# Patient Record
Sex: MALE | Race: WHITE | NOT HISPANIC OR LATINO | Employment: OTHER | URBAN - METROPOLITAN AREA
[De-identification: names, ages, dates, MRNs, and addresses within clinical notes are randomized per-mention and may not be internally consistent; named-entity substitution may affect disease eponyms.]

---

## 2017-01-05 RX ORDER — AMLODIPINE BESYLATE 10 MG/1
TABLET ORAL
Qty: 90 TABLET | Refills: 2 | Status: SHIPPED | OUTPATIENT
Start: 2017-01-05 | End: 2017-08-23 | Stop reason: SDUPTHER

## 2017-01-05 RX ORDER — FLUTICASONE PROPIONATE 50 MCG
SPRAY, SUSPENSION (ML) NASAL
Qty: 48 G | Refills: 3 | Status: SHIPPED | OUTPATIENT
Start: 2017-01-05 | End: 2017-10-23 | Stop reason: SDUPTHER

## 2017-01-24 RX ORDER — BLOOD SUGAR DIAGNOSTIC
STRIP MISCELLANEOUS
Qty: 300 EACH | Refills: 2 | Status: SHIPPED | OUTPATIENT
Start: 2017-01-24 | End: 2017-08-23 | Stop reason: SDUPTHER

## 2017-02-13 RX ORDER — LANCETS
EACH MISCELLANEOUS
Qty: 300 EACH | Refills: 2 | Status: SHIPPED | OUTPATIENT
Start: 2017-02-13 | End: 2020-03-02

## 2017-02-14 RX ORDER — BISOPROLOL FUMARATE AND HYDROCHLOROTHIAZIDE 2.5; 6.25 MG/1; MG/1
TABLET ORAL
Qty: 90 TABLET | Refills: 2 | Status: SHIPPED | OUTPATIENT
Start: 2017-02-14 | End: 2017-10-23 | Stop reason: SDUPTHER

## 2017-03-01 ENCOUNTER — OFFICE VISIT (OUTPATIENT)
Dept: INTERNAL MEDICINE | Facility: CLINIC | Age: 77
End: 2017-03-01

## 2017-03-01 VITALS
SYSTOLIC BLOOD PRESSURE: 120 MMHG | HEIGHT: 66 IN | BODY MASS INDEX: 25.88 KG/M2 | WEIGHT: 161 LBS | DIASTOLIC BLOOD PRESSURE: 65 MMHG | HEART RATE: 68 BPM

## 2017-03-01 DIAGNOSIS — I10 ESSENTIAL HYPERTENSION: ICD-10-CM

## 2017-03-01 DIAGNOSIS — I35.0 AORTIC VALVE STENOSIS, UNSPECIFIED ETIOLOGY: Primary | ICD-10-CM

## 2017-03-01 DIAGNOSIS — K57.30 DIVERTICULOSIS OF LARGE INTESTINE WITHOUT HEMORRHAGE: ICD-10-CM

## 2017-03-01 DIAGNOSIS — J30.9 ATOPIC RHINITIS: ICD-10-CM

## 2017-03-01 DIAGNOSIS — E78.49 OTHER HYPERLIPIDEMIA: ICD-10-CM

## 2017-03-01 DIAGNOSIS — F32.89 OTHER DEPRESSION: ICD-10-CM

## 2017-03-01 DIAGNOSIS — IMO0002 UNCONTROLLED TYPE 2 DIABETES MELLITUS WITH COMPLICATION, WITHOUT LONG-TERM CURRENT USE OF INSULIN: ICD-10-CM

## 2017-03-01 LAB — HBA1C MFR BLD: 6.5 %

## 2017-03-01 PROCEDURE — 99214 OFFICE O/P EST MOD 30 MIN: CPT | Performed by: INTERNAL MEDICINE

## 2017-03-01 PROCEDURE — 83036 HEMOGLOBIN GLYCOSYLATED A1C: CPT | Performed by: INTERNAL MEDICINE

## 2017-03-01 NOTE — PROGRESS NOTES
Patient is a 77 y.o. male who is here for a follow up of chronic conditions.  Chief Complaint   Patient presents with   • Hypertension   • Hyperlipidemia   • Diabetes         HPI:  Here for f/u.  Had to stop glyburide bc he was having hypoglycemia.  Energy level is fair.  Working out daily.  Watching his carb intake.  No dizziness or lightheadedness.  No abdominal pains.  No HA's.     History:    Patient Active Problem List   Diagnosis   • Atopic rhinitis   • Depression   • Uncontrolled type 2 diabetes mellitus   • Diverticulosis of intestine   • Hyperlipidemia   • Hypertension   • Aortic stenosis       Past Medical History   Diagnosis Date   • Community acquired pneumonia        Past Surgical History   Procedure Laterality Date   • Lipoma excision       s/p excision on chest   • Leg surgery Left 07/2016     Popliteal Artery stenting by Dr Hdz   • Leg surgery Left 07/2016     skin graft by Dr Nance       Current Outpatient Prescriptions on File Prior to Visit   Medication Sig   • ACCU-CHEK FASTCLIX LANCETS misc TEST THREE TIMES DAILY   • amLODIPine (NORVASC) 10 MG tablet TAKE 1 TABLET EVERY DAY   • benazepril (LOTENSIN) 40 MG tablet TAKE 1 TABLET DAILY.   • bisoprolol-hydrochlorothiazide (ZIAC) 2.5-6.25 MG per tablet TAKE 1 TABLET ONE TIME DAILY   • clopidogrel (PLAVIX) 75 MG tablet Take 1 tablet by mouth Daily. Stop aspirin   • fluticasone (FLONASE) 50 MCG/ACT nasal spray USE 1 SPRAY NASALLY TWICE DAILY   • metFORMIN (GLUCOPHAGE) 850 MG tablet TAKE 1 TABLET TWICE DAILY WITH MEALS   • PARoxetine (PAXIL) 20 MG tablet TAKE 1 TABLET DAILY   • simvastatin (ZOCOR) 40 MG tablet TAKE 1 TABLET DAILY AT BEDTIME.   • SITagliptin (JANUVIA) 100 MG tablet Take 0.5 tablets by mouth Daily.   • [DISCONTINUED] glipiZIDE (GLUCOTROL) 10 MG tablet Daily.   • ACCU-CHEK SMARTVIEW test strip TEST THREE TIMES DAILY     No current facility-administered medications on file prior to visit.        Family History   Problem Relation Age of  "Onset   • Diabetes Other    • Hypertension Other    • Stroke Other    • Coronary artery disease Other        Social History     Social History   • Marital status:      Spouse name: N/A   • Number of children: N/A   • Years of education: N/A     Occupational History   • Not on file.     Social History Main Topics   • Smoking status: Former Smoker   • Smokeless tobacco: Never Used   • Alcohol use Not on file   • Drug use: No   • Sexual activity: Not on file     Other Topics Concern   • Not on file     Social History Narrative         ROS:    Review of Systems   Constitutional: Negative for chills, diaphoresis, fatigue, fever and unexpected weight change.   HENT: Negative for congestion, ear pain, hearing loss, nosebleeds, postnasal drip, sinus pressure and sore throat.    Eyes: Negative for pain, discharge and itching.   Respiratory: Negative for cough, chest tightness, shortness of breath and wheezing.    Cardiovascular: Negative for chest pain, palpitations and leg swelling.   Gastrointestinal: Negative for abdominal distention, abdominal pain, blood in stool, constipation, diarrhea, nausea and vomiting.        2016 colonoscopy    Endocrine: Negative for polydipsia and polyuria.   Genitourinary: Negative for difficulty urinating, dysuria, frequency and hematuria.   Musculoskeletal: Negative for arthralgias, back pain, gait problem, joint swelling and myalgias.   Skin: Negative for rash and wound.   Neurological: Negative for dizziness, syncope, weakness and headaches.   Psychiatric/Behavioral: Negative for dysphoric mood and sleep disturbance. The patient is not nervous/anxious.        Visit Vitals   • /65   • Pulse 68   • Ht 66\" (167.6 cm)   • Wt 161 lb (73 kg)   • BMI 25.99 kg/m2       Physical Exam:    Physical Exam   Constitutional: He is oriented to person, place, and time. He appears well-developed and well-nourished.   HENT:   Head: Normocephalic and atraumatic.   Right Ear: External ear normal. "   Left Ear: External ear normal.   Mouth/Throat: Oropharynx is clear and moist.   Eyes: Conjunctivae and EOM are normal.   Neck: Normal range of motion. Neck supple.   Cardiovascular: Normal rate, regular rhythm and normal heart sounds.    Pulmonary/Chest: Effort normal and breath sounds normal.   Abdominal: Soft. Bowel sounds are normal.   Musculoskeletal: Normal range of motion.   Lymphadenopathy:     He has no cervical adenopathy.   Neurological: He is alert and oriented to person, place, and time.   Skin: Skin is warm and dry.   Psychiatric: He has a normal mood and affect. His behavior is normal. Thought content normal.       Procedure:      Discussion/Summary:  htn-stable  [facility]lipidemia-[facility]  dm-counseled on diet and exercise, labs today  depression-stable  [facility]  anemia-[facility]  rhinitis-flonase rx  pvd-stop asa and cont plavix  other-patient to get colonoscopy       Current Outpatient Prescriptions:   •  ACCU-CHEK FASTCLIX LANCETS misc, TEST THREE TIMES DAILY, Disp: 300 each, Rfl: 2  •  amLODIPine (NORVASC) 10 MG tablet, TAKE 1 TABLET EVERY DAY, Disp: 90 tablet, Rfl: 2  •  benazepril (LOTENSIN) 40 MG tablet, TAKE 1 TABLET DAILY., Disp: 90 tablet, Rfl: 2  •  bisoprolol-hydrochlorothiazide (ZIAC) 2.5-6.25 MG per tablet, TAKE 1 TABLET ONE TIME DAILY, Disp: 90 tablet, Rfl: 2  •  clopidogrel (PLAVIX) 75 MG tablet, Take 1 tablet by mouth Daily. Stop aspirin, Disp: 90 tablet, Rfl: 3  •  fluticasone (FLONASE) 50 MCG/ACT nasal spray, USE 1 SPRAY NASALLY TWICE DAILY, Disp: 48 g, Rfl: 3  •  metFORMIN (GLUCOPHAGE) 850 MG tablet, TAKE 1 TABLET TWICE DAILY WITH MEALS, Disp: 180 tablet, Rfl: 3  •  PARoxetine (PAXIL) 20 MG tablet, TAKE 1 TABLET DAILY, Disp: 90 tablet, Rfl: 2  •  simvastatin (ZOCOR) 40 MG tablet, TAKE 1 TABLET DAILY AT BEDTIME., Disp: 90 tablet, Rfl: 3  •  SITagliptin (JANUVIA) 100 MG tablet, Take 0.5 tablets by mouth Daily., Disp: 42 tablet, Rfl: 0  •  ACCU-CHEK SMARTVIEW test strip,  TEST THREE TIMES DAILY, Disp: 300 each, Rfl: 2        Derian was seen today for hypertension, hyperlipidemia and diabetes.    Diagnoses and all orders for this visit:    Aortic valve stenosis, unspecified etiology    Other hyperlipidemia    Secondary hypertension    Atopic rhinitis    Diverticulosis of large intestine without hemorrhage    Uncontrolled type 2 diabetes mellitus with complication, without long-term current use of insulin  -     POC Glycosylated Hemoglobin (Hb A1C)    Other depression

## 2017-03-20 RX ORDER — PAROXETINE HYDROCHLORIDE 20 MG/1
TABLET, FILM COATED ORAL
Qty: 90 TABLET | Refills: 2 | Status: SHIPPED | OUTPATIENT
Start: 2017-03-20 | End: 2017-11-15 | Stop reason: SDUPTHER

## 2017-04-18 ENCOUNTER — OFFICE VISIT (OUTPATIENT)
Dept: INTERNAL MEDICINE | Facility: CLINIC | Age: 77
End: 2017-04-18

## 2017-04-18 VITALS
HEIGHT: 66 IN | TEMPERATURE: 97.2 F | DIASTOLIC BLOOD PRESSURE: 80 MMHG | SYSTOLIC BLOOD PRESSURE: 130 MMHG | HEART RATE: 74 BPM | BODY MASS INDEX: 26.03 KG/M2 | OXYGEN SATURATION: 100 % | WEIGHT: 162 LBS | RESPIRATION RATE: 20 BRPM

## 2017-04-18 DIAGNOSIS — I35.0 AORTIC VALVE STENOSIS, UNSPECIFIED ETIOLOGY: Primary | ICD-10-CM

## 2017-04-18 DIAGNOSIS — F32.89 OTHER DEPRESSION: ICD-10-CM

## 2017-04-18 DIAGNOSIS — I10 ESSENTIAL HYPERTENSION: ICD-10-CM

## 2017-04-18 DIAGNOSIS — K57.30 DIVERTICULOSIS OF LARGE INTESTINE WITHOUT HEMORRHAGE: ICD-10-CM

## 2017-04-18 DIAGNOSIS — J30.9 ATOPIC RHINITIS: ICD-10-CM

## 2017-04-18 DIAGNOSIS — E78.49 OTHER HYPERLIPIDEMIA: ICD-10-CM

## 2017-04-18 DIAGNOSIS — IMO0002 UNCONTROLLED TYPE 2 DIABETES MELLITUS WITH COMPLICATION, WITHOUT LONG-TERM CURRENT USE OF INSULIN: ICD-10-CM

## 2017-04-18 LAB
BUN SERPL-MCNC: 20 MG/DL (ref 9–23)
BUN/CREAT SERPL: 28.6 (ref 7–25)
CALCIUM SERPL-MCNC: 10.4 MG/DL (ref 8.7–10.4)
CHLORIDE SERPL-SCNC: 108 MMOL/L (ref 99–109)
CO2 SERPL-SCNC: 26 MMOL/L (ref 20–31)
CREAT SERPL-MCNC: 0.7 MG/DL (ref 0.6–1.3)
ERYTHROCYTE [DISTWIDTH] IN BLOOD BY AUTOMATED COUNT: 12.8 % (ref 11.3–14.5)
GLUCOSE SERPL-MCNC: 164 MG/DL (ref 70–100)
HCT VFR BLD AUTO: 41.6 % (ref 38.9–50.9)
HGB BLD-MCNC: 13.5 G/DL (ref 13.1–17.5)
MCH RBC QN AUTO: 31.8 PG (ref 27–31)
MCHC RBC AUTO-ENTMCNC: 32.5 G/DL (ref 32–36)
MCV RBC AUTO: 98.1 FL (ref 80–99)
PLATELET # BLD AUTO: 226 10*3/MM3 (ref 150–450)
POTASSIUM SERPL-SCNC: 4.4 MMOL/L (ref 3.5–5.5)
RBC # BLD AUTO: 4.24 10*6/MM3 (ref 4.2–5.76)
SODIUM SERPL-SCNC: 141 MMOL/L (ref 132–146)
WBC # BLD AUTO: 6.56 10*3/MM3 (ref 3.5–10.8)

## 2017-04-18 PROCEDURE — 99214 OFFICE O/P EST MOD 30 MIN: CPT | Performed by: INTERNAL MEDICINE

## 2017-04-18 NOTE — PROGRESS NOTES
Patient is a 77 y.o. male who is here for a follow up of   Chief Complaint   Patient presents with   • Dizziness         HPI:  Here for f/u.  Has had increased episodes of dizziness.  Improved with increased fluid intake. Worst with activity.  BP in the 120's/70's.  No palpitations or CP.  No abdominal pains.  No edema.      History:    Patient Active Problem List   Diagnosis   • Atopic rhinitis   • Depression   • Uncontrolled type 2 diabetes mellitus   • Diverticulosis of intestine   • Hyperlipidemia   • Essential hypertension   • Aortic stenosis       Past Medical History:   Diagnosis Date   • Community acquired pneumonia        Past Surgical History:   Procedure Laterality Date   • LEG SURGERY Left 07/2016    Popliteal Artery stenting by Dr Hdz   • LEG SURGERY Left 07/2016    skin graft by Dr Nance   • LIPOMA EXCISION      s/p excision on chest       Current Outpatient Prescriptions on File Prior to Visit   Medication Sig   • ACCU-CHEK FASTCLIX LANCETS misc TEST THREE TIMES DAILY   • ACCU-CHEK SMARTVIEW test strip TEST THREE TIMES DAILY   • amLODIPine (NORVASC) 10 MG tablet TAKE 1 TABLET EVERY DAY   • benazepril (LOTENSIN) 40 MG tablet TAKE 1 TABLET DAILY.   • bisoprolol-hydrochlorothiazide (ZIAC) 2.5-6.25 MG per tablet TAKE 1 TABLET ONE TIME DAILY   • clopidogrel (PLAVIX) 75 MG tablet Take 1 tablet by mouth Daily. Stop aspirin   • fluticasone (FLONASE) 50 MCG/ACT nasal spray USE 1 SPRAY NASALLY TWICE DAILY   • metFORMIN (GLUCOPHAGE) 850 MG tablet TAKE 1 TABLET TWICE DAILY WITH MEALS   • PARoxetine (PAXIL) 20 MG tablet TAKE 1 TABLET DAILY   • simvastatin (ZOCOR) 40 MG tablet TAKE 1 TABLET DAILY AT BEDTIME.   • SITagliptin (JANUVIA) 100 MG tablet Take 0.5 tablets by mouth Daily.     No current facility-administered medications on file prior to visit.        Family History   Problem Relation Age of Onset   • Diabetes Other    • Hypertension Other    • Stroke Other    • Coronary artery disease Other        Social  "History     Social History   • Marital status:      Spouse name: N/A   • Number of children: N/A   • Years of education: N/A     Occupational History   • Not on file.     Social History Main Topics   • Smoking status: Former Smoker   • Smokeless tobacco: Never Used   • Alcohol use Not on file   • Drug use: No   • Sexual activity: Not on file     Other Topics Concern   • Not on file     Social History Narrative         ROS:    Review of Systems   Constitutional: Negative for chills, diaphoresis, fatigue, fever and unexpected weight change.   HENT: Negative for congestion, ear pain, hearing loss, nosebleeds, postnasal drip, sinus pressure and sore throat.    Eyes: Negative for pain, discharge and itching.   Respiratory: Negative for cough, chest tightness, shortness of breath and wheezing.    Cardiovascular: Negative for chest pain, palpitations and leg swelling.   Gastrointestinal: Negative for abdominal distention, abdominal pain, blood in stool, constipation, diarrhea, nausea and vomiting.        2016 colonoscopy    Endocrine: Negative for polydipsia and polyuria.   Genitourinary: Negative for difficulty urinating, dysuria, frequency and hematuria.   Musculoskeletal: Negative for arthralgias, back pain, gait problem, joint swelling and myalgias.   Skin: Negative for rash and wound.   Neurological: Positive for light-headedness. Negative for dizziness, syncope, weakness and headaches.   Psychiatric/Behavioral: Negative for dysphoric mood and sleep disturbance. The patient is not nervous/anxious.        /80  Pulse 74  Temp 97.2 °F (36.2 °C)  Resp 20  Ht 66\" (167.6 cm)  Wt 162 lb (73.5 kg)  SpO2 100%  BMI 26.15 kg/m2    Physical Exam:    Physical Exam   Constitutional: He is oriented to person, place, and time. He appears well-developed and well-nourished.   HENT:   Head: Normocephalic and atraumatic.   Right Ear: External ear normal.   Left Ear: External ear normal.   Mouth/Throat: Oropharynx is " clear and moist.   Eyes: Conjunctivae and EOM are normal.   Neck: Normal range of motion. Neck supple.   Cardiovascular: Normal rate, regular rhythm and normal heart sounds.    Pulmonary/Chest: Effort normal and breath sounds normal.   Abdominal: Soft. Bowel sounds are normal.   Musculoskeletal: Normal range of motion.   Lymphadenopathy:     He has no cervical adenopathy.   Neurological: He is alert and oriented to person, place, and time.   Skin: Skin is warm and dry.   Psychiatric: He has a normal mood and affect. His behavior is normal. Thought content normal.       Procedure:      Discussion/Summary:  htn-stable  hyperlipidemia-[facility]  dm-counseled on diet and exercise, labs at goal  depression-stable  [facility] done [facility]  anemia-check today  rhinitis-flonase rx prn and otc claritin/allegra  pvd-cont plavix  other-patient to get colonoscopy     Labs noted and dw patient      Current Outpatient Prescriptions:   •  ACCU-CHEK FASTCLIX LANCETS misc, TEST THREE TIMES DAILY, Disp: 300 each, Rfl: 2  •  ACCU-CHEK SMARTVIEW test strip, TEST THREE TIMES DAILY, Disp: 300 each, Rfl: 2  •  amLODIPine (NORVASC) 10 MG tablet, TAKE 1 TABLET EVERY DAY, Disp: 90 tablet, Rfl: 2  •  benazepril (LOTENSIN) 40 MG tablet, TAKE 1 TABLET DAILY., Disp: 90 tablet, Rfl: 2  •  bisoprolol-hydrochlorothiazide (ZIAC) 2.5-6.25 MG per tablet, TAKE 1 TABLET ONE TIME DAILY, Disp: 90 tablet, Rfl: 2  •  clopidogrel (PLAVIX) 75 MG tablet, Take 1 tablet by mouth Daily. Stop aspirin, Disp: 90 tablet, Rfl: 3  •  fluticasone (FLONASE) 50 MCG/ACT nasal spray, USE 1 SPRAY NASALLY TWICE DAILY, Disp: 48 g, Rfl: 3  •  metFORMIN (GLUCOPHAGE) 850 MG tablet, TAKE 1 TABLET TWICE DAILY WITH MEALS, Disp: 180 tablet, Rfl: 3  •  PARoxetine (PAXIL) 20 MG tablet, TAKE 1 TABLET DAILY, Disp: 90 tablet, Rfl: 2  •  simvastatin (ZOCOR) 40 MG tablet, TAKE 1 TABLET DAILY AT BEDTIME., Disp: 90 tablet, Rfl: 3  •  SITagliptin (JANUVIA) 100 MG tablet, Take 0.5 tablets by mouth  Daily., Disp: 42 tablet, Rfl: 0        Derian was seen today for dizziness.    Diagnoses and all orders for this visit:    Aortic valve stenosis, unspecified etiology    Essential hypertension  -     Basic Metabolic Panel  -     CBC (No Diff)    Other hyperlipidemia    Atopic rhinitis    Diverticulosis of large intestine without hemorrhage    Uncontrolled type 2 diabetes mellitus with complication, without long-term current use of insulin    Other depression

## 2017-04-28 RX ORDER — BENAZEPRIL HYDROCHLORIDE 40 MG/1
TABLET, FILM COATED ORAL
Qty: 90 TABLET | Refills: 2 | Status: SHIPPED | OUTPATIENT
Start: 2017-04-28 | End: 2017-11-15 | Stop reason: SDUPTHER

## 2017-04-28 RX ORDER — ISOPROPYL ALCOHOL 0.75 G/1
SWAB TOPICAL
Qty: 100 EACH | Refills: 3 | Status: SHIPPED | OUTPATIENT
Start: 2017-04-28 | End: 2018-05-08 | Stop reason: SDUPTHER

## 2017-05-04 ENCOUNTER — OFFICE VISIT (OUTPATIENT)
Dept: INTERNAL MEDICINE | Facility: CLINIC | Age: 77
End: 2017-05-04

## 2017-05-04 VITALS
BODY MASS INDEX: 26.36 KG/M2 | HEART RATE: 60 BPM | SYSTOLIC BLOOD PRESSURE: 140 MMHG | DIASTOLIC BLOOD PRESSURE: 80 MMHG | HEIGHT: 66 IN | WEIGHT: 164 LBS

## 2017-05-04 DIAGNOSIS — J30.9 ATOPIC RHINITIS: ICD-10-CM

## 2017-05-04 DIAGNOSIS — E78.49 OTHER HYPERLIPIDEMIA: ICD-10-CM

## 2017-05-04 DIAGNOSIS — I10 ESSENTIAL HYPERTENSION: ICD-10-CM

## 2017-05-04 DIAGNOSIS — IMO0002 UNCONTROLLED TYPE 2 DIABETES MELLITUS WITH COMPLICATION, WITHOUT LONG-TERM CURRENT USE OF INSULIN: ICD-10-CM

## 2017-05-04 DIAGNOSIS — F32.89 OTHER DEPRESSION: ICD-10-CM

## 2017-05-04 DIAGNOSIS — K57.30 DIVERTICULOSIS OF LARGE INTESTINE WITHOUT HEMORRHAGE: ICD-10-CM

## 2017-05-04 DIAGNOSIS — I35.0 AORTIC VALVE STENOSIS, UNSPECIFIED ETIOLOGY: Primary | ICD-10-CM

## 2017-05-04 PROCEDURE — 96160 PT-FOCUSED HLTH RISK ASSMT: CPT | Performed by: INTERNAL MEDICINE

## 2017-05-04 PROCEDURE — G0438 PPPS, INITIAL VISIT: HCPCS | Performed by: INTERNAL MEDICINE

## 2017-08-24 RX ORDER — BLOOD SUGAR DIAGNOSTIC
STRIP MISCELLANEOUS
Qty: 300 EACH | Refills: 2 | Status: SHIPPED | OUTPATIENT
Start: 2017-08-24 | End: 2018-12-24 | Stop reason: SDUPTHER

## 2017-08-24 RX ORDER — AMLODIPINE BESYLATE 10 MG/1
TABLET ORAL
Qty: 90 TABLET | Refills: 2 | Status: SHIPPED | OUTPATIENT
Start: 2017-08-24 | End: 2018-05-08 | Stop reason: SDUPTHER

## 2017-09-15 ENCOUNTER — OFFICE VISIT (OUTPATIENT)
Dept: INTERNAL MEDICINE | Facility: CLINIC | Age: 77
End: 2017-09-15

## 2017-09-15 VITALS
WEIGHT: 155 LBS | DIASTOLIC BLOOD PRESSURE: 64 MMHG | BODY MASS INDEX: 24.91 KG/M2 | HEART RATE: 60 BPM | SYSTOLIC BLOOD PRESSURE: 118 MMHG | HEIGHT: 66 IN

## 2017-09-15 DIAGNOSIS — I35.0 AORTIC VALVE STENOSIS, UNSPECIFIED ETIOLOGY: Primary | ICD-10-CM

## 2017-09-15 DIAGNOSIS — H61.23 BILATERAL IMPACTED CERUMEN: ICD-10-CM

## 2017-09-15 DIAGNOSIS — L97.309 DIABETIC ANKLE ULCER (HCC): ICD-10-CM

## 2017-09-15 DIAGNOSIS — N40.1 BENIGN NON-NODULAR PROSTATIC HYPERPLASIA WITH LOWER URINARY TRACT SYMPTOMS: ICD-10-CM

## 2017-09-15 DIAGNOSIS — K57.30 DIVERTICULOSIS OF LARGE INTESTINE WITHOUT HEMORRHAGE: ICD-10-CM

## 2017-09-15 DIAGNOSIS — IMO0002 UNCONTROLLED TYPE 2 DIABETES MELLITUS WITH COMPLICATION, WITHOUT LONG-TERM CURRENT USE OF INSULIN: ICD-10-CM

## 2017-09-15 DIAGNOSIS — E78.49 OTHER HYPERLIPIDEMIA: ICD-10-CM

## 2017-09-15 DIAGNOSIS — J30.9 ATOPIC RHINITIS: ICD-10-CM

## 2017-09-15 DIAGNOSIS — E11.622 DIABETIC ANKLE ULCER (HCC): ICD-10-CM

## 2017-09-15 DIAGNOSIS — I10 ESSENTIAL HYPERTENSION: ICD-10-CM

## 2017-09-15 DIAGNOSIS — F32.89 OTHER DEPRESSION: ICD-10-CM

## 2017-09-15 PROCEDURE — 99214 OFFICE O/P EST MOD 30 MIN: CPT | Performed by: INTERNAL MEDICINE

## 2017-09-15 PROCEDURE — 69209 REMOVE IMPACTED EAR WAX UNI: CPT | Performed by: INTERNAL MEDICINE

## 2017-09-15 RX ORDER — DOXAZOSIN 2 MG/1
2 TABLET ORAL NIGHTLY
Qty: 90 TABLET | Refills: 3 | Status: SHIPPED | OUTPATIENT
Start: 2017-09-15 | End: 2018-12-24 | Stop reason: SDUPTHER

## 2017-09-15 NOTE — PROGRESS NOTES
Patient is a 77 y.o. male who is here for a follow up of chronic conditions.  Chief Complaint   Patient presents with   • Hypertension   • Hyperlipidemia         HPI:  Here for f/u.  FSBS in the 120-160's in the am.  Continues to exercise on regular basis.  No dizziness or lightheadedness.  Urinates a lot at night.  Breathing is good.  No abdominal pains.  Feels his ear are full of wax.     History:    Patient Active Problem List   Diagnosis   • Atopic rhinitis   • Depression   • Uncontrolled type 2 diabetes mellitus   • Diverticulosis of intestine   • Hyperlipidemia   • Essential hypertension   • Aortic stenosis   • Bilateral impacted cerumen   • Benign non-nodular prostatic hyperplasia with lower urinary tract symptoms       Past Medical History:   Diagnosis Date   • Community acquired pneumonia        Past Surgical History:   Procedure Laterality Date   • LEG SURGERY Left 07/2016    Popliteal Artery stenting by Dr Hdz   • LEG SURGERY Left 07/2016    skin graft by Dr Nance   • LIPOMA EXCISION      s/p excision on chest       Current Outpatient Prescriptions on File Prior to Visit   Medication Sig   • amLODIPine (NORVASC) 10 MG tablet TAKE 1 TABLET EVERY DAY   • benazepril (LOTENSIN) 40 MG tablet TAKE 1 TABLET EVERY DAY   • bisoprolol-hydrochlorothiazide (ZIAC) 2.5-6.25 MG per tablet TAKE 1 TABLET ONE TIME DAILY   • clopidogrel (PLAVIX) 75 MG tablet Take 1 tablet by mouth Daily. Stop aspirin   • fluticasone (FLONASE) 50 MCG/ACT nasal spray USE 1 SPRAY NASALLY TWICE DAILY   • metFORMIN (GLUCOPHAGE) 850 MG tablet TAKE 1 TABLET TWICE DAILY WITH MEALS   • PARoxetine (PAXIL) 20 MG tablet TAKE 1 TABLET DAILY   • simvastatin (ZOCOR) 40 MG tablet TAKE 1 TABLET DAILY AT BEDTIME.   • SITagliptin (JANUVIA) 100 MG tablet Take 0.5 tablets by mouth Daily.   • ACCU-CHEK FASTCLIX LANCETS misc TEST THREE TIMES DAILY   • ACCU-CHEK SMARTVIEW test strip TEST THREE TIMES DAILY   • Alcohol Swabs (B-D SINGLE USE SWABS REGULAR) pads USE  FOR TESTING DAILY AS DIRECTED     No current facility-administered medications on file prior to visit.        Family History   Problem Relation Age of Onset   • Diabetes Other    • Hypertension Other    • Stroke Other    • Coronary artery disease Other        Social History     Social History   • Marital status:      Spouse name: N/A   • Number of children: N/A   • Years of education: N/A     Occupational History   • Not on file.     Social History Main Topics   • Smoking status: Former Smoker   • Smokeless tobacco: Never Used   • Alcohol use Not on file   • Drug use: No   • Sexual activity: Not on file     Other Topics Concern   • Not on file     Social History Narrative         ROS:    Review of Systems   Constitutional: Negative for chills, diaphoresis, fatigue, fever and unexpected weight change.   HENT: Negative for congestion, ear pain, hearing loss, nosebleeds, postnasal drip, sinus pressure and sore throat.    Eyes: Negative for pain, discharge and itching.   Respiratory: Negative for cough, chest tightness, shortness of breath and wheezing.    Cardiovascular: Negative for chest pain, palpitations and leg swelling.   Gastrointestinal: Negative for abdominal distention, abdominal pain, blood in stool, constipation, diarrhea, nausea and vomiting.        3/2016 colonoscopy by Dr García, with polyps times 2   Endocrine: Negative for polydipsia and polyuria.   Genitourinary: Positive for difficulty urinating, frequency and urgency. Negative for dysuria and hematuria.        [facility], 0.4 on [facility]/17   Musculoskeletal: Negative for arthralgias, back pain, gait problem, joint swelling and myalgias.   Skin: Negative for rash and wound.   Neurological: Positive for light-headedness. Negative for dizziness, syncope, weakness and headaches.   Psychiatric/Behavioral: Negative for dysphoric mood and sleep disturbance. The patient is not nervous/anxious.        /64 (BP Location: Left arm, Patient Position:  "Sitting)  Pulse 60  Ht 66\" (167.6 cm)  Wt 155 lb (70.3 kg)  BMI 25.02 kg/m2    Physical Exam:    Physical Exam   Constitutional: He is oriented to person, place, and time. He appears well-developed and well-nourished.   HENT:   Head: Normocephalic and atraumatic.   Right Ear: External ear normal.   Left Ear: External ear normal.   Mouth/Throat: Oropharynx is clear and moist.   Mild cerumen R>L   Eyes: Conjunctivae and EOM are normal.   Neck: Normal range of motion. Neck supple.   Cardiovascular: Normal rate and regular rhythm.    Murmur heard.  2/6 ANDREA   Pulmonary/Chest: Effort normal and breath sounds normal.   Abdominal: Soft. Bowel sounds are normal.   Musculoskeletal: Normal range of motion.   Lymphadenopathy:     He has no cervical adenopathy.   Neurological: He is alert and oriented to person, place, and time.   Skin: Skin is warm and dry.   Psychiatric: He has a normal mood and affect. His behavior is normal. Thought content normal.         Procedure:    Ear Cerumen Removal Instrumentation  Date/Time: 9/15/2017 4:32 PM  Performed by: AZUL ADHIKARI  Authorized by: AZUL ADHIKARI   Consent: Verbal consent obtained. Written consent not obtained.  Risks and benefits: risks, benefits and alternatives were discussed  Consent given by: patient  Patient understanding: patient states understanding of the procedure being performed  Patient consent: the patient's understanding of the procedure matches consent given  Procedure consent: procedure consent matches procedure scheduled  Relevant documents: relevant documents not present or verified  Test results: test results not available  Site marked: the operative site was not marked  Imaging studies: imaging studies not available  Patient identity confirmed: verbally with patient    Anesthesia:  Local Anesthetic: none  Location details: right ear and left ear  Procedure type: irrigation    Sedation:  Patient sedated: no  Patient tolerance: Patient tolerated the " procedure well with no immediate complications        Discussion/Summary:    htn-stable  hyperlipidemia-[facility]  dm-counseled on diet and exercise, labs at goal  depression-stable  [facility] done at [facility]  Anemia-labs noted  rhinitis-flonase rx prn and otc claritin/allegra  pvd-cont plavix  Cerumen-irrigated bilaterally  BPH-add cardura      Labs noted and dw patient      Current Outpatient Prescriptions:   •  amLODIPine (NORVASC) 10 MG tablet, TAKE 1 TABLET EVERY DAY, Disp: 90 tablet, Rfl: 2  •  benazepril (LOTENSIN) 40 MG tablet, TAKE 1 TABLET EVERY DAY, Disp: 90 tablet, Rfl: 2  •  bisoprolol-hydrochlorothiazide (ZIAC) 2.5-6.25 MG per tablet, TAKE 1 TABLET ONE TIME DAILY, Disp: 90 tablet, Rfl: 2  •  clopidogrel (PLAVIX) 75 MG tablet, Take 1 tablet by mouth Daily. Stop aspirin, Disp: 90 tablet, Rfl: 3  •  fluticasone (FLONASE) 50 MCG/ACT nasal spray, USE 1 SPRAY NASALLY TWICE DAILY, Disp: 48 g, Rfl: 3  •  metFORMIN (GLUCOPHAGE) 850 MG tablet, TAKE 1 TABLET TWICE DAILY WITH MEALS, Disp: 180 tablet, Rfl: 3  •  PARoxetine (PAXIL) 20 MG tablet, TAKE 1 TABLET DAILY, Disp: 90 tablet, Rfl: 2  •  simvastatin (ZOCOR) 40 MG tablet, TAKE 1 TABLET DAILY AT BEDTIME., Disp: 90 tablet, Rfl: 3  •  SITagliptin (JANUVIA) 100 MG tablet, Take 0.5 tablets by mouth Daily., Disp: 42 tablet, Rfl: 0  •  ACCU-CHEK FASTCLIX LANCETS misc, TEST THREE TIMES DAILY, Disp: 300 each, Rfl: 2  •  ACCU-CHEK SMARTVIEW test strip, TEST THREE TIMES DAILY, Disp: 300 each, Rfl: 2  •  Alcohol Swabs (B-D SINGLE USE SWABS REGULAR) pads, USE FOR TESTING DAILY AS DIRECTED, Disp: 100 each, Rfl: 3  •  doxazosin (CARDURA) 2 MG tablet, Take 1 tablet by mouth Every Night., Disp: 90 tablet, Rfl: 3        Derian was seen today for hypertension and hyperlipidemia.    Diagnoses and all orders for this visit:    Aortic valve stenosis, unspecified etiology    Essential hypertension    Other hyperlipidemia    Atopic rhinitis    Diverticulosis of large intestine without  hemorrhage    Diabetic ankle ulcer    Uncontrolled type 2 diabetes mellitus with complication, without long-term current use of insulin    Bilateral impacted cerumen  -     Ear Cerumen Removal    Other depression    Benign non-nodular prostatic hyperplasia with lower urinary tract symptoms  -     doxazosin (CARDURA) 2 MG tablet; Take 1 tablet by mouth Every Night.

## 2017-10-23 DIAGNOSIS — I73.9 PVD (PERIPHERAL VASCULAR DISEASE) (HCC): ICD-10-CM

## 2017-10-23 RX ORDER — FLUTICASONE PROPIONATE 50 MCG
SPRAY, SUSPENSION (ML) NASAL
Qty: 48 G | Refills: 3 | Status: SHIPPED | OUTPATIENT
Start: 2017-10-23 | End: 2018-09-27 | Stop reason: SDUPTHER

## 2017-10-23 RX ORDER — SIMVASTATIN 40 MG
TABLET ORAL
Qty: 90 TABLET | Refills: 3 | Status: SHIPPED | OUTPATIENT
Start: 2017-10-23 | End: 2018-09-27 | Stop reason: SDUPTHER

## 2017-10-23 RX ORDER — BISOPROLOL FUMARATE AND HYDROCHLOROTHIAZIDE 2.5; 6.25 MG/1; MG/1
TABLET ORAL
Qty: 90 TABLET | Refills: 2 | Status: SHIPPED | OUTPATIENT
Start: 2017-10-23 | End: 2018-05-08 | Stop reason: SDUPTHER

## 2017-10-23 RX ORDER — CLOPIDOGREL BISULFATE 75 MG/1
TABLET ORAL
Qty: 90 TABLET | Refills: 3 | Status: SHIPPED | OUTPATIENT
Start: 2017-10-23 | End: 2018-09-27 | Stop reason: SDUPTHER

## 2017-11-16 RX ORDER — PAROXETINE HYDROCHLORIDE 20 MG/1
TABLET, FILM COATED ORAL
Qty: 90 TABLET | Refills: 2 | Status: SHIPPED | OUTPATIENT
Start: 2017-11-16 | End: 2018-05-09 | Stop reason: SDUPTHER

## 2017-11-16 RX ORDER — BENAZEPRIL HYDROCHLORIDE 40 MG/1
TABLET, FILM COATED ORAL
Qty: 90 TABLET | Refills: 2 | Status: SHIPPED | OUTPATIENT
Start: 2017-11-16 | End: 2018-07-05 | Stop reason: SDUPTHER

## 2018-02-14 ENCOUNTER — TELEPHONE (OUTPATIENT)
Dept: INTERNAL MEDICINE | Facility: CLINIC | Age: 78
End: 2018-02-14

## 2018-03-27 ENCOUNTER — OFFICE VISIT (OUTPATIENT)
Dept: INTERNAL MEDICINE | Facility: CLINIC | Age: 78
End: 2018-03-27

## 2018-03-27 VITALS
HEIGHT: 66 IN | BODY MASS INDEX: 25.88 KG/M2 | DIASTOLIC BLOOD PRESSURE: 70 MMHG | SYSTOLIC BLOOD PRESSURE: 124 MMHG | WEIGHT: 161 LBS | HEART RATE: 68 BPM

## 2018-03-27 DIAGNOSIS — H61.23 BILATERAL IMPACTED CERUMEN: ICD-10-CM

## 2018-03-27 DIAGNOSIS — J30.89 CHRONIC NON-SEASONAL ALLERGIC RHINITIS, UNSPECIFIED TRIGGER: ICD-10-CM

## 2018-03-27 DIAGNOSIS — F32.89 OTHER DEPRESSION: ICD-10-CM

## 2018-03-27 DIAGNOSIS — N40.1 BENIGN NON-NODULAR PROSTATIC HYPERPLASIA WITH LOWER URINARY TRACT SYMPTOMS: ICD-10-CM

## 2018-03-27 DIAGNOSIS — I10 ESSENTIAL HYPERTENSION: ICD-10-CM

## 2018-03-27 DIAGNOSIS — IMO0002 UNCONTROLLED TYPE 2 DIABETES MELLITUS WITH COMPLICATION, WITHOUT LONG-TERM CURRENT USE OF INSULIN: ICD-10-CM

## 2018-03-27 DIAGNOSIS — E78.49 OTHER HYPERLIPIDEMIA: ICD-10-CM

## 2018-03-27 DIAGNOSIS — I35.0 AORTIC VALVE STENOSIS, ETIOLOGY OF CARDIAC VALVE DISEASE UNSPECIFIED: Primary | ICD-10-CM

## 2018-03-27 DIAGNOSIS — K57.30 DIVERTICULOSIS OF LARGE INTESTINE WITHOUT HEMORRHAGE: ICD-10-CM

## 2018-03-27 PROCEDURE — 99214 OFFICE O/P EST MOD 30 MIN: CPT | Performed by: INTERNAL MEDICINE

## 2018-03-27 NOTE — PROGRESS NOTES
Patient is a 78 y.o. male who is here for a follow up of chronic conditions.  Chief Complaint   Patient presents with   • Hypertension   • Hyperlipidemia         HPI:  Here for f/u.  FSBS are good.  Continues on Januvia and metformin.  Some nocturia.  No HA's.  No abdominal pains.  No dizziness or lightheadedness.  No fever or chills.      History:    Patient Active Problem List   Diagnosis   • Atopic rhinitis   • Depression   • Uncontrolled type 2 diabetes mellitus   • Diverticulosis of intestine   • Hyperlipidemia   • Essential hypertension   • Aortic stenosis   • Bilateral impacted cerumen   • Benign non-nodular prostatic hyperplasia with lower urinary tract symptoms       Past Medical History:   Diagnosis Date   • Community acquired pneumonia        Past Surgical History:   Procedure Laterality Date   • LEG SURGERY Left 07/2016    Popliteal Artery stenting by Dr Hdz   • LEG SURGERY Left 07/2016    skin graft by Dr Nance   • LIPOMA EXCISION      s/p excision on chest       Current Outpatient Prescriptions on File Prior to Visit   Medication Sig   • amLODIPine (NORVASC) 10 MG tablet TAKE 1 TABLET EVERY DAY   • benazepril (LOTENSIN) 40 MG tablet TAKE 1 TABLET EVERY DAY   • bisoprolol-hydrochlorothiazide (ZIAC) 2.5-6.25 MG per tablet TAKE 1 TABLET EVERY DAY   • clopidogrel (PLAVIX) 75 MG tablet TAKE 1 TABLET EVERY DAY (STOP ASPIRIN)   • doxazosin (CARDURA) 2 MG tablet Take 1 tablet by mouth Every Night.   • fluticasone (FLONASE) 50 MCG/ACT nasal spray USE 1 SPRAY NASALLY TWICE DAILY   • metFORMIN (GLUCOPHAGE) 850 MG tablet TAKE 1 TABLET TWICE DAILY WITH MEALS   • PARoxetine (PAXIL) 20 MG tablet TAKE 1 TABLET EVERY DAY   • simvastatin (ZOCOR) 40 MG tablet TAKE 1 TABLET AT BEDTIME   • SITagliptin (JANUVIA) 100 MG tablet Take 0.5 tablets by mouth Daily.   • ACCU-CHEK FASTCLIX LANCETS misc TEST THREE TIMES DAILY   • ACCU-CHEK SMARTVIEW test strip TEST THREE TIMES DAILY   • Alcohol Swabs (B-D SINGLE USE SWABS REGULAR)  pads USE FOR TESTING DAILY AS DIRECTED     No current facility-administered medications on file prior to visit.        Family History   Problem Relation Age of Onset   • Diabetes Other    • Hypertension Other    • Stroke Other    • Coronary artery disease Other        Social History     Social History   • Marital status:      Spouse name: N/A   • Number of children: N/A   • Years of education: N/A     Occupational History   • Not on file.     Social History Main Topics   • Smoking status: Former Smoker   • Smokeless tobacco: Never Used   • Alcohol use Not on file   • Drug use: No   • Sexual activity: Not on file     Other Topics Concern   • Not on file     Social History Narrative   • No narrative on file         ROS:    Review of Systems   Constitutional: Negative for chills, diaphoresis, fatigue, fever and unexpected weight change.   HENT: Negative for congestion, ear pain, hearing loss, nosebleeds, postnasal drip, sinus pressure and sore throat.    Eyes: Negative for pain, discharge and itching.   Respiratory: Negative for cough, chest tightness, shortness of breath and wheezing.    Cardiovascular: Negative for chest pain, palpitations and leg swelling.   Gastrointestinal: Negative for abdominal distention, abdominal pain, blood in stool, constipation, diarrhea, nausea and vomiting.        3/2016 colonoscopy by Dr García, with polyps times 2   Endocrine: Negative for polydipsia and polyuria.   Genitourinary: Positive for difficulty urinating, frequency and urgency. Negative for dysuria and hematuria.        [facility]   Musculoskeletal: Negative for arthralgias, back pain, gait problem, joint swelling and myalgias.   Skin: Negative for rash and wound.   Neurological: Positive for light-headedness. Negative for dizziness, syncope, weakness and headaches.   Psychiatric/Behavioral: Negative for dysphoric mood and sleep disturbance. The patient is not nervous/anxious.        /70 (BP Location:  "Left arm, Patient Position: Sitting)   Pulse 68   Ht 167.6 cm (65.98\")   Wt 73 kg (161 lb)   BMI 26.00 kg/m²     Physical Exam:    Physical Exam   Constitutional: He is oriented to person, place, and time. He appears well-developed and well-nourished.   HENT:   Head: Normocephalic and atraumatic.   Right Ear: External ear normal.   Left Ear: External ear normal.   Mouth/Throat: Oropharynx is clear and moist.   Mild cerumen R>L   Eyes: Conjunctivae and EOM are normal.   Neck: Normal range of motion. Neck supple.   Cardiovascular: Normal rate and regular rhythm.    Murmur heard.  2/6 ANDREA   Pulmonary/Chest: Effort normal and breath sounds normal.   Abdominal: Soft. Bowel sounds are normal.   Musculoskeletal: Normal range of motion.   Lymphadenopathy:     He has no cervical adenopathy.   Neurological: He is alert and oriented to person, place, and time.   Skin: Skin is warm and dry.   Psychiatric: He has a normal mood and affect. His behavior is normal. Thought content normal.       Procedure:      Discussion/Summary:    htn-stable  hyperlipidemia-[facility]  dm-counseled on diet and exercise, labs at goal  depression-stable  [facility]  Anemia-labs noted  rhinitis-flonase rx prn and otc claritin/allegra  pvd-cont plavix  Cerumen-debrox prn  BPH-cont cardura      Labs noted and dw patient    Current Outpatient Prescriptions:   •  amLODIPine (NORVASC) 10 MG tablet, TAKE 1 TABLET EVERY DAY, Disp: 90 tablet, Rfl: 2  •  benazepril (LOTENSIN) 40 MG tablet, TAKE 1 TABLET EVERY DAY, Disp: 90 tablet, Rfl: 2  •  bisoprolol-hydrochlorothiazide (ZIAC) 2.5-6.25 MG per tablet, TAKE 1 TABLET EVERY DAY, Disp: 90 tablet, Rfl: 2  •  clopidogrel (PLAVIX) 75 MG tablet, TAKE 1 TABLET EVERY DAY (STOP ASPIRIN), Disp: 90 tablet, Rfl: 3  •  doxazosin (CARDURA) 2 MG tablet, Take 1 tablet by mouth Every Night., Disp: 90 tablet, Rfl: 3  •  fluticasone (FLONASE) 50 MCG/ACT nasal spray, USE 1 SPRAY NASALLY TWICE DAILY, Disp: 48 g, Rfl: " 3  •  metFORMIN (GLUCOPHAGE) 850 MG tablet, TAKE 1 TABLET TWICE DAILY WITH MEALS, Disp: 180 tablet, Rfl: 3  •  PARoxetine (PAXIL) 20 MG tablet, TAKE 1 TABLET EVERY DAY, Disp: 90 tablet, Rfl: 2  •  simvastatin (ZOCOR) 40 MG tablet, TAKE 1 TABLET AT BEDTIME, Disp: 90 tablet, Rfl: 3  •  SITagliptin (JANUVIA) 100 MG tablet, Take 0.5 tablets by mouth Daily., Disp: 42 tablet, Rfl: 0  •  ACCU-CHEK FASTCLIX LANCETS misc, TEST THREE TIMES DAILY, Disp: 300 each, Rfl: 2  •  ACCU-CHEK SMARTVIEW test strip, TEST THREE TIMES DAILY, Disp: 300 each, Rfl: 2  •  Alcohol Swabs (B-D SINGLE USE SWABS REGULAR) pads, USE FOR TESTING DAILY AS DIRECTED, Disp: 100 each, Rfl: 3        Derian was seen today for hypertension and hyperlipidemia.    Diagnoses and all orders for this visit:    Aortic valve stenosis, etiology of cardiac valve disease unspecified    Essential hypertension    Other hyperlipidemia    Chronic non-seasonal allergic rhinitis, unspecified trigger    Diverticulosis of large intestine without hemorrhage    Uncontrolled type 2 diabetes mellitus with complication, without long-term current use of insulin    Bilateral impacted cerumen    Benign non-nodular prostatic hyperplasia with lower urinary tract symptoms    Other depression

## 2018-05-08 RX ORDER — BISOPROLOL FUMARATE AND HYDROCHLOROTHIAZIDE 2.5; 6.25 MG/1; MG/1
TABLET ORAL
Qty: 90 TABLET | Refills: 2 | Status: SHIPPED | OUTPATIENT
Start: 2018-05-08 | End: 2019-10-04

## 2018-05-08 RX ORDER — AMLODIPINE BESYLATE 10 MG/1
TABLET ORAL
Qty: 90 TABLET | Refills: 2 | Status: SHIPPED | OUTPATIENT
Start: 2018-05-08 | End: 2019-10-04

## 2018-05-08 RX ORDER — ISOPROPYL ALCOHOL 0.75 G/1
SWAB TOPICAL
Qty: 100 EACH | Refills: 3 | Status: SHIPPED | OUTPATIENT
Start: 2018-05-08 | End: 2020-06-05 | Stop reason: SDUPTHER

## 2018-05-10 RX ORDER — PAROXETINE HYDROCHLORIDE 20 MG/1
TABLET, FILM COATED ORAL
Qty: 90 TABLET | Refills: 2 | Status: SHIPPED | OUTPATIENT
Start: 2018-05-10 | End: 2019-03-03 | Stop reason: SDUPTHER

## 2018-07-05 RX ORDER — BENAZEPRIL HYDROCHLORIDE 40 MG/1
40 TABLET, FILM COATED ORAL DAILY
Qty: 10 TABLET | Refills: 0 | Status: SHIPPED | OUTPATIENT
Start: 2018-07-05 | End: 2018-09-27 | Stop reason: SDUPTHER

## 2018-08-17 ENCOUNTER — OFFICE VISIT (OUTPATIENT)
Dept: INTERNAL MEDICINE | Facility: CLINIC | Age: 78
End: 2018-08-17

## 2018-08-17 VITALS
SYSTOLIC BLOOD PRESSURE: 120 MMHG | HEART RATE: 64 BPM | WEIGHT: 161 LBS | DIASTOLIC BLOOD PRESSURE: 60 MMHG | BODY MASS INDEX: 25.88 KG/M2 | HEIGHT: 66 IN

## 2018-08-17 DIAGNOSIS — K57.30 DIVERTICULOSIS OF LARGE INTESTINE WITHOUT HEMORRHAGE: ICD-10-CM

## 2018-08-17 DIAGNOSIS — J30.89 CHRONIC NON-SEASONAL ALLERGIC RHINITIS, UNSPECIFIED TRIGGER: ICD-10-CM

## 2018-08-17 DIAGNOSIS — E78.49 OTHER HYPERLIPIDEMIA: ICD-10-CM

## 2018-08-17 DIAGNOSIS — N40.1 BENIGN NON-NODULAR PROSTATIC HYPERPLASIA WITH LOWER URINARY TRACT SYMPTOMS: ICD-10-CM

## 2018-08-17 DIAGNOSIS — I10 ESSENTIAL HYPERTENSION: ICD-10-CM

## 2018-08-17 DIAGNOSIS — I35.0 AORTIC VALVE STENOSIS, ETIOLOGY OF CARDIAC VALVE DISEASE UNSPECIFIED: Primary | ICD-10-CM

## 2018-08-17 DIAGNOSIS — F32.89 OTHER DEPRESSION: ICD-10-CM

## 2018-08-17 DIAGNOSIS — IMO0002 UNCONTROLLED TYPE 2 DIABETES MELLITUS WITH COMPLICATION, WITHOUT LONG-TERM CURRENT USE OF INSULIN: ICD-10-CM

## 2018-08-17 PROCEDURE — 99397 PER PM REEVAL EST PAT 65+ YR: CPT | Performed by: INTERNAL MEDICINE

## 2018-08-17 PROCEDURE — G0439 PPPS, SUBSEQ VISIT: HCPCS | Performed by: INTERNAL MEDICINE

## 2018-08-17 PROCEDURE — 96160 PT-FOCUSED HLTH RISK ASSMT: CPT | Performed by: INTERNAL MEDICINE

## 2018-08-17 NOTE — PROGRESS NOTES
QUICK REFERENCE INFORMATION:  The ABCs of the Annual Wellness Visit    Subsequent Medicare Wellness Visit    HEALTH RISK ASSESSMENT    1940    Recent Hospitalizations:  No hospitalization(s) within the last year..        Current Medical Providers:  Patient Care Team:  Diaz Lee MD as PCP - General        Smoking Status:  History   Smoking Status   • Former Smoker   Smokeless Tobacco   • Never Used       Alcohol Consumption:  History   Alcohol use Not on file       Depression Screen:   PHQ-2/PHQ-9 Depression Screening 8/17/2018   Little interest or pleasure in doing things 0   Feeling down, depressed, or hopeless 0   Trouble falling or staying asleep, or sleeping too much 0   Feeling tired or having little energy 0   Poor appetite or overeating 0   Feeling bad about yourself - or that you are a failure or have let yourself or your family down 0   Trouble concentrating on things, such as reading the newspaper or watching television 0   Moving or speaking so slowly that other people could have noticed. Or the opposite - being so fidgety or restless that you have been moving around a lot more than usual 0   Thoughts that you would be better off dead, or of hurting yourself in some way 0   Total Score 0   If you checked off any problems, how difficult have these problems made it for you to do your work, take care of things at home, or get along with other people? -       Health Habits and Functional and Cognitive Screening:  Functional & Cognitive Status 8/17/2018   Do you have difficulty preparing food and eating? No   Do you have difficulty bathing yourself, getting dressed or grooming yourself? No   Do you have difficulty using the toilet? No   Do you have difficulty moving around from place to place? No   Do you have trouble with steps or getting out of a bed or a chair? No   In the past year have you fallen or experienced a near fall? No   Current Diet Well Balanced Diet   Dental Exam Up to date   Eye  Exam Up to date   Exercise (times per week) 5 times per week   Current Exercise Activities Include Aerobics   Do you need help using the phone?  No   Are you deaf or do you have serious difficulty hearing?  No   Do you need help with transportation? No   Do you need help shopping? No   Do you need help preparing meals?  No   Do you need help with housework?  No   Do you need help with laundry? No   Do you need help taking your medications? No   Do you need help managing money? No   Do you ever drive or ride in a car without wearing a seat belt? No   Have you felt unusual stress, anger or loneliness in the last month? No   Who do you live with? Spouse   If you need help, do you have trouble finding someone available to you? No   Have you been bothered in the last four weeks by sexual problems? No   Do you have difficulty concentrating, remembering or making decisions? No           Does the patient have evidence of cognitive impairment? No    Aspirin use counseling: On clopidrogel as an alternative (due to ASA contraindication)      Recent Lab Results:  CMP:  Lab Results   Component Value Date     (H) 04/18/2017    BUN 20 04/18/2017    CREATININE 0.70 04/18/2017    EGFRIFNONA 109 04/18/2017    EGFRIFAFRI 132 04/18/2017    BCR 28.6 (H) 04/18/2017     04/18/2017    K 4.4 04/18/2017    CO2 26.0 04/18/2017    CALCIUM 10.4 04/18/2017     Lipid Panel:     HbA1c:  Lab Results   Component Value Date    HGBA1C 6.5 03/01/2017       Visual Acuity:  No exam data present    Age-appropriate Screening Schedule:  Refer to the list below for future screening recommendations based on patient's age, sex and/or medical conditions. Orders for these recommended tests are listed in the plan section. The patient has been provided with a written plan.    Health Maintenance   Topic Date Due   • URINE MICROALBUMIN  1940   • TDAP/TD VACCINES (1 - Tdap) 01/07/1958   • ZOSTER VACCINE (1 of 2) 01/07/1989   • PNEUMOCOCCAL VACCINES  (65+ LOW/MEDIUM RISK) (1 of 2 - PCV13) 01/07/2004   • LIPID PANEL  12/01/2016   • HEMOGLOBIN A1C  09/01/2017   • INFLUENZA VACCINE  08/01/2018        Subjective   History of Present Illness    Derian Joya is a 78 y.o. male who presents for an Subsequent Wellness Visit.    The following portions of the patient's history were reviewed and updated as appropriate: current medications, past family history, past medical history, past social history, past surgical history and problem list.    Outpatient Medications Prior to Visit   Medication Sig Dispense Refill   • amLODIPine (NORVASC) 10 MG tablet TAKE 1 TABLET EVERY DAY 90 tablet 2   • benazepril (LOTENSIN) 40 MG tablet Take 1 tablet by mouth Daily. 10 tablet 0   • bisoprolol-hydrochlorothiazide (ZIAC) 2.5-6.25 MG per tablet TAKE 1 TABLET EVERY DAY 90 tablet 2   • clopidogrel (PLAVIX) 75 MG tablet TAKE 1 TABLET EVERY DAY (STOP ASPIRIN) 90 tablet 3   • doxazosin (CARDURA) 2 MG tablet Take 1 tablet by mouth Every Night. 90 tablet 3   • fluticasone (FLONASE) 50 MCG/ACT nasal spray USE 1 SPRAY NASALLY TWICE DAILY 48 g 3   • metFORMIN (GLUCOPHAGE) 850 MG tablet TAKE 1 TABLET TWICE DAILY WITH MEALS 180 tablet 3   • PARoxetine (PAXIL) 20 MG tablet TAKE 1 TABLET EVERY DAY 90 tablet 2   • simvastatin (ZOCOR) 40 MG tablet TAKE 1 TABLET AT BEDTIME 90 tablet 3   • SITagliptin (JANUVIA) 100 MG tablet Take 0.5 tablets by mouth Daily. 42 tablet 0   • ACCU-CHEK FASTCLIX LANCETS misc TEST THREE TIMES DAILY 300 each 2   • ACCU-CHEK SMARTVIEW test strip TEST THREE TIMES DAILY 300 each 2   • Alcohol Swabs (B-D SINGLE USE SWABS REGULAR) pads USE FOR TESTING DAILY AS DIRECTED 100 each 3     No facility-administered medications prior to visit.        Patient Active Problem List   Diagnosis   • Atopic rhinitis   • Depression   • Uncontrolled type 2 diabetes mellitus (CMS/HCC)   • Diverticulosis of intestine   • Hyperlipidemia   • Essential hypertension   • Aortic stenosis   • Bilateral  impacted cerumen   • Benign non-nodular prostatic hyperplasia with lower urinary tract symptoms       Advance Care Planning:  has NO advance directive - not interested in additional information    Identification of Risk Factors:  Risk factors include: weight , cardiovascular risk and hearing limitations.    Review of Systems   Constitutional: Negative for chills, diaphoresis, fatigue, fever and unexpected weight change.   HENT: Negative for congestion, ear pain, hearing loss, nosebleeds, postnasal drip, sinus pressure and sore throat.    Eyes: Negative for pain, discharge and itching.   Respiratory: Negative for cough, chest tightness, shortness of breath and wheezing.    Cardiovascular: Negative for chest pain, palpitations and leg swelling.   Gastrointestinal: Negative for abdominal distention, abdominal pain, blood in stool, constipation, diarrhea, nausea and vomiting.        3/2016 colonoscopy by Dr García, with polyps times 2   Endocrine: Negative for polydipsia and polyuria.   Genitourinary: Positive for difficulty urinating, frequency and urgency. Negative for dysuria and hematuria.        [facility]   Musculoskeletal: Negative for arthralgias, back pain, gait problem, joint swelling and myalgias.   Skin: Negative for rash and wound.   Neurological: Positive for light-headedness. Negative for dizziness, syncope, weakness and headaches.   Psychiatric/Behavioral: Negative for dysphoric mood and sleep disturbance. The patient is not nervous/anxious.        Compared to one year ago, the patient feels his physical health is better.  Compared to one year ago, the patient feels his mental health is better.    Objective     Physical Exam   Constitutional: He is oriented to person, place, and time. He appears well-developed and well-nourished.   HENT:   Head: Normocephalic and atraumatic.   Right Ear: External ear normal.   Left Ear: External ear normal.   Mouth/Throat: Oropharynx is clear and moist.   Mild  "cerumen R>L   Eyes: Conjunctivae and EOM are normal.   Neck: Normal range of motion. Neck supple.   Cardiovascular: Normal rate and regular rhythm.    Murmur heard.  2/6 ANDREA   Pulmonary/Chest: Effort normal and breath sounds normal.   Abdominal: Soft. Bowel sounds are normal.   Musculoskeletal: Normal range of motion.   Lymphadenopathy:     He has no cervical adenopathy.   Neurological: He is alert and oriented to person, place, and time.   Skin: Skin is warm and dry.   Psychiatric: He has a normal mood and affect. His behavior is normal. Thought content normal.       Vitals:    08/17/18 1501 08/17/18 1523   BP: 120/64 120/60   BP Location: Left arm    Patient Position: Sitting    Pulse: 64    Weight: 73 kg (161 lb)    Height: 167.6 cm (65.98\")    PainSc: 0-No pain        Patient's Body mass index is 26 kg/m². BMI is above normal parameters. Recommendations include: exercise counseling and nutrition counseling.      Assessment/Plan   Patient Self-Management and Personalized Health Advice  The patient has been provided with information about: diet, exercise, weight management and alcohol use and preventive services including:   · Influenza vaccine, Pneumococcal vaccine , Prostate cancer screening discussed, Zostavax vaccine (Herpes Zoster).    Visit Diagnoses:    ICD-10-CM ICD-9-CM   1. Aortic valve stenosis, etiology of cardiac valve disease unspecified I35.0 424.1   2. Essential hypertension I10 401.9   3. Other hyperlipidemia E78.4 272.4   4. Chronic non-seasonal allergic rhinitis, unspecified trigger J30.89 477.9   5. Diverticulosis of large intestine without hemorrhage K57.30 562.10   6. Uncontrolled type 2 diabetes mellitus with complication, without long-term current use of insulin (CMS/formerly [facility]) E11.8 250.82    E11.65    7. Benign non-nodular prostatic hyperplasia with lower urinary tract symptoms N40.1 600.91   8. Other depression F32.89 311       No orders of the defined types were placed in this " encounter.      Outpatient Encounter Prescriptions as of 8/17/2018   Medication Sig Dispense Refill   • amLODIPine (NORVASC) 10 MG tablet TAKE 1 TABLET EVERY DAY 90 tablet 2   • benazepril (LOTENSIN) 40 MG tablet Take 1 tablet by mouth Daily. 10 tablet 0   • bisoprolol-hydrochlorothiazide (ZIAC) 2.5-6.25 MG per tablet TAKE 1 TABLET EVERY DAY 90 tablet 2   • clopidogrel (PLAVIX) 75 MG tablet TAKE 1 TABLET EVERY DAY (STOP ASPIRIN) 90 tablet 3   • doxazosin (CARDURA) 2 MG tablet Take 1 tablet by mouth Every Night. 90 tablet 3   • fluticasone (FLONASE) 50 MCG/ACT nasal spray USE 1 SPRAY NASALLY TWICE DAILY 48 g 3   • metFORMIN (GLUCOPHAGE) 850 MG tablet TAKE 1 TABLET TWICE DAILY WITH MEALS 180 tablet 3   • PARoxetine (PAXIL) 20 MG tablet TAKE 1 TABLET EVERY DAY 90 tablet 2   • simvastatin (ZOCOR) 40 MG tablet TAKE 1 TABLET AT BEDTIME 90 tablet 3   • SITagliptin (JANUVIA) 100 MG tablet Take 0.5 tablets by mouth Daily. 42 tablet 0   • ACCU-CHEK FASTCLIX LANCETS misc TEST THREE TIMES DAILY 300 each 2   • ACCU-CHEK SMARTVIEW test strip TEST THREE TIMES DAILY 300 each 2   • Alcohol Swabs (B-D SINGLE USE SWABS REGULAR) pads USE FOR TESTING DAILY AS DIRECTED 100 each 3     No facility-administered encounter medications on file as of 8/17/2018.        Reviewed use of high risk medication in the elderly: yes  Reviewed for potential of harmful drug interactions in the elderly: yes    Follow Up:  No Follow-up on file.     An After Visit Summary and PPPS with all of these plans were given to the patient.          htn-stable  [facility]  dm-counseled on diet and exercise, labs at goal, HBAIC of 7.4  depression-stable  AS-ECHO done at [facility]  Anemia-labs noted  rhinitis-flonase rx prn and otc claritin/allegra  pvd-cont plavix  BPH-cont cardura      Labs noted and dw patient  [facility] immun[facility] at [facility]

## 2018-09-27 DIAGNOSIS — I73.9 PVD (PERIPHERAL VASCULAR DISEASE) (HCC): ICD-10-CM

## 2018-09-28 RX ORDER — SIMVASTATIN 40 MG
TABLET ORAL
Qty: 90 TABLET | Refills: 3 | Status: SHIPPED | OUTPATIENT
Start: 2018-09-28 | End: 2019-10-27 | Stop reason: SDUPTHER

## 2018-09-28 RX ORDER — BENAZEPRIL HYDROCHLORIDE 40 MG/1
TABLET, FILM COATED ORAL
Qty: 90 TABLET | Refills: 2 | Status: SHIPPED | OUTPATIENT
Start: 2018-09-28 | End: 2019-10-04

## 2018-09-28 RX ORDER — CLOPIDOGREL BISULFATE 75 MG/1
TABLET ORAL
Qty: 90 TABLET | Refills: 3 | Status: SHIPPED | OUTPATIENT
Start: 2018-09-28 | End: 2020-01-10

## 2018-09-28 RX ORDER — FLUTICASONE PROPIONATE 50 MCG
SPRAY, SUSPENSION (ML) NASAL
Qty: 48 G | Refills: 3 | Status: SHIPPED | OUTPATIENT
Start: 2018-09-28 | End: 2022-02-05 | Stop reason: HOSPADM

## 2018-12-24 DIAGNOSIS — N40.1 BENIGN NON-NODULAR PROSTATIC HYPERPLASIA WITH LOWER URINARY TRACT SYMPTOMS: ICD-10-CM

## 2018-12-26 RX ORDER — DOXAZOSIN 2 MG/1
2 TABLET ORAL NIGHTLY
Qty: 90 TABLET | Refills: 3 | Status: SHIPPED | OUTPATIENT
Start: 2018-12-26 | End: 2020-03-02

## 2018-12-26 RX ORDER — BLOOD SUGAR DIAGNOSTIC
STRIP MISCELLANEOUS
Qty: 300 EACH | Refills: 2 | Status: SHIPPED | OUTPATIENT
Start: 2018-12-26 | End: 2020-03-31 | Stop reason: SDUPTHER

## 2019-03-04 RX ORDER — PAROXETINE HYDROCHLORIDE 20 MG/1
TABLET, FILM COATED ORAL
Qty: 90 TABLET | Refills: 2 | Status: SHIPPED | OUTPATIENT
Start: 2019-03-04 | End: 2020-03-02

## 2019-10-04 ENCOUNTER — OFFICE VISIT (OUTPATIENT)
Dept: INTERNAL MEDICINE | Facility: CLINIC | Age: 79
End: 2019-10-04

## 2019-10-04 VITALS
BODY MASS INDEX: 23.95 KG/M2 | HEIGHT: 66 IN | SYSTOLIC BLOOD PRESSURE: 124 MMHG | WEIGHT: 149 LBS | HEART RATE: 68 BPM | DIASTOLIC BLOOD PRESSURE: 72 MMHG

## 2019-10-04 DIAGNOSIS — E78.2 MIXED HYPERLIPIDEMIA: Primary | ICD-10-CM

## 2019-10-04 DIAGNOSIS — I42.8 CARDIOMYOPATHY, NONISCHEMIC (HCC): ICD-10-CM

## 2019-10-04 DIAGNOSIS — E11.65 UNCONTROLLED TYPE 2 DIABETES MELLITUS WITH HYPERGLYCEMIA (HCC): ICD-10-CM

## 2019-10-04 DIAGNOSIS — F32.89 OTHER DEPRESSION: ICD-10-CM

## 2019-10-04 DIAGNOSIS — I10 ESSENTIAL HYPERTENSION: ICD-10-CM

## 2019-10-04 DIAGNOSIS — I35.0 NONRHEUMATIC AORTIC VALVE STENOSIS: ICD-10-CM

## 2019-10-04 DIAGNOSIS — N40.1 BENIGN NON-NODULAR PROSTATIC HYPERPLASIA WITH LOWER URINARY TRACT SYMPTOMS: ICD-10-CM

## 2019-10-04 DIAGNOSIS — K57.30 DIVERTICULOSIS OF LARGE INTESTINE WITHOUT HEMORRHAGE: ICD-10-CM

## 2019-10-04 PROCEDURE — 96160 PT-FOCUSED HLTH RISK ASSMT: CPT | Performed by: INTERNAL MEDICINE

## 2019-10-04 PROCEDURE — G0439 PPPS, SUBSEQ VISIT: HCPCS | Performed by: INTERNAL MEDICINE

## 2019-10-04 RX ORDER — ASPIRIN 81 MG/1
81 TABLET ORAL DAILY
COMMUNITY
End: 2020-10-16

## 2019-10-04 RX ORDER — BISOPROLOL FUMARATE 10 MG/1
10 TABLET, FILM COATED ORAL DAILY
COMMUNITY
End: 2022-06-15

## 2019-10-04 NOTE — PROGRESS NOTES
Patient is a 79 y.o. male who is here for a follow up of hyperlipidemia and hypertension.  Chief Complaint   Patient presents with   • Hyperlipidemia   • Hypertension         HPI:      History:     Patient Active Problem List   Diagnosis   • Atopic rhinitis   • Depression   • Uncontrolled type 2 diabetes mellitus (CMS/HCC)   • Diverticulosis of intestine   • Hyperlipidemia   • Essential hypertension   • Aortic stenosis   • Bilateral impacted cerumen   • Benign non-nodular prostatic hyperplasia with lower urinary tract symptoms       Past Medical History:   Diagnosis Date   • Community acquired pneumonia        Past Surgical History:   Procedure Laterality Date   • LEG SURGERY Left 07/2016    Popliteal Artery stenting by Dr Hdz   • LEG SURGERY Left 07/2016    skin graft by Dr Nance   • LIPOMA EXCISION      s/p excision on chest       Current Outpatient Medications on File Prior to Visit   Medication Sig   • ACCU-CHEK FASTCLIX LANCETS misc TEST THREE TIMES DAILY   • ACCU-CHEK SMARTVIEW test strip TEST THREE TIMES DAILY   • Alcohol Swabs (B-D SINGLE USE SWABS REGULAR) pads USE FOR TESTING DAILY AS DIRECTED   • amLODIPine (NORVASC) 10 MG tablet TAKE 1 TABLET EVERY DAY   • benazepril (LOTENSIN) 40 MG tablet TAKE 1 TABLET EVERY DAY   • bisoprolol-hydrochlorothiazide (ZIAC) 2.5-6.25 MG per tablet TAKE 1 TABLET EVERY DAY   • clopidogrel (PLAVIX) 75 MG tablet TAKE 1 TABLET EVERY DAY (STOP ASPIRIN)   • doxazosin (CARDURA) 2 MG tablet TAKE 1 TABLET BY MOUTH EVERY NIGHT.   • fluticasone (FLONASE) 50 MCG/ACT nasal spray USE 1 SPRAY NASALLY TWICE DAILY   • metFORMIN (GLUCOPHAGE) 850 MG tablet TAKE 1 TABLET TWICE DAILY WITH MEALS   • PARoxetine (PAXIL) 20 MG tablet TAKE 1 TABLET EVERY DAY   • simvastatin (ZOCOR) 40 MG tablet TAKE 1 TABLET AT BEDTIME   • SITagliptin (JANUVIA) 100 MG tablet Take 0.5 tablets by mouth Daily.     No current facility-administered medications on file prior to visit.        Family History   Problem  Relation Age of Onset   • Diabetes Other    • Hypertension Other    • Stroke Other    • Coronary artery disease Other        Social History     Socioeconomic History   • Marital status:      Spouse name: Not on file   • Number of children: Not on file   • Years of education: Not on file   • Highest education level: Not on file   Tobacco Use   • Smoking status: Former Smoker   • Smokeless tobacco: Never Used   Substance and Sexual Activity   • Drug use: No         Review of Systems    There were no vitals taken for this visit.      Physical Exam    Procedure:      Discussion/Summary:        Current Outpatient Medications:   •  ACCU-CHEK FASTCLIX LANCETS misc, TEST THREE TIMES DAILY, Disp: 300 each, Rfl: 2  •  ACCU-CHEK SMARTVIEW test strip, TEST THREE TIMES DAILY, Disp: 300 each, Rfl: 2  •  Alcohol Swabs (B-D SINGLE USE SWABS REGULAR) pads, USE FOR TESTING DAILY AS DIRECTED, Disp: 100 each, Rfl: 3  •  amLODIPine (NORVASC) 10 MG tablet, TAKE 1 TABLET EVERY DAY, Disp: 90 tablet, Rfl: 2  •  benazepril (LOTENSIN) 40 MG tablet, TAKE 1 TABLET EVERY DAY, Disp: 90 tablet, Rfl: 2  •  bisoprolol-hydrochlorothiazide (ZIAC) 2.5-6.25 MG per tablet, TAKE 1 TABLET EVERY DAY, Disp: 90 tablet, Rfl: 2  •  clopidogrel (PLAVIX) 75 MG tablet, TAKE 1 TABLET EVERY DAY (STOP ASPIRIN), Disp: 90 tablet, Rfl: 3  •  doxazosin (CARDURA) 2 MG tablet, TAKE 1 TABLET BY MOUTH EVERY NIGHT., Disp: 90 tablet, Rfl: 3  •  fluticasone (FLONASE) 50 MCG/ACT nasal spray, USE 1 SPRAY NASALLY TWICE DAILY, Disp: 48 g, Rfl: 3  •  metFORMIN (GLUCOPHAGE) 850 MG tablet, TAKE 1 TABLET TWICE DAILY WITH MEALS, Disp: 180 tablet, Rfl: 3  •  PARoxetine (PAXIL) 20 MG tablet, TAKE 1 TABLET EVERY DAY, Disp: 90 tablet, Rfl: 2  •  simvastatin (ZOCOR) 40 MG tablet, TAKE 1 TABLET AT BEDTIME, Disp: 90 tablet, Rfl: 3  •  SITagliptin (JANUVIA) 100 MG tablet, Take 0.5 tablets by mouth Daily., Disp: 42 tablet, Rfl: 0        There are no diagnoses linked to this encounter.

## 2019-10-04 NOTE — PROGRESS NOTES
The ABCs of the Annual Wellness Visit  Subsequent Medicare Wellness Visit    Chief Complaint   Patient presents with   • Annual Exam       Subjective   History of Present Illness:  Derian Joya is a 79 y.o. male who presents for a Subsequent Medicare Wellness Visit.    HEALTH RISK ASSESSMENT    Recent Hospitalizations:  Recently treated at the following:  Other: [facility]    Current Medical Providers:  Patient Care Team:  Diaz Lee MD as PCP - General    Smoking Status:  Social History     Tobacco Use   Smoking Status Former Smoker   Smokeless Tobacco Never Used       Alcohol Consumption:  Social History     Substance and Sexual Activity   Alcohol Use Not on file       Depression Screen:   PHQ-2/PHQ-9 Depression Screening 10/4/2019   Little interest or pleasure in doing things 0   Feeling down, depressed, or hopeless 0   Trouble falling or staying asleep, or sleeping too much 0   Feeling tired or having little energy 0   Poor appetite or overeating 0   Feeling bad about yourself - or that you are a failure or have let yourself or your family down 0   Trouble concentrating on things, such as reading the newspaper or watching television 0   Moving or speaking so slowly that other people could have noticed. Or the opposite - being so fidgety or restless that you have been moving around a lot more than usual 0   Thoughts that you would be better off dead, or of hurting yourself in some way 0   Total Score 0   If you checked off any problems, how difficult have these problems made it for you to do your work, take care of things at home, or get along with other people? -       Fall Risk Screen:  DASHA Fall Risk Assessment was completed, and patient is at LOW risk for falls.Assessment completed on:10/4/2019    Health Habits and Functional and Cognitive Screening:  Functional & Cognitive Status 10/4/2019   Do you have difficulty preparing food and eating? No   Do you have difficulty bathing yourself,  getting dressed or grooming yourself? No   Do you have difficulty using the toilet? No   Do you have difficulty moving around from place to place? No   Do you have trouble with steps or getting out of a bed or a chair? No   Current Diet Well Balanced Diet   Dental Exam Up to date   Eye Exam Up to date   Exercise (times per week) 5 times per week   Current Exercise Activities Include Walking   Do you need help using the phone?  No   Are you deaf or do you have serious difficulty hearing?  No   Do you need help with transportation? No   Do you need help shopping? No   Do you need help preparing meals?  No   Do you need help with housework?  No   Do you need help with laundry? No   Do you need help taking your medications? No   Do you need help managing money? No   Do you ever drive or ride in a car without wearing a seat belt? No   Have you felt unusual stress, anger or loneliness in the last month? No   Who do you live with? Spouse   If you need help, do you have trouble finding someone available to you? No   Have you been bothered in the last four weeks by sexual problems? No   Do you have difficulty concentrating, remembering or making decisions? No         Does the patient have evidence of cognitive impairment? No    Asprin use counseling:Taking ASA appropriately as indicated    Age-appropriate Screening Schedule:  Refer to the list below for future screening recommendations based on patient's age, sex and/or medical conditions. Orders for these recommended tests are listed in the plan section. The patient has been provided with a written plan.    Health Maintenance   Topic Date Due   • URINE MICROALBUMIN  1940   • TDAP/TD VACCINES (1 - Tdap) 01/07/1958   • ZOSTER VACCINE (1 of 2) 01/07/1989   • PNEUMOCOCCAL VACCINES (65+ LOW/MEDIUM RISK) (1 of 2 - PCV13) 01/07/2004   • DIABETIC EYE EXAM  05/05/2016   • LIPID PANEL  12/01/2016   • HEMOGLOBIN A1C  09/01/2017   • INFLUENZA VACCINE  08/01/2019            Fall  Risk Assessment was completed, and patient is at low risk for falls.      The following portions of the patient's history were reviewed and updated as appropriate: current medications, past family history, past medical history, past social history, past surgical history and problem list.    Outpatient Medications Prior to Visit   Medication Sig Dispense Refill   • aspirin 81 MG EC tablet Take 81 mg by mouth Daily.     • bisoprolol (ZEBeta) 10 MG tablet Take 10 mg by mouth Daily.     • clopidogrel (PLAVIX) 75 MG tablet TAKE 1 TABLET EVERY DAY (STOP ASPIRIN) 90 tablet 3   • doxazosin (CARDURA) 2 MG tablet TAKE 1 TABLET BY MOUTH EVERY NIGHT. 90 tablet 3   • Empagliflozin (JARDIANCE) 10 MG tablet Take  by mouth.     • fluticasone (FLONASE) 50 MCG/ACT nasal spray USE 1 SPRAY NASALLY TWICE DAILY 48 g 3   • metFORMIN (GLUCOPHAGE) 850 MG tablet TAKE 1 TABLET TWICE DAILY WITH MEALS 180 tablet 3   • PARoxetine (PAXIL) 20 MG tablet TAKE 1 TABLET EVERY DAY 90 tablet 2   • sacubitril-valsartan (ENTRESTO) 49-51 MG tablet Take 1 tablet by mouth 2 (Two) Times a Day.     • simvastatin (ZOCOR) 40 MG tablet TAKE 1 TABLET AT BEDTIME 90 tablet 3   • ACCU-CHEK FASTCLIX LANCETS misc TEST THREE TIMES DAILY 300 each 2   • ACCU-CHEK SMARTVIEW test strip TEST THREE TIMES DAILY 300 each 2   • Alcohol Swabs (B-D SINGLE USE SWABS REGULAR) pads USE FOR TESTING DAILY AS DIRECTED 100 each 3   • amLODIPine (NORVASC) 10 MG tablet TAKE 1 TABLET EVERY DAY 90 tablet 2   • benazepril (LOTENSIN) 40 MG tablet TAKE 1 TABLET EVERY DAY 90 tablet 2   • bisoprolol-hydrochlorothiazide (ZIAC) 2.5-6.25 MG per tablet TAKE 1 TABLET EVERY DAY 90 tablet 2   • SITagliptin (JANUVIA) 100 MG tablet Take 0.5 tablets by mouth Daily. 42 tablet 0     No facility-administered medications prior to visit.        Patient Active Problem List   Diagnosis   • Atopic rhinitis   • Depression   • Uncontrolled type 2 diabetes mellitus (CMS/HCC)   • Diverticulosis of intestine   •  Hyperlipidemia   • Essential hypertension   • Aortic stenosis   • Bilateral impacted cerumen   • Benign non-nodular prostatic hyperplasia with lower urinary tract symptoms   • Cardiomyopathy, nonischemic (CMS/HCC)       Advanced Care Planning:  Patient has an advance directive - a copy has not been provided. Have asked the patient to send this to us to add to record    Review of Systems   Constitutional: Negative for chills, diaphoresis, fatigue, fever and unexpected weight change.   HENT: Negative for congestion, ear pain, hearing loss, nosebleeds, postnasal drip, sinus pressure and sore throat.    Eyes: Negative for pain, discharge and itching.   Respiratory: Negative for cough, chest tightness, shortness of breath and wheezing.    Cardiovascular: Negative for chest pain, palpitations and leg swelling.   Gastrointestinal: Negative for abdominal distention, abdominal pain, blood in stool, constipation, diarrhea, nausea and vomiting.        3/2016 colonoscopy by Dr García, with polyps times 2   Endocrine: Negative for polydipsia and polyuria.   Genitourinary: Positive for difficulty urinating, frequency and urgency. Negative for dysuria and hematuria.        [facility], 0.5 on [facility]/19   Musculoskeletal: Negative for arthralgias, back pain, gait problem, joint swelling and myalgias.   Skin: Negative for rash and wound.   Neurological: Negative for dizziness, syncope, weakness and headaches.   Psychiatric/Behavioral: Negative for dysphoric mood and sleep disturbance. The patient is not nervous/anxious.        Compared to one year ago, the patient feels his physical health is the same.  Compared to one year ago, the patient feels his mental health is the same.    Reviewed chart for potential of high risk medication in the elderly: yes  Reviewed chart for potential of harmful drug interactions in the elderly:yes    Objective         Vitals:    10/04/19 0950   BP: 124/72   BP Location: Left arm   Patient Position: Sitting  "  Pulse: 68   Weight: 67.6 kg (149 lb)   Height: 167.6 cm (65.98\")   PainSc: 0-No pain       Body mass index is 24.06 kg/m².  Discussed the patient's BMI with him. The BMI is in the acceptable range.    Physical Exam   Constitutional: He is oriented to person, place, and time. He appears well-developed and well-nourished.   HENT:   Head: Normocephalic and atraumatic.   Right Ear: External ear normal.   Left Ear: External ear normal.   Mouth/Throat: Oropharynx is clear and moist.   Mild cerumen R>L   Eyes: Conjunctivae and EOM are normal.   Neck: Normal range of motion. Neck supple.   Cardiovascular: Normal rate and regular rhythm.   Murmur heard.  2/6 ANDREA   Pulmonary/Chest: Effort normal and breath sounds normal.   Abdominal: Soft. Bowel sounds are normal.   Musculoskeletal: Normal range of motion.   Lymphadenopathy:     He has no cervical adenopathy.   Neurological: He is alert and oriented to person, place, and time.   Skin: Skin is warm and dry.   Psychiatric: He has a normal mood and affect. His behavior is normal. Thought content normal.             Assessment/Plan   Medicare Risks and Personalized Health Plan  CMS Preventative Services Quick Reference  Cardiovascular risk  Hearing Problem  Immunizations Discussed/Encouraged (specific immunizations; Td, Hepatitis A Vaccine/Series, Influenza, Pneumococcal 23, Prevnar and Shingrix )  Prostate Cancer Screening     The above risks/problems have been discussed with the patient.  Pertinent information has been shared with the patient in the After Visit Summary.  Follow up plans and orders are seen below in the Assessment/Plan Section.    Diagnoses and all orders for this visit:    1. Mixed hyperlipidemia (Primary)  -     Comprehensive Metabolic Panel  -     Lipid Panel  -     TSH    2. Essential hypertension  -     CBC (No Diff)    3. Cardiomyopathy, nonischemic (CMS/HCC)    4. Nonrheumatic aortic valve stenosis    5. Diverticulosis of large intestine without " hemorrhage    6. Uncontrolled type 2 diabetes mellitus with hyperglycemia (CMS/[facility])  -     Hemoglobin A1c    7. Benign non-nodular prostatic hyperplasia with lower urinary tract symptoms    8. Other depression      Follow Up:  Return in about 6 months (around 4/4/2020) for Recheck, Fasting labs at their convenience, with fasting labs.     An After Visit Summary and PPPS were given to the patient.      htn-stable on BB and cardura  hyperlipidemia-labs soon on zocor  dm-counseled on diet and exercise, labs at goal on 3/19  depression-stable on paxil  [facility], stable clinically  Anemia-labs soon  Allergic rhinitis-flonase rx prn and otc claritin/allegra  pvd-cont plavix and asa  BPH-cont cardura, stable  NICMO-stable on Entresto      Labs noted and dw patient  [facility] at [facility]

## 2019-10-09 LAB
ALBUMIN SERPL-MCNC: 4.3 G/DL (ref 3.5–5.2)
ALBUMIN/GLOB SERPL: 1.9 G/DL
ALP SERPL-CCNC: 48 U/L (ref 39–117)
ALT SERPL-CCNC: 12 U/L (ref 1–41)
AST SERPL-CCNC: 19 U/L (ref 1–40)
BILIRUB SERPL-MCNC: 0.5 MG/DL (ref 0.2–1.2)
BUN SERPL-MCNC: 16 MG/DL (ref 8–23)
BUN/CREAT SERPL: 21.6 (ref 7–25)
CALCIUM SERPL-MCNC: 9.3 MG/DL (ref 8.6–10.5)
CHLORIDE SERPL-SCNC: 102 MMOL/L (ref 98–107)
CHOLEST SERPL-MCNC: 165 MG/DL (ref 0–200)
CO2 SERPL-SCNC: 27 MMOL/L (ref 22–29)
CREAT SERPL-MCNC: 0.74 MG/DL (ref 0.76–1.27)
ERYTHROCYTE [DISTWIDTH] IN BLOOD BY AUTOMATED COUNT: 11.8 % (ref 12.3–15.4)
GLOBULIN SER CALC-MCNC: 2.3 GM/DL
GLUCOSE SERPL-MCNC: 135 MG/DL (ref 65–99)
HBA1C MFR BLD: 8 % (ref 4.8–5.6)
HCT VFR BLD AUTO: 43.7 % (ref 37.5–51)
HDLC SERPL-MCNC: 77 MG/DL (ref 40–60)
HGB BLD-MCNC: 14.8 G/DL (ref 13–17.7)
LDLC SERPL CALC-MCNC: 57 MG/DL (ref 0–100)
MCH RBC QN AUTO: 33.4 PG (ref 26.6–33)
MCHC RBC AUTO-ENTMCNC: 33.9 G/DL (ref 31.5–35.7)
MCV RBC AUTO: 98.6 FL (ref 79–97)
PLATELET # BLD AUTO: 191 10*3/MM3 (ref 140–450)
POTASSIUM SERPL-SCNC: 4.5 MMOL/L (ref 3.5–5.2)
PROT SERPL-MCNC: 6.6 G/DL (ref 6–8.5)
RBC # BLD AUTO: 4.43 10*6/MM3 (ref 4.14–5.8)
SODIUM SERPL-SCNC: 144 MMOL/L (ref 136–145)
TRIGL SERPL-MCNC: 156 MG/DL (ref 0–150)
TSH SERPL DL<=0.005 MIU/L-ACNC: 4.66 UIU/ML (ref 0.27–4.2)
VLDLC SERPL CALC-MCNC: 31.2 MG/DL
WBC # BLD AUTO: 4.54 10*3/MM3 (ref 3.4–10.8)

## 2019-10-28 RX ORDER — SIMVASTATIN 40 MG
TABLET ORAL
Qty: 90 TABLET | Refills: 3 | Status: SHIPPED | OUTPATIENT
Start: 2019-10-28 | End: 2020-10-07

## 2020-01-10 DIAGNOSIS — I73.9 PVD (PERIPHERAL VASCULAR DISEASE) (HCC): ICD-10-CM

## 2020-01-10 RX ORDER — CLOPIDOGREL BISULFATE 75 MG/1
TABLET ORAL
Qty: 90 TABLET | Refills: 3 | Status: SHIPPED | OUTPATIENT
Start: 2020-01-10 | End: 2020-12-18

## 2020-02-29 DIAGNOSIS — N40.1 BENIGN NON-NODULAR PROSTATIC HYPERPLASIA WITH LOWER URINARY TRACT SYMPTOMS: ICD-10-CM

## 2020-03-02 RX ORDER — DOXAZOSIN 2 MG/1
TABLET ORAL
Qty: 90 TABLET | Refills: 3 | Status: SHIPPED | OUTPATIENT
Start: 2020-03-02 | End: 2021-03-03

## 2020-03-02 RX ORDER — LANCETS
EACH MISCELLANEOUS
Qty: 306 EACH | Refills: 5 | Status: SHIPPED | OUTPATIENT
Start: 2020-03-02 | End: 2021-08-25

## 2020-03-02 RX ORDER — BENAZEPRIL HYDROCHLORIDE 40 MG/1
TABLET, FILM COATED ORAL
Qty: 90 TABLET | Refills: 1 | Status: SHIPPED | OUTPATIENT
Start: 2020-03-02 | End: 2020-10-16

## 2020-03-02 RX ORDER — PAROXETINE HYDROCHLORIDE 20 MG/1
TABLET, FILM COATED ORAL
Qty: 90 TABLET | Refills: 2 | Status: SHIPPED | OUTPATIENT
Start: 2020-03-02 | End: 2020-09-11

## 2020-03-31 ENCOUNTER — TELEPHONE (OUTPATIENT)
Dept: INTERNAL MEDICINE | Facility: CLINIC | Age: 80
End: 2020-03-31

## 2020-03-31 NOTE — TELEPHONE ENCOUNTER
PT CALLED STATES THAT [facility] SENT A FAX REQUESTING PERMISSION FROM PCP FOR DIABETIC SUPPLIES. PT CALLED TO CHECK ON THE STATUS OF THIS.      PT IS REQUESTING A CALL BACK WITH AN UPDATE.       CONTACT: [phone]

## 2020-06-05 RX ORDER — ISOPROPYL ALCOHOL 0.75 G/1
1 SWAB TOPICAL 3 TIMES DAILY
Qty: 300 EACH | Refills: 3 | Status: SHIPPED | OUTPATIENT
Start: 2020-06-05 | End: 2022-01-12

## 2020-07-24 ENCOUNTER — TELEPHONE (OUTPATIENT)
Dept: INTERNAL MEDICINE | Facility: CLINIC | Age: 80
End: 2020-07-24

## 2020-07-24 NOTE — TELEPHONE ENCOUNTER
PT CALLED STATED THAT [facility] FAXED OVER ORDER  FOR DIABETIC MONITOR AND PT IS CHECKING STATUS.    CALL BACK:[phone]

## 2020-08-07 ENCOUNTER — TELEMEDICINE (OUTPATIENT)
Dept: INTERNAL MEDICINE | Facility: CLINIC | Age: 80
End: 2020-08-07
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
## 2020-08-07 DIAGNOSIS — M25.552 LEFT HIP PAIN: ICD-10-CM

## 2020-08-07 PROCEDURE — 99213 OFFICE O/P EST LOW 20 MIN: CPT | Performed by: NURSE PRACTITIONER

## 2020-08-07 NOTE — PROGRESS NOTES
Subjective   Derian Joya is a 80 y.o. male here today for leg pain    Chief Complaint   Patient presents with   • Fall   Derian is here today via video visit after having fallen on his driveway on Tuesday.  He tripped over something on the driveway and as it was an incline, he lost his balance and fell on his left side.  On Tuesday his leg hurt fairly significantly and was ambulating with crutches.  Wednesday and Thursday was able to ambulate with a walker and has improved each day.  The pain is mostly around his thigh.  He would like to make sure he has not broken his hip.  He is able to bear weight on his leg.  There is no swelling.  Denies leg length discrepancy    Review of Systems   Constitutional: Negative for activity change, fatigue, fever, unexpected weight gain and unexpected weight loss.   Eyes: Negative for photophobia and visual disturbance.   Respiratory: Negative for cough and shortness of breath.    Cardiovascular: Negative for chest pain and leg swelling.   Gastrointestinal: Negative for abdominal pain, anal bleeding and constipation.   Genitourinary: Negative for dysuria and flank pain.   Musculoskeletal: Positive for gait problem and myalgias. Negative for back pain, joint swelling and neck pain.   Skin: Negative for rash and skin lesions.   Neurological: Negative for dizziness, tremors, weakness, numbness and headache.   Hematological: Negative for adenopathy. Does not bruise/bleed easily.       Past Medical History:   Diagnosis Date   • Community acquired pneumonia      Family History   Problem Relation Age of Onset   • Diabetes Other    • Hypertension Other    • Stroke Other    • Coronary artery disease Other      Past Surgical History:   Procedure Laterality Date   • LEG SURGERY Left 07/2016    Popliteal Artery stenting by Dr Hdz   • LEG SURGERY Left 07/2016    skin graft by Dr Nance   • LIPOMA EXCISION      s/p excision on chest   • PACEMAKER IMPLANTATION  01/2019    and defibrillator      Social History     Socioeconomic History   • Marital status:      Spouse name: Not on file   • Number of children: Not on file   • Years of education: Not on file   • Highest education level: Not on file   Tobacco Use   • Smoking status: Former Smoker   • Smokeless tobacco: Never Used   Substance and Sexual Activity   • Drug use: No         Current Outpatient Medications:   •  ACCU-CHEK FASTCLIX LANCETS misc, TEST BLOOD SUGAR THREE TIMES DAILY, Disp: 306 each, Rfl: 5  •  Alcohol Swabs (B-D SINGLE USE SWABS REGULAR) pads, 1 each by Other route 3 (Three) Times a Day., Disp: 300 each, Rfl: 3  •  aspirin 81 MG EC tablet, Take 81 mg by mouth Daily., Disp: , Rfl:   •  benazepril (LOTENSIN) 40 MG tablet, TAKE 1 TABLET EVERY DAY, Disp: 90 tablet, Rfl: 1  •  bisoprolol (ZEBeta) 10 MG tablet, Take 10 mg by mouth Daily., Disp: , Rfl:   •  clopidogrel (PLAVIX) 75 MG tablet, TAKE 1 TABLET EVERY DAY (STOP ASPIRIN), Disp: 90 tablet, Rfl: 3  •  doxazosin (CARDURA) 2 MG tablet, TAKE 1 TABLET EVERY NIGHT, Disp: 90 tablet, Rfl: 3  •  Empagliflozin (JARDIANCE) 10 MG tablet, Take  by mouth., Disp: , Rfl:   •  fluticasone (FLONASE) 50 MCG/ACT nasal spray, USE 1 SPRAY NASALLY TWICE DAILY, Disp: 48 g, Rfl: 3  •  glucose blood (Accu-Chek SmartView) test strip, 1 each by Other route 3 (Three) Times a Day. for testing, Disp: 300 each, Rfl: 2  •  metFORMIN (GLUCOPHAGE) 850 MG tablet, TAKE 1 TABLET TWICE DAILY WITH MEALS, Disp: 180 tablet, Rfl: 3  •  PARoxetine (PAXIL) 20 MG tablet, TAKE 1 TABLET EVERY DAY, Disp: 90 tablet, Rfl: 2  •  sacubitril-valsartan (ENTRESTO) 49-51 MG tablet, Take 1 tablet by mouth 2 (Two) Times a Day., Disp: , Rfl:   •  simvastatin (ZOCOR) 40 MG tablet, TAKE 1 TABLET AT BEDTIME, Disp: 90 tablet, Rfl: 3    Objective   There were no vitals filed for this visit.  There is no height or weight on file to calculate BMI.  Physical Exam   Constitutional: He is oriented to person, place, and time. He appears  well-developed and well-nourished. No distress.   Pulmonary/Chest: Effort normal. No accessory muscle usage. No respiratory distress.   Musculoskeletal:        Legs:  Neurological: He is alert and oriented to person, place, and time.   Skin: No erythema. No pallor.   Psychiatric: He has a normal mood and affect. His speech is normal and behavior is normal. Judgment and thought content normal.   Nursing note and vitals reviewed.      Assessment/Plan   Problem List Items Addressed This Visit     None      Visit Diagnoses     Fall (on)(from) incline, initial encounter    -  Primary    Left hip pain            Derian was seen today for fall.    Diagnoses and all orders for this visit:    Fall (on)(from) incline, initial encounter    Left hip pain  Reassured believe this is simply a contusion.  He is able to bear weight and has been seen improvement each day.  Advised should he not continue on his improvement each day, to call on Monday for x-ray.           The patient was counseled regarding diagnostic results, impressions, prognosis, instructions for management, risk factor reductions, education, and importance of treatment compliance.  The patient verbalized understanding of and agreement with the plan of care.    Advised patient to call with any further questions and any new or worsening symptoms.     Return if symptoms worsen or fail to improve.      SCARLETT Patterson    Please note that portions of this note were completed with a voice recognition program. Efforts were made to edit the dictations, but occasionally words are mistranscribed.

## 2020-08-11 ENCOUNTER — HOSPITAL ENCOUNTER (OUTPATIENT)
Dept: GENERAL RADIOLOGY | Facility: HOSPITAL | Age: 80
Discharge: HOME | End: 2020-08-11
Admitting: INTERNAL MEDICINE

## 2020-08-11 ENCOUNTER — TELEPHONE (OUTPATIENT)
Dept: INTERNAL MEDICINE | Facility: CLINIC | Age: 80
End: 2020-08-11

## 2020-08-11 DIAGNOSIS — M25.552 LEFT HIP PAIN: Primary | ICD-10-CM

## 2020-08-11 PROCEDURE — 73502 X-RAY EXAM HIP UNI 2-3 VIEWS: CPT

## 2020-08-11 NOTE — TELEPHONE ENCOUNTER
Aliison from [facility] called, to inform that patient has a hip fracture. Patient was not informed.     dw patient, have dw ortho on call at  and advised to head to ER

## 2020-08-11 NOTE — TELEPHONE ENCOUNTER
Patient requested a call back. Patient was seen by DONNY Goff on 8/7/20 for a fall that hurt his left leg. Patient stated his left leg is improving a little each day but it is still hurting. Patient would like to know if an x-ray could be ordered for him. Patient is also interested in physical therapy so he can learn exercises to help his leg.    Please call and advise. Patient call back [phone]

## 2020-08-26 ENCOUNTER — TELEPHONE (OUTPATIENT)
Dept: INTERNAL MEDICINE | Facility: CLINIC | Age: 80
End: 2020-08-26

## 2020-08-26 NOTE — TELEPHONE ENCOUNTER
Patient called to provide Dr. Lee information. Patient stated he was seen at [place] on 8/11/20 for a broken hip. Patient is seeing Dr. Ibarra to follow up and wanted to let Dr. Lee know.    Patient call back [phone]

## 2020-09-11 RX ORDER — PAROXETINE HYDROCHLORIDE 20 MG/1
TABLET, FILM COATED ORAL
Qty: 90 TABLET | Refills: 2 | Status: SHIPPED | OUTPATIENT
Start: 2020-09-11 | End: 2021-10-18

## 2020-09-22 ENCOUNTER — TELEPHONE (OUTPATIENT)
Dept: INTERNAL MEDICINE | Facility: CLINIC | Age: 80
End: 2020-09-22

## 2020-09-22 NOTE — TELEPHONE ENCOUNTER
Caller: Derian Joya    Relationship: Self    Best call back number: [phone]   Medication needed:     PATIENT STATES HE RECEIVED A LETTER FROM [facility] STATING THAT THE PATIENT'S PARoxetine (PAXIL) 20 MG tablet IS NOT SUGGESTED FOR PATIENTS OVER 65 BUT THAT SERTRALINE WAS A GOOD ALTERNATE.       What is the patient's preferred pharmacy: [facility] - [facility] [address] [facility] - [phone] [facility] [phone] FX     PLEASE CALL PATIENT AND ADVISE [phone]     Called patient

## 2020-10-07 RX ORDER — SIMVASTATIN 40 MG
TABLET ORAL
Qty: 90 TABLET | Refills: 3 | Status: SHIPPED | OUTPATIENT
Start: 2020-10-07 | End: 2021-08-30

## 2020-10-16 ENCOUNTER — OFFICE VISIT (OUTPATIENT)
Dept: INTERNAL MEDICINE | Facility: CLINIC | Age: 80
End: 2020-10-16

## 2020-10-16 VITALS
SYSTOLIC BLOOD PRESSURE: 120 MMHG | TEMPERATURE: 97.1 F | DIASTOLIC BLOOD PRESSURE: 70 MMHG | HEART RATE: 68 BPM | WEIGHT: 137 LBS | BODY MASS INDEX: 22.02 KG/M2 | HEIGHT: 66 IN

## 2020-10-16 DIAGNOSIS — F32.89 OTHER DEPRESSION: ICD-10-CM

## 2020-10-16 DIAGNOSIS — K57.30 DIVERTICULOSIS OF LARGE INTESTINE WITHOUT HEMORRHAGE: ICD-10-CM

## 2020-10-16 DIAGNOSIS — Z00.00 ROUTINE GENERAL MEDICAL EXAMINATION AT A HEALTH CARE FACILITY: ICD-10-CM

## 2020-10-16 DIAGNOSIS — E11.65 UNCONTROLLED TYPE 2 DIABETES MELLITUS WITH HYPERGLYCEMIA (HCC): ICD-10-CM

## 2020-10-16 DIAGNOSIS — N40.1 BENIGN NON-NODULAR PROSTATIC HYPERPLASIA WITH LOWER URINARY TRACT SYMPTOMS: ICD-10-CM

## 2020-10-16 DIAGNOSIS — I42.8 CARDIOMYOPATHY, NONISCHEMIC (HCC): ICD-10-CM

## 2020-10-16 DIAGNOSIS — E78.49 OTHER HYPERLIPIDEMIA: Primary | ICD-10-CM

## 2020-10-16 DIAGNOSIS — I35.0 NONRHEUMATIC AORTIC VALVE STENOSIS: ICD-10-CM

## 2020-10-16 DIAGNOSIS — I10 ESSENTIAL HYPERTENSION: ICD-10-CM

## 2020-10-16 PROCEDURE — G0439 PPPS, SUBSEQ VISIT: HCPCS | Performed by: INTERNAL MEDICINE

## 2020-10-16 PROCEDURE — 96160 PT-FOCUSED HLTH RISK ASSMT: CPT | Performed by: INTERNAL MEDICINE

## 2020-10-16 NOTE — PROGRESS NOTES
The ABCs of the Annual Wellness Visit  Subsequent Medicare Wellness Visit    Chief Complaint   Patient presents with   • Annual Exam       Subjective   History of Present Illness:  Derian Joya is a 80 y.o. male who presents for a Subsequent Medicare Wellness Visit.    HEALTH RISK ASSESSMENT    Recent Hospitalizations:  Recently treated at the following:  [facility]    Current Medical Providers:  Patient Care Team:  Diaz Lee MD as PCP - General    Smoking Status:  Social History     Tobacco Use   Smoking Status Former Smoker   Smokeless Tobacco Never Used       Alcohol Consumption:  Social History     Substance and Sexual Activity   Alcohol Use None       Depression Screen:   PHQ-2/PHQ-9 Depression Screening 10/16/2020   Little interest or pleasure in doing things 0   Feeling down, depressed, or hopeless 0   Trouble falling or staying asleep, or sleeping too much 0   Feeling tired or having little energy 0   Poor appetite or overeating 0   Feeling bad about yourself - or that you are a failure or have let yourself or your family down 0   Trouble concentrating on things, such as reading the newspaper or watching television 0   Moving or speaking so slowly that other people could have noticed. Or the opposite - being so fidgety or restless that you have been moving around a lot more than usual 0   Thoughts that you would be better off dead, or of hurting yourself in some way 0   Total Score 0   If you checked off any problems, how difficult have these problems made it for you to do your work, take care of things at home, or get along with other people? -       Fall Risk Screen:  DASHA Fall Risk Assessment was completed, and patient is at HIGH risk for falls. Assessment completed on:10/16/2020    Health Habits and Functional and Cognitive Screening:  Functional & Cognitive Status 10/16/2020   Do you have difficulty preparing food and eating? No   Do you have difficulty bathing yourself,  getting dressed or grooming yourself? No   Do you have difficulty using the toilet? No   Do you have difficulty moving around from place to place? No   Do you have trouble with steps or getting out of a bed or a chair? No   Current Diet Well Balanced Diet   Dental Exam Not up to date   Eye Exam Up to date   Exercise (times per week) 0 times per week   Current Exercise Activities Include -   Do you need help using the phone?  No   Are you deaf or do you have serious difficulty hearing?  No   Do you need help with transportation? No   Do you need help shopping? No   Do you need help preparing meals?  No   Do you need help with housework?  No   Do you need help with laundry? No   Do you need help taking your medications? No   Do you need help managing money? No   Do you ever drive or ride in a car without wearing a seat belt? No   Have you felt unusual stress, anger or loneliness in the last month? No   Who do you live with? Spouse   If you need help, do you have trouble finding someone available to you? No   Have you been bothered in the last four weeks by sexual problems? No   Do you have difficulty concentrating, remembering or making decisions? No         Does the patient have evidence of cognitive impairment? No    Asprin use counseling:Taking ASA appropriately as indicated    Age-appropriate Screening Schedule:  Refer to the list below for future screening recommendations based on patient's age, sex and/or medical conditions. Orders for these recommended tests are listed in the plan section. The patient has been provided with a written plan.    Health Maintenance   Topic Date Due   • URINE MICROALBUMIN  1940   • TDAP/TD VACCINES (1 - Tdap) 01/07/1958   • ZOSTER VACCINE (1 of 2) 01/07/1989   • DIABETIC EYE EXAM  05/05/2016   • HEMOGLOBIN A1C  04/08/2020   • INFLUENZA VACCINE  08/01/2020   • LIPID PANEL  10/08/2020            Fall Risk Assessment was completed, and patient is at low risk for falls.        The  following portions of the patient's history were reviewed and updated as appropriate: current medications, past family history, past medical history, past social history, past surgical history and problem list.    Outpatient Medications Prior to Visit   Medication Sig Dispense Refill   • bisoprolol (ZEBeta) 10 MG tablet Take 10 mg by mouth Daily.     • clopidogrel (PLAVIX) 75 MG tablet TAKE 1 TABLET EVERY DAY (STOP ASPIRIN) 90 tablet 3   • doxazosin (CARDURA) 2 MG tablet TAKE 1 TABLET EVERY NIGHT 90 tablet 3   • Empagliflozin (JARDIANCE) 10 MG tablet Take  by mouth.     • fluticasone (FLONASE) 50 MCG/ACT nasal spray USE 1 SPRAY NASALLY TWICE DAILY 48 g 3   • metFORMIN (GLUCOPHAGE) 850 MG tablet TAKE 1 TABLET TWICE DAILY WITH MEALS 180 tablet 3   • PARoxetine (PAXIL) 20 MG tablet TAKE 1 TABLET EVERY DAY 90 tablet 2   • sacubitril-valsartan (ENTRESTO) 49-51 MG tablet Take 1 tablet by mouth 2 (Two) Times a Day.     • simvastatin (ZOCOR) 40 MG tablet TAKE 1 TABLET AT BEDTIME 90 tablet 3   • aspirin 81 MG EC tablet Take 81 mg by mouth Daily.     • ACCU-CHEK FASTCLIX LANCETS misc TEST BLOOD SUGAR THREE TIMES DAILY 306 each 5   • Alcohol Swabs (B-D SINGLE USE SWABS REGULAR) pads 1 each by Other route 3 (Three) Times a Day. 300 each 3   • glucose blood (Accu-Chek SmartView) test strip 1 each by Other route 3 (Three) Times a Day. for testing 300 each 2   • benazepril (LOTENSIN) 40 MG tablet TAKE 1 TABLET EVERY DAY 90 tablet 1     No facility-administered medications prior to visit.        Patient Active Problem List   Diagnosis   • Atopic rhinitis   • Depression   • Uncontrolled type 2 diabetes mellitus (CMS/HCC)   • Diverticulosis of intestine   • Hyperlipidemia   • Essential hypertension   • Aortic stenosis   • Bilateral impacted cerumen   • Benign non-nodular prostatic hyperplasia with lower urinary tract symptoms   • Cardiomyopathy, nonischemic (CMS/HCC)   • Routine general medical examination at a health care facility  "      Advanced Care Planning:  ACP discussion was held with the patient during this visit. Patient does not have an advance directive, information provided.    Review of Systems   Constitutional: Negative for chills, diaphoresis, fatigue, fever and unexpected weight change.   HENT: Negative for congestion, ear pain, hearing loss, nosebleeds, postnasal drip, sinus pressure and sore throat.    Eyes: Negative for pain, discharge and itching.   Respiratory: Negative for cough, chest tightness, shortness of breath and wheezing.    Cardiovascular: Negative for chest pain, palpitations and leg swelling.   Gastrointestinal: Negative for abdominal distention, abdominal pain, blood in stool, constipation, diarrhea, nausea and vomiting.        3/2016 colonoscopy by Dr García, with polyps times 2   Endocrine: Negative for polydipsia and polyuria.   Genitourinary: Positive for difficulty urinating, frequency and urgency. Negative for dysuria and hematuria.        [facility], 0.5 on 3/19   Musculoskeletal: Positive for arthralgias. Negative for back pain, gait problem, joint swelling and myalgias.   Skin: Negative for rash and wound.   Neurological: Negative for dizziness, syncope, weakness and headaches.   Psychiatric/Behavioral: Negative for dysphoric mood and sleep disturbance. The patient is not nervous/anxious.        Compared to one year ago, the patient feels his physical health is the same.  Compared to one year ago, the patient feels his mental health is the same.    Reviewed chart for potential of high risk medication in the elderly: yes  Reviewed chart for potential of harmful drug interactions in the elderly:yes    Objective         Vitals:    10/16/20 1512 10/16/20 1526   BP: 120/64 120/70   BP Location: Left arm    Patient Position: Sitting    Pulse: 68    Temp: 97.1 °F (36.2 °C)    TempSrc: Infrared    Weight: 62.1 kg (137 lb)    Height: 167.6 cm (65.98\")    PainSc: 0-No pain        Body mass index is 22.12 " kg/m².  Discussed the patient's BMI with him. The BMI is in the acceptable range.    Physical Exam  Constitutional:       Appearance: Normal appearance. He is well-developed.   HENT:      Head: Normocephalic and atraumatic.      Right Ear: External ear normal.      Left Ear: External ear normal.      Nose: Nose normal.      Mouth/Throat:      Mouth: Mucous membranes are moist.      Pharynx: Oropharynx is clear.   Eyes:      Extraocular Movements: Extraocular movements intact.      Conjunctiva/sclera: Conjunctivae normal.      Pupils: Pupils are equal, round, and reactive to light.   Neck:      Musculoskeletal: Normal range of motion and neck supple.   Cardiovascular:      Rate and Rhythm: Normal rate and regular rhythm.      Heart sounds: Murmur (2/6 ANDREA) present.   Pulmonary:      Effort: Pulmonary effort is normal.      Breath sounds: Normal breath sounds.   Abdominal:      General: Bowel sounds are normal.      Palpations: Abdomen is soft.   Musculoskeletal: Normal range of motion.   Lymphadenopathy:      Cervical: No cervical adenopathy.   Skin:     General: Skin is warm and dry.   Neurological:      General: No focal deficit present.      Mental Status: He is alert and oriented to person, place, and time.   Psychiatric:         Mood and Affect: Mood normal.         Behavior: Behavior normal.         Thought Content: Thought content normal.               Assessment/Plan   Medicare Risks and Personalized Health Plan  CMS Preventative Services Quick Reference  Advance Directive Discussion  Cardiovascular risk  Colon Cancer Screening  Fall Risk  Immunizations Discussed/Encouraged (specific immunizations; done at [facility] )  Prostate Cancer Screening     The above risks/problems have been discussed with the patient.  Pertinent information has been shared with the patient in the After Visit Summary.  Follow up plans and orders are seen below in the Assessment/Plan Section.    Diagnoses and all orders for this visit:    1.  Other hyperlipidemia (Primary)    2. Essential hypertension    3. Cardiomyopathy, nonischemic (CMS/HCC)    4. Nonrheumatic aortic valve stenosis    5. Diverticulosis of large intestine without hemorrhage    6. Uncontrolled type 2 diabetes mellitus with hyperglycemia (CMS/HCC)    7. Benign non-nodular prostatic hyperplasia with lower urinary tract symptoms    8. Other depression    9. Routine general medical examination at a health care facility      Follow Up:  No follow-ups on file.     An After Visit Summary and PPPS were given to the patient.      HME-counseled on diet and exercise and seat belts  htn-controlled on BB and cardura  hyperlipidemia-labs soon on zocor  dm-counseled on diet and exercise, labs   depression-stable on paxil  AS-ECHO done at [facility], stable clinically  pvd-cont plavix  BPH-cont cardura, controlled  NICMO-stable on Entresto      Labs noted and dw patient  G[facility] immunizations at [facility], has already had the flu shot

## 2020-11-02 ENCOUNTER — OFFICE VISIT (OUTPATIENT)
Dept: INTERNAL MEDICINE | Facility: CLINIC | Age: 80
End: 2020-11-02

## 2020-11-02 VITALS
BODY MASS INDEX: 22.08 KG/M2 | SYSTOLIC BLOOD PRESSURE: 126 MMHG | DIASTOLIC BLOOD PRESSURE: 70 MMHG | WEIGHT: 137.4 LBS | HEIGHT: 66 IN | TEMPERATURE: 97.3 F | HEART RATE: 68 BPM

## 2020-11-02 DIAGNOSIS — N40.1 BENIGN NON-NODULAR PROSTATIC HYPERPLASIA WITH LOWER URINARY TRACT SYMPTOMS: ICD-10-CM

## 2020-11-02 DIAGNOSIS — E11.65 UNCONTROLLED TYPE 2 DIABETES MELLITUS WITH HYPERGLYCEMIA (HCC): ICD-10-CM

## 2020-11-02 DIAGNOSIS — I10 ESSENTIAL HYPERTENSION: ICD-10-CM

## 2020-11-02 DIAGNOSIS — I35.0 NONRHEUMATIC AORTIC VALVE STENOSIS: ICD-10-CM

## 2020-11-02 DIAGNOSIS — E78.49 OTHER HYPERLIPIDEMIA: Primary | ICD-10-CM

## 2020-11-02 DIAGNOSIS — F32.89 OTHER DEPRESSION: ICD-10-CM

## 2020-11-02 DIAGNOSIS — I42.8 CARDIOMYOPATHY, NONISCHEMIC (HCC): ICD-10-CM

## 2020-11-02 PROCEDURE — 99214 OFFICE O/P EST MOD 30 MIN: CPT | Performed by: INTERNAL MEDICINE

## 2020-11-02 NOTE — PROGRESS NOTES
Patient is a 80 y.o. male who is here for pre op clearance for cataract.  Chief Complaint   Patient presents with   • Pre-op Exam         HPI:    Here for preop for bilateral cataract surgery.  Will be doing the Right one first then Left eye.  No dizziness or lightheadedness.  No HAs.  No CPs.  No SOB.  Occasional bruising.  No HUNT.  No edema.  No PND or orthopnea.  No issues with anesthesia.     History:     Patient Active Problem List   Diagnosis   • Atopic rhinitis   • Depression   • Uncontrolled type 2 diabetes mellitus (CMS/HCC)   • Diverticulosis of intestine   • Other hyperlipidemia   • Essential hypertension   • Aortic stenosis   • Bilateral impacted cerumen   • Benign non-nodular prostatic hyperplasia with lower urinary tract symptoms   • Cardiomyopathy, nonischemic (CMS/HCC)   • Routine general medical examination at a health care facility       Past Medical History:   Diagnosis Date   • Community acquired pneumonia        Past Surgical History:   Procedure Laterality Date   • HIP SURGERY Left 08/2020    Dr Galvan   • LEG SURGERY Left 07/2016    Popliteal Artery stenting by Dr Hdz   • LEG SURGERY Left 07/2016    skin graft by Dr Nance   • LIPOMA EXCISION      s/p excision on chest   • PACEMAKER IMPLANTATION  01/2019    and defibrillator       Current Outpatient Medications on File Prior to Visit   Medication Sig   • bisoprolol (ZEBeta) 10 MG tablet Take 10 mg by mouth Daily.   • clopidogrel (PLAVIX) 75 MG tablet TAKE 1 TABLET EVERY DAY (STOP ASPIRIN)   • doxazosin (CARDURA) 2 MG tablet TAKE 1 TABLET EVERY NIGHT   • Empagliflozin (JARDIANCE) 10 MG tablet Take  by mouth.   • fluticasone (FLONASE) 50 MCG/ACT nasal spray USE 1 SPRAY NASALLY TWICE DAILY   • metFORMIN (GLUCOPHAGE) 850 MG tablet TAKE 1 TABLET TWICE DAILY WITH MEALS   • PARoxetine (PAXIL) 20 MG tablet TAKE 1 TABLET EVERY DAY   • sacubitril-valsartan (ENTRESTO) 49-51 MG tablet Take 1 tablet by mouth 2 (Two) Times a Day.   • simvastatin  (ZOCOR) 40 MG tablet TAKE 1 TABLET AT BEDTIME   • ACCU-CHEK FASTCLIX LANCETS misc TEST BLOOD SUGAR THREE TIMES DAILY   • Alcohol Swabs (B-D SINGLE USE SWABS REGULAR) pads 1 each by Other route 3 (Three) Times a Day.   • glucose blood (Accu-Chek SmartView) test strip 1 each by Other route 3 (Three) Times a Day. for testing     No current facility-administered medications on file prior to visit.        Family History   Problem Relation Age of Onset   • Diabetes Other    • Hypertension Other    • Stroke Other    • Coronary artery disease Other        Social History     Socioeconomic History   • Marital status:      Spouse name: Not on file   • Number of children: Not on file   • Years of education: Not on file   • Highest education level: Not on file   Tobacco Use   • Smoking status: Former Smoker   • Smokeless tobacco: Never Used   Substance and Sexual Activity   • Drug use: No         Review of Systems   Constitutional: Negative for chills, diaphoresis, fatigue, fever and unexpected weight change.   HENT: Negative for congestion, ear pain, hearing loss, nosebleeds, postnasal drip, sinus pressure and sore throat.    Eyes: Negative for pain, discharge and itching.   Respiratory: Negative for cough, chest tightness, shortness of breath and wheezing.    Cardiovascular: Negative for chest pain, palpitations and leg swelling.   Gastrointestinal: Negative for abdominal distention, abdominal pain, blood in stool, constipation, diarrhea, nausea and vomiting.        3/2016 colonoscopy by Dr García, with polyps times 2   Endocrine: Negative for polydipsia and polyuria.   Genitourinary: Positive for difficulty urinating, frequency and urgency. Negative for dysuria and hematuria.        [facility], 0.5 on 3/19   Musculoskeletal: Positive for arthralgias. Negative for back pain, gait problem, joint swelling and myalgias.   Skin: Negative for rash and wound.   Neurological: Negative for dizziness, syncope, weakness and  "headaches.   Psychiatric/Behavioral: Negative for dysphoric mood and sleep disturbance. The patient is not nervous/anxious.        /70   Pulse 68   Temp 97.3 °F (36.3 °C) (Infrared)   Ht 167.6 cm (65.98\")   Wt 62.3 kg (137 lb 6.4 oz)   BMI 22.19 kg/m²       Physical Exam  Constitutional:       Appearance: Normal appearance. He is well-developed.   HENT:      Head: Normocephalic and atraumatic.      Right Ear: External ear normal.      Left Ear: External ear normal.      Nose: Nose normal.      Mouth/Throat:      Mouth: Mucous membranes are moist.      Pharynx: Oropharynx is clear.   Eyes:      Extraocular Movements: Extraocular movements intact.      Conjunctiva/sclera: Conjunctivae normal.      Pupils: Pupils are equal, round, and reactive to light.   Neck:      Musculoskeletal: Normal range of motion and neck supple.   Cardiovascular:      Rate and Rhythm: Normal rate and regular rhythm.      Heart sounds: Murmur (2/6 ANDREA) present.   Pulmonary:      Effort: Pulmonary effort is normal.      Breath sounds: Normal breath sounds.   Abdominal:      General: Bowel sounds are normal.      Palpations: Abdomen is soft.   Musculoskeletal: Normal range of motion.   Lymphadenopathy:      Cervical: No cervical adenopathy.   Skin:     General: Skin is warm and dry.   Neurological:      General: No focal deficit present.      Mental Status: He is alert and oriented to person, place, and time.   Psychiatric:         Mood and Affect: Mood normal.         Behavior: Behavior normal.         Thought Content: Thought content normal.         Procedure:      Discussion/Summary:    htn-stable on BB and cardura  hyperlipidemia-labs soon on zocor  dm-counseled on diet and exercise, labs   depression-stable on paxil  [facility], [facility] clinically  pvd-cont plavix  BPH-cont cardura, controlled  NICMO-stable on Entresto  Depression-stable on paxil      Labs noted and dw patient  [facility] immunizations at [facility]    Needs labs 2 " weeks before cataract    CLEARED FOR CATARACT SURGERY PENDING LABS TO BE DONE 2 WEEKS BEFORE    Current Outpatient Medications:   •  bisoprolol (ZEBeta) 10 MG tablet, Take 10 mg by mouth Daily., Disp: , Rfl:   •  clopidogrel (PLAVIX) 75 MG tablet, TAKE 1 TABLET EVERY DAY (STOP ASPIRIN), Disp: 90 tablet, Rfl: 3  •  doxazosin (CARDURA) 2 MG tablet, TAKE 1 TABLET EVERY NIGHT, Disp: 90 tablet, Rfl: 3  •  Empagliflozin (JARDIANCE) 10 MG tablet, Take  by mouth., Disp: , Rfl:   •  fluticasone (FLONASE) 50 MCG/ACT nasal spray, USE 1 SPRAY NASALLY TWICE DAILY, Disp: 48 g, Rfl: 3  •  metFORMIN (GLUCOPHAGE) 850 MG tablet, TAKE 1 TABLET TWICE DAILY WITH MEALS, Disp: 180 tablet, Rfl: 3  •  PARoxetine (PAXIL) 20 MG tablet, TAKE 1 TABLET EVERY DAY, Disp: 90 tablet, Rfl: 2  •  sacubitril-valsartan (ENTRESTO) 49-51 MG tablet, Take 1 tablet by mouth 2 (Two) Times a Day., Disp: , Rfl:   •  simvastatin (ZOCOR) 40 MG tablet, TAKE 1 TABLET AT BEDTIME, Disp: 90 tablet, Rfl: 3  •  ACCU-CHEK FASTCLIX LANCETS misc, TEST BLOOD SUGAR THREE TIMES DAILY, Disp: 306 each, Rfl: 5  •  Alcohol Swabs (B-D SINGLE USE SWABS REGULAR) pads, 1 each by Other route 3 (Three) Times a Day., Disp: 300 each, Rfl: 3  •  glucose blood (Accu-Chek SmartView) test strip, 1 each by Other route 3 (Three) Times a Day. for testing, Disp: 300 each, Rfl: 2        Diagnoses and all orders for this visit:    1. Other hyperlipidemia (Primary)    2. Essential hypertension    3. Cardiomyopathy, nonischemic (CMS/HCC)    4. Nonrheumatic aortic valve stenosis    5. Uncontrolled type 2 diabetes mellitus with hyperglycemia (CMS/HCC)    6. Benign non-nodular prostatic hyperplasia with lower urinary tract symptoms    7. Other depression

## 2020-12-18 DIAGNOSIS — I73.9 PVD (PERIPHERAL VASCULAR DISEASE) (HCC): ICD-10-CM

## 2020-12-18 RX ORDER — CLOPIDOGREL BISULFATE 75 MG/1
TABLET ORAL
Qty: 90 TABLET | Refills: 3 | Status: SHIPPED | OUTPATIENT
Start: 2020-12-18 | End: 2021-12-02

## 2021-01-07 ENCOUNTER — TELEPHONE (OUTPATIENT)
Dept: INTERNAL MEDICINE | Facility: CLINIC | Age: 81
End: 2021-01-07

## 2021-01-07 NOTE — TELEPHONE ENCOUNTER
Surgery on 1/26/2020 with Dr. David Sterling (eye) - pt needs a pre-op appointment.  Pt needs a Covid test also.    Please call [phone] cell

## 2021-01-20 ENCOUNTER — OFFICE VISIT (OUTPATIENT)
Dept: INTERNAL MEDICINE | Facility: CLINIC | Age: 81
End: 2021-01-20

## 2021-01-20 VITALS
WEIGHT: 137 LBS | HEART RATE: 68 BPM | BODY MASS INDEX: 22.02 KG/M2 | HEIGHT: 66 IN | SYSTOLIC BLOOD PRESSURE: 120 MMHG | TEMPERATURE: 96.8 F | DIASTOLIC BLOOD PRESSURE: 60 MMHG

## 2021-01-20 DIAGNOSIS — K57.30 DIVERTICULOSIS OF LARGE INTESTINE WITHOUT HEMORRHAGE: ICD-10-CM

## 2021-01-20 DIAGNOSIS — E78.49 OTHER HYPERLIPIDEMIA: Primary | ICD-10-CM

## 2021-01-20 DIAGNOSIS — N40.1 BENIGN NON-NODULAR PROSTATIC HYPERPLASIA WITH LOWER URINARY TRACT SYMPTOMS: ICD-10-CM

## 2021-01-20 DIAGNOSIS — E11.65 UNCONTROLLED TYPE 2 DIABETES MELLITUS WITH HYPERGLYCEMIA (HCC): ICD-10-CM

## 2021-01-20 DIAGNOSIS — I35.0 NONRHEUMATIC AORTIC VALVE STENOSIS: ICD-10-CM

## 2021-01-20 DIAGNOSIS — I10 ESSENTIAL HYPERTENSION: ICD-10-CM

## 2021-01-20 DIAGNOSIS — F32.89 OTHER DEPRESSION: ICD-10-CM

## 2021-01-20 DIAGNOSIS — I42.8 CARDIOMYOPATHY, NONISCHEMIC (HCC): ICD-10-CM

## 2021-01-20 PROCEDURE — 99214 OFFICE O/P EST MOD 30 MIN: CPT | Performed by: INTERNAL MEDICINE

## 2021-01-20 NOTE — PROGRESS NOTES
Patient is a 81 y.o. male who is here for pre op clearance cataract surgery.  Chief Complaint   Patient presents with   • Cataract         HPI:    Here for preop for LEFT eye cataract.  No problems with RIGHT one done months prior.  No SOB/CP or cough.  No bruising.  No fever or chills.  No problems with supine position.      History:     Patient Active Problem List   Diagnosis   • Atopic rhinitis   • Depression   • Uncontrolled type 2 diabetes mellitus (CMS/HCC)   • Diverticulosis of intestine   • Other hyperlipidemia   • Essential hypertension   • Aortic stenosis   • Bilateral impacted cerumen   • Benign non-nodular prostatic hyperplasia with lower urinary tract symptoms   • Cardiomyopathy, nonischemic (CMS/HCC)   • Routine general medical examination at a health care facility       Past Medical History:   Diagnosis Date   • Community acquired pneumonia        Past Surgical History:   Procedure Laterality Date   • HIP SURGERY Left 08/2020    Dr Galvan   • LEG SURGERY Left 07/2016    Popliteal Artery stenting by Dr Hdz   • LEG SURGERY Left 07/2016    skin graft by Dr Nance   • LIPOMA EXCISION      s/p excision on chest   • PACEMAKER IMPLANTATION  01/2019    and defibrillator       Current Outpatient Medications on File Prior to Visit   Medication Sig   • bisoprolol (ZEBeta) 10 MG tablet Take 10 mg by mouth Daily.   • clopidogrel (PLAVIX) 75 MG tablet TAKE 1 TABLET EVERY DAY (STOP ASPIRIN)   • doxazosin (CARDURA) 2 MG tablet TAKE 1 TABLET EVERY NIGHT   • Empagliflozin (JARDIANCE) 10 MG tablet Take  by mouth.   • fluticasone (FLONASE) 50 MCG/ACT nasal spray USE 1 SPRAY NASALLY TWICE DAILY   • metFORMIN (GLUCOPHAGE) 850 MG tablet TAKE 1 TABLET TWICE DAILY WITH MEALS   • PARoxetine (PAXIL) 20 MG tablet TAKE 1 TABLET EVERY DAY   • sacubitril-valsartan (ENTRESTO) 49-51 MG tablet Take 1 tablet by mouth 2 (Two) Times a Day.   • simvastatin (ZOCOR) 40 MG tablet TAKE 1 TABLET AT BEDTIME   • ACCU-CHEK FASTCLIX LANCETS  [facility] [place]LOOD [place]GAR THREE TIMES DAILY   • Alcohol Swabs (B-D SINGLE USE SWABS REGULAR) pads 1 each by Other route 3 (Three) Times a Day.   • glucose blood (Accu-Chek SmartView) test strip 1 each by Other route 3 (Three) Times a Day. for testing     No current facility-administered medications on file prior to visit.        Family History   Problem Relation Age of Onset   • Diabetes Other    • Hypertension Other    • Stroke Other    • Coronary artery disease Other        Social History     Socioeconomic History   • Marital status:      Spouse name: Not on file   • Number of children: Not on file   • Years of education: Not on file   • Highest education level: Not on file   Tobacco Use   • Smoking status: Former Smoker   • Smokeless tobacco: Never Used   Substance and Sexual Activity   • Drug use: No         Review of Systems   Constitutional: Negative for chills, diaphoresis, fatigue, fever and unexpected weight change.   HENT: Negative for congestion, ear pain, hearing loss, nosebleeds, postnasal drip, sinus pressure and sore throat.    Eyes: Negative for pain, discharge and itching.   Respiratory: Negative for cough, chest tightness, shortness of breath and wheezing.    Cardiovascular: Negative for chest pain, palpitations and leg swelling.   Gastrointestinal: Negative for abdominal distention, abdominal pain, blood in stool, constipation, diarrhea, nausea and vomiting.        3/2016 colonoscopy by Dr García, with polyps times 2   Endocrine: Negative for polydipsia and polyuria.   Genitourinary: Positive for difficulty urinating, frequency and urgency. Negative for dysuria and hematuria.        [facility]   Musculoskeletal: Positive for arthralgias. Negative for back pain, gait problem, joint swelling and myalgias.   Skin: Negative for rash and wound.   Neurological: Negative for dizziness, syncope, weakness and headaches.   Psychiatric/Behavioral: Negative for dysphoric mood and sleep  "disturbance. The patient is not nervous/anxious.        /60   Pulse 68   Temp 96.8 °F (36 °C) (Infrared)   Ht 167.6 cm (65.98\")   Wt 62.1 kg (137 lb)   BMI 22.12 kg/m²       Physical Exam  Constitutional:       Appearance: Normal appearance. He is well-developed.   HENT:      Head: Normocephalic and atraumatic.      Right Ear: External ear normal.      Left Ear: External ear normal.      Nose: Nose normal.      Mouth/Throat:      Mouth: Mucous membranes are moist.      Pharynx: Oropharynx is clear.   Eyes:      Extraocular Movements: Extraocular movements intact.      Conjunctiva/sclera: Conjunctivae normal.      Pupils: Pupils are equal, round, and reactive to light.   Neck:      Musculoskeletal: Normal range of motion and neck supple.   Cardiovascular:      Rate and Rhythm: Normal rate and regular rhythm.      Heart sounds: Murmur (2/6 ANDREA) present.   Pulmonary:      Effort: Pulmonary effort is normal.      Breath sounds: Normal breath sounds.   Abdominal:      General: Bowel sounds are normal.      Palpations: Abdomen is soft.   Musculoskeletal: Normal range of motion.   Lymphadenopathy:      Cervical: No cervical adenopathy.   Skin:     General: Skin is warm and dry.   Neurological:      General: No focal deficit present.      Mental Status: He is alert and oriented to person, place, and time.   Psychiatric:         Mood and Affect: Mood normal.         Behavior: Behavior normal.         Thought Content: Thought content normal.         Procedure:      Discussion/Summary:    htn-controlled on BB and cardura  [facility] z[facility] at [facility]  dm-counseled on diet and exercise, AIC 6.8 on 11/20  depression-stable on paxil  [facility]-E[facility] done at [facility], stable clinically  pvd-cont plavix  BPH-cont cardura, controlled  NICMO-stable on Entresto  Diverticulosis-asymptomatic, advised increased fiber      1/20 Labs noted and dw patient  [facility]     CLEARED FOR  LEFT CATARACT " SURGERY    Current Outpatient Medications:   •  bisoprolol (ZEBeta) 10 MG tablet, Take 10 mg by mouth Daily., Disp: , Rfl:   •  clopidogrel (PLAVIX) 75 MG tablet, TAKE 1 TABLET EVERY DAY (STOP ASPIRIN), Disp: 90 tablet, Rfl: 3  •  doxazosin (CARDURA) 2 MG tablet, TAKE 1 TABLET EVERY NIGHT, Disp: 90 tablet, Rfl: 3  •  Empagliflozin (JARDIANCE) 10 MG tablet, Take  by mouth., Disp: , Rfl:   •  fluticasone (FLONASE) 50 MCG/ACT nasal spray, USE 1 SPRAY NASALLY TWICE DAILY, Disp: 48 g, Rfl: 3  •  metFORMIN (GLUCOPHAGE) 850 MG tablet, TAKE 1 TABLET TWICE DAILY WITH MEALS, Disp: 180 tablet, Rfl: 3  •  PARoxetine (PAXIL) 20 MG tablet, TAKE 1 TABLET EVERY DAY, Disp: 90 tablet, Rfl: 2  •  sacubitril-valsartan (ENTRESTO) 49-51 MG tablet, Take 1 tablet by mouth 2 (Two) Times a Day., Disp: , Rfl:   •  simvastatin (ZOCOR) 40 MG tablet, TAKE 1 TABLET AT BEDTIME, Disp: 90 tablet, Rfl: 3  •  ACCU-CHEK FASTCLIX LANCETS misc, TEST BLOOD SUGAR THREE TIMES DAILY, Disp: 306 each, Rfl: 5  •  Alcohol Swabs (B-D SINGLE USE SWABS REGULAR) pads, 1 each by Other route 3 (Three) Times a Day., Disp: 300 each, Rfl: 3  •  glucose blood (Accu-Chek SmartView) test strip, 1 each by Other route 3 (Three) Times a Day. for testing, Disp: 300 each, Rfl: 2        Diagnoses and all orders for this visit:    1. Other hyperlipidemia (Primary)    2. Essential hypertension  -     CBC (No Diff)  -     Comprehensive Metabolic Panel    3. Cardiomyopathy, nonischemic (CMS/HCC)    4. Nonrheumatic aortic valve stenosis    5. Uncontrolled type 2 diabetes mellitus with hyperglycemia (CMS/HCC)    6. Diverticulosis of large intestine without hemorrhage    7. Benign non-nodular prostatic hyperplasia with lower urinary tract symptoms    8. Other depression

## 2021-01-21 LAB
ALBUMIN SERPL-MCNC: 4.5 G/DL (ref 3.6–4.6)
ALBUMIN/GLOB SERPL: 2 {RATIO} (ref 1.2–2.2)
ALP SERPL-CCNC: 51 IU/L (ref 39–117)
ALT SERPL-CCNC: 13 IU/L (ref 0–44)
AST SERPL-CCNC: 17 IU/L (ref 0–40)
BILIRUB SERPL-MCNC: 0.5 MG/DL (ref 0–1.2)
BUN SERPL-MCNC: 20 MG/DL (ref 8–27)
BUN/CREAT SERPL: 25 (ref 10–24)
CALCIUM SERPL-MCNC: 10 MG/DL (ref 8.6–10.2)
CHLORIDE SERPL-SCNC: 100 MMOL/L (ref 96–106)
CO2 SERPL-SCNC: 28 MMOL/L (ref 20–29)
CREAT SERPL-MCNC: 0.81 MG/DL (ref 0.76–1.27)
ERYTHROCYTE [DISTWIDTH] IN BLOOD BY AUTOMATED COUNT: 11.9 % (ref 11.6–15.4)
GLOBULIN SER CALC-MCNC: 2.2 G/DL (ref 1.5–4.5)
GLUCOSE SERPL-MCNC: 163 MG/DL (ref 65–99)
HCT VFR BLD AUTO: 42.1 % (ref 37.5–51)
HGB BLD-MCNC: 14.3 G/DL (ref 13–17.7)
MCH RBC QN AUTO: 32.9 PG (ref 26.6–33)
MCHC RBC AUTO-ENTMCNC: 34 G/DL (ref 31.5–35.7)
MCV RBC AUTO: 97 FL (ref 79–97)
PLATELET # BLD AUTO: 220 X10E3/UL (ref 150–450)
POTASSIUM SERPL-SCNC: 4 MMOL/L (ref 3.5–5.2)
PROT SERPL-MCNC: 6.7 G/DL (ref 6–8.5)
RBC # BLD AUTO: 4.34 X10E6/UL (ref 4.14–5.8)
SODIUM SERPL-SCNC: 143 MMOL/L (ref 134–144)
WBC # BLD AUTO: 6.4 X10E3/UL (ref 3.4–10.8)

## 2021-03-02 DIAGNOSIS — N40.1 BENIGN NON-NODULAR PROSTATIC HYPERPLASIA WITH LOWER URINARY TRACT SYMPTOMS: ICD-10-CM

## 2021-03-03 RX ORDER — DOXAZOSIN 2 MG/1
TABLET ORAL
Qty: 90 TABLET | Refills: 3 | Status: ON HOLD | OUTPATIENT
Start: 2021-03-03 | End: 2022-01-19 | Stop reason: SDUPTHER

## 2021-04-16 ENCOUNTER — OFFICE VISIT (OUTPATIENT)
Dept: INTERNAL MEDICINE | Facility: CLINIC | Age: 81
End: 2021-04-16

## 2021-04-16 VITALS
BODY MASS INDEX: 22.56 KG/M2 | SYSTOLIC BLOOD PRESSURE: 110 MMHG | OXYGEN SATURATION: 94 % | HEIGHT: 66 IN | RESPIRATION RATE: 16 BRPM | WEIGHT: 140.4 LBS | TEMPERATURE: 96.8 F | DIASTOLIC BLOOD PRESSURE: 72 MMHG | HEART RATE: 70 BPM

## 2021-04-16 DIAGNOSIS — N40.1 BENIGN NON-NODULAR PROSTATIC HYPERPLASIA WITH LOWER URINARY TRACT SYMPTOMS: ICD-10-CM

## 2021-04-16 DIAGNOSIS — E11.65 UNCONTROLLED TYPE 2 DIABETES MELLITUS WITH HYPERGLYCEMIA (HCC): ICD-10-CM

## 2021-04-16 DIAGNOSIS — I10 ESSENTIAL HYPERTENSION: ICD-10-CM

## 2021-04-16 DIAGNOSIS — E78.49 OTHER HYPERLIPIDEMIA: Primary | ICD-10-CM

## 2021-04-16 DIAGNOSIS — I35.0 NONRHEUMATIC AORTIC VALVE STENOSIS: ICD-10-CM

## 2021-04-16 DIAGNOSIS — D36.9 TUBULAR ADENOMA: ICD-10-CM

## 2021-04-16 DIAGNOSIS — F32.89 OTHER DEPRESSION: ICD-10-CM

## 2021-04-16 DIAGNOSIS — I42.8 CARDIOMYOPATHY, NONISCHEMIC (HCC): ICD-10-CM

## 2021-04-16 PROCEDURE — 99214 OFFICE O/P EST MOD 30 MIN: CPT | Performed by: INTERNAL MEDICINE

## 2021-04-16 RX ORDER — LEVOCETIRIZINE DIHYDROCHLORIDE 5 MG/1
5 TABLET, FILM COATED ORAL EVERY EVENING
COMMUNITY
End: 2022-02-05 | Stop reason: HOSPADM

## 2021-04-16 NOTE — PROGRESS NOTES
Patient is a 81 y.o. male who is here for a follow up of   Chief Complaint   Patient presents with   • Follow-up     6 month   • Diabetes   • Hypertension   • Hyperlipidemia         HPI:    Here for f/u of HTN and DM and hyperlipidemia.  Doing good.  FSBS are good.  No dizziness or lightheadedness.  BP has been good.  No nausea or emesis.  No abdominal pains.  Continues to exercise.  No falls.  Breathing well.      History:     Patient Active Problem List   Diagnosis   • Atopic rhinitis   • Depression   • Uncontrolled type 2 diabetes mellitus (CMS/HCC)   • Diverticulosis of intestine   • Other hyperlipidemia   • Essential hypertension   • Aortic stenosis   • Bilateral impacted cerumen   • Benign non-nodular prostatic hyperplasia with lower urinary tract symptoms   • Cardiomyopathy, nonischemic (CMS/HCC)   • Routine general medical examination at a health care facility       Past Medical History:   Diagnosis Date   • Community acquired pneumonia        Past Surgical History:   Procedure Laterality Date   • CATARACT EXTRACTION     • HIP SURGERY Left 08/2020    Dr Galvan   • LEG SURGERY Left 07/2016    Popliteal Artery stenting by Dr Hdz   • LEG SURGERY Left 07/2016    skin graft by Dr Nance   • LIPOMA EXCISION      s/p excision on chest   • PACEMAKER IMPLANTATION  01/2019    and defibrillator       Current Outpatient Medications on File Prior to Visit   Medication Sig   • ACCU-CHEK FASTCLIX LANCETS misc TEST BLOOD SUGAR THREE TIMES DAILY   • Alcohol Swabs (B-D SINGLE USE SWABS REGULAR) pads 1 each by Other route 3 (Three) Times a Day.   • bisoprolol (ZEBeta) 10 MG tablet Take 10 mg by mouth Daily.   • clopidogrel (PLAVIX) 75 MG tablet TAKE 1 TABLET EVERY DAY (STOP ASPIRIN)   • doxazosin (CARDURA) 2 MG tablet TAKE 1 TABLET EVERY NIGHT   • Empagliflozin (JARDIANCE) 10 MG tablet Take  by mouth.   • fluticasone (FLONASE) 50 MCG/ACT nasal spray USE 1 SPRAY NASALLY TWICE DAILY   • glucose blood (Accu-Chek SmartView)  test strip 1 each by Other route 3 (Three) Times a Day. for testing   • levocetirizine (XYZAL) 5 MG tablet Take 5 mg by mouth Every Evening.   • metFORMIN (GLUCOPHAGE) 850 MG tablet TAKE 1 TABLET TWICE DAILY WITH MEALS   • PARoxetine (PAXIL) 20 MG tablet TAKE 1 TABLET EVERY DAY   • sacubitril-valsartan (ENTRESTO) 49-51 MG tablet Take 1 tablet by mouth 2 (Two) Times a Day.   • simvastatin (ZOCOR) 40 MG tablet TAKE 1 TABLET AT BEDTIME     No current facility-administered medications on file prior to visit.       Family History   Problem Relation Age of Onset   • Diabetes Other    • Hypertension Other    • Stroke Other    • Coronary artery disease Other        Social History     Socioeconomic History   • Marital status:      Spouse name: Not on file   • Number of children: Not on file   • Years of education: Not on file   • Highest education level: Not on file   Tobacco Use   • Smoking status: Former Smoker   • Smokeless tobacco: Never Used   Substance and Sexual Activity   • Drug use: No         Review of Systems   Constitutional: Negative for chills, diaphoresis, fatigue, fever and unexpected weight change.   HENT: Negative for congestion, ear pain, hearing loss, nosebleeds, postnasal drip, sinus pressure and sore throat.    Eyes: Negative for pain, discharge and itching.   Respiratory: Negative for cough, chest tightness, shortness of breath and wheezing.    Cardiovascular: Negative for chest pain, palpitations and leg swelling.   Gastrointestinal: Negative for abdominal distention, abdominal pain, blood in stool, constipation, diarrhea, nausea and vomiting.        3/2016 colonoscopy by Dr García, with polyps times 2   Endocrine: Negative for polydipsia and polyuria.   Genitourinary: Positive for frequency and urgency. Negative for dysuria and hematuria.        [facility]   Musculoskeletal: Positive for arthralgias. Negative for back pain, gait problem, joint swelling and myalgias.   Skin: Negative  "for rash and wound.   Neurological: Negative for dizziness, syncope, weakness and headaches.   Psychiatric/Behavioral: Negative for dysphoric mood and sleep disturbance. The patient is not nervous/anxious.        /72   Pulse 70   Temp 96.8 °F (36 °C)   Resp 16   Ht 167.6 cm (65.98\")   Wt 63.7 kg (140 lb 6.4 oz)   SpO2 94%   BMI 22.67 kg/m²       Physical Exam  Constitutional:       Appearance: Normal appearance. He is well-developed.   HENT:      Head: Normocephalic and atraumatic.      Right Ear: External ear normal.      Left Ear: External ear normal.      Nose: Nose normal.      Mouth/Throat:      Mouth: Mucous membranes are moist.      Pharynx: Oropharynx is clear.   Eyes:      Extraocular Movements: Extraocular movements intact.      Conjunctiva/sclera: Conjunctivae normal.      Pupils: Pupils are equal, round, and reactive to light.   Cardiovascular:      Rate and Rhythm: Normal rate and regular rhythm.      Heart sounds: Murmur (2/6 ANDREA) heard.     Pulmonary:      Effort: Pulmonary effort is normal.      Breath sounds: Normal breath sounds.   Abdominal:      General: Bowel sounds are normal.      Palpations: Abdomen is soft.   Musculoskeletal:         General: Normal range of motion.      Cervical back: Normal range of motion and neck supple.   Lymphadenopathy:      Cervical: No cervical adenopathy.   Skin:     General: Skin is warm and dry.   Neurological:      General: No focal deficit present.      Mental Status: He is alert and oriented to person, place, and time.   Psychiatric:         Mood and Affect: Mood normal.         Behavior: Behavior normal.         Thought Content: Thought content normal.       Patient's Body mass index is 22.67 kg/m². BMI is within normal parameters. No follow-up required..     Procedure:      Discussion/Summary:    htn-stable on BB and cardura  [facility]  dm-counseled on diet and exercise, AIC today on target  depression-stable on " paxil  [facility]  pvd-cont plavix  BPH-cont cardura, controlled  NICMO-stable on Entresto  Diverticulosis-asymptomatic, advised increased fiber  Hx of TA-rescope      4/16 Labs noted and dw patient    Current Outpatient Medications:   •  ACCU-CHEK FASTCLIX LANCETS misc, TEST BLOOD SUGAR THREE TIMES DAILY, Disp: 306 each, Rfl: 5  •  Alcohol Swabs (B-D SINGLE USE SWABS REGULAR) pads, 1 each by Other route 3 (Three) Times a Day., Disp: 300 each, Rfl: 3  •  bisoprolol (ZEBeta) 10 MG tablet, Take 10 mg by mouth Daily., Disp: , Rfl:   •  clopidogrel (PLAVIX) 75 MG tablet, TAKE 1 TABLET EVERY DAY (STOP ASPIRIN), Disp: 90 tablet, Rfl: 3  •  doxazosin (CARDURA) 2 MG tablet, TAKE 1 TABLET EVERY NIGHT, Disp: 90 tablet, Rfl: 3  •  Empagliflozin (JARDIANCE) 10 MG tablet, Take  by mouth., Disp: , Rfl:   •  fluticasone (FLONASE) 50 MCG/ACT nasal spray, USE 1 SPRAY NASALLY TWICE DAILY, Disp: 48 g, Rfl: 3  •  glucose blood (Accu-Chek SmartView) test strip, 1 each by Other route 3 (Three) Times a Day. for testing, Disp: 300 each, Rfl: 2  •  levocetirizine (XYZAL) 5 MG tablet, Take 5 mg by mouth Every Evening., Disp: , Rfl:   •  metFORMIN (GLUCOPHAGE) 850 MG tablet, TAKE 1 TABLET TWICE DAILY WITH MEALS, Disp: 180 tablet, Rfl: 3  •  PARoxetine (PAXIL) 20 MG tablet, TAKE 1 TABLET EVERY DAY, Disp: 90 tablet, Rfl: 2  •  sacubitril-valsartan (ENTRESTO) 49-51 MG tablet, Take 1 tablet by mouth 2 (Two) Times a Day., Disp: , Rfl:   •  simvastatin (ZOCOR) 40 MG tablet, TAKE 1 TABLET AT BEDTIME, Disp: 90 tablet, Rfl: 3        Diagnoses and all orders for this visit:    1. Other hyperlipidemia (Primary)  -     Comprehensive Metabolic Panel  -     Lipid Panel  -     TSH    2. Essential hypertension  -     CBC (No Diff)    3. Cardiomyopathy, nonischemic (CMS/HCC)    4. Nonrheumatic aortic valve stenosis    5. Uncontrolled type 2 diabetes mellitus with hyperglycemia (CMS/HCC)  -     Hemoglobin A1c    6. Benign non-nodular  prostatic hyperplasia with lower urinary tract symptoms    7. Other depression    8. Tubular adenoma  -     Ambulatory Referral to Colorectal Surgery

## 2021-04-17 LAB
ALBUMIN SERPL-MCNC: 4.5 G/DL (ref 3.5–5.2)
ALBUMIN/GLOB SERPL: 2 G/DL
ALP SERPL-CCNC: 59 U/L (ref 39–117)
ALT SERPL-CCNC: 15 U/L (ref 1–41)
AST SERPL-CCNC: 21 U/L (ref 1–40)
BILIRUB SERPL-MCNC: 0.5 MG/DL (ref 0–1.2)
BUN SERPL-MCNC: 17 MG/DL (ref 8–23)
BUN/CREAT SERPL: 23.9 (ref 7–25)
CALCIUM SERPL-MCNC: 9.8 MG/DL (ref 8.6–10.5)
CHLORIDE SERPL-SCNC: 100 MMOL/L (ref 98–107)
CHOLEST SERPL-MCNC: 168 MG/DL (ref 0–200)
CO2 SERPL-SCNC: 30 MMOL/L (ref 22–29)
CREAT SERPL-MCNC: 0.71 MG/DL (ref 0.76–1.27)
ERYTHROCYTE [DISTWIDTH] IN BLOOD BY AUTOMATED COUNT: 11.6 % (ref 12.3–15.4)
GLOBULIN SER CALC-MCNC: 2.2 GM/DL
GLUCOSE SERPL-MCNC: 161 MG/DL (ref 65–99)
HBA1C MFR BLD: 7.9 % (ref 4.8–5.6)
HCT VFR BLD AUTO: 43.2 % (ref 37.5–51)
HDLC SERPL-MCNC: 100 MG/DL (ref 40–60)
HGB BLD-MCNC: 14.1 G/DL (ref 13–17.7)
LDLC SERPL CALC-MCNC: 51 MG/DL (ref 0–100)
MCH RBC QN AUTO: 32.1 PG (ref 26.6–33)
MCHC RBC AUTO-ENTMCNC: 32.6 G/DL (ref 31.5–35.7)
MCV RBC AUTO: 98.4 FL (ref 79–97)
PLATELET # BLD AUTO: 198 10*3/MM3 (ref 140–450)
POTASSIUM SERPL-SCNC: 4.4 MMOL/L (ref 3.5–5.2)
PROT SERPL-MCNC: 6.7 G/DL (ref 6–8.5)
RBC # BLD AUTO: 4.39 10*6/MM3 (ref 4.14–5.8)
SODIUM SERPL-SCNC: 141 MMOL/L (ref 136–145)
TRIGL SERPL-MCNC: 97 MG/DL (ref 0–150)
TSH SERPL DL<=0.005 MIU/L-ACNC: 4.75 UIU/ML (ref 0.27–4.2)
VLDLC SERPL CALC-MCNC: 17 MG/DL (ref 5–40)
WBC # BLD AUTO: 5.98 10*3/MM3 (ref 3.4–10.8)

## 2021-08-03 ENCOUNTER — TELEPHONE (OUTPATIENT)
Dept: INTERNAL MEDICINE | Facility: CLINIC | Age: 81
End: 2021-08-03

## 2021-08-03 NOTE — TELEPHONE ENCOUNTER
Per [facility] they are not doing COVID infusion at this time. Pt is made aware            ----- Message from Diaz Lee MD sent at 8/3/2021  2:22 PM EDT -----  Yes , just notify that we have forwarded info to the Covid infusion dept and they will contact him, let us know if not called with in the next 24 hours  ----- Message -----  From: Layne Fajardo MA  Sent: 8/3/2021   1:55 PM EDT  To: Diaz Lee MD    Do I need to call pt  ----- Message -----  From: Diaz Lee MD  Sent: 8/3/2021  11:44 AM EDT  To: Layne Fajardo MA    Can you ask [facility] for the protocol for monoclonal antibody infusion  ----- Message -----  From: Layne Fajardo MA  Sent: 8/3/2021  11:27 AM EDT  To: Diaz Lee MD    Wife is calling asking about the infusion therapy for COVID.  He was tested positive on the 31st but started having symptoms a few days prior to the 31st.  She states he is better but still extremely tired.    Please advise

## 2021-08-04 ENCOUNTER — TELEPHONE (OUTPATIENT)
Dept: INTERNAL MEDICINE | Facility: CLINIC | Age: 81
End: 2021-08-04

## 2021-08-04 NOTE — TELEPHONE ENCOUNTER
[facility] called needing the orders for the pt faxed over to them. They need the authorization note included on the order, please advise.    [facility] fax [phone]

## 2021-08-06 ENCOUNTER — PATIENT OUTREACH (OUTPATIENT)
Dept: CASE MANAGEMENT | Facility: OTHER | Age: 81
End: 2021-08-06

## 2021-08-06 NOTE — OUTREACH NOTE
"Ambulatory Case Management Note    Patient Outreach    Pt contacted regarding [facility] ED visit 8/4/21 and to assess for any case management needs.  Pt states Dr. Lee had actually sent him to ER to have the infusion for his Covid. (received monoclonal antibody)  States \"I think I'm doing better.\"  Is up and about and ambulates without assistive device. He denies any falls.  Is eating and drinking without issue.  States his wife is fixing him breakfast right now.  He is still in quarantine and states wife does not have covid so they are trying to stay distanced, wearing mask and practicing good handwashing.  Has been contacted by the health dept.  Asked if he was to follow up with Dr. Lee, which he states \"they did not say anything about that and I have appt with him in Oct.\"  He states he is self sufficient and drives himself.  He does monitor his bp and bs at home.  Care gaps discussed.  He does report he is [facility] with [facility] and [facility]  He states he does have living will.  Encouraged to bring copy to next PCP office visit so they can scan into his chart.  He denies any needs and voiced his appreciation for the call.  Role of  explained and contact number given.  [facility] Nurse line explained and contact number provided.     SDOH updated and reviewed with the patient during this program:     Financial Resource Strain: Low Risk    • Difficulty of Paying Living Expenses: Not hard at all       Food Insecurity: No Food Insecurity   • Worried About Running Out of Food in the Last Year: Never true   • Ran Out of Food in the Last Year: Never true       Transportation Needs: No Transportation Needs   • Lack of Transportation (Medical): No   • Lack of Transportation (Non-Medical): No       Housing Stability: Low Risk    • Unable to Pay for Housing in the Last Year: No   • Number of Places Lived in the Last Year: 1   • Unstable Housing in the Last Year: No     General & Health Literacy " Assessment    Questions/Answers      Most Recent Value   Assessment Completed With  Patient   Living Arrangement  Spouse   Type of Residence  Private Residence   Equiptment Used at Home  -- [glucometer   bp monitor]   Bed or Wheelchair Confined  No   Difficulty Keeping Appointments  No   Health Literacy  Good        Care Evaluation    Questions/Answers      Most Recent Value   Annual Wellness Visit:   Patient Has Completed   Care Gaps Addressed  Flu Shot, Pneumonia Vaccine, Diabetic A1C   Flu Shot Status  Up to Date   Pneumonia Vaccine Status  Up to Date           Rosalba Gallardo RN  Ambulatory Case Management    8/6/2021, 11:31 EDT

## 2021-08-25 RX ORDER — LANCETS
EACH MISCELLANEOUS
Qty: 100 EACH | Refills: 3 | Status: SHIPPED | OUTPATIENT
Start: 2021-08-25 | End: 2022-01-12

## 2021-08-30 RX ORDER — SIMVASTATIN 40 MG
TABLET ORAL
Qty: 90 TABLET | Refills: 3 | Status: SHIPPED | OUTPATIENT
Start: 2021-08-30 | End: 2022-01-13 | Stop reason: HOSPADM

## 2021-10-18 RX ORDER — PAROXETINE HYDROCHLORIDE 20 MG/1
TABLET, FILM COATED ORAL
Qty: 90 TABLET | Refills: 2 | Status: SHIPPED | OUTPATIENT
Start: 2021-10-18 | End: 2022-02-05 | Stop reason: HOSPADM

## 2021-10-22 ENCOUNTER — OFFICE VISIT (OUTPATIENT)
Dept: INTERNAL MEDICINE | Facility: CLINIC | Age: 81
End: 2021-10-22

## 2021-10-22 VITALS
HEIGHT: 66 IN | WEIGHT: 143 LBS | HEART RATE: 70 BPM | SYSTOLIC BLOOD PRESSURE: 140 MMHG | OXYGEN SATURATION: 95 % | BODY MASS INDEX: 22.98 KG/M2 | DIASTOLIC BLOOD PRESSURE: 80 MMHG | TEMPERATURE: 96.9 F

## 2021-10-22 DIAGNOSIS — I10 ESSENTIAL HYPERTENSION: ICD-10-CM

## 2021-10-22 DIAGNOSIS — E78.49 OTHER HYPERLIPIDEMIA: Primary | ICD-10-CM

## 2021-10-22 DIAGNOSIS — I42.8 CARDIOMYOPATHY, NONISCHEMIC (HCC): ICD-10-CM

## 2021-10-22 DIAGNOSIS — F32.89 OTHER DEPRESSION: ICD-10-CM

## 2021-10-22 DIAGNOSIS — I35.0 NONRHEUMATIC AORTIC VALVE STENOSIS: ICD-10-CM

## 2021-10-22 DIAGNOSIS — Z00.00 ENCOUNTER FOR MEDICARE ANNUAL WELLNESS EXAM: ICD-10-CM

## 2021-10-22 DIAGNOSIS — K57.30 DIVERTICULOSIS OF LARGE INTESTINE WITHOUT HEMORRHAGE: ICD-10-CM

## 2021-10-22 DIAGNOSIS — Z00.00 ROUTINE GENERAL MEDICAL EXAMINATION AT A HEALTH CARE FACILITY: ICD-10-CM

## 2021-10-22 DIAGNOSIS — E11.65 UNCONTROLLED TYPE 2 DIABETES MELLITUS WITH HYPERGLYCEMIA (HCC): ICD-10-CM

## 2021-10-22 PROCEDURE — 1126F AMNT PAIN NOTED NONE PRSNT: CPT | Performed by: INTERNAL MEDICINE

## 2021-10-22 PROCEDURE — 1170F FXNL STATUS ASSESSED: CPT | Performed by: INTERNAL MEDICINE

## 2021-10-22 PROCEDURE — 1160F RVW MEDS BY RX/DR IN RCRD: CPT | Performed by: INTERNAL MEDICINE

## 2021-10-22 PROCEDURE — 96160 PT-FOCUSED HLTH RISK ASSMT: CPT | Performed by: INTERNAL MEDICINE

## 2021-10-22 PROCEDURE — G0439 PPPS, SUBSEQ VISIT: HCPCS | Performed by: INTERNAL MEDICINE

## 2021-10-22 NOTE — PROGRESS NOTES
The ABCs of the Annual Wellness Visit  Subsequent Medicare Wellness Visit    Chief Complaint   Patient presents with   • Annual Exam      Subjective       History of Present Illness:  Derian Joya is a 81 y.o. male who presents for a Subsequent Medicare Wellness Visit.    The following portions of the patient's history were reviewed and   updated as appropriate: current medications, past family history, past medical history, past social history, past surgical history and problem list.       Compared to one year ago, the patient feels his physical   health is the same.    Compared to one year ago, the patient feels his mental   health is the same.    Recent Hospitalizations:  He was not admitted to the hospital during the last year.       Current Medical Providers:  Patient Care Team:  Diaz Lee MD as PCP - General    Outpatient Medications Prior to Visit   Medication Sig Dispense Refill   • bisoprolol (ZEBeta) 10 MG tablet Take 10 mg by mouth Daily.     • clopidogrel (PLAVIX) 75 MG tablet TAKE 1 TABLET EVERY DAY (STOP ASPIRIN) 90 tablet 3   • doxazosin (CARDURA) 2 MG tablet TAKE 1 TABLET EVERY NIGHT 90 tablet 3   • Empagliflozin (JARDIANCE) 10 MG tablet Take  by mouth.     • fluticasone (FLONASE) 50 MCG/ACT nasal spray USE 1 SPRAY NASALLY TWICE DAILY 48 g 3   • levocetirizine (XYZAL) 5 MG tablet Take 5 mg by mouth Every Evening.     • metFORMIN (GLUCOPHAGE) 850 MG tablet TAKE 1 TABLET TWICE DAILY WITH MEALS 180 tablet 3   • PARoxetine (PAXIL) 20 MG tablet TAKE 1 TABLET EVERY DAY 90 tablet 2   • sacubitril-valsartan (ENTRESTO) 49-51 MG tablet Take 1 tablet by mouth 2 (Two) Times a Day.     • simvastatin (ZOCOR) 40 MG tablet TAKE 1 TABLET EVERY NIGHT AT BEDTIME 90 tablet 3   • Accu-Chek Softclix Lancets lancets TEST BLOOD SUGAR EVERY  each 3   • Alcohol Swabs (B-D SINGLE USE SWABS REGULAR) pads 1 each by Other route 3 (Three) Times a Day. 300 each 3   • glucose blood (Accu-Chek SmartView) test strip  1 each by Other route 3 (Three) Times a Day. for testing 300 each 2     No facility-administered medications prior to visit.       No opioid medication identified on active medication list. I have reviewed chart for other potential  high risk medication/s and harmful drug interactions in the elderly.          Aspirin is not on active medication list.  Aspirin use is contraindicated for this patient due to: current use of clopidogrel.  .      Fall Risk Assessment was completed, and patient is at low risk for falls.      Patient Active Problem List   Diagnosis   • Atopic rhinitis   • Depression   • Uncontrolled type 2 diabetes mellitus (HCC)   • Diverticulosis of intestine   • Other hyperlipidemia   • Essential hypertension   • Aortic stenosis   • Bilateral impacted cerumen   • Benign non-nodular prostatic hyperplasia with lower urinary tract symptoms   • Cardiomyopathy, nonischemic (HCC)   • Routine general medical examination at a health care facility     Advance Care Planning   Advance Directive is not on file.  ACP discussion was held with the patient during this visit. Patient does not have an advance directive, information provided.    Review of Systems   Constitutional: Negative for chills, diaphoresis, fatigue, fever and unexpected weight change.   HENT: Negative for congestion, ear pain, hearing loss, nosebleeds, postnasal drip, sinus pressure and sore throat.    Eyes: Negative for pain, discharge and itching.   Respiratory: Negative for cough, chest tightness, shortness of breath and wheezing.    Cardiovascular: Negative for chest pain, palpitations and leg swelling.   Gastrointestinal: Negative for abdominal distention, abdominal pain, blood in stool, constipation, diarrhea, nausea and vomiting.        3/2016 colonoscopy by Dr García, with polyps times 2  6/21 colonoscopy by Dr García, no adenoma   Endocrine: Negative for polydipsia and polyuria.   Genitourinary: Positive for frequency and urgency. Negative  "for dysuria and hematuria.        [facility], 0.5 on 3/19   Musculoskeletal: Positive for arthralgias. Negative for back pain, gait problem, joint swelling and myalgias.   Skin: Negative for rash and wound.   Neurological: Negative for dizziness, syncope, weakness and headaches.   Psychiatric/Behavioral: Negative for dysphoric mood and sleep disturbance. The patient is not nervous/anxious.          Objective          Vitals:    10/22/21 1005 10/22/21 1034   BP: 120/72 140/80   BP Location: Left arm    Patient Position: Sitting    Pulse: 70    Temp: 96.9 °F (36.1 °C)    TempSrc: Infrared    SpO2: 95%    Weight: 64.9 kg (143 lb)    Height: 167.6 cm (65.98\")    PainSc: 0-No pain      BMI Readings from Last 1 Encounters:   10/22/21 23.09 kg/m²   BMI is within normal parameters. No follow-up required.  Body mass index is 23.09 kg/m².  BMI has not been calculated during today's encounter.     Does the patient have evidence of cognitive impairment? No    Physical Exam  Constitutional:       Appearance: Normal appearance. He is well-developed.   HENT:      Head: Normocephalic and atraumatic.      Right Ear: External ear normal.      Left Ear: External ear normal.      Nose: Nose normal.      Mouth/Throat:      Mouth: Mucous membranes are moist.      Pharynx: Oropharynx is clear.   Eyes:      Extraocular Movements: Extraocular movements intact.      Conjunctiva/sclera: Conjunctivae normal.      Pupils: Pupils are equal, round, and reactive to light.   Cardiovascular:      Rate and Rhythm: Normal rate and regular rhythm.      Heart sounds: Murmur (2/6 ANDREA) heard.       Pulmonary:      Effort: Pulmonary effort is normal.      Breath sounds: Normal breath sounds.   Abdominal:      General: Bowel sounds are normal.      Palpations: Abdomen is soft.   Musculoskeletal:         General: Normal range of motion.      Cervical back: Normal range of motion and neck supple.   Lymphadenopathy:      Cervical: No cervical adenopathy. "   Skin:     General: Skin is warm and dry.   Neurological:      General: No focal deficit present.      Mental Status: He is alert and oriented to person, place, and time.   Psychiatric:         Mood and Affect: Mood normal.         Behavior: Behavior normal.         Thought Content: Thought content normal.                 HEALTH RISK ASSESSMENT    Smoking Status:  Social History     Tobacco Use   Smoking Status Former Smoker   Smokeless Tobacco Never Used     Alcohol Consumption:  Social History     Substance and Sexual Activity   Alcohol Use None     Fall Risk Screen:    [facility], and patient is at LOW risk for falls.Assessment completed on:10/22/2021    Depression Screening:  PHQ-2/PHQ-9 Depression Screening 10/22/2021   Little interest or pleasure in doing things 0   Feeling down, depressed, or hopeless 0   Trouble falling or staying asleep, or sleeping too much 0   Feeling tired or having little energy 1   Poor appetite or overeating 0   Feeling bad about yourself - or that you are a failure or have let yourself or your family down 0   Trouble concentrating on things, such as reading the newspaper or watching television 0   Moving or speaking so slowly that other people could have noticed. Or the opposite - being so fidgety or restless that you have been moving around a lot more than usual 0   Thoughts that you would be better off dead, or of hurting yourself in some way 0   Total Score 1   If you checked off any problems, how difficult have these problems made it for you to do your work, take care of things at home, or get along with other people? Not difficult at all       Health Habits and Functional and Cognitive Screening:  Functional & Cognitive Status 10/22/2021   Do you have difficulty preparing food and eating? No   Do you have difficulty bathing yourself, getting dressed or grooming yourself? No   Do you have difficulty using the toilet? No   Do you have difficulty  moving around from place to place? No   Do you have trouble with steps or getting out of a bed or a chair? No   Current Diet Low Carb Diet   Dental Exam Not up to date   Eye Exam Not up to date   Exercise (times per week) 4 times per week   Current Exercises Include Walking   Current Exercise Activities Include -   Do you need help using the phone?  No   Are you deaf or do you have serious difficulty hearing?  No   Do you need help with transportation? No   Do you need help shopping? No   Do you need help preparing meals?  No   Do you need help with housework?  No   Do you need help with laundry? No   Do you need help taking your medications? No   Do you need help managing money? No   Do you ever drive or ride in a car without wearing a seat belt? No   Have you felt unusual stress, anger or loneliness in the last month? No   Who do you live with? Spouse   If you need help, do you have trouble finding someone available to you? No   Have you been bothered in the last four weeks by sexual problems? No   Do you have difficulty concentrating, remembering or making decisions? No       Age-appropriate Screening Schedule:  Refer to the list below for future screening recommendations based on patient's age, sex and/or medical conditions. Orders for these recommended tests are listed in the plan section. The patient has been provided with a written plan.    Health Maintenance   Topic Date Due   • URINE MICROALBUMIN  Never done   • TDAP/TD VACCINES (1 - Tdap) Never done   • ZOSTER VACCINE (1 of 2) Never done   • INFLUENZA VACCINE  Never done   • HEMOGLOBIN A1C  10/16/2021   • DIABETIC EYE EXAM  02/10/2022   • LIPID PANEL  04/16/2022              Assessment/Plan        CMS Preventative Services Quick Reference  Risk Factors Identified During Encounter  Cardiovascular Disease  Immunizations Discussed/Encouraged (specific Immunizations; immunizations UTD  Polypharmacy  The above risks/problems have been discussed with the  patient.  Follow up actions/plans if indicated are seen below in the Assessment/Plan Section.  Pertinent information has been shared with the patient in the After Visit Summary.    Diagnoses and all orders for this visit:    1. Other hyperlipidemia (Primary)  -     Lipid Panel    2. Essential hypertension    3. Cardiomyopathy, nonischemic (HCC)    4. Nonrheumatic aortic valve stenosis    5. Uncontrolled type 2 diabetes mellitus with hyperglycemia (HCC)  -     Hemoglobin A1c    6. Diverticulosis of large intestine without hemorrhage    7. Routine general medical examination at a health care facility  -     CBC (No Diff)  -     Comprehensive Metabolic Panel  -     Hemoglobin A1c  -     Lipid Panel  -     TSH    8. Other depression    9. Encounter for Medicare annual wellness exam  -     CBC (No Diff)  -     Comprehensive Metabolic Panel  -     Hemoglobin A1c  -     Lipid Panel  -     TSH        Follow Up:     No follow-ups on file.     An After Visit Summary and PPPS were given to the patient.            HME-counseled on diet and exercise, fasting labs today dw patient  htn-stable on BB and cardura  hyperlipidemia-labs today at [facility]  dm-counseled on diet and exercise, AIC today improved  depression-stable on paxil  [facility], stable clinically  pvd-cont plavix  BPH-cont cardura, controlled  NICMO-stable on Entresto  Diverticulosis-asymptomatic, advised increased fiber  Hx of TA-rescope noted      10/22 Labs noted and dw patient    Reviewed the following with the patient: advised patient to avoid alcoholic beverages and encouraged patient to exercise 5-7 days per week for 30 minutes at a time.

## 2021-10-23 LAB
ALBUMIN SERPL-MCNC: 4.2 G/DL (ref 3.5–5.2)
ALBUMIN/GLOB SERPL: 2.1 G/DL
ALP SERPL-CCNC: 63 U/L (ref 39–117)
ALT SERPL-CCNC: 13 U/L (ref 1–41)
AST SERPL-CCNC: 20 U/L (ref 1–40)
BILIRUB SERPL-MCNC: 0.4 MG/DL (ref 0–1.2)
BUN SERPL-MCNC: 18 MG/DL (ref 8–23)
BUN/CREAT SERPL: 26.5 (ref 7–25)
CALCIUM SERPL-MCNC: 8.9 MG/DL (ref 8.6–10.5)
CHLORIDE SERPL-SCNC: 103 MMOL/L (ref 98–107)
CHOLEST SERPL-MCNC: 158 MG/DL (ref 0–200)
CO2 SERPL-SCNC: 27.3 MMOL/L (ref 22–29)
CREAT SERPL-MCNC: 0.68 MG/DL (ref 0.76–1.27)
ERYTHROCYTE [DISTWIDTH] IN BLOOD BY AUTOMATED COUNT: 12.5 % (ref 12.3–15.4)
GLOBULIN SER CALC-MCNC: 2 GM/DL
GLUCOSE SERPL-MCNC: 146 MG/DL (ref 65–99)
HBA1C MFR BLD: 7.5 % (ref 4.8–5.6)
HCT VFR BLD AUTO: 43.2 % (ref 37.5–51)
HDLC SERPL-MCNC: 81 MG/DL (ref 40–60)
HGB BLD-MCNC: 14 G/DL (ref 13–17.7)
LDLC SERPL CALC-MCNC: 57 MG/DL (ref 0–100)
MCH RBC QN AUTO: 32.1 PG (ref 26.6–33)
MCHC RBC AUTO-ENTMCNC: 32.4 G/DL (ref 31.5–35.7)
MCV RBC AUTO: 99.1 FL (ref 79–97)
PLATELET # BLD AUTO: 202 10*3/MM3 (ref 140–450)
POTASSIUM SERPL-SCNC: 4.2 MMOL/L (ref 3.5–5.2)
PROT SERPL-MCNC: 6.2 G/DL (ref 6–8.5)
RBC # BLD AUTO: 4.36 10*6/MM3 (ref 4.14–5.8)
SODIUM SERPL-SCNC: 143 MMOL/L (ref 136–145)
TRIGL SERPL-MCNC: 113 MG/DL (ref 0–150)
TSH SERPL DL<=0.005 MIU/L-ACNC: 3.87 UIU/ML (ref 0.27–4.2)
VLDLC SERPL CALC-MCNC: 20 MG/DL (ref 5–40)
WBC # BLD AUTO: 5.81 10*3/MM3 (ref 3.4–10.8)

## 2021-12-01 DIAGNOSIS — I73.9 PVD (PERIPHERAL VASCULAR DISEASE) (HCC): ICD-10-CM

## 2021-12-02 RX ORDER — CLOPIDOGREL BISULFATE 75 MG/1
TABLET ORAL
Qty: 90 TABLET | Refills: 3 | Status: SHIPPED | OUTPATIENT
Start: 2021-12-02 | End: 2022-01-13 | Stop reason: HOSPADM

## 2022-01-11 ENCOUNTER — HOSPITAL ENCOUNTER (INPATIENT)
Facility: HOSPITAL | Age: 82
LOS: 1 days | Discharge: HOME HEALTH | End: 2022-01-13
Attending: STUDENT IN AN ORGANIZED HEALTH CARE EDUCATION/TRAINING PROGRAM | Admitting: INTERNAL MEDICINE

## 2022-01-11 ENCOUNTER — APPOINTMENT (OUTPATIENT)
Dept: CT IMAGING | Facility: HOSPITAL | Age: 82
End: 2022-01-11

## 2022-01-11 ENCOUNTER — APPOINTMENT (OUTPATIENT)
Dept: GENERAL RADIOLOGY | Facility: HOSPITAL | Age: 82
End: 2022-01-11

## 2022-01-11 DIAGNOSIS — I60.9 SAH (SUBARACHNOID HEMORRHAGE): Primary | ICD-10-CM

## 2022-01-11 DIAGNOSIS — S06.5XAA SDH (SUBDURAL HEMATOMA): ICD-10-CM
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
## 2022-01-11 DIAGNOSIS — S09.90XA HEAD TRAUMA, INITIAL ENCOUNTER: ICD-10-CM

## 2022-01-11 DIAGNOSIS — R41.841 COGNITIVE COMMUNICATION DEFICIT: ICD-10-CM

## 2022-01-11 PROBLEM — H61.23 BILATERAL IMPACTED CERUMEN: Status: RESOLVED | Noted: 2017-09-15 | Resolved: 2022-01-11

## 2022-01-11 PROBLEM — Z00.00 ROUTINE GENERAL MEDICAL EXAMINATION AT A HEALTH CARE FACILITY: Status: RESOLVED | Noted: 2020-10-16 | Resolved: 2022-01-11

## 2022-01-11 PROBLEM — N40.1 BENIGN NON-NODULAR PROSTATIC HYPERPLASIA WITH LOWER URINARY TRACT SYMPTOMS: Status: RESOLVED | Noted: 2017-09-15 | Resolved: 2022-01-11

## 2022-01-11 LAB
APTT PPP: 31.4 SECONDS (ref 22–39)
INR PPP: 1.06 (ref 0.85–1.16)
PROTHROMBIN TIME: 13.5 SECONDS (ref 11.4–14.4)

## 2022-01-11 PROCEDURE — 80053 COMPREHEN METABOLIC PANEL: CPT | Performed by: STUDENT IN AN ORGANIZED HEALTH CARE EDUCATION/TRAINING PROGRAM

## 2022-01-11 PROCEDURE — 0 IOPAMIDOL PER 1 ML: Performed by: STUDENT IN AN ORGANIZED HEALTH CARE EDUCATION/TRAINING PROGRAM

## 2022-01-11 PROCEDURE — 85610 PROTHROMBIN TIME: CPT | Performed by: STUDENT IN AN ORGANIZED HEALTH CARE EDUCATION/TRAINING PROGRAM

## 2022-01-11 PROCEDURE — 80061 LIPID PANEL: CPT | Performed by: NURSE PRACTITIONER

## 2022-01-11 PROCEDURE — 83036 HEMOGLOBIN GLYCOSYLATED A1C: CPT | Performed by: NURSE PRACTITIONER

## 2022-01-11 PROCEDURE — 85730 THROMBOPLASTIN TIME PARTIAL: CPT | Performed by: STUDENT IN AN ORGANIZED HEALTH CARE EDUCATION/TRAINING PROGRAM

## 2022-01-11 PROCEDURE — 99223 1ST HOSP IP/OBS HIGH 75: CPT

## 2022-01-11 PROCEDURE — 99285 EMERGENCY DEPT VISIT HI MDM: CPT

## 2022-01-11 PROCEDURE — 70450 CT HEAD/BRAIN W/O DYE: CPT

## 2022-01-11 PROCEDURE — 93005 ELECTROCARDIOGRAM TRACING: CPT | Performed by: STUDENT IN AN ORGANIZED HEALTH CARE EDUCATION/TRAINING PROGRAM

## 2022-01-11 PROCEDURE — 83735 ASSAY OF MAGNESIUM: CPT | Performed by: STUDENT IN AN ORGANIZED HEALTH CARE EDUCATION/TRAINING PROGRAM

## 2022-01-11 PROCEDURE — 70496 CT ANGIOGRAPHY HEAD: CPT

## 2022-01-11 PROCEDURE — 84100 ASSAY OF PHOSPHORUS: CPT | Performed by: NURSE PRACTITIONER

## 2022-01-11 PROCEDURE — 84484 ASSAY OF TROPONIN QUANT: CPT | Performed by: STUDENT IN AN ORGANIZED HEALTH CARE EDUCATION/TRAINING PROGRAM

## 2022-01-11 PROCEDURE — 85025 COMPLETE CBC W/AUTO DIFF WBC: CPT | Performed by: STUDENT IN AN ORGANIZED HEALTH CARE EDUCATION/TRAINING PROGRAM

## 2022-01-11 PROCEDURE — 72125 CT NECK SPINE W/O DYE: CPT

## 2022-01-11 PROCEDURE — 73130 X-RAY EXAM OF HAND: CPT

## 2022-01-11 PROCEDURE — 70498 CT ANGIOGRAPHY NECK: CPT

## 2022-01-11 RX ORDER — ONDANSETRON 2 MG/ML
4 INJECTION INTRAMUSCULAR; INTRAVENOUS EVERY 6 HOURS PRN
Status: DISCONTINUED | OUTPATIENT
Start: 2022-01-11 | End: 2022-01-13 | Stop reason: HOSPADM

## 2022-01-11 RX ORDER — NICOTINE POLACRILEX 4 MG
15 LOZENGE BUCCAL
Status: DISCONTINUED | OUTPATIENT
Start: 2022-01-11 | End: 2022-01-13 | Stop reason: HOSPADM

## 2022-01-11 RX ORDER — SODIUM CHLORIDE 0.9 % (FLUSH) 0.9 %
10 SYRINGE (ML) INJECTION EVERY 12 HOURS SCHEDULED
Status: DISCONTINUED | OUTPATIENT
Start: 2022-01-12 | End: 2022-01-13

## 2022-01-11 RX ORDER — POLYETHYLENE GLYCOL 3350 17 G/17G
17 POWDER, FOR SOLUTION ORAL DAILY PRN
Status: DISCONTINUED | OUTPATIENT
Start: 2022-01-11 | End: 2022-01-13 | Stop reason: HOSPADM

## 2022-01-11 RX ORDER — AMOXICILLIN 250 MG
2 CAPSULE ORAL 2 TIMES DAILY
Status: DISCONTINUED | OUTPATIENT
Start: 2022-01-12 | End: 2022-01-13 | Stop reason: HOSPADM

## 2022-01-11 RX ORDER — BISACODYL 5 MG/1
5 TABLET, DELAYED RELEASE ORAL DAILY PRN
Status: DISCONTINUED | OUTPATIENT
Start: 2022-01-11 | End: 2022-01-13 | Stop reason: HOSPADM

## 2022-01-11 RX ORDER — ACETAMINOPHEN 325 MG/1
650 TABLET ORAL EVERY 4 HOURS PRN
Status: DISCONTINUED | OUTPATIENT
Start: 2022-01-11 | End: 2022-01-13 | Stop reason: HOSPADM

## 2022-01-11 RX ORDER — DEXTROSE MONOHYDRATE 25 G/50ML
25 INJECTION, SOLUTION INTRAVENOUS
Status: DISCONTINUED | OUTPATIENT
Start: 2022-01-11 | End: 2022-01-13 | Stop reason: HOSPADM

## 2022-01-11 RX ORDER — SODIUM CHLORIDE 0.9 % (FLUSH) 0.9 %
10 SYRINGE (ML) INJECTION AS NEEDED
Status: DISCONTINUED | OUTPATIENT
Start: 2022-01-11 | End: 2022-01-13

## 2022-01-11 RX ORDER — ALUMINA, MAGNESIA, AND SIMETHICONE 2400; 2400; 240 MG/30ML; MG/30ML; MG/30ML
7.5 SUSPENSION ORAL EVERY 4 HOURS PRN
Status: DISCONTINUED | OUTPATIENT
Start: 2022-01-11 | End: 2022-01-13 | Stop reason: HOSPADM

## 2022-01-11 RX ORDER — BISACODYL 10 MG
10 SUPPOSITORY, RECTAL RECTAL DAILY PRN
Status: DISCONTINUED | OUTPATIENT
Start: 2022-01-11 | End: 2022-01-13 | Stop reason: HOSPADM

## 2022-01-11 RX ORDER — ACETAMINOPHEN 650 MG/1
650 SUPPOSITORY RECTAL EVERY 4 HOURS PRN
Status: DISCONTINUED | OUTPATIENT
Start: 2022-01-11 | End: 2022-01-13 | Stop reason: HOSPADM

## 2022-01-11 RX ADMIN — NICARDIPINE HYDROCHLORIDE 5 MG/HR: 25 INJECTION, SOLUTION INTRAVENOUS at 23:35

## 2022-01-11 RX ADMIN — IOPAMIDOL 75 ML: 755 INJECTION, SOLUTION INTRAVENOUS at 23:12

## 2022-01-12 ENCOUNTER — TELEPHONE (OUTPATIENT)
Dept: NEUROSURGERY | Facility: CLINIC | Age: 82
End: 2022-01-12

## 2022-01-12 ENCOUNTER — APPOINTMENT (OUTPATIENT)
Dept: CARDIOLOGY | Facility: HOSPITAL | Age: 82
End: 2022-01-12

## 2022-01-12 ENCOUNTER — APPOINTMENT (OUTPATIENT)
Dept: CT IMAGING | Facility: HOSPITAL | Age: 82
End: 2022-01-12

## 2022-01-12 ENCOUNTER — APPOINTMENT (OUTPATIENT)
Dept: NEUROLOGY | Facility: HOSPITAL | Age: 82
End: 2022-01-12

## 2022-01-12 DIAGNOSIS — S06.5XAA SDH (SUBDURAL HEMATOMA): Primary | ICD-10-CM

## 2022-01-12 PROBLEM — S09.90XA HEAD TRAUMA: Status: ACTIVE | Noted: 2022-01-12

## 2022-01-12 LAB
ALBUMIN SERPL-MCNC: 4 G/DL (ref 3.5–5.2)
ALBUMIN/GLOB SERPL: 1.7 G/DL
ALP SERPL-CCNC: 60 U/L (ref 39–117)
ALT SERPL W P-5'-P-CCNC: 21 U/L (ref 1–41)
ANION GAP SERPL CALCULATED.3IONS-SCNC: 15 MMOL/L (ref 5–15)
ASCENDING AORTA: 3.2 CM
AST SERPL-CCNC: 29 U/L (ref 1–40)
B PARAPERT DNA SPEC QL NAA+PROBE: NOT DETECTED
B PERT DNA SPEC QL NAA+PROBE: NOT DETECTED
BASOPHILS # BLD AUTO: 0.04 10*3/MM3 (ref 0–0.2)
BASOPHILS NFR BLD AUTO: 0.3 % (ref 0–1.5)
BH CV ECHO MEAS - AO MAX PG (FULL): 29.2 MMHG
BH CV ECHO MEAS - AO MAX PG: 32.5 MMHG
BH CV ECHO MEAS - AO MEAN PG (FULL): 18.1 MMHG
BH CV ECHO MEAS - AO MEAN PG: 20.1 MMHG
BH CV ECHO MEAS - AO ROOT AREA (BSA CORRECTED): 2
BH CV ECHO MEAS - AO ROOT AREA: 8.5 CM^2
BH CV ECHO MEAS - AO ROOT DIAM: 3.3 CM
BH CV ECHO MEAS - AO V2 MAX: 285 CM/SEC
BH CV ECHO MEAS - AO V2 MEAN: 210.6 CM/SEC
BH CV ECHO MEAS - AO V2 VTI: 58.3 CM
BH CV ECHO MEAS - ASC AORTA: 3.2 CM
BH CV ECHO MEAS - AVA(I,A): 0.99 CM^2
BH CV ECHO MEAS - AVA(I,D): 0.99 CM^2
BH CV ECHO MEAS - AVA(V,A): 1 CM^2
BH CV ECHO MEAS - AVA(V,D): 1 CM^2
BH CV ECHO MEAS - BSA(HAYCOCK): 1.7 M^2
BH CV ECHO MEAS - BSA: 1.7 M^2
BH CV ECHO MEAS - BZI_BMI: 23 KILOGRAMS/M^2
BH CV ECHO MEAS - BZI_METRIC_HEIGHT: 165.1 CM
BH CV ECHO MEAS - BZI_METRIC_WEIGHT: 62.6 KG
BH CV ECHO MEAS - EDV(CUBED): 44.8 ML
BH CV ECHO MEAS - EDV(MOD-SP2): 54 ML
BH CV ECHO MEAS - EDV(MOD-SP4): 57 ML
BH CV ECHO MEAS - EDV(TEICH): 52.7 ML
BH CV ECHO MEAS - EF(CUBED): 53.6 %
BH CV ECHO MEAS - EF(MOD-BP): 50 %
BH CV ECHO MEAS - EF(MOD-SP2): 46.3 %
BH CV ECHO MEAS - EF(MOD-SP4): 45.6 %
BH CV ECHO MEAS - EF(TEICH): 46.4 %
BH CV ECHO MEAS - ESV(CUBED): 20.8 ML
BH CV ECHO MEAS - ESV(MOD-SP2): 29 ML
BH CV ECHO MEAS - ESV(MOD-SP4): 31 ML
BH CV ECHO MEAS - ESV(TEICH): 28.3 ML
BH CV ECHO MEAS - FS: 22.6 %
BH CV ECHO MEAS - IVS/LVPW: 0.89
BH CV ECHO MEAS - IVSD: 0.88 CM
BH CV ECHO MEAS - LA DIMENSION: 2.9 CM
BH CV ECHO MEAS - LA/AO: 0.88
BH CV ECHO MEAS - LAD MAJOR: 4.4 CM
BH CV ECHO MEAS - LAT PEAK E' VEL: 11.6 CM/SEC
BH CV ECHO MEAS - LATERAL E/E' RATIO: 7.3
BH CV ECHO MEAS - LV DIASTOLIC VOL/BSA (35-75): 33.7 ML/M^2
BH CV ECHO MEAS - LV IVRT: 0.12 SEC
BH CV ECHO MEAS - LV MASS(C)D: 96.4 GRAMS
BH CV ECHO MEAS - LV MASS(C)DI: 57 GRAMS/M^2
BH CV ECHO MEAS - LV MAX PG: 3.3 MMHG
BH CV ECHO MEAS - LV MEAN PG: 2 MMHG
BH CV ECHO MEAS - LV SYSTOLIC VOL/BSA (12-30): 18.3 ML/M^2
BH CV ECHO MEAS - LV V1 MAX: 90.8 CM/SEC
BH CV ECHO MEAS - LV V1 MEAN: 62.1 CM/SEC
BH CV ECHO MEAS - LV V1 VTI: 18.1 CM
BH CV ECHO MEAS - LVIDD: 3.6 CM
BH CV ECHO MEAS - LVIDS: 2.7 CM
BH CV ECHO MEAS - LVLD AP2: 7.3 CM
BH CV ECHO MEAS - LVLD AP4: 7.1 CM
BH CV ECHO MEAS - LVLS AP2: 6.5 CM
BH CV ECHO MEAS - LVLS AP4: 6.7 CM
BH CV ECHO MEAS - LVOT AREA (M): 3.1 CM^2
BH CV ECHO MEAS - LVOT AREA: 3.2 CM^2
BH CV ECHO MEAS - LVOT DIAM: 2 CM
BH CV ECHO MEAS - LVPWD: 0.99 CM
BH CV ECHO MEAS - MED PEAK E' VEL: 10.1 CM/SEC
BH CV ECHO MEAS - MEDIAL E/E' RATIO: 8.4
BH CV ECHO MEAS - MV A MAX VEL: 153.5 CM/SEC
BH CV ECHO MEAS - MV DEC SLOPE: 281.7 CM/SEC^2
BH CV ECHO MEAS - MV DEC TIME: 0.15 SEC
BH CV ECHO MEAS - MV E MAX VEL: 86.4 CM/SEC
BH CV ECHO MEAS - MV E/A: 0.56
BH CV ECHO MEAS - MV P1/2T MAX VEL: 96.4 CM/SEC
BH CV ECHO MEAS - MV P1/2T: 100.2 MSEC
BH CV ECHO MEAS - MVA P1/2T LCG: 2.3 CM^2
BH CV ECHO MEAS - MVA(P1/2T): 2.2 CM^2
BH CV ECHO MEAS - PA ACC SLOPE: 974.3 CM/SEC^2
BH CV ECHO MEAS - PA ACC TIME: 0.09 SEC
BH CV ECHO MEAS - PA PR(ACCEL): 39.4 MMHG
BH CV ECHO MEAS - RAP SYSTOLE: 3 MMHG
BH CV ECHO MEAS - RVDD: 2.2 CM
BH CV ECHO MEAS - RVSP: 34 MMHG
BH CV ECHO MEAS - SI(AO): 294.8 ML/M^2
BH CV ECHO MEAS - SI(CUBED): 14.2 ML/M^2
BH CV ECHO MEAS - SI(LVOT): 34.2 ML/M^2
BH CV ECHO MEAS - SI(MOD-SP2): 14.8 ML/M^2
BH CV ECHO MEAS - SI(MOD-SP4): 15.4 ML/M^2
BH CV ECHO MEAS - SI(TEICH): 14.5 ML/M^2
BH CV ECHO MEAS - SV(AO): 498 ML
BH CV ECHO MEAS - SV(CUBED): 24 ML
BH CV ECHO MEAS - SV(LVOT): 57.8 ML
BH CV ECHO MEAS - SV(MOD-SP2): 25 ML
BH CV ECHO MEAS - SV(MOD-SP4): 26 ML
BH CV ECHO MEAS - SV(TEICH): 24.4 ML
BH CV ECHO MEAS - TAPSE (>1.6): 1.2 CM
BH CV ECHO MEAS - TR MAX PG: 31 MMHG
BH CV ECHO MEAS - TR MAX VEL: 278.3 CM/SEC
BH CV ECHO MEASUREMENTS AVERAGE E/E' RATIO: 7.96
BH CV VAS BP RIGHT ARM: NORMAL MMHG
BH CV XLRA - RV BASE: 2.7 CM
BH CV XLRA - RV LENGTH: 7.3 CM
BH CV XLRA - RV MID: 2.6 CM
BH CV XLRA - TDI S': 13.5 CM/SEC
BILIRUB SERPL-MCNC: 0.6 MG/DL (ref 0–1.2)
BUN SERPL-MCNC: 19 MG/DL (ref 8–23)
BUN/CREAT SERPL: 31.7 (ref 7–25)
C PNEUM DNA NPH QL NAA+NON-PROBE: NOT DETECTED
CALCIUM SPEC-SCNC: 9.1 MG/DL (ref 8.6–10.5)
CHLORIDE SERPL-SCNC: 102 MMOL/L (ref 98–107)
CHOLEST SERPL-MCNC: 172 MG/DL (ref 0–200)
CO2 SERPL-SCNC: 23 MMOL/L (ref 22–29)
CREAT SERPL-MCNC: 0.6 MG/DL (ref 0.76–1.27)
DEPRECATED RDW RBC AUTO: 45.3 FL (ref 37–54)
EOSINOPHIL # BLD AUTO: 0.04 10*3/MM3 (ref 0–0.4)
EOSINOPHIL NFR BLD AUTO: 0.3 % (ref 0.3–6.2)
ERYTHROCYTE [DISTWIDTH] IN BLOOD BY AUTOMATED COUNT: 12.4 % (ref 12.3–15.4)
FLUAV SUBTYP SPEC NAA+PROBE: NOT DETECTED
FLUBV RNA ISLT QL NAA+PROBE: NOT DETECTED
GFR SERPL CREATININE-BSD FRML MDRD: 129 ML/MIN/1.73
GLOBULIN UR ELPH-MCNC: 2.3 GM/DL
GLUCOSE BLDC GLUCOMTR-MCNC: 108 MG/DL (ref 70–130)
GLUCOSE BLDC GLUCOMTR-MCNC: 128 MG/DL (ref 70–130)
GLUCOSE BLDC GLUCOMTR-MCNC: 177 MG/DL (ref 70–130)
GLUCOSE BLDC GLUCOMTR-MCNC: 178 MG/DL (ref 70–130)
GLUCOSE BLDC GLUCOMTR-MCNC: 235 MG/DL (ref 70–130)
GLUCOSE BLDC GLUCOMTR-MCNC: 363 MG/DL (ref 70–130)
GLUCOSE SERPL-MCNC: 157 MG/DL (ref 65–99)
HADV DNA SPEC NAA+PROBE: NOT DETECTED
HBA1C MFR BLD: 8.1 % (ref 4.8–5.6)
HCOV 229E RNA SPEC QL NAA+PROBE: NOT DETECTED
HCOV HKU1 RNA SPEC QL NAA+PROBE: NOT DETECTED
HCOV NL63 RNA SPEC QL NAA+PROBE: NOT DETECTED
HCOV OC43 RNA SPEC QL NAA+PROBE: NOT DETECTED
HCT VFR BLD AUTO: 39.8 % (ref 37.5–51)
HDLC SERPL-MCNC: 73 MG/DL (ref 40–60)
HGB BLD-MCNC: 13 G/DL (ref 13–17.7)
HMPV RNA NPH QL NAA+NON-PROBE: NOT DETECTED
HOLD SPECIMEN: NORMAL
HPIV1 RNA ISLT QL NAA+PROBE: NOT DETECTED
HPIV2 RNA SPEC QL NAA+PROBE: NOT DETECTED
HPIV3 RNA NPH QL NAA+PROBE: NOT DETECTED
HPIV4 P GENE NPH QL NAA+PROBE: NOT DETECTED
IMM GRANULOCYTES # BLD AUTO: 0.12 10*3/MM3 (ref 0–0.05)
IMM GRANULOCYTES NFR BLD AUTO: 0.9 % (ref 0–0.5)
LDLC SERPL CALC-MCNC: 60 MG/DL (ref 0–100)
LDLC/HDLC SERPL: 0.68 {RATIO}
LEFT ATRIUM VOLUME INDEX: 23.1 ML/M^2
LEFT ATRIUM VOLUME: 39 ML
LYMPHOCYTES # BLD AUTO: 1.18 10*3/MM3 (ref 0.7–3.1)
LYMPHOCYTES NFR BLD AUTO: 9.3 % (ref 19.6–45.3)
M PNEUMO IGG SER IA-ACNC: NOT DETECTED
MAGNESIUM SERPL-MCNC: 1.7 MG/DL (ref 1.6–2.4)
MCH RBC QN AUTO: 32.6 PG (ref 26.6–33)
MCHC RBC AUTO-ENTMCNC: 32.7 G/DL (ref 31.5–35.7)
MCV RBC AUTO: 99.7 FL (ref 79–97)
MONOCYTES # BLD AUTO: 0.71 10*3/MM3 (ref 0.1–0.9)
MONOCYTES NFR BLD AUTO: 5.6 % (ref 5–12)
NEUTROPHILS NFR BLD AUTO: 10.56 10*3/MM3 (ref 1.7–7)
NEUTROPHILS NFR BLD AUTO: 83.6 % (ref 42.7–76)
NRBC BLD AUTO-RTO: 0 /100 WBC (ref 0–0.2)
PHOSPHATE SERPL-MCNC: 3.5 MG/DL (ref 2.5–4.5)
PLATELET # BLD AUTO: 165 10*3/MM3 (ref 140–450)
PMV BLD AUTO: 10.9 FL (ref 6–12)
POTASSIUM SERPL-SCNC: 3.4 MMOL/L (ref 3.5–5.2)
PROT SERPL-MCNC: 6.3 G/DL (ref 6–8.5)
RBC # BLD AUTO: 3.99 10*6/MM3 (ref 4.14–5.8)
RHINOVIRUS RNA SPEC NAA+PROBE: NOT DETECTED
RSV RNA NPH QL NAA+NON-PROBE: NOT DETECTED
SARS-COV-2 RNA NPH QL NAA+NON-PROBE: NOT DETECTED
SODIUM SERPL-SCNC: 140 MMOL/L (ref 136–145)
TRIGL SERPL-MCNC: 247 MG/DL (ref 0–150)
TROPONIN T SERPL-MCNC: NORMAL
VLDLC SERPL-MCNC: 39 MG/DL (ref 5–40)
WBC NRBC COR # BLD: 12.65 10*3/MM3 (ref 3.4–10.8)
WHOLE BLOOD HOLD SPECIMEN: NORMAL
WHOLE BLOOD HOLD SPECIMEN: NORMAL

## 2022-01-12 PROCEDURE — 63710000001 INSULIN DETEMIR PER 5 UNITS: Performed by: NURSE PRACTITIONER

## 2022-01-12 PROCEDURE — 95816 EEG AWAKE AND DROWSY: CPT

## 2022-01-12 PROCEDURE — 63710000001 INSULIN REGULAR HUMAN PER 5 UNITS: Performed by: NURSE PRACTITIONER

## 2022-01-12 PROCEDURE — 70450 CT HEAD/BRAIN W/O DYE: CPT

## 2022-01-12 PROCEDURE — 99233 SBSQ HOSP IP/OBS HIGH 50: CPT | Performed by: PSYCHIATRY & NEUROLOGY

## 2022-01-12 PROCEDURE — 82962 GLUCOSE BLOOD TEST: CPT

## 2022-01-12 PROCEDURE — 63710000001 INSULIN LISPRO (HUMAN) PER 5 UNITS: Performed by: NURSE PRACTITIONER

## 2022-01-12 PROCEDURE — 93306 TTE W/DOPPLER COMPLETE: CPT

## 2022-01-12 PROCEDURE — 99222 1ST HOSP IP/OBS MODERATE 55: CPT | Performed by: PHYSICIAN ASSISTANT

## 2022-01-12 PROCEDURE — 0202U NFCT DS 22 TRGT SARS-COV-2: CPT | Performed by: INTERNAL MEDICINE

## 2022-01-12 PROCEDURE — 99291 CRITICAL CARE FIRST HOUR: CPT | Performed by: INTERNAL MEDICINE

## 2022-01-12 RX ORDER — ATORVASTATIN CALCIUM 40 MG/1
40 TABLET, FILM COATED ORAL NIGHTLY
Status: DISCONTINUED | OUTPATIENT
Start: 2022-01-12 | End: 2022-01-13 | Stop reason: HOSPADM

## 2022-01-12 RX ORDER — FAMOTIDINE 10 MG/ML
20 INJECTION, SOLUTION INTRAVENOUS EVERY 12 HOURS SCHEDULED
Status: DISCONTINUED | OUTPATIENT
Start: 2022-01-12 | End: 2022-01-13

## 2022-01-12 RX ORDER — SODIUM CHLORIDE 0.9 % (FLUSH) 0.9 %
10 SYRINGE (ML) INJECTION EVERY 12 HOURS SCHEDULED
Status: DISCONTINUED | OUTPATIENT
Start: 2022-01-12 | End: 2022-01-13 | Stop reason: HOSPADM

## 2022-01-12 RX ORDER — BISOPROLOL FUMARATE 5 MG/1
5 TABLET, FILM COATED ORAL
Status: DISCONTINUED | OUTPATIENT
Start: 2022-01-13 | End: 2022-01-13 | Stop reason: HOSPADM

## 2022-01-12 RX ORDER — SODIUM CHLORIDE 0.9 % (FLUSH) 0.9 %
10 SYRINGE (ML) INJECTION AS NEEDED
Status: DISCONTINUED | OUTPATIENT
Start: 2022-01-12 | End: 2022-01-13 | Stop reason: HOSPADM

## 2022-01-12 RX ADMIN — SACUBITRIL AND VALSARTAN 1 TABLET: 49; 51 TABLET, FILM COATED ORAL at 21:45

## 2022-01-12 RX ADMIN — FAMOTIDINE 20 MG: 10 INJECTION INTRAVENOUS at 02:02

## 2022-01-12 RX ADMIN — FAMOTIDINE 20 MG: 10 INJECTION INTRAVENOUS at 10:21

## 2022-01-12 RX ADMIN — INSULIN HUMAN 4 UNITS: 100 INJECTION, SOLUTION PARENTERAL at 18:27

## 2022-01-12 RX ADMIN — SODIUM CHLORIDE, PRESERVATIVE FREE 10 ML: 5 INJECTION INTRAVENOUS at 20:51

## 2022-01-12 RX ADMIN — NICARDIPINE HYDROCHLORIDE 5 MG/HR: 25 INJECTION, SOLUTION INTRAVENOUS at 20:46

## 2022-01-12 RX ADMIN — DOCUSATE SODIUM 50 MG AND SENNOSIDES 8.6 MG 2 TABLET: 8.6; 5 TABLET, FILM COATED ORAL at 10:21

## 2022-01-12 RX ADMIN — INSULIN DETEMIR 5 UNITS: 100 INJECTION, SOLUTION SUBCUTANEOUS at 10:21

## 2022-01-12 RX ADMIN — INSULIN LISPRO 2 UNITS: 100 INJECTION, SOLUTION INTRAVENOUS; SUBCUTANEOUS at 21:45

## 2022-01-12 RX ADMIN — SODIUM CHLORIDE, PRESERVATIVE FREE 10 ML: 5 INJECTION INTRAVENOUS at 20:48

## 2022-01-12 RX ADMIN — FAMOTIDINE 20 MG: 10 INJECTION INTRAVENOUS at 20:48

## 2022-01-12 RX ADMIN — ATORVASTATIN CALCIUM 40 MG: 40 TABLET, FILM COATED ORAL at 02:02

## 2022-01-12 RX ADMIN — ATORVASTATIN CALCIUM 40 MG: 40 TABLET, FILM COATED ORAL at 20:57

## 2022-01-12 RX ADMIN — DOCUSATE SODIUM 50 MG AND SENNOSIDES 8.6 MG 2 TABLET: 8.6; 5 TABLET, FILM COATED ORAL at 20:57

## 2022-01-12 RX ADMIN — INSULIN HUMAN 2 UNITS: 100 INJECTION, SOLUTION PARENTERAL at 02:01

## 2022-01-12 NOTE — TELEPHONE ENCOUNTER
Received a call to schedule 1 month hospital follow up with CT head.     Please pend an order the CT scan.    Does patient need to follow up with physician or PA-C?

## 2022-01-12 NOTE — PROGRESS NOTES
"Neurology       Patient Care Team:  Diaz Lee MD as PCP - General    Chief complaint: Fall    History: Patient done well.    Has seen neurosurgery who feels it is subdural and subarachnoid are stable and could be discharged.    Spoke with Dr. Tate feels that his blood sugar needs working on.      Past Medical History:   Diagnosis Date   • Allergic rhinitis    • Aortic stenosis    • Atopic rhinitis 7/6/2016   • Benign non-nodular prostatic hyperplasia with lower urinary tract symptoms 9/15/2017   • CAD (coronary artery disease)    • Cardiomyopathy, ischemic    • Colon polyps     tubular adenoma 2016   • Community acquired pneumonia    • Depression 7/6/2016   • Diabetes (HCC)    • Diverticulosis of intestine 7/6/2016    Description: Left and sigmoid   • Dyslipidemia    • LBBB (left bundle branch block)    • PAD (peripheral artery disease) (HCC)        Vital Signs   Vitals:    01/12/22 1136 01/12/22 1145 01/12/22 1214 01/12/22 1215   BP:  125/77  (!) 156/135   Pulse:  87 95    Resp:       Temp:       TempSrc:       SpO2:  94% 95% 94%   Weight: 62.6 kg (138 lb 0.1 oz)      Height: 165.1 cm (65\")          Physical Exam:   General: Pleasant and alert              Neuro: Oriented to person place and time.    Speech is articulate with no word finding problems.    Fund of knowledge is normal.    Cranial nerves show benign fundi equal pupils full eye movements.  Facial movement sensation are normal.    Palate elevates normally tongue protrudes normally.    Reflexes are 1+ and equal bilaterally.    Coordination is normal on finger-nose testing bilaterally.    Motor testing shows normal power and tone with no pronator drift.    Sensory testing is normal.        Results Review:  CT head shows stable subdural and subarachnoid bleed.    Globin A1c is 8  Results from last 7 days   Lab Units 01/11/22  2253   WBC 10*3/mm3 12.65*   HEMOGLOBIN g/dL 13.0   HEMATOCRIT % 39.8   PLATELETS 10*3/mm3 165     Results from last 7 days "   Lab Units 01/11/22  2356   SODIUM mmol/L 140   POTASSIUM mmol/L 3.4*   CHLORIDE mmol/L 102   CO2 mmol/L 23.0   BUN mg/dL 19   CREATININE mg/dL 0.60*   CALCIUM mg/dL 9.1   BILIRUBIN mg/dL 0.6   ALK PHOS U/L 60   ALT (SGPT) U/L 21   AST (SGOT) U/L 29   GLUCOSE mg/dL 157*       Imaging Results (Last 24 Hours)     Procedure Component Value Units Date/Time    CT Head Without Contrast [[id number]] Collected: 01/12/22 0630     Updated: 01/12/22 0632    Narrative:      CT Head WO    HISTORY:   Follow-up traumatic subarachnoid hemorrhage.    TECHNIQUE:   Routine noncontrast head CT. Coronal reformatted images. Radiation dose reduction techniques included automated exposure control or exposure modulation based on body size. Count of known CT and cardiac nuc med studies performed in previous 12 months: 4.     COMPARISON:   CT head and CTA head/neck 1/11/2022    FINDINGS:   Stable small amount of subdural hemorrhage along the bilateral interhemispheric falx and along the left tentorium. Stable subarachnoid hemorrhage in the medial left frontal region, right frontal convexity, and left sylvian fissure. Stable subarachnoid  hemorrhage in the basilar cisterns. Interval development of a small amount of intraventricular blood in the bilateral occipital horns of the lateral ventricles. No hydrocephalus. No midline shift or focal mass effect. Moderate generalized atrophy and  chronic small vessel disease throughout the supratentorial white matter. No acute osseous abnormality. Mucosal thickening bilateral maxillary sinuses with a small air-fluid level in the left maxillary sinus. Visualized mastoid air cells are clear.      Impression:        1. Stable subdural and subarachnoid hemorrhage, detailed above.  2. Interval development of a small amount of intraventricular hemorrhage in the bilateral occipital horns of the lateral ventricles. No hydrocephalus or midline shift.  3. Bilateral maxillary sinus mucosal thickening with small  left-sided air-fluid level.          Signer Name: Lucio Asher MD   Signed: 1/12/2022 6:30 AM   Workstation Name: [email or web address]    [facility]    XR Hand 3+ View Right [[id number]] Collected: 01/12/22 0027     Updated: 01/12/22 0029    Narrative:      CR Hand Min 3 Vws RT    INDICATION:   Fell with swelling right hand    COMPARISON:   None available.    FINDINGS:   PA, lateral, and oblique views of the right hand.  No fracture or dislocation.  No bone erosion or destruction.  . There are vascular calcifications. There are mild degenerative arthritis changes. Please correlate with the site of tenderness clinically.  I see dorsal soft tissue swelling.      Impression:      Dorsal soft tissue swelling without evidence for acute fracture dislocation or definite radiopaque foreign body. Small densities at the base of the thumb are probably on the surface of the skin. Please confirm clinically.    Signer Name: Cris Loco MD   Signed: 1/12/2022 12:27 AM   Workstation Name: [email or web address]    [facility]    CT Angiogram Head w AI Analysis of LVO [[id number]] Collected: 01/12/22 0005     Updated: 01/12/22 0007    Narrative:      INDICATION:   Acute intracranial hemorrhage after a fall. Patient on blood thinners    TECHNIQUE:   CT angiogram of the head and neck with IV contrast. Contrast dose recorded in the patient's chart. 3-D MIP reformatted images were acquired and reviewed. Evaluation for a significant carotid arterial stenosis is based on the NASCET criteria. Radiation  dose reduction techniques included automated exposure control or exposure modulation based on body size. Radiation audit for number of CT and nuclear cardiology exams performed in the last year:  0. Precontrast imaging also obtained.    Rapid AI utilized for LVO    COMPARISON:   Earlier noncontrast head CT.    FINDINGS:   CTA neck:  There our atherosclerotic vascular calcifications at the aortic arch. There is  involvement of great vessel origins without hemodynamically significant stenosis. There is probably mild stenosis at the origin of the right subclavian artery.  There is mild narrowing at the origin the right common carotid artery due to vascular ectasia.    There is calcified plaque at the right carotid bifurcation but by NASCET criteria there is essentially 0% diameter stenosis. There is moderate stenosis at the origin of the right external carotid artery. There is calcified plaque at the left carotid  bifurcation and along the left common carotid artery. By NASCET criteria there is 0% diameter stenosis. There is mild narrowing at the origin of the left external carotid artery. There is calcified disease at both carotid siphons with mild to moderate  stenosis likely.    Both vertebral arteries are patent. The left is dominant. There is disease at the origin of the vertebral arteries partly obscured by venous vascular opacification.    CTA head:  Study is performed with a stroke evaluation protocol and is not post processed for the purpose of evaluating for intracranial aneurysm. There are acute blood products present as identified on the earlier noncontrast head CT. There our  atherosclerotic vascular calcifications involving both the 4 segments with likely hemodynamically significant stenosis of the hypoplastic distal right vertebral artery. This vessel mostly terminates in a PICA vessel. There is irregular ectasia of the  basilar due to atherosclerotic disease with high-grade short segment proximal stenosis followed by some poststenotic dilatation. There is a moderate-sized anterior communicating artery present. The left A1 vessels mildly hypoplastic. No intracranial  vascular cut off is appreciated. There is no gross intracranial aneurysm or arteriovenous malformation appreciated on this study performed for stroke evaluation. There is a small to moderate sized right posterior communicating artery. There is a  large  left posterior communicator with largely fetal origin to the left posterior cerebral artery distribution. Left posterior cerebral artery is irregular is probably some intracranial atherosclerotic disease.    There is venous contrast reflux intracranially from the injection into the dural venous sinuses. No intracranial vascular cut off is suspected.      Impression:        1. By NASCET criteria, there is 0% stenosis at either carotid bifurcation.  2. Both vertebral arteries are patent with left being dominant.  3. There is considerable atherosclerotic disease involving the bilateral carotid siphons and intracranial vertebral arteries. There is severe disease of the basilar artery with calcified and noncalcified plaque. This includes short segment high-grade  stenosis of the proximal basilar with some long segment irregularity. The distal right vertebral artery largely terminates in a PICA.  4. No obvious intracranial aneurysm or arteriovenous malformation. Please note the CT angiogram is protocol to evaluate for stroke and utilizes rapid AI for this purpose. It is limited for evaluation for intracranial aneurysm.  5. There is a large amount of venous contrast reflux including reflux into the dural venous sinuses.    Signer Name: Cris Loco MD   Signed: 1/12/2022 12:05 AM   Workstation Name: [email or web address]    [facility]    CT Angiogram Neck [[id number]] Collected: 01/12/22 0005     Updated: 01/12/22 0007    Narrative:      INDICATION:   Acute intracranial hemorrhage after a fall. Patient on blood thinners    TECHNIQUE:   CT angiogram of the head and neck with IV contrast. Contrast dose recorded in the patient's chart. 3-D MIP reformatted images were acquired and reviewed. Evaluation for a significant carotid arterial stenosis is based on the NASCET criteria. Radiation  dose reduction techniques included automated exposure control or exposure modulation based on body size. Radiation audit  for number of CT and nuclear cardiology exams performed in the last year:  0. Precontrast imaging also obtained.    Rapid AI utilized for LVO    COMPARISON:   Earlier noncontrast head CT.    FINDINGS:   CTA neck:  There our atherosclerotic vascular calcifications at the aortic arch. There is involvement of great vessel origins without hemodynamically significant stenosis. There is probably mild stenosis at the origin of the right subclavian artery.  There is mild narrowing at the origin the right common carotid artery due to vascular ectasia.    There is calcified plaque at the right carotid bifurcation but by NASCET criteria there is essentially 0% diameter stenosis. There is moderate stenosis at the origin of the right external carotid artery. There is calcified plaque at the left carotid  bifurcation and along the left common carotid artery. By NASCET criteria there is 0% diameter stenosis. There is mild narrowing at the origin of the left external carotid artery. There is calcified disease at both carotid siphons with mild to moderate  stenosis likely.    Both vertebral arteries are patent. The left is dominant. There is disease at the origin of the vertebral arteries partly obscured by venous vascular opacification.    CTA head:  Study is performed with a stroke evaluation protocol and is not post processed for the purpose of evaluating for intracranial aneurysm. There are acute blood products present as identified on the earlier noncontrast head CT. There our  atherosclerotic vascular calcifications involving both the 4 segments with likely hemodynamically significant stenosis of the hypoplastic distal right vertebral artery. This vessel mostly terminates in a PICA vessel. There is irregular ectasia of the  basilar due to atherosclerotic disease with high-grade short segment proximal stenosis followed by some poststenotic dilatation. There is a moderate-sized anterior communicating artery present. The left A1  vessels mildly hypoplastic. No intracranial  vascular cut off is appreciated. There is no gross intracranial aneurysm or arteriovenous malformation appreciated on this study performed for stroke evaluation. There is a small to moderate sized right posterior communicating artery. There is a large  left posterior communicator with largely fetal origin to the left posterior cerebral artery distribution. Left posterior cerebral artery is irregular is probably some intracranial atherosclerotic disease.    There is venous contrast reflux intracranially from the injection into the dural venous sinuses. No intracranial vascular cut off is suspected.      Impression:        1. By NASCET criteria, there is 0% stenosis at either carotid bifurcation.  2. Both vertebral arteries are patent with left being dominant.  3. There is considerable atherosclerotic disease involving the bilateral carotid siphons and intracranial vertebral arteries. There is severe disease of the basilar artery with calcified and noncalcified plaque. This includes short segment high-grade  stenosis of the proximal basilar with some long segment irregularity. The distal right vertebral artery largely terminates in a PICA.  4. No obvious intracranial aneurysm or arteriovenous malformation. Please note the CT angiogram is protocol to evaluate for stroke and utilizes rapid AI for this purpose. It is limited for evaluation for intracranial aneurysm.  5. There is a large amount of venous contrast reflux including reflux into the dural venous sinuses.    Signer Name: Cris Loco MD   Signed: 1/12/2022 12:05 AM   Workstation Name: [email or web address]    [facility]    CT Head Without Contrast [[id number]] Collected: 01/11/22 2229     Updated: 01/11/22 2231    Narrative:      CT Head WO    HISTORY:   Fall. Head injury. Patient on blood thinners.    TECHNIQUE:   Axial unenhanced head CT with multiplanar reformats. Radiation dose reduction techniques  included automated exposure control or exposure modulation based on body size. Count of known CT and cardiac nuc med studies performed in previous 12 months: 0.     COMPARISON:   None.    FINDINGS:   Abnormal examination. There is generalized atrophy with chronic small vessel ischemic disease in the white matter. Ventricular size is within normal limits. There is acute subdural hemorrhage along the interhemispheric falx measuring up to 5 mm in  thickness. There is adjacent subarachnoid hemorrhage in the medial left frontal lobe and near the right vertex. Some of the hemorrhage near the vertex could also be of subdural origin. There is subarachnoid hemorrhage in the left sylvian fissure  measuring up to 7 mm in thickness. There is also subarachnoid hemorrhage in the basilar cisterns left greater than right. There also appears to be some subdural hemorrhage along the left side of the tentorium. No intraventricular hemorrhage is  identified. There is no evidence of acute infarct. There is an occipital scalp hematoma. There is atherosclerotic disease in the vertebral arteries and carotid siphons. There is a distal left vertebral artery stent versus atherosclerotic calcification.  Correlate with history. No skull fracture is identified.    SAH detected                     YES     Diffuse or vertical layer of SAH blood < 1 mm thick                 NO     Localized clot and/or vertical layer of SAH blood  > 1 mm thick           YES     ICH or IVH with diffuse or no SAH          NO       Impression:        1. Acute subdural hemorrhage along the interhemispheric falx measuring up to 5 mm in thickness. There is also some subdural hemorrhage along the left tentorium.  2. Subarachnoid hemorrhage is noted at the right vertex and in the left frontal region and left sylvian fissure. There is also subarachnoid hemorrhage in the basilar cisterns worse to the left of midline.  3. No intraventricular hemorrhage.  4. Atrophy with  chronic small vessel ischemic disease in the white matter.  5. Occipital scalp hematoma without skull fracture.      NOTIFICATION: Critical Value/emergent results were called by telephone at the time of interpretation on 1/11/2022 10:25 PM to Larry Gonzalez MD who verbally acknowledged these results.      Signer Name: Hari Simon MD   Signed: 1/11/2022 10:29 PM   Workstation Name: [facility]    CT Cervical Spine Without Contrast [[id number]] Collected: 01/11/22 2229     Updated: 01/11/22 2231    Narrative:      CT Spine Cervical WO    INDICATION:   Neck pain after fall.    TECHNIQUE:   CT of the cervical spine without IV contrast. Coronal and sagittal reconstructions were obtained.  Radiation dose reduction techniques included automated exposure control or exposure modulation based on body size. Count of known CT and cardiac nuc med  studies performed in previous 12 months: 0.     COMPARISON:  None available.    FINDINGS:  Note is made of extensive carotid atherosclerotic disease. There is mild grade 1 retrolisthesis of C5 on C6. Cervical alignment is otherwise normal. No acute cervical spine fracture is seen. There is multilevel degenerative disc disease and facet  arthropathy, most severe at C5-6 where there is a disc osteophyte complex with mild narrowing of the central canal and moderate bilateral foraminal stenosis. Intracranial hemorrhage noted the skull base. Please see head CT report dictated separately.      Impression:      No acute cervical spine fracture.    Signer Name: Hari Simon MD   Signed: 1/11/2022 10:29 PM   Workstation Name: [facility]          Assessment:  Posttraumatic subdural subarachnoid hemorrhage secondary to mechanical fall    Diabetes    Plan:  Talk to Dr. Tate would like to work on his diabetes overnight.    A neurologic standpoint he can be discharged anytime.    Comment:  No ill consequences of  his hemorrhage         I discussed the patients findings and my recommendations with patient, nursing staff and primary care team    Sathish Monroy MD  01/12/22  13:49 EST

## 2022-01-12 NOTE — CONSULTS
Stroke Consult Note    Patient Name: Derian Joya   MRN: [id number]  Age: 82 y.o.  Sex: male  : 1940    Primary Care Physician: Diaz Lee MD  Referring Physician:  Larry Gonzalez MD     TIME STROKE TEAM CALLED:  EST     TIME PATIENT SEEN:  EST    Handedness: Right   Race:     Chief Complaint/Reason for Consultation: Fall/head trauma    HPI: Mr. Joya is a 82-year-old  male with a past medical history of aortic stenosis, ventricular pacemaker, BPH, CAD, ischemic cardiomyopathy, depression, diabetes mellitus, reticulosis, dyslipidemia, and PAD.  The patient presents symptoms of to [facility] ED related to a fall and loss of consciousness.  The patient was walking upstairs and fell back hitting his head.  On CT head revealed that the patient has acute subdural hemorrhage and subarachnoid hemorrhage.   The patient currently takes Plavix but does not take anticoagulant.  The patient reports that he is independent and lives at home with his wife.        Last Known Normal Date/Time: unknown      Review of Systems   Unable to perform ROS: Mental status change      Past Medical History:   Diagnosis Date   • Allergic rhinitis    • Aortic stenosis    • Atopic rhinitis 2016   • Benign non-nodular prostatic hyperplasia with lower urinary tract symptoms 9/15/2017   • CAD (coronary artery disease)    • Cardiomyopathy, ischemic    • Colon polyps     tubular adenoma    • Community acquired pneumonia    • Depression 2016   • Diabetes (HCC)    • Diverticulosis of intestine 2016    Description: Left and sigmoid   • Dyslipidemia    • LBBB (left bundle branch block)    • PAD (peripheral artery disease) (HCC)      Past Surgical History:   Procedure Laterality Date   • CARDIAC CATHETERIZATION      100% cx   • CATARACT EXTRACTION     • COLONOSCOPY W/ BIOPSIES AND POLYPECTOMY  2021    1 benign polyp   • HIP SURGERY Left 2020    Dr Mandy BOUDREAUX   • LEG SURGERY Left  2016    Popliteal Artery stenting by Dr Hdz   • LEG SURGERY Left 2016    skin graft by Dr Nance   • LIPOMA EXCISION      s/p excision on chest   • PACEMAKER IMPLANTATION  2019    and defibrillator     Family History   Problem Relation Age of Onset   • Diabetes Other    • Hypertension Other    • Stroke Other    • Coronary artery disease Other      Social History     Socioeconomic History   • Marital status:    Tobacco Use   • Smoking status: Former Smoker     Types: Cigarettes     Quit date: 1997     Years since quittin.0   • Smokeless tobacco: Never Used   Substance and Sexual Activity   • Drug use: No     No Known Allergies  Prior to Admission medications    Medication Sig Start Date End Date Taking? Authorizing Provider   Empagliflozin (JARDIANCE) 10 MG tablet Take  by mouth.   Yes ProviderJoselin MD   levocetirizine (XYZAL) 5 MG tablet Take 5 mg by mouth Every Evening.   Yes Joselin Rico MD   metFORMIN (GLUCOPHAGE) 850 MG tablet TAKE 1 TABLET TWICE DAILY WITH MEALS 3/3/21  Yes Diaz Lee MD   PARoxetine (PAXIL) 20 MG tablet TAKE 1 TABLET EVERY DAY 10/18/21  Yes Diaz Lee MD   sacubitril-valsartan (ENTRESTO) 49-51 MG tablet Take 1 tablet by mouth 2 (Two) Times a Day.   Yes ProviderJoselin MD   simvastatin (ZOCOR) 40 MG tablet TAKE 1 TABLET EVERY NIGHT AT BEDTIME 21  Yes Diaz Lee MD   Accu-Chek Softclix Lancets lancets TEST BLOOD SUGAR EVERY DAY 21   Diaz Lee MD   Alcohol Swabs (B-D SINGLE USE SWABS REGULAR) pads 1 each by Other route 3 (Three) Times a Day. 20   Diaz Lee MD   bisoprolol (ZEBeta) 10 MG tablet Take 10 mg by mouth Daily.    ProviderJoselin MD   clopidogrel (PLAVIX) 75 MG tablet TAKE 1 TABLET EVERY DAY (STOP ASPIRIN) 21   Diaz Lee MD   doxazosin (CARDURA) 2 MG tablet TAKE 1 TABLET EVERY NIGHT 3/3/21   Diaz Lee MD   fluticasone (FLONASE) 50 MCG/ACT nasal spray USE 1  SPRAY NASALLY TWICE DAILY 9/28/18   Diaz Lee MD   glucose blood (Accu-Chek SmartView) test strip 1 each by Other route 3 (Three) Times a Day. for testing 6/5/20   Diaz Lee MD         Temp:  [97.8 °F (36.6 °C)] 97.8 °F (36.6 °C)  Heart Rate:  [83-93] 90  Resp:  [18] 18  BP: (105-162)/(64-97) 106/64  Neurological Exam  Mental Status  Awake and alert. Oriented only to person and place. Speech is normal. Language is fluent with no aphasia. Fund of knowledge is abnormal. Knowledge: Confused and has confused conversations .    Cranial Nerves  CN I: Sense of smell is normal.  CN II: Right visual acuity: counts fingers. Left visual acuity: counts fingers. Visual fields full to confrontation.  CN III, IV, VI: Extraocular movements intact bilaterally. Pupils equal round and reactive to light bilaterally.  CN V: Facial sensation is normal.  CN VII: Full and symmetric facial movement.  CN VIII: Bilateral hearing appears to be intact.  CN IX, X: Palate elevates symmetrically  CN XI: Shoulder shrug strength is normal.  CN XII: Tongue midline without atrophy or fasciculations.    Motor  Normal muscle bulk throughout. No fasciculations present. Normal muscle tone. No abnormal involuntary movements. Strength is 5/5 throughout all four extremities.    Sensory  Sensation is intact to light touch, pinprick, vibration and proprioception in all four extremities.    Coordination  Finger-to-nose, rapid alternating movements and heel-to-shin normal bilaterally without dysmetria.    Gait  Unable to assess .      Physical Exam  Vitals reviewed.   Constitutional:       General: He is awake. He is not in acute distress.  HENT:      Head: Normocephalic.      Comments: Traumatic; posterior internal hematoma      Mouth/Throat:      Mouth: Mucous membranes are dry.   Eyes:      Extraocular Movements: Extraocular movements intact.      Pupils: Pupils are equal, round, and reactive to light.   Neck:      Comments: Pt had on a  cervical collar  Cardiovascular:      Rate and Rhythm: Normal rate.      Pulses: Normal pulses.      Comments: Rhythm is paced   Pulmonary:      Effort: Pulmonary effort is normal. No respiratory distress.   Musculoskeletal:      Right lower leg: No edema.      Left lower leg: No edema.   Skin:     General: Skin is warm and dry.   Neurological:      Mental Status: He is alert. He is disoriented.      GCS: GCS eye subscore is 4. GCS verbal subscore is 5. GCS motor subscore is 5.      Cranial Nerves: No cranial nerve deficit.      Sensory: No sensory deficit.      Motor: No weakness.      Coordination: Coordination is intact. Coordination normal.      Deep Tendon Reflexes: Strength normal.   Psychiatric:         Speech: Speech normal.         Behavior: Behavior is cooperative.         Cognition and Memory: Cognition is impaired.         Acute Stroke Data    Alteplase (tPA) Inclusion / Exclusion Criteria    Time: 22:45 EST  Person Administering Scale: SCARLETT John    Inclusion Criteria  [x]   18 years of age or greater   []   Onset of symptoms < 4.5 hours before beginning treatment (stroke onset = time patient was last seen well or without symptoms).   []   Diagnosis of acute ischemic stroke causing measurable disabling deficit (Complete Hemianopia, Any Aphasia, Visual or Sensory Extinction, Any weakness limiting sustained effort against gravity)   []   Any remaining deficit considered potentially disabling in view of patient and practitioner   Exclusion criteria (Do not proceed with Alteplase if any are checked under exclusion criteria)  []   Onset unknown or GREATER than 4.5 hours   [x]   ICH on CT/MRI   []   CT demonstrates hypodensity representing acute or subacute infarct   []   Significant head trauma or prior stroke in the previous 3 months   []   Symptoms suggestive of subarachnoid hemorrhage   []   History of un-ruptured intracranial aneurysm GREATER than 10 mm   []   Recent intracranial or  intraspinal surgery within the last 3 months   []   Arterial puncture at a non-compressible site in the previous 7 days   []   Active internal bleeding   []   Acute bleeding tendency   []   Platelet count LESS than 100,000 for known hematological diseases such as leukemia, thrombocytopenia or chronic cirrhosis   []   Current use of anticoagulant with INR GREATER than 1.7 or PT GREATER than 15 seconds, aPTT GREATER than 40 seconds   []   Heparin received within 48 hours, resulting in abnormally elevated aPTT GREATER than upper limit of normal   []   Current use of direct thrombin inhibitors or direct factor Xa inhibitors in the past 48 hours   []   Elevated blood pressure refractory to treatment (systolic GREATER than 185 mm/Hg or diastolic  GREATER than 110 mm/Hg   []   Suspected infective endocarditis and aortic arch dissection   []   Current use of therapeutic treatment dose of low-molecular-weight heparin (LMWH) within the previous 24 hours   []   Structural GI malignancy or bleed   Relative exclusion for all patients  []   Only minor non-disabling symptoms   []   Pregnancy   []   Seizure at onset with postictal residual neurological impairments   []   Major surgery or previous trauma within past 14 days   []   History of previous spontaneous ICH, intracranial neoplasm, or AV malformation   []   Postpartum (within previous 14 days)   []   Recent GI or urinary tract hemorrhage (within previous 21 days)   []   Recent acute MI (within previous 3 months)   []   History of un-ruptured intracranial aneurysm LESS than 10 mm   []   History of ruptured intracranial aneurysm   []   Blood glucose LESS than 50 mg/dL (2.7 mmol/L)   []   Dural puncture within the last 7 days   []   Known GREATER than 10 cerebral microbleeds   Additional exclusions for patients with symptoms onset between 3 and 4.5 hours.  []   Age > 80.   []   On any anticoagulants regardless of INR  >>> Warfarin (Coumadin), Heparin, Enoxaparin (Lovenox),  fondaparinux (Arixtra), bivalirudin (Angiomax), Argatroban, dabigatran (Pradaxa), rivaroxaban (Xarelto), or apixaban (Eliquis)   []   Severe stroke (NIHSS > 25).   []   History of BOTH diabetes and previous ischemic stroke.   []   The risks and benefits have been discussed with the patient or family related to the administration of IV Alteplase for stroke symptoms.   []   I have discussed and reviewed the patient's case and imaging with the attending prior to IV Alteplase.   N/A Time Alteplase administered       Hospital Meds:  Scheduled- atorvastatin, 40 mg, Oral, Nightly  famotidine, 20 mg, Intravenous, Q12H  insulin detemir, 5 Units, Subcutaneous, Daily  insulin regular, 0-9 Units, Subcutaneous, Q6H  senna-docusate sodium, 2 tablet, Oral, BID  sodium chloride, 10 mL, Intravenous, Q12H  sodium chloride, 10 mL, Intravenous, Q12H      Infusions- niCARdipine, 5-15 mg/hr, Last Rate: Stopped (01/12/22 2797)       PRNs- •  acetaminophen **OR** acetaminophen  •  aluminum-magnesium hydroxide-simethicone  •  senna-docusate sodium **AND** polyethylene glycol **AND** bisacodyl **AND** bisacodyl  •  dextrose  •  dextrose  •  glucagon (human recombinant)  •  ondansetron  •  sodium chloride  •  sodium chloride  •  sodium chloride    Functional Status Prior to Current Stroke/Andrews Score: 0    NIH Stroke Scale  Time: 22:45 EST  Person Administering Scale: SCARLETT John     1a. Level of Consciousness: 0-->Alert, keenly responsive  1b. LOC Questions: 0-->Answers both questions correctly  1c. LOC Commands: 0-->Performs both tasks correctly  2. Best Gaze: 0-->Normal  3. Visual: 0-->No visual loss  4. Facial Palsy: 0-->Normal symmetrical movements  5a. Motor Arm, Left: 0-->No drift, limb holds 90 (or 45) degrees for full 10 secs  5b. Motor Arm, Right: 0-->No drift, limb holds 90 (or 45) degrees for full 10 secs  6a. Motor Leg, Left: 0-->No drift, leg holds 30 degree position for full 5 secs  6b. Motor Leg, Right: 0-->No  drift, leg holds 30 degree position for full 5 secs  7. Limb Ataxia: 0-->Absent  8. Sensory: 0-->Normal, no sensory loss  9. Best Language: 0-->No aphasia, normal  10. Dysarthria: 0-->Normal  11. Extinction and Inattention (formerly Neglect): 0-->No abnormality    Total (NIH Stroke Scale): 0     ICH Score:  0 Points (GCS 13 to 15)  0 Points (ICH volume < 30 cm3)  0 Points (Intraventricular Extension Absent)   0 Points (Infratentorial Origin - No )  1 Point (Age =/> 80 years)  The total ICS Score for this patient is 1 at 22:45 EST on 01/11/22    Hopson and Finley Grade:  The Hopson and Finley grade for this patient is: Grade 3: Drowsy or confused, mild focal neurologic deficit at 22:45 EST on 01/11/22.  Results Reviewed:  I have personally reviewed current lab, radiology, and data and agree with results.    Lab Results   Component Value Date    GLUCOSE 157 (H) 01/11/2022    BUN 19 01/11/2022    CREATININE 0.60 (L) 01/11/2022    EGFRIFNONA 129 01/11/2022    EGFRIFAFRI 135 10/22/2021    BCR 31.7 (H) 01/11/2022    K 3.4 (L) 01/11/2022    CO2 23.0 01/11/2022    CALCIUM 9.1 01/11/2022    PROTENTOTREF 6.2 10/22/2021    ALBUMIN 4.00 01/11/2022    LABIL2 2.1 10/22/2021    AST 29 01/11/2022    ALT 21 01/11/2022     WBC   Date Value Ref Range Status   01/11/2022 12.65 (H) 3.40 - 10.80 10*3/mm3 Final     RBC   Date Value Ref Range Status   01/11/2022 3.99 (L) 4.14 - 5.80 10*6/mm3 Final     Hemoglobin   Date Value Ref Range Status   01/11/2022 13.0 13.0 - 17.7 g/dL Final     Hematocrit   Date Value Ref Range Status   01/11/2022 39.8 37.5 - 51.0 % Final     MCV   Date Value Ref Range Status   01/11/2022 99.7 (H) 79.0 - 97.0 fL Final     MCH   Date Value Ref Range Status   01/11/2022 32.6 26.6 - 33.0 pg Final     MCHC   Date Value Ref Range Status   01/11/2022 32.7 31.5 - 35.7 g/dL Final     RDW   Date Value Ref Range Status   01/11/2022 12.4 12.3 - 15.4 % Final     RDW-SD   Date Value Ref Range Status   01/11/2022 45.3 37.0 - 54.0 fl  Final     MPV   Date Value Ref Range Status   01/11/2022 10.9 6.0 - 12.0 fL Final     Platelets   Date Value Ref Range Status   01/11/2022 165 140 - 450 10*3/mm3 Final     Neutrophil %   Date Value Ref Range Status   01/11/2022 83.6 (H) 42.7 - 76.0 % Final     Lymphocyte %   Date Value Ref Range Status   01/11/2022 9.3 (L) 19.6 - 45.3 % Final     Monocyte %   Date Value Ref Range Status   01/11/2022 5.6 5.0 - 12.0 % Final     Eosinophil %   Date Value Ref Range Status   01/11/2022 0.3 0.3 - 6.2 % Final     Basophil %   Date Value Ref Range Status   01/11/2022 0.3 0.0 - 1.5 % Final     Immature Grans %   Date Value Ref Range Status   01/11/2022 0.9 (H) 0.0 - 0.5 % Final     Neutrophils, Absolute   Date Value Ref Range Status   01/11/2022 10.56 (H) 1.70 - 7.00 10*3/mm3 Final     Lymphocytes, Absolute   Date Value Ref Range Status   01/11/2022 1.18 0.70 - 3.10 10*3/mm3 Final     Monocytes, Absolute   Date Value Ref Range Status   01/11/2022 0.71 0.10 - 0.90 10*3/mm3 Final     Eosinophils, Absolute   Date Value Ref Range Status   01/11/2022 0.04 0.00 - 0.40 10*3/mm3 Final     Basophils, Absolute   Date Value Ref Range Status   01/11/2022 0.04 0.00 - 0.20 10*3/mm3 Final     Immature Grans, Absolute   Date Value Ref Range Status   01/11/2022 0.12 (H) 0.00 - 0.05 10*3/mm3 Final     nRBC   Date Value Ref Range Status   01/11/2022 0.0 0.0 - 0.2 /100 WBC Final     XR Hand 3+ View Right    Result Date: 1/12/2022  Dorsal soft tissue swelling without evidence for acute fracture dislocation or definite radiopaque foreign body. Small densities at the base of the thumb are probably on the surface of the skin. Please confirm clinically. Signer Name: Cris Loco MD  Signed: 1/12/2022 12:27 AM  Workstation Name: [facility]  [facility]    CT Head Without Contrast    Result Date: 1/11/2022  1. Acute subdural hemorrhage along the interhemispheric falx measuring up to 5 mm in thickness. There is also some subdural  hemorrhage along the left tentorium. 2. Subarachnoid hemorrhage is noted at the right vertex and in the left frontal region and left sylvian fissure. There is also subarachnoid hemorrhage in the basilar cisterns worse to the left of midline. 3. No intraventricular hemorrhage. 4. Atrophy with chronic small vessel ischemic disease in the white matter. 5. Occipital scalp hematoma without skull fracture. NOTIFICATION: Critical Value/emergent results were called by telephone at the time of interpretation on 1/11/2022 10:25 PM to Larry Gonzalez MD who verbally acknowledged these results. Signer Name: Hari Simon MD  Signed: 1/11/2022 10:29 PM  Workstation Name: [facility]  Radiology [facility]    CT Angiogram Neck    Result Date: 1/12/2022  1. By NASCET criteria, there is 0% stenosis at either carotid bifurcation. 2. Both vertebral arteries are patent with left being dominant. 3. There is considerable atherosclerotic disease involving the bilateral carotid siphons and intracranial vertebral arteries. There is severe disease of the basilar artery with calcified and noncalcified plaque. This includes short segment high-grade stenosis of the proximal basilar with some long segment irregularity. The distal right vertebral artery largely terminates in a PICA. 4. No obvious intracranial aneurysm or arteriovenous malformation. Please note the CT angiogram is protocol to evaluate for stroke and utilizes rapid AI for this purpose. It is limited for evaluation for intracranial aneurysm. 5. There is a large amount of venous contrast reflux including reflux into the dural venous sinuses. Signer Name: Cris Loco MD  Signed: 1/12/2022 12:05 AM  Workstation Name: [facility]  [facility]    CT Cervical Spine Without Contrast    Result Date: 1/11/2022  No acute cervical spine fracture. Signer Name: Hari Simon MD  Signed: 1/11/2022 10:29 PM  Workstation Name: [facility]  [facility] [place]    CT Angiogram Head w AI Analysis of LVO    Result Date: 1/12/2022  1. By NASCET criteria, there is 0% stenosis at either carotid bifurcation. 2. Both vertebral arteries are patent with left being dominant. 3. There is considerable atherosclerotic disease involving the bilateral carotid siphons and intracranial vertebral arteries. There is severe disease of the basilar artery with calcified and noncalcified plaque. This includes short segment high-grade stenosis of the proximal basilar with some long segment irregularity. The distal right vertebral artery largely terminates in a PICA. 4. No obvious intracranial aneurysm or arteriovenous malformation. Please note the CT angiogram is protocol to evaluate for stroke and utilizes rapid AI for this purpose. It is limited for evaluation for intracranial aneurysm. 5. There is a large amount of venous contrast reflux including reflux into the dural venous sinuses. Signer Name: Cris Loco MD  Signed: 1/12/2022 12:05 AM  Workstation Name: [email or web address]  [facility]        Assessment/Plan:  Mr. Joya is a 82-year-old  male with a past medical history of aortic stenosis, ventricular pacemaker, BPH, CAD, ischemic cardiomyopathy, depression, diabetes mellitus, reticulosis, dyslipidemia, and PAD.  The patient presents symptoms of to [facility] ED related to a fall and loss of consciousness.  The patient was walking upstairs and fell back hitting his head.  On CT head revealed that the patient has acute subdural hemorrhage and subarachnoid hemorrhage at the occipital scalp.  The patient currently takes Plavix but does not take anticoagulant.  The patient reports that he is independent and lives at home with his wife.          1. Fall/ Head Trauma   -Fell backwards from steps and hit head   -Subdural hemorrhage   -Subarachnoid hemorrhage   -ICH score is a 1   -Hunt and Finley is a 3   -NIHSS is a 0   -GSC is a 14   -CT head without  contrast completed and revealed a SDH and SAH    -Initiated Hemorraghic stroke order set    -Hold Plavix    -Repeat CT Scan    -ECHO in the AM    -NPO until bedside nursing dysphagia screening has been completed   2.   Hypertension    -SBP goal is <140   -Nicardipine drip is SBP is >140    Plan of care was discussed with the patient, Dr. Gonzalez, Dr. Dove, and the nursing staff.  Neurosurgery will follow this patient.  If you have any questions or concerns please feel free to reach out to Neurosurgery PA.    Thanks for the consult.     Alissa Canseco, SCARLETT  January 12, 2022  05:21 EST

## 2022-01-12 NOTE — CONSULTS
Consults    Referring Provider: Rigoberto ESCOBAR    Patient Care Team:  Diaz Lee MD as PCP - General    Chief Complaint: Fall/loss of consciousness    Subjective .     History of present illness:       Patient is a nice 82-year-old  male with a history of pacemaker, coronary artery disease, ischemic cardiomyopathy, aortic stenosis, diabetes. Patient presented to the emergency department after a 6 episode of loss of consciousness. This was secondary to a fall onto the steps. Patient struck the back of his head. Patient cannot recall the events surrounding. EEG was to rule out seizure as a culprit which came back negative.    Patient was admitted to the ER secondary to a traumatic subarachnoid hemorrhage that trace throughout the brainstem in the infratentorial area. 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 Dr. Dove was consulted who melanite and repeat CT was done today showing stability.    Patient is yet to be placed in the ICU and his neurologic exam is stable. Patient continues to be fully alert and oriented and really has no complaint other than his right hand being sore.     Review of Systems   Constitutional: Negative for activity change, appetite change, chills, fatigue and fever.   HENT: Negative for congestion, dental problem, ear pain, hearing loss, sinus pressure and tinnitus.    Eyes: Negative for pain and redness.   Respiratory: Negative for apnea, cough, shortness of breath and wheezing.    Cardiovascular: Negative for chest pain, palpitations and leg swelling.   Gastrointestinal: Negative for abdominal distention, abdominal pain, blood in stool, constipation, diarrhea, nausea and vomiting.   Endocrine: Negative for cold intolerance, heat intolerance and polyuria.   Genitourinary: Negative for enuresis, frequency and urgency.   Skin: Negative for color change and rash.   Neurological: Negative for dizziness, tremors, seizures, syncope, speech difficulty,  weakness, light-headedness, numbness and headaches.   Psychiatric/Behavioral: Negative for behavioral problems and confusion. The patient is not nervous/anxious.        History  Past Medical History:   Diagnosis Date   • Allergic rhinitis    • Aortic stenosis    • Atopic rhinitis 2016   • Benign non-nodular prostatic hyperplasia with lower urinary tract symptoms 9/15/2017   • CAD (coronary artery disease)    • Cardiomyopathy, ischemic    • Colon polyps     tubular adenoma    • Community acquired pneumonia    • Depression 2016   • Diabetes (HCC)    • Diverticulosis of intestine 2016    Description: Left and sigmoid   • Dyslipidemia    • LBBB (left bundle branch block)    • PAD (peripheral artery disease) (HCC)    ,   Past Surgical History:   Procedure Laterality Date   • CARDIAC CATHETERIZATION      100% cx   • CATARACT EXTRACTION     • COLONOSCOPY W/ BIOPSIES AND POLYPECTOMY  2021    1 benign polyp   • HIP SURGERY Left 2020    Dr Mandy BOUDREAUX   • LEG SURGERY Left 2016    Popliteal Artery stenting by Dr Hdz   • LEG SURGERY Left 2016    skin graft by Dr Nance   • LIPOMA EXCISION      s/p excision on chest   • PACEMAKER IMPLANTATION  2019    and defibrillator   ,   Family History   Problem Relation Age of Onset   • Diabetes Other    • Hypertension Other    • Stroke Other    • Coronary artery disease Other    ,   Social History     Socioeconomic History   • Marital status:    Tobacco Use   • Smoking status: Former Smoker     Types: Cigarettes     Quit date: 1997     Years since quittin.0   • Smokeless tobacco: Never Used   Substance and Sexual Activity   • Drug use: No     E-cigarette/Vaping     E-cigarette/Vaping Substances     E-cigarette/Vaping Devices       , (Not in a hospital admission)  , Scheduled Meds:  atorvastatin, 40 mg, Oral, Nightly  famotidine, 20 mg, Intravenous, Q12H  insulin detemir, 5 Units, Subcutaneous, Daily  insulin regular, 0-9 Units,  Subcutaneous, Q6H  senna-docusate sodium, 2 tablet, Oral, BID  sodium chloride, 10 mL, Intravenous, Q12H  sodium chloride, 10 mL, Intravenous, Q12H    , Continuous Infusions:  niCARdipine, 5-15 mg/hr, Last Rate: Stopped (01/12/22 9779)    , PRN Meds:  •  acetaminophen **OR** acetaminophen  •  aluminum-magnesium hydroxide-simethicone  •  senna-docusate sodium **AND** polyethylene glycol **AND** bisacodyl **AND** bisacodyl  •  dextrose  •  dextrose  •  glucagon (human recombinant)  •  ondansetron  •  sodium chloride  •  sodium chloride  •  sodium chloride and Allergies:  Patient has no known allergies.   SMOKING STATUS: Non-smoker  Objective     Vital Signs   Temp:  [97.8 °F (36.6 °C)] 97.8 °F (36.6 °C)  Heart Rate:  [81-93] 86  Resp:  [18] 18  BP: ()/(56-97) 118/71  Body mass index is 22.97 kg/m².    Physical Exam:     Body mass index is 22.97 kg/m².    GENERAL:           The patient is in no acute distress, and is able to answer all questions appropriately. Scalp hematoma and posterior portion of head.     Neck:          Supple without lymphadenopathy    Cardiovascular:       Peripheral pulses 2+ at dorsalis pedis and posterior tibialis    Lungs:         Breathing unlabored    Musculoskeletal:            strength is 5 out of 5 bilaterally.   Bicep flexion tricep extension both 5 out of 5       Shoulder abduction is 5 out of 5.         Dorsiflexion is 5/5 Bilaterally       Plantarflexion is 5/5 bilaterally       Hip Flexion 5/5 bilaterally.         The patient´s gait is normal without antalgia.    Neurologic:          The patient is alert and oriented by 3.          Pupils are equal and reactive to light.         Visual fields are full.         Extraocular movements are intact without nystagmus.         There is no evidence of central motor drift. No facial droop.  No difficulty with rapid alternating movements.         Sensation is equal bilaterally with no deficit.           Reflexes:  2+ through  out    CRANIAL NERVES:         Cranial nerve II: Visual fields are full to confrontation.       Cranial nerves III, IV and VI: PERRLA DC.  Extraocular movements are intact.  Nystagmus is not present.       Cranial nerve V: Facial sensation is intact to light touch.       Cranial nerve VII: Muscles of facial expression revealed no asymmetry.       Cranial nerve VIII: Hearing is intact to finger rub bilaterally.       Cranial nerve IX and X: Palate elevates symmetrically.        Cranial nerve XI: Shoulder shrug is intact.       Cranial nerve XII: Tongue is midline without evidence of Atrophy or fasciculation. There is some bruising on his tongue noted      Results Review:   I reviewed the patient's new imaging results and agree with the interpretation.  Discussed with Dr. Dove.    CT of the cervical spine shows no acute fracture or any alarming issues for ligamentous disruption.    CT of the head shows a diffuse subdural hemorrhage in the interhemispheric falx as well as subdural hemorrhage throughout the left tentorium. Subarachnoid hemorrhage is also noted in the right vertex, left frontal region left in the left sylvian fissure. Subarachnoid hemorrhage also in basilar cistern.    Generous cerebral atrophy noted and no evidence of increased ICP/midline shift etc.    CTAs of head and neck showed no flow-limiting stenosis but there is diffuse calcification throughout the intracranial carotid and the basilar system.    Assessment/Plan       SAH (subarachnoid hemorrhage) (HCC)    Uncontrolled type 2 diabetes mellitus (HCC)    Other hyperlipidemia    Essential hypertension    Aortic stenosis    Cardiomyopathy, nonischemic (HCC)    SDH (subdural hematoma) (HCC)    Head trauma, initial encounter    Head trauma    From neurosurgical standpoint patient is doing well showing no signs of neurologic decline. Patient is serial CT of the does not show any extension of the hemorrhages. Patient does not need Plavix currently.  Patient is okay for aspirin after 48 hours.    Patient be downgraded from ICU to floor if observation necessary. From neurosurgical standpoint he is okay to be discharged from ER if medically ready.    Patient will need to follow-up with us in 1 month with CT head noncontrast. This looks like a prototypical posttraumatic subarachnoid hemorrhage.  I will discuss with Dr. Tate for further clarification.              I discussed the patients findings and my recommendations with patient and consulting provider    Miguel Ángel Scruggs PA-C  01/12/22  12:20 EST    Time: 60 minutes

## 2022-01-12 NOTE — SIGNIFICANT NOTE
Transfer/Downgrade    HPI:  Patient is a 82 y.o. male with coronary artery disease, aortic valve stenosis, diabetes mellitus, history of cardiomyopathy, ventricular pacemaker, who reports that he was starting to walk up the stairs and that is the last thing he remembers.  He fell down the steps with loss of consciousness.  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  There was a large amount of venous contrast reflux into the dural venous sinuses.  CT scan of the head revealed acute subdural hematoma, subarachnoid hemorrhage occipital scalp hematoma without skull fracture.  CT of the cervical spine, negative for acute fracture, multiple degenerative disc disease most severe at C5-6 with mild narrowing of the central canal.  Intracranial hemorrhage noted at the base of the skull.  There is no history of seizure disorder.  He does take Plavix but no other blood thinners.  He was hypertensive in the ER and started on a Cardene drip.  He had swelling and bruising of his right hand.  Films were negative for a fracture.  He denies headache nausea or vomiting.  He does not recall the fall just waking up at the bottom of the steps.    Interval History:  Since initial evaluation the patient has remained stable.  Serial CT scans were obtained and reviewed by neurosurgery noting lack of extension of hemorrhage.  He was deemed acceptable for downgrade to floor, or discharge home from a neurological standpoint.  Given his multiple comorbidities including uncontrolled diabetes mellitus, aortic stenosis, cardiomyopathy, and recent head trauma patient will be transferred to telemetry for ongoing observation.  Echo results pending.  EEG negative for epileptiform activity however did show evidence of diffuse cerebral dysfunction which was characterized as mild and nonspecific.  Continue PT/OT/speech evaluation.  Will  need diabetes education.    SCARLETT Gresham, ACNP-BC  Pulmonology and Critical Care Medicine

## 2022-01-12 NOTE — TELEPHONE ENCOUNTER
Consults by Miguel Ángel Scruggs PA-C (01/12/2022 12:00)      CT head ordered.     Should pt f/u with PA or MD?

## 2022-01-12 NOTE — PLAN OF CARE
Goal Outcome Evaluation:            Patient placed in bed with side rails up in area visible from nursing station; call light placed within easy reach.

## 2022-01-12 NOTE — PLAN OF CARE
Problem: Fall Injury Risk  Goal: Absence of Fall and Fall-Related Injury  Outcome: Ongoing, Progressing   Goal Outcome Evaluation:               In highly visible area, call light within reach, side rails up

## 2022-01-12 NOTE — H&P
ICU ADMISSION NOTE    Chief complaint     Head trauma  Syncope  Subdural hematoma  Subarachnoid hemorrhage  Coronary artery disease  Cardiomyopathy  Ventricular pacemaker  Diabetes mellitus    Subjective     Patient is a 82 y.o. male with coronary artery disease, aortic valve stenosis, diabetes mellitus, history of cardiomyopathy, ventricular pacemaker, who reports that he was starting to walk up the stairs and that is the last thing he remembers.  He fell down the steps with loss of consciousness.  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  There was a large amount of venous contrast reflux into the dural venous sinuses.  CT scan of the head revealed acute subdural hematoma, subarachnoid hemorrhage occipital scalp hematoma without skull fracture.  CT of the cervical spine, negative for acute fracture, multiple degenerative disc disease most severe at C5-6 with mild narrowing of the central canal.  Intracranial hemorrhage noted at the base of the skull.  There is no history of seizure disorder.  He does take Plavix but no other blood thinners.  He was hypertensive in the ER and started on a Cardene drip.  He had swelling and bruising of his right hand.  Films were negative for a fracture.  He denies headache nausea or vomiting.  He does not recall the fall just waking up at the bottom of the steps.    Review of Systems  Review of Systems   Constitutional: Negative for fever.   HENT: Negative for congestion.    Eyes: Negative for visual disturbance.   Respiratory: Negative for cough and shortness of breath.    Cardiovascular: Negative for chest pain, palpitations and leg swelling.   Gastrointestinal: Negative for abdominal pain.   Endocrine: Negative for cold intolerance and heat intolerance.   Genitourinary: Positive for difficulty urinating.   Musculoskeletal: Negative for gait problem and neck  pain.   Skin: Negative for rash.   Allergic/Immunologic: Positive for environmental allergies.   Neurological: Positive for syncope.   Hematological: Bruises/bleeds easily.   Psychiatric/Behavioral: Positive for dysphoric mood.        Home Medications  (Not in a hospital admission)  Bisoprolol 10 mg daily  Plavix 75 mg daily  Cardura 2 mg nightly  Jardiance 10 mg daily  Flonase  Xyzal 5 mg nightly  Metformin 850 mg twice daily  Paxil 20 mg daily  Entresto 49-51 mg 1 tablet twice daily  Zocor 40 mg daily    History  Past Medical History:   Diagnosis Date   • Allergic rhinitis    • Aortic stenosis    • Atopic rhinitis 2016   • Benign non-nodular prostatic hyperplasia with lower urinary tract symptoms 9/15/2017   • CAD (coronary artery disease)    • Cardiomyopathy, ischemic    • Colon polyps     tubular adenoma    • Community acquired pneumonia    • Depression 2016   • Diabetes (HCC)    • Diverticulosis of intestine 2016    Description: Left and sigmoid   • Dyslipidemia    • LBBB (left bundle branch block)    • PAD (peripheral artery disease) (HCC)      Past Surgical History:   Procedure Laterality Date   • CARDIAC CATHETERIZATION      100% cx   • CATARACT EXTRACTION     • COLONOSCOPY W/ BIOPSIES AND POLYPECTOMY  2021    1 benign polyp   • HIP SURGERY Left 2020    Dr Mandy BOUDREAUX   • LEG SURGERY Left 2016    Popliteal Artery stenting by Dr Hdz   • LEG SURGERY Left 2016    skin graft by Dr Nance   • LIPOMA EXCISION      s/p excision on chest   • PACEMAKER IMPLANTATION  2019    and defibrillator     Family History   Problem Relation Age of Onset   • Diabetes Other    • Hypertension Other    • Stroke Other    • Coronary artery disease Other      Social History     Tobacco Use   • Smoking status: Former Smoker     Types: Cigarettes     Quit date: 1997     Years since quittin.0   • Smokeless tobacco: Never Used   Substance Use Topics   • Alcohol use: Not on file   • Drug  "use: No     (Not in a hospital admission)    Allergies:  Patient has no known allergies.      Objective     Vital Signs  Blood pressure 162/95, pulse 90, temperature 97.8 °F (36.6 °C), temperature source Oral, resp. rate 18, height 165.1 cm (65\"), weight 62.6 kg (138 lb), SpO2 93 %.    Physical Exam:  General Appearance:   Thin older gentleman supine on the stretcher in no respiratory distress   Head:   Palpable hematoma over the occiput   Eyes:          Conjunctiva pink.   Ears:     Throat:  Oral mucosa moist   Neck:  No carotid bruit   Back:      Lungs:    Symmetric chest expansion.  Breath sounds are bilateral without wheeze    Heart:   Regular rhythm, diminished S2, harsh systolic ejection murmur   Abdomen:    Flat, bowel sounds present, soft   Rectal:     Deferred   Extremities:  Ecchymosis over the dorsum of his right hand.  Full range of motion of the right hand and ability to form a fist   Pulses:  Diminished left femoral pulse   Skin:  Warm and dry   Lymph nodes:    Neurologic:  Awake, oriented x3, follows commands with all extremities       Results Review:   Lab Results (last 24 hours)     Procedure Component Value Units Date/Time    [place] Draw [[id number]] Collected: 01/11/22 2253    Specimen: Blood Updated: 01/12/22 0015    Narrative:      The following orders were created for panel order [place] Draw.  Procedure                               Abnormality         Status                     ---------                               -----------         ------                     Green Top (Gel)[[id number]]                                  In process                 Lavender Top[[id number]]                                                                Gold Top - [facility][[id number]]                                   Final result               Gray Top[[id number]]                                                                    Light Blue Top[[id number]]                                   Final result             "     Please view results for these tests on the individual orders.    Light Blue Top [[id number]] Collected: 01/11/22 2300    Specimen: Blood Updated: 01/12/22 0015     Extra Tube hold for add-on     Comment: Auto resulted       Phosphorus [[id number]] Collected: 01/11/22 2356    Specimen: Blood Updated: 01/12/22 0011    Lipid Panel [[id number]] Collected: 01/11/22 2356    Specimen: Blood Updated: 01/12/22 0011    Comprehensive Metabolic Panel [[id number]] Collected: 01/11/22 2356    Specimen: Blood Updated: 01/12/22 0000    Magnesium [[id number]] Collected: 01/11/22 2356    Specimen: Blood Updated: 01/12/22 0000    Troponin [[id number]] Collected: 01/11/22 2356    Specimen: Blood Updated: 01/12/22 0000    Green Top (Gel) [[id number]] Collected: 01/11/22 2356    Specimen: Blood Updated: 01/12/22 0000    Gold Top - [facility]ST [[id number]] Collected: 01/11/22 2253    Specimen: Blood Updated: 01/12/22 0000     Extra Tube Hold for add-ons.     Comment: Auto resulted.       Protime-INR [[id number]]  (Normal) Collected: 01/11/22 2300    Specimen: Blood Updated: 01/11/22 2350     Protime 13.5 Seconds      INR 1.06    aPTT [[id number]]  (Normal) Collected: 01/11/22 2300    Specimen: Blood Updated: 01/11/22 2350     PTT 31.4 seconds     Narrative:      PTT = The equivalent PTT values for the therapeutic range of heparin levels at 0.3 to 0.5 U/ml are 55 to 70 seconds.        Imaging Results (Last 24 Hours)     Procedure Component Value Units Date/Time    XR Hand 3+ View Right [[id number]] Collected: 01/12/22 0027     Updated: 01/12/22 0029    Narrative:      CR Hand Min 3 Vws RT    INDICATION:   Fell with swelling right hand    COMPARISON:   None available.    FINDINGS:   PA, lateral, and oblique views of the right hand.  No fracture or dislocation.  No bone erosion or destruction.  . There are vascular calcifications. There are mild degenerative arthritis changes. Please correlate with the site of tenderness clinically.  I see dorsal soft  tissue swelling.      Impression:      Dorsal soft tissue swelling without evidence for acute fracture dislocation or definite radiopaque foreign body. Small densities at the base of the thumb are probably on the surface of the skin. Please confirm clinically.    Signer Name: Cris Loco MD   Signed: 1/12/2022 12:27 AM   Workstation Name: [email or web address]    [facility]    CT Angiogram Head w AI Analysis of LVO [[id number]] Collected: 01/12/22 0005     Updated: 01/12/22 0007    Narrative:      INDICATION:   Acute intracranial hemorrhage after a fall. Patient on blood thinners    TECHNIQUE:   CT angiogram of the head and neck with IV contrast. Contrast dose recorded in the patient's chart. 3-D MIP reformatted images were acquired and reviewed. Evaluation for a significant carotid arterial stenosis is based on the NASCET criteria. Radiation  dose reduction techniques included automated exposure control or exposure modulation based on body size. Radiation audit for number of CT and nuclear cardiology exams performed in the last year:  0. Precontrast imaging also obtained.    Rapid AI utilized for LVO    COMPARISON:   Earlier noncontrast head CT.    FINDINGS:   CTA neck:  There our atherosclerotic vascular calcifications at the aortic arch. There is involvement of great vessel origins without hemodynamically significant stenosis. There is probably mild stenosis at the origin of the right subclavian artery.  There is mild narrowing at the origin the right common carotid artery due to vascular ectasia.    There is calcified plaque at the right carotid bifurcation but by NASCET criteria there is essentially 0% diameter stenosis. There is moderate stenosis at the origin of the right external carotid artery. There is calcified plaque at the left carotid  bifurcation and along the left common carotid artery. By NASCET criteria there is 0% diameter stenosis. There is mild narrowing at the origin of the left  external carotid artery. There is calcified disease at both carotid siphons with mild to moderate  stenosis likely.    Both vertebral arteries are patent. The left is dominant. There is disease at the origin of the vertebral arteries partly obscured by venous vascular opacification.    CTA head:  Study is performed with a stroke evaluation protocol and is not post processed for the purpose of evaluating for intracranial aneurysm. There are acute blood products present as identified on the earlier noncontrast head CT. There our  atherosclerotic vascular calcifications involving both the 4 segments with likely hemodynamically significant stenosis of the hypoplastic distal right vertebral artery. This vessel mostly terminates in a PICA vessel. There is irregular ectasia of the  basilar due to atherosclerotic disease with high-grade short segment proximal stenosis followed by some poststenotic dilatation. There is a moderate-sized anterior communicating artery present. The left A1 vessels mildly hypoplastic. No intracranial  vascular cut off is appreciated. There is no gross intracranial aneurysm or arteriovenous malformation appreciated on this study performed for stroke evaluation. There is a small to moderate sized right posterior communicating artery. There is a large  left posterior communicator with largely fetal origin to the left posterior cerebral artery distribution. Left posterior cerebral artery is irregular is probably some intracranial atherosclerotic disease.    There is venous contrast reflux intracranially from the injection into the dural venous sinuses. No intracranial vascular cut off is suspected.      Impression:        1. By NASCET criteria, there is 0% stenosis at either carotid bifurcation.  2. Both vertebral arteries are patent with left being dominant.  3. There is considerable atherosclerotic disease involving the bilateral carotid siphons and intracranial vertebral arteries. There is severe  disease of the basilar artery with calcified and noncalcified plaque. This includes short segment high-grade  stenosis of the proximal basilar with some long segment irregularity. The distal right vertebral artery largely terminates in a PICA.  4. No obvious intracranial aneurysm or arteriovenous malformation. Please note the CT angiogram is protocol to evaluate for stroke and utilizes rapid AI for this purpose. It is limited for evaluation for intracranial aneurysm.  5. There is a large amount of venous contrast reflux including reflux into the dural venous sinuses.    Signer Name: Cris Loco MD   Signed: 1/12/2022 12:05 AM   Workstation Name: [email or web address]    [facility]    CT Angiogram Neck [[id number]] Collected: 01/12/22 0005     Updated: 01/12/22 0007    Narrative:      INDICATION:   Acute intracranial hemorrhage after a fall. Patient on blood thinners    TECHNIQUE:   CT angiogram of the head and neck with IV contrast. Contrast dose recorded in the patient's chart. 3-D MIP reformatted images were acquired and reviewed. Evaluation for a significant carotid arterial stenosis is based on the NASCET criteria. Radiation  dose reduction techniques included automated exposure control or exposure modulation based on body size. Radiation audit for number of CT and nuclear cardiology exams performed in the last year:  0. Precontrast imaging also obtained.    Rapid AI utilized for LVO    COMPARISON:   Earlier noncontrast head CT.    FINDINGS:   CTA neck:  There our atherosclerotic vascular calcifications at the aortic arch. There is involvement of great vessel origins without hemodynamically significant stenosis. There is probably mild stenosis at the origin of the right subclavian artery.  There is mild narrowing at the origin the right common carotid artery due to vascular ectasia.    There is calcified plaque at the right carotid bifurcation but by NASCET criteria there is essentially 0% diameter  stenosis. There is moderate stenosis at the origin of the right external carotid artery. There is calcified plaque at the left carotid  bifurcation and along the left common carotid artery. By NASCET criteria there is 0% diameter stenosis. There is mild narrowing at the origin of the left external carotid artery. There is calcified disease at both carotid siphons with mild to moderate  stenosis likely.    Both vertebral arteries are patent. The left is dominant. There is disease at the origin of the vertebral arteries partly obscured by venous vascular opacification.    CTA head:  Study is performed with a stroke evaluation protocol and is not post processed for the purpose of evaluating for intracranial aneurysm. There are acute blood products present as identified on the earlier noncontrast head CT. There our  atherosclerotic vascular calcifications involving both the 4 segments with likely hemodynamically significant stenosis of the hypoplastic distal right vertebral artery. This vessel mostly terminates in a PICA vessel. There is irregular ectasia of the  basilar due to atherosclerotic disease with high-grade short segment proximal stenosis followed by some poststenotic dilatation. There is a moderate-sized anterior communicating artery present. The left A1 vessels mildly hypoplastic. No intracranial  vascular cut off is appreciated. There is no gross intracranial aneurysm or arteriovenous malformation appreciated on this study performed for stroke evaluation. There is a small to moderate sized right posterior communicating artery. There is a large  left posterior communicator with largely fetal origin to the left posterior cerebral artery distribution. Left posterior cerebral artery is irregular is probably some intracranial atherosclerotic disease.    There is venous contrast reflux intracranially from the injection into the dural venous sinuses. No intracranial vascular cut off is suspected.      Impression:         1. By NASCET criteria, there is 0% stenosis at either carotid bifurcation.  2. Both vertebral arteries are patent with left being dominant.  3. There is considerable atherosclerotic disease involving the bilateral carotid siphons and intracranial vertebral arteries. There is severe disease of the basilar artery with calcified and noncalcified plaque. This includes short segment high-grade  stenosis of the proximal basilar with some long segment irregularity. The distal right vertebral artery largely terminates in a PICA.  4. No obvious intracranial aneurysm or arteriovenous malformation. Please note the CT angiogram is protocol to evaluate for stroke and utilizes rapid AI for this purpose. It is limited for evaluation for intracranial aneurysm.  5. There is a large amount of venous contrast reflux including reflux into the dural venous sinuses.    Signer Name: Cris Loco MD   Signed: 1/12/2022 12:05 AM   Workstation Name: [email or web address]    [facility]    CT Head Without Contrast [[id number]] Collected: 01/11/22 2229     Updated: 01/11/22 2231    Narrative:      CT Head WO    HISTORY:   Fall. Head injury. Patient on blood thinners.    TECHNIQUE:   Axial unenhanced head CT with multiplanar reformats. Radiation dose reduction techniques included automated exposure control or exposure modulation based on body size. Count of known CT and cardiac nuc med studies performed in previous 12 months: 0.     COMPARISON:   None.    FINDINGS:   Abnormal examination. There is generalized atrophy with chronic small vessel ischemic disease in the white matter. Ventricular size is within normal limits. There is acute subdural hemorrhage along the interhemispheric falx measuring up to 5 mm in  thickness. There is adjacent subarachnoid hemorrhage in the medial left frontal lobe and near the right vertex. Some of the hemorrhage near the vertex could also be of subdural origin. There is subarachnoid hemorrhage in  the left sylvian fissure  measuring up to 7 mm in thickness. There is also subarachnoid hemorrhage in the basilar cisterns left greater than right. There also appears to be some subdural hemorrhage along the left side of the tentorium. No intraventricular hemorrhage is  identified. There is no evidence of acute infarct. There is an occipital scalp hematoma. There is atherosclerotic disease in the vertebral arteries and carotid siphons. There is a distal left vertebral artery stent versus atherosclerotic calcification.  Correlate with history. No skull fracture is identified.    SAH detected                     YES     Diffuse or vertical layer of SAH blood < 1 mm thick                 NO     Localized clot and/or vertical layer of SAH blood  > 1 mm thick           YES     ICH or IVH with diffuse or no SAH          NO       Impression:        1. Acute subdural hemorrhage along the interhemispheric falx measuring up to 5 mm in thickness. There is also some subdural hemorrhage along the left tentorium.  2. Subarachnoid hemorrhage is noted at the right vertex and in the left frontal region and left sylvian fissure. There is also subarachnoid hemorrhage in the basilar cisterns worse to the left of midline.  3. No intraventricular hemorrhage.  4. Atrophy with chronic small vessel ischemic disease in the white matter.  5. Occipital scalp hematoma without skull fracture.      NOTIFICATION: Critical Value/emergent results were called by telephone at the time of interpretation on 1/11/2022 10:25 PM to Larry Gonzalez MD who verbally acknowledged these results.      Signer Name: Hari Simon MD   Signed: 1/11/2022 10:29 PM   Workstation Name: [facility]    [facility]    CT Cervical Spine Without Contrast [[id number]] Collected: 01/11/22 2229     Updated: 01/11/22 2231    Narrative:      CT Spine Cervical WO    INDICATION:   Neck pain after fall.    TECHNIQUE:   CT of the cervical spine without IV  contrast. Coronal and sagittal reconstructions were obtained.  Radiation dose reduction techniques included automated exposure control or exposure modulation based on body size. Count of known CT and cardiac nuc med  studies performed in previous 12 months: 0.     COMPARISON:  None available.    FINDINGS:  Note is made of extensive carotid atherosclerotic disease. There is mild grade 1 retrolisthesis of C5 on C6. Cervical alignment is otherwise normal. No acute cervical spine fracture is seen. There is multilevel degenerative disc disease and facet  arthropathy, most severe at C5-6 where there is a disc osteophyte complex with mild narrowing of the central canal and moderate bilateral foraminal stenosis. Intracranial hemorrhage noted the skull base. Please see head CT report dictated separately.      Impression:      No acute cervical spine fracture.    Signer Name: Hari Simon MD   Signed: 1/11/2022 10:29 PM   Workstation Name: [email or web address]    [facility]           PROBLEM LIST    Head trauma  Subarachnoid hemorrhage  Subdural hematoma  Syncope  Aortic valve stenosis  Diabetes mellitus  Hypertension  Coronary artery disease  History of cardiomyopathy  Ventricular permanent pacemaker/AICD    Assessment/Plan     #1 head trauma post fall with occipital hematoma, subdural hematoma, subarachnoid hemorrhage.  He is alert and cooperative currently.  Imaging does not reveal any cervical fractures or skull fractures.  He was on Plavix.  Neurosurgery did talk with ER physician and did not wish platelet transfusion    #2 syncope, etiology uncertain.  He does have aortic valve stenosis.  Echo report from [facility] indicated that it was moderate in August 2020.  He also has an AICD implanted in 2019.  Current EKG is V paced.      #3 history of coronary artery disease, cardiomyopathy.  Last heart catheterization revealed an occluded circumflex.  Prior EF's were down to 39% but improved to of 50% in  2020.  He currently denies angina, edema.    #4 hypertension, currently on a Cardene drip      -Monitor neurologic status  -Repeat CT scan tomorrow  -Echocardiogram  -Neurology, neurosurgery following, ED contacted Dr. Dove  -EEG to rule out seizure as the cause of syncope  -Cardene drip for hypotension with goal systolic blood pressure less than 140  -Sliding scale insulin  -N.p.o. until swallow evaluation  -Interrogate pacemaker  -Ask cardiology to see depending on the pacemaker interrogation  -Replace potassium      I discussed the patients findings and my recommendations with patient    Lydia Franklin MD  01/12/22  00:35 EST    Time: Critical care 50 min    This note was produced with a voice recognition program and may have uncorrected errors.

## 2022-01-13 ENCOUNTER — HOME HEALTH ADMISSION (OUTPATIENT)
Dept: HOME HEALTH SERVICES | Facility: HOME HEALTHCARE | Age: 82
End: 2022-01-13

## 2022-01-13 ENCOUNTER — READMISSION MANAGEMENT (OUTPATIENT)
Dept: CALL CENTER | Facility: HOSPITAL | Age: 82
End: 2022-01-13

## 2022-01-13 VITALS
RESPIRATION RATE: 18 BRPM | WEIGHT: 132.72 LBS | HEART RATE: 79 BPM | HEIGHT: 65 IN | OXYGEN SATURATION: 93 % | SYSTOLIC BLOOD PRESSURE: 140 MMHG | BODY MASS INDEX: 22.11 KG/M2 | TEMPERATURE: 98.7 F | DIASTOLIC BLOOD PRESSURE: 78 MMHG

## 2022-01-13 LAB
ANION GAP SERPL CALCULATED.3IONS-SCNC: 14 MMOL/L (ref 5–15)
BUN SERPL-MCNC: 16 MG/DL (ref 8–23)
BUN/CREAT SERPL: 25.8 (ref 7–25)
CALCIUM SPEC-SCNC: 9 MG/DL (ref 8.6–10.5)
CHLORIDE SERPL-SCNC: 103 MMOL/L (ref 98–107)
CO2 SERPL-SCNC: 25 MMOL/L (ref 22–29)
CREAT SERPL-MCNC: 0.62 MG/DL (ref 0.76–1.27)
DEPRECATED RDW RBC AUTO: 46.8 FL (ref 37–54)
ERYTHROCYTE [DISTWIDTH] IN BLOOD BY AUTOMATED COUNT: 12.6 % (ref 12.3–15.4)
GFR SERPL CREATININE-BSD FRML MDRD: 124 ML/MIN/1.73
GLUCOSE BLDC GLUCOMTR-MCNC: 152 MG/DL (ref 70–130)
GLUCOSE BLDC GLUCOMTR-MCNC: 154 MG/DL (ref 70–130)
GLUCOSE BLDC GLUCOMTR-MCNC: 216 MG/DL (ref 70–130)
GLUCOSE SERPL-MCNC: 176 MG/DL (ref 65–99)
HCT VFR BLD AUTO: 42.5 % (ref 37.5–51)
HGB BLD-MCNC: 13.6 G/DL (ref 13–17.7)
MAGNESIUM SERPL-MCNC: 2.1 MG/DL (ref 1.6–2.4)
MCH RBC QN AUTO: 32.4 PG (ref 26.6–33)
MCHC RBC AUTO-ENTMCNC: 32 G/DL (ref 31.5–35.7)
MCV RBC AUTO: 101.2 FL (ref 79–97)
PLATELET # BLD AUTO: 152 10*3/MM3 (ref 140–450)
PMV BLD AUTO: 10.6 FL (ref 6–12)
POTASSIUM SERPL-SCNC: 3.4 MMOL/L (ref 3.5–5.2)
RBC # BLD AUTO: 4.2 10*6/MM3 (ref 4.14–5.8)
SODIUM SERPL-SCNC: 142 MMOL/L (ref 136–145)
WBC NRBC COR # BLD: 9.5 10*3/MM3 (ref 3.4–10.8)

## 2022-01-13 PROCEDURE — 82962 GLUCOSE BLOOD TEST: CPT

## 2022-01-13 PROCEDURE — 99222 1ST HOSP IP/OBS MODERATE 55: CPT | Performed by: INTERNAL MEDICINE

## 2022-01-13 PROCEDURE — 83735 ASSAY OF MAGNESIUM: CPT | Performed by: INTERNAL MEDICINE

## 2022-01-13 PROCEDURE — 63710000001 INSULIN DETEMIR PER 5 UNITS: Performed by: NURSE PRACTITIONER

## 2022-01-13 PROCEDURE — 85027 COMPLETE CBC AUTOMATED: CPT | Performed by: INTERNAL MEDICINE

## 2022-01-13 PROCEDURE — 97165 OT EVAL LOW COMPLEX 30 MIN: CPT

## 2022-01-13 PROCEDURE — 99231 SBSQ HOSP IP/OBS SF/LOW 25: CPT | Performed by: PHYSICIAN ASSISTANT

## 2022-01-13 PROCEDURE — 92523 SPEECH SOUND LANG COMPREHEN: CPT

## 2022-01-13 PROCEDURE — 80048 BASIC METABOLIC PNL TOTAL CA: CPT | Performed by: INTERNAL MEDICINE

## 2022-01-13 PROCEDURE — 99238 HOSP IP/OBS DSCHRG MGMT 30/<: CPT | Performed by: NURSE PRACTITIONER

## 2022-01-13 PROCEDURE — 63710000001 INSULIN LISPRO (HUMAN) PER 5 UNITS: Performed by: NURSE PRACTITIONER

## 2022-01-13 PROCEDURE — 97161 PT EVAL LOW COMPLEX 20 MIN: CPT

## 2022-01-13 RX ORDER — POTASSIUM CHLORIDE 750 MG/1
40 CAPSULE, EXTENDED RELEASE ORAL AS NEEDED
Status: DISCONTINUED | OUTPATIENT
Start: 2022-01-13 | End: 2022-01-13 | Stop reason: HOSPADM

## 2022-01-13 RX ORDER — POTASSIUM CHLORIDE 7.45 MG/ML
10 INJECTION INTRAVENOUS
Status: DISCONTINUED | OUTPATIENT
Start: 2022-01-13 | End: 2022-01-13 | Stop reason: HOSPADM

## 2022-01-13 RX ORDER — ATORVASTATIN CALCIUM 40 MG/1
40 TABLET, FILM COATED ORAL NIGHTLY
Qty: 30 TABLET | Refills: 3 | Status: ON HOLD | OUTPATIENT
Start: 2022-01-13 | End: 2022-01-19 | Stop reason: SDUPTHER

## 2022-01-13 RX ORDER — POTASSIUM CHLORIDE 1.5 G/1.77G
40 POWDER, FOR SOLUTION ORAL AS NEEDED
Status: DISCONTINUED | OUTPATIENT
Start: 2022-01-13 | End: 2022-01-13 | Stop reason: HOSPADM

## 2022-01-13 RX ADMIN — DOCUSATE SODIUM 50 MG AND SENNOSIDES 8.6 MG 2 TABLET: 8.6; 5 TABLET, FILM COATED ORAL at 09:04

## 2022-01-13 RX ADMIN — POTASSIUM CHLORIDE 40 MEQ: 10 CAPSULE, COATED, EXTENDED RELEASE ORAL at 09:05

## 2022-01-13 RX ADMIN — INSULIN LISPRO 4 UNITS: 100 INJECTION, SOLUTION INTRAVENOUS; SUBCUTANEOUS at 12:37

## 2022-01-13 RX ADMIN — INSULIN LISPRO 2 UNITS: 100 INJECTION, SOLUTION INTRAVENOUS; SUBCUTANEOUS at 09:05

## 2022-01-13 RX ADMIN — INSULIN DETEMIR 5 UNITS: 100 INJECTION, SOLUTION SUBCUTANEOUS at 09:05

## 2022-01-13 RX ADMIN — EMPAGLIFLOZIN 25 MG: 25 TABLET, FILM COATED ORAL at 12:30

## 2022-01-13 RX ADMIN — SACUBITRIL AND VALSARTAN 1 TABLET: 49; 51 TABLET, FILM COATED ORAL at 09:05

## 2022-01-13 RX ADMIN — POTASSIUM CHLORIDE 40 MEQ: 10 CAPSULE, COATED, EXTENDED RELEASE ORAL at 13:06

## 2022-01-13 RX ADMIN — BISOPROLOL FUMARATE 5 MG: 5 TABLET, FILM COATED ORAL at 09:04

## 2022-01-13 RX ADMIN — NICARDIPINE HYDROCHLORIDE 5 MG/HR: 25 INJECTION, SOLUTION INTRAVENOUS at 01:55

## 2022-01-13 RX ADMIN — SODIUM CHLORIDE, PRESERVATIVE FREE 10 ML: 5 INJECTION INTRAVENOUS at 09:16

## 2022-01-13 NOTE — PROGRESS NOTES
HOD# : 1    Patient got confused last night and this is a normal finding per the wife.  Patient received CT scan and admission to the ICU.  CT scan shows no change in subarachnoid hemorrhage/subdural hemorrhage.    This morning patient is able to easily tell me he is in the hospital on [address] Dorian Palmer is president, 2022 is the year.    Patient is feeling okay not complaining of any headache or pain.  All extremities move.       SAH (subarachnoid hemorrhage) (HCC)    Uncontrolled type 2 diabetes mellitus (HCC)    Other hyperlipidemia    Essential hypertension    Aortic stenosis    Cardiomyopathy, nonischemic (HCC)    SDH (subdural hematoma) (HCC)    Head trauma, initial encounter    Head trauma      Temp:  [98.6 °F (37 °C)-100.1 °F (37.8 °C)] 98.6 °F (37 °C)  Heart Rate:  [81-98] 90  Resp:  [16-18] 16  BP: (110-162)/() 131/73  I/O last 3 completed shifts:  In: 1094.3 [P.O.:360; I.V.:734.3]  Out: 1177 [Urine:1177]  No intake/output data recorded.  Vital signs were reviewed and documented in the chart      EXAM   Body mass index is 22.09 kg/m².      Patient appeared in good neurologic function with normal comprehension   CN grossly intact  Moves all extremities to command      DIAGNOSIS  1. SAH (subarachnoid hemorrhage) (HCC)    2. SDH (subdural hematoma) (HCC)    3. Head trauma, initial encounter    4. Fall, initial encounter          PLAN    No change in scan.  My assumption is he will be transferred to floor today and possibly home.  Patient does get delirium/confused at night per wife's report.  So this was a normal finding last night.  If patient were to become obtunded or unresponsive that would be indication for further scan.  Or if he develops sided weakness etc.    Same discharge plan from neurosurgical standpoint.  Nothing new

## 2022-01-13 NOTE — DISCHARGE SUMMARY
DISCHARGE SUMMARY     Admit date: 1/11/2022  Date of Discharge:  1/13/2022    Discharge Diagnoses  Active Hospital Problems    Diagnosis    • **SAH (subarachnoid hemorrhage) (HCC)    • Head trauma    • SDH (subdural hematoma) (HCC)    • Aortic stenosis      moderate     • Head trauma, initial encounter    • Cardiomyopathy, nonischemic (HCC)    • Uncontrolled type 2 diabetes mellitus (HCC)    • Essential hypertension    • Other hyperlipidemia        Past Surgical History:   Procedure Laterality Date   • CARDIAC CATHETERIZATION  2018    100% cx   • CATARACT EXTRACTION     • COLONOSCOPY W/ BIOPSIES AND POLYPECTOMY  05/2021    1 benign polyp   • HIP SURGERY Left 08/2020    Dr Mandy BOUDREAUX   • LEG SURGERY Left 07/2016    Popliteal Artery stenting by Dr Hdz   • LEG SURGERY Left 07/2016    skin graft by Dr Nance   • LIPOMA EXCISION      s/p excision on chest   • PACEMAKER IMPLANTATION  01/2019    and defibrillator       History of Present Illness    Patient is a 82 y.o. male with coronary artery disease, aortic valve stenosis, diabetes mellitus, history of cardiomyopathy, ventricular pacemaker, who reports that he was starting to walk up the stairs and that is the last thing he remembers.  He fell down the steps with loss of consciousness.  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  There was a large amount of venous contrast reflux into the dural venous sinuses.  CT scan of the head revealed acute subdural hematoma, subarachnoid hemorrhage occipital scalp hematoma without skull fracture.  CT of the cervical spine, negative for acute fracture, multiple degenerative disc disease most severe at C5-6 with mild narrowing of the central canal.  Intracranial hemorrhage noted at the base of the skull.  There is no history of seizure disorder.  He does take Plavix but no other blood thinners.  He was  "hypertensive in the ER and started on a Cardene drip.  He had swelling and bruising of his right hand.  Films were negative for a fracture.  He denies headache nausea or vomiting.  He does not recall the fall just waking up at the bottom of the steps.    Hospital Course    On admission he was alert and cooperative. Imaging was not consistent with any cervical fractures or skull fractures. Despite being on Plavix, neurosurgery did not wish platelet transfusion to be completed. EEG to rule out seizures was negative for epileptiform activity however did show evidence of diffuse cerebral dysfunction which was characterized by nonspecific. ECHO revealed only mild aortic stenosis. Serial CT did not show any extension of hemorrhages.     He was deemed acceptable for downgrade to floor on 1/12, unfortunately he developed increasing confusion and agitated so he was moved back to the ICU for mental status changes. CT scan was done and revealed no change in his bleed. On arrival to the ICU he was hypertensive and Cardene was restarted. This has since been discontinued and was started on Zebeta. Cardiology was consulted and wanted orthostatic BP's to be obtained. He did have a drop in his pressures so his home Entresto has been stopped; however Zebeta was continued. They did feel patient was appropriate for discharge and for him to follow up with his primary cardiologist for possible stress ECHO to see if his gradient across his aortic valve increases with exercise. Follow up with primary cardiologist.     Per Neurosurgery, patient does not need PLAVIX currently and is okay for ASA 48 hours after. NIH on discharged 0.     Follow up with  Neurosurgery - SUZY Oneal 1 months with CT head noncontrast.   Cardiology would like patient to have an ischemic evaluation for either nuclear perfusion scan or heart catheter. \"would also consider possible stress ECHO to see if his gradient across his aortic valve increases with " exercise. Low dose bisoprolol started. Follow up with primary cardiologist.     Physical Exam:  Constitutional:  Elderly, well-developed and well-nourished man. No distress.   HEENT: Palpable hematoma over the occipital region. Normocephalic. PERRL  Neck: Normal range of motion. Neck supple. No JVD present.   Cardiovascular: Regular rhythm, intact distal pulses.  No gallop and no friction rub. Murmur present  Pulmonary/Chest: Effort normal and breath sounds diminished in lower lobes bilatearlly. No respiratory distress. No wheezes, rhonchi or rales.   Abdominal: Soft. No distension and no mass. There is no tenderness.   Musculoskeletal: Normal range of motion.   Neurological: Alert and oriented to person and time; disoriented to place and situation.  No focal deficits  Skin: Skin is warm and dry. No rash noted.   Extremities:  No clubbing, edema or cyanosis  Psychiatric: Normal mood and affect. Behavior is normal.     Procedures Performed:      Consults:   Hari Tilley MD - Cardiology  SUZY nOeal - Neurosurgery  Sathish Monroy MD - Neurology    Pertinent Test Results:   Results from last 7 days   Lab Units 01/13/22  0602   WBC 10*3/mm3 9.50   HEMOGLOBIN g/dL 13.6   HEMATOCRIT % 42.5   PLATELETS 10*3/mm3 152     Results from last 7 days   Lab Units 01/13/22  0602 01/11/22  2356 01/11/22  2356   SODIUM mmol/L 142  --  140   POTASSIUM mmol/L 3.4*  --  3.4*   CHLORIDE mmol/L 103  --  102   CO2 mmol/L 25.0  --  23.0   BUN mg/dL 16  --  19   CREATININE mg/dL 0.62*  --  0.60*   GLUCOSE mg/dL 176*   < > 157*   CALCIUM mg/dL 9.0  --  9.1   PHOSPHORUS mg/dL  --   --  3.5    < > = values in this interval not displayed.     Results from last 7 days   Lab Units 01/11/22  2253   HEMOGLOBIN A1C % 8.10*       Condition on Discharge:  Stable    Discharge Disposition: Home or Self Care    Discharge Medications:      Discharge Medications      New Medications      Instructions Start Date   atorvastatin 40 MG  tablet  Commonly known as: LIPITOR   40 mg, Oral, Nightly         Changes to Medications      Instructions Start Date   empagliflozin 25 MG tablet tablet  Commonly known as: JARDIANCE  What changed:   · medication strength  · how much to take   25 mg, Oral, Daily      fluticasone 50 MCG/ACT nasal spray  Commonly known as: FLONASE  What changed: See the new instructions.   USE 1 SPRAY NASALLY TWICE DAILY         Continue These Medications      Instructions Start Date   bisoprolol 10 MG tablet  Commonly known as: ZEBeta   10 mg, Oral, Daily      doxazosin 2 MG tablet  Commonly known as: CARDURA   TAKE 1 TABLET EVERY NIGHT      levocetirizine 5 MG tablet  Commonly known as: XYZAL   5 mg, Oral, Every Evening      metFORMIN 850 MG tablet  Commonly known as: GLUCOPHAGE   TAKE 1 TABLET TWICE DAILY WITH MEALS      PARoxetine 20 MG tablet  Commonly known as: PAXIL   TAKE 1 TABLET EVERY DAY         Stop These Medications    clopidogrel 75 MG tablet  Commonly known as: PLAVIX     Entresto 49-51 MG tablet  Generic drug: sacubitril-valsartan     simvastatin 40 MG tablet  Commonly known as: ZOCOR            Discharge Diet: Diet Soft Texture; [employer]; Cardiac, Consistent Carbohydrate    Activity at Discharge: As tolarated    Follow-up Appointments  No future appointments.  Additional Instructions for the Follow-ups that You Need to Schedule     Ambulatory Referral to Home Health   As directed      Face to Face Visit Date: 1/13/2022    Follow-up provider for Plan of Care?: I treated the patient in an acute care facility and will not continue treatment after discharge.    Follow-up provider: AZUL ADHIKARI [[id number]]    Reason/Clinical Findings: s/p fall, impaired balance, subdural hematoma and subarachnoid hemorrhage, Mild-moderate cog-comm deficits    Describe mobility limitations that make leaving home difficult: Impaired mobility    Nursing/Therapeutic Services Requested: Physical Therapy Speech Therapy    PT orders:  Therapeutic exercise Gait Training Transfer training Strengthening Ultrasound Home safety assessment    Weight Bearing Status: As Tolerated    SLP orders: Cognition Language Articulation    Frequency: Other         Ambulatory Referral to Home Health (Hospital)   As directed      Face to Face Visit Date: 1/13/2022    Follow-up provider for Plan of Care?: I treated the patient in an acute care facility and will not continue treatment after discharge.    Follow-up provider: AIDAN PAULSON [[id number]]    Reason/Clinical Findings: Per PT/OT recs    Describe mobility limitations that make leaving home difficult: Syncope    Nursing/Therapeutic Services Requested: Physical Therapy Occupational Therapy    Frequency: 1 Week 1         Discharge Follow-up with PCP   As directed       Currently Documented PCP:    Diaz Lee MD    PCP Phone Number:    [phone]     Follow Up Details: Within 1 week of discharge         Discharge Follow-up with Specified Provider: Home Cardiologist; 1 Week   As directed      To: Home Cardiologist    Follow Up: 1 Week         Discharge Follow-up with Specified Provider: Neurosurgery; 1 Month   As directed      To: Neurosurgery    Follow Up: 1 Month    Follow Up Details: with CT head noncontrast               Test Results Pending at Discharge       Code Status and Medical Interventions:   Ordered at: 01/11/22 2358     Code Status (Patient has no pulse and is not breathing):    CPR (Attempt to Resuscitate)     Medical Interventions (Patient has pulse or is breathing):    Full Support       SCARLETT Harley  01/13/22  13:13 EST    Discharge Time Spent: 30 Minutes      Electronically signed by SCARLETT Harley, 01/13/22, 11:30 AM EST.

## 2022-01-13 NOTE — SIGNIFICANT NOTE
Stroke Navigator:    Called by ICU RN that patient was being transferred from telemetry floor back to the ICU decline in neuro status.  Stat CT of the head was ordered.  Stable subdural subarachnoid and intraventricular hemorrhage noted.  No midline shift or hydrocephalus.  I presented to the ICU to examine the patient.  NIHSS is a 0.  No focal deficits.  Patient is alert and oriented to person, time, place.  However he is confused on who the president is.  Cardene drip ordered to maintain SBP </ 140.    Please call for any further questions or concerns.    Casi Quezada, APRN  1/12/2022  2000 EST

## 2022-01-13 NOTE — PLAN OF CARE
Goal Outcome Evaluation:      NIH this AM a 0. Pt alert and orientede x4, FC, and RANDOLPH. On RA. VSS. Afebrile. AUOP. Pt being discharged with home health. Education provided.

## 2022-01-13 NOTE — PLAN OF CARE
Goal Outcome Evaluation:  Plan of Care Reviewed With: patient        Progress: no change  Outcome Summary: NIH ). Stable neuro assessments. Cardene infusing @ 5mg/hr to maintain SBP < 140. Patient oriented X's 4 but is @ X's confused. Patient was was restrainted for safety due to frequently removing lines and trying to get OOB. Patient is extremely modest and initially refused help with toileting then tries to get up unassisted. Currently patient is more coorperated and agreeable to assistance. Restraints are currently off. Paced rhythm with PVC's, Max temp of 100.1, frequent urination with adequate UOP.

## 2022-01-13 NOTE — PLAN OF CARE
Goal Outcome Evaluation:  Plan of Care Reviewed With: (P) patient           Outcome Summary: (P) PT initial evaluation completed. Pt presents with deficits including strength, functional endurance, and balance. Ambulated 120ft CGAx1 with FWW able to progress to no AD and navigated 5 stairs. Treatment limited by fatigue. Rec cont skilled IP PT in order to increase indep with mobility. Rec DC home with 24/7 assist, rolling walker, and HH.

## 2022-01-13 NOTE — CONSULTS
[facility] at [facility]  Cardiovascular Consultation Note    Reason for consultation: Syncope leading to subarachnoid and subdural hematoma.  Patient has a history of CAD, previous LV dysfunction    History of Present Illness:  82-year-old male with PVD CAD aortic stenosis prior LV dysfunction with left bundle branch block who presents after a syncopal episode hitting his head and suffering trauma.  The patient does not know whether he tripped or if he got lightheaded and passed out.  He does note that he occasionally gets lightheaded when he stands.  Also has had new onset recent dyspnea on exertion.  Also gets some tightness in his throat with activity.  He has had no palpitations.  His defibrillator is not fired.  He was not quite clear why he was on Plavix.  He states he has never had a stent in his heart.  He thinks is because of some peripheral vascular disease.  The patient is not on a beta-blocker.  Patient currently denies any headache.  No edema.  No exertional syncope or presyncope    Cardiac risk factors: Male sex #2 age greater than 45 #3 hypertension #4 documented prior CAD and PVD    Past medical and surgical history, social and family history reviewed in EMR.    REVIEW OF SYSTEMS:     Past Medical History:   Diagnosis Date   • Allergic rhinitis    • Aortic stenosis    • Atopic rhinitis 7/6/2016   • Benign non-nodular prostatic hyperplasia with lower urinary tract symptoms 9/15/2017   • CAD (coronary artery disease)    • Cardiomyopathy, ischemic    • Colon polyps     tubular adenoma 2016   • Community acquired pneumonia    • Depression 7/6/2016   • Diabetes (HCC)    • Diverticulosis of intestine 7/6/2016    Description: Left and sigmoid   • Dyslipidemia    • LBBB (left bundle branch block)    • PAD (peripheral artery disease) (Formerly [facility])      Past Surgical History:   Procedure Laterality Date   • CARDIAC CATHETERIZATION  2018    100% cx   • CATARACT EXTRACTION     • COLONOSCOPY W/  BIOPSIES AND POLYPECTOMY  2021    1 benign polyp   • HIP SURGERY Left 2020    Dr Mandy BOUDREAUX   • LEG SURGERY Left 2016    Popliteal Artery stenting by Dr Hdz   • LEG SURGERY Left 2016    skin graft by Dr Nance   • LIPOMA EXCISION      s/p excision on chest   • PACEMAKER IMPLANTATION  2019    and defibrillator     Social History     Socioeconomic History   • Marital status:    Tobacco Use   • Smoking status: Former Smoker     Types: Cigarettes     Quit date: 1997     Years since quittin.0   • Smokeless tobacco: Never Used   Substance and Sexual Activity   • Drug use: No     Family History   Problem Relation Age of Onset   • Diabetes Other    • Hypertension Other    • Stroke Other    • Coronary artery disease Other        H&P ROS reviewed and pertinent CV ROS as noted in HPI.    Cardiac: CAD.  Previous history of LV dysfunction possibly secondary to left bundle branch block.  He has a BiV ICD.  Gets exertional neck discomfort.  Exertional dyspnea  Respiratory: Has noted some new onset dyspnea  Lower Extremities: No lesions or swelling  GI: No nausea vomiting diarrhea bright blood per rectum or melena  Neuro: Patient denies headache after doing his head and suffering bleeding  Hematology: No hematologic malignancy ecchymosis or petechia  Renal: No history of renal failure renal stones or hematuria  Musculoskeletal: Does not complain of any joint pain  Endocrine: No hypothyroidism  Constitutional: No fever chills weight loss  Psych: No suicidal ideation      Physical Exam: General well-developed elderly male not dyspneic not tachypneic current heart rate 75.       HEENT: No icterus.  There is bilateral harsh noise in the carotids.  Tongue is midline.  Dentition good.       Respiratory: Equal bilateral symmetrical expansion with some basilar crackles       Cardiovascular: Regular rate and rhythm with a grade 4-5 over systolic ejection murmur.  No edema to palpation.  No palpable DP  or PT pulses       GI: Soft flat nontender positive bowel sounds       Lower Extremities: Feet are cool to the touch.  No lesions       Neuro: Facial expressions are symmetrical moves all 4 extremities       Skin: Feet are cool no edema to palpation       Psych: Pleasant affect    Results Review: Potassium is low.  Hemoglobin is normal.  I personally reviewed the patient's echocardiogram and he has moderate aortic stenosis.  Normal wall motion and EF of 65%           Vital Sign Min/Max for last 24 hours  Temp  Min: 98.6 °F (37 °C)  Max: 100.1 °F (37.8 °C)   BP  Min: 110/65  Max: 162/87   Pulse  Min: 81  Max: 98   Resp  Min: 16  Max: 18   SpO2  Min: 89 %  Max: 98 %   No data recorded      Intake/Output Summary (Last 24 hours) at 1/13/2022 1020  Last data filed at 1/13/2022 0800  Gross per 24 hour   Intake 816 ml   Output 1627 ml   Net -811 ml             Current Facility-Administered Medications:   •  acetaminophen (TYLENOL) tablet 650 mg, 650 mg, Oral, Q4H PRN **OR** acetaminophen (TYLENOL) suppository 650 mg, 650 mg, Rectal, Q4H PRN, Wesley Santizo, APRN  •  aluminum-magnesium hydroxide-simethicone (MAALOX MAX) 400-400-40 MG/5ML suspension 7.5 mL, 7.5 mL, Oral, Q4H PRN, Wesley Santizo, APRN  •  atorvastatin (LIPITOR) tablet 40 mg, 40 mg, Oral, Nightly, Wesley Santizo, SCARLETT, 40 mg at 01/12/22 2057  •  sennosides-docusate (PERICOLACE) 8.6-50 MG per tablet 2 tablet, 2 tablet, Oral, BID, 2 tablet at 01/13/22 0904 **AND** polyethylene glycol (MIRALAX) packet 17 g, 17 g, Oral, Daily PRN **AND** bisacodyl (DULCOLAX) EC tablet 5 mg, 5 mg, Oral, Daily PRN **AND** bisacodyl (DULCOLAX) suppository 10 mg, 10 mg, Rectal, Daily PRN, Wesley Santizo, APRN  •  bisoprolol (ZEBeta) tablet 5 mg, 5 mg, Oral, Q24H, Lydia Franklin MD, 5 mg at 01/13/22 0904  •  dextrose (D50W) (25 g/50 mL) IV injection 25 g, 25 g, Intravenous, Q15 Min PRN, Wesley Santizo, APRN  •  dextrose (GLUTOSE) oral gel 15 g, 15 g, Oral,  Q15 Min PRN, Wesley Santizo, APRN  •  empagliflozin (JARDIANCE) tablet 25 mg, 25 mg, Oral, Daily, Severo Houston MD  •  glucagon (human recombinant) (GLUCAGEN DIAGNOSTIC) injection 1 mg, 1 mg, Subcutaneous, Q15 Min PRN, Wesley Santizo, APRN  •  insulin detemir (LEVEMIR) injection 5 Units, 5 Units, Subcutaneous, Daily, Wesley Santizo APRN, 5 Units at 01/13/22 0905  •  insulin lispro (humaLOG) injection 0-9 Units, 0-9 Units, Subcutaneous, 4x Daily With Meals & Nightly, Irene Olivas, APRN, 2 Units at 01/13/22 0905  •  niCARdipine (CARDENE) 25mg in 250mL NS infusion, 5-15 mg/hr, Intravenous, Titrated, Casi Quezada, APRN, Stopped at 01/13/22 0715  •  ondansetron (ZOFRAN) injection 4 mg, 4 mg, Intravenous, Q6H PRN, Wesley Santizo, APRN  •  potassium chloride (MICRO-K) CR capsule 40 mEq, 40 mEq, Oral, PRN, 40 mEq at 01/13/22 0905 **OR** potassium chloride (KLOR-CON) packet 40 mEq, 40 mEq, Oral, PRN **OR** potassium chloride 10 mEq in 100 mL IVPB, 10 mEq, Intravenous, Q1H PRN, Case, Ruma V., DO  •  sacubitril-valsartan (ENTRESTO) 49-51 MG tablet 1 tablet, 1 tablet, Oral, Q12H, Lydia Franklin MD, 1 tablet at 01/13/22 0905  •  sodium chloride 0.9 % flush 10 mL, 10 mL, Intravenous, Q12H, Alissa Canseco APRN, 10 mL at 01/13/22 0916  •  sodium chloride 0.9 % flush 10 mL, 10 mL, Intravenous, PRN, Alissa Canseco, APRJB    Lab Review:   Results from last 7 days   Lab Units 01/13/22  0602 01/11/22  2253   WBC 10*3/mm3 9.50 12.65*   HEMOGLOBIN g/dL 13.6 13.0   PLATELETS 10*3/mm3 152 165     Results from last 7 days   Lab Units 01/13/22  0602 01/11/22  2356   SODIUM mmol/L 142 140   POTASSIUM mmol/L 3.4* 3.4*   CO2 mmol/L 25.0 23.0   BUN mg/dL 16 19   CREATININE mg/dL 0.62* 0.60*   MAGNESIUM mg/dL 2.1 1.7   PHOSPHORUS mg/dL  --  3.5   GLUCOSE mg/dL 176* 157*     Estimated Creatinine Clearance: 59.6 mL/min (A) (by C-G formula based on SCr of 0.62 mg/dL (L)).    Results from last 7 days    Lab Units 01/11/22  2423   HEMOGLOBIN A1C % 8.10*     .lipd  Lab Results   Component Value Date    CHLPL 158 10/22/2021    TRIG 247 (H) 01/11/2022    HDL 73 (H) 01/11/2022    AST 29 01/11/2022    ALT 21 01/11/2022       Radiology Reports:  Imaging Results (Last 72 Hours)     Procedure Component Value Units Date/Time    CT Head Without Contrast [[id number]] Collected: 01/12/22 1938     Updated: 01/12/22 1940    Narrative:      CT Head WO    HISTORY:   Altered mental status and clinical deterioration today. Known traumatic subarachnoid hemorrhage.    TECHNIQUE:   Routine noncontrast head CT. Coronal reformatted images. Radiation dose reduction techniques included automated exposure control or exposure modulation based on body size. Count of known CT and cardiac nuc med studies performed in previous 12 months: 5.     COMPARISON:   CT head 1/12/2022 and 1/11/2022    FINDINGS:   Stable small subdural hemorrhages along the bilateral interhemispheric falx and left tentorium. Stable subarachnoid hemorrhage in the medial left frontal region, left sylvian fissure, and basilar cisterns. Improved small amount of subarachnoid blood at  the right frontal convexity. Stable small amount of intraventricular hemorrhage in the bilateral occipital horns. No hydrocephalus or midline shift. Moderate generalized atrophy and chronic small vessel disease throughout the supratentorial white matter.  No acute osseous abnormality. Bilateral maxillary sinus mucosal thickening. Visualized mastoid air cells are clear.      Impression:        1. Stable subdural, subarachnoid, and intraventricular hemorrhage compared to the prior examination performed earlier the same date at 6:02 AM.  2. No hydrocephalus or midline shift.  3. Bilateral maxillary sinus mucosal thickening.          Signer Name: Lucio Asher MD   Signed: 1/12/2022 7:38 PM   Workstation Name: [email or web address]    [facility]    CT Head Without Contrast  [[id number]] Collected: 01/12/22 0630     Updated: 01/12/22 0632    Narrative:      CT Head WO    HISTORY:   Follow-up traumatic subarachnoid hemorrhage.    TECHNIQUE:   Routine noncontrast head CT. Coronal reformatted images. Radiation dose reduction techniques included automated exposure control or exposure modulation based on body size. Count of known CT and cardiac nuc med studies performed in previous 12 months: 4.     COMPARISON:   CT head and CTA head/neck 1/11/2022    FINDINGS:   Stable small amount of subdural hemorrhage along the bilateral interhemispheric falx and along the left tentorium. Stable subarachnoid hemorrhage in the medial left frontal region, right frontal convexity, and left sylvian fissure. Stable subarachnoid  hemorrhage in the basilar cisterns. Interval development of a small amount of intraventricular blood in the bilateral occipital horns of the lateral ventricles. No hydrocephalus. No midline shift or focal mass effect. Moderate generalized atrophy and  chronic small vessel disease throughout the supratentorial white matter. No acute osseous abnormality. Mucosal thickening bilateral maxillary sinuses with a small air-fluid level in the left maxillary sinus. Visualized mastoid air cells are clear.      Impression:        1. Stable subdural and subarachnoid hemorrhage, detailed above.  2. Interval development of a small amount of intraventricular hemorrhage in the bilateral occipital horns of the lateral ventricles. No hydrocephalus or midline shift.  3. Bilateral maxillary sinus mucosal thickening with small left-sided air-fluid level.          Signer Name: Lucio Asher MD   Signed: 1/12/2022 6:30 AM   [facility]: [email or web address]    [facility]    XR Hand 3+ View Right [[id number]] Collected: 01/12/22 0027     Updated: 01/12/22 0029    Narrative:      CR Hand Min 3 Vws RT    INDICATION:   Fell with swelling right hand    COMPARISON:   None  available.    FINDINGS:   PA, lateral, and oblique views of the right hand.  No fracture or dislocation.  No bone erosion or destruction.  . There are vascular calcifications. There are mild degenerative arthritis changes. Please correlate with the site of tenderness clinically.  I see dorsal soft tissue swelling.      Impression:      Dorsal soft tissue swelling without evidence for acute fracture dislocation or definite radiopaque foreign body. Small densities at the base of the thumb are probably on the surface of the skin. Please confirm clinically.    Signer Name: Cris Loco MD   Signed: 1/12/2022 12:27 AM   Workstation Name: [email or web address]    [facility]    CT Angiogram Head w AI Analysis of LVO [[id number]] Collected: 01/12/22 0005     Updated: 01/12/22 0007    Narrative:      INDICATION:   Acute intracranial hemorrhage after a fall. Patient on blood thinners    TECHNIQUE:   CT angiogram of the head and neck with IV contrast. Contrast dose recorded in the patient's chart. 3-D MIP reformatted images were acquired and reviewed. Evaluation for a significant carotid arterial stenosis is based on the NASCET criteria. Radiation  dose reduction techniques included automated exposure control or exposure modulation based on body size. Radiation audit for number of CT and nuclear cardiology exams performed in the last year:  0. Precontrast imaging also obtained.    Rapid AI utilized for LVO    COMPARISON:   Earlier noncontrast head CT.    FINDINGS:   CTA neck:  There our atherosclerotic vascular calcifications at the aortic arch. There is involvement of great vessel origins without hemodynamically significant stenosis. There is probably mild stenosis at the origin of the right subclavian artery.  There is mild narrowing at the origin the right common carotid artery due to vascular ectasia.    There is calcified plaque at the right carotid bifurcation but by NASCET criteria there is essentially 0%  diameter stenosis. There is moderate stenosis at the origin of the right external carotid artery. There is calcified plaque at the left carotid  bifurcation and along the left common carotid artery. By NASCET criteria there is 0% diameter stenosis. There is mild narrowing at the origin of the left external carotid artery. There is calcified disease at both carotid siphons with mild to moderate  stenosis likely.    Both vertebral arteries are patent. The left is dominant. There is disease at the origin of the vertebral arteries partly obscured by venous vascular opacification.    CTA head:  Study is performed with a stroke evaluation protocol and is not post processed for the purpose of evaluating for intracranial aneurysm. There are acute blood products present as identified on the earlier noncontrast head CT. There our  atherosclerotic vascular calcifications involving both the 4 segments with likely hemodynamically significant stenosis of the hypoplastic distal right vertebral artery. This vessel mostly terminates in a PICA vessel. There is irregular ectasia of the  basilar due to atherosclerotic disease with high-grade short segment proximal stenosis followed by some poststenotic dilatation. There is a moderate-sized anterior communicating artery present. The left A1 vessels mildly hypoplastic. No intracranial  vascular cut off is appreciated. There is no gross intracranial aneurysm or arteriovenous malformation appreciated on this study performed for stroke evaluation. There is a small to moderate sized right posterior communicating artery. There is a large  left posterior communicator with largely fetal origin to the left posterior cerebral artery distribution. Left posterior cerebral artery is irregular is probably some intracranial atherosclerotic disease.    There is venous contrast reflux intracranially from the injection into the dural venous sinuses. No intracranial vascular cut off is suspected.       Impression:        1. By NASCET criteria, there is 0% stenosis at either carotid bifurcation.  2. Both vertebral arteries are patent with left being dominant.  3. There is considerable atherosclerotic disease involving the bilateral carotid siphons and intracranial vertebral arteries. There is severe disease of the basilar artery with calcified and noncalcified plaque. This includes short segment high-grade  stenosis of the proximal basilar with some long segment irregularity. The distal right vertebral artery largely terminates in a PICA.  4. No obvious intracranial aneurysm or arteriovenous malformation. Please note the CT angiogram is protocol to evaluate for stroke and utilizes rapid AI for this purpose. It is limited for evaluation for intracranial aneurysm.  5. There is a large amount of venous contrast reflux including reflux into the dural venous sinuses.    Signer Name: Cris Loco MD   Signed: 1/12/2022 12:05 AM   Workstation Name: [email or web address]    [facility]    CT Angiogram Neck [[id number]] Collected: 01/12/22 0005     Updated: 01/12/22 0007    Narrative:      INDICATION:   Acute intracranial hemorrhage after a fall. Patient on blood thinners    TECHNIQUE:   CT angiogram of the head and neck with IV contrast. Contrast dose recorded in the patient's chart. 3-D MIP reformatted images were acquired and reviewed. Evaluation for a significant carotid arterial stenosis is based on the NASCET criteria. Radiation  dose reduction techniques included automated exposure control or exposure modulation based on body size. Radiation audit for number of CT and nuclear cardiology exams performed in the last year:  0. Precontrast imaging also obtained.    Rapid AI utilized for LVO    COMPARISON:   Earlier noncontrast head CT.    FINDINGS:   CTA neck:  There our atherosclerotic vascular calcifications at the aortic arch. There is involvement of great vessel origins without hemodynamically significant  stenosis. There is probably mild stenosis at the origin of the right subclavian artery.  There is mild narrowing at the origin the right common carotid artery due to vascular ectasia.    There is calcified plaque at the right carotid bifurcation but by NASCET criteria there is essentially 0% diameter stenosis. There is moderate stenosis at the origin of the right external carotid artery. There is calcified plaque at the left carotid  bifurcation and along the left common carotid artery. By NASCET criteria there is 0% diameter stenosis. There is mild narrowing at the origin of the left external carotid artery. There is calcified disease at both carotid siphons with mild to moderate  stenosis likely.    Both vertebral arteries are patent. The left is dominant. There is disease at the origin of the vertebral arteries partly obscured by venous vascular opacification.    CTA head:  Study is performed with a stroke evaluation protocol and is not post processed for the purpose of evaluating for intracranial aneurysm. There are acute blood products present as identified on the earlier noncontrast head CT. There our  atherosclerotic vascular calcifications involving both the 4 segments with likely hemodynamically significant stenosis of the hypoplastic distal right vertebral artery. This vessel mostly terminates in a PICA vessel. There is irregular ectasia of the  basilar due to atherosclerotic disease with high-grade short segment proximal stenosis followed by some poststenotic dilatation. There is a moderate-sized anterior communicating artery present. The left A1 vessels mildly hypoplastic. No intracranial  vascular cut off is appreciated. There is no gross intracranial aneurysm or arteriovenous malformation appreciated on this study performed for stroke evaluation. There is a small to moderate sized right posterior communicating artery. There is a large  left posterior communicator with largely fetal origin to the left  posterior cerebral artery distribution. Left posterior cerebral artery is irregular is probably some intracranial atherosclerotic disease.    There is venous contrast reflux intracranially from the injection into the dural venous sinuses. No intracranial vascular cut off is suspected.      Impression:        1. By NASCET criteria, there is 0% stenosis at either carotid bifurcation.  2. Both vertebral arteries are patent with left being dominant.  3. There is considerable atherosclerotic disease involving the bilateral carotid siphons and intracranial vertebral arteries. There is severe disease of the basilar artery with calcified and noncalcified plaque. This includes short segment high-grade  stenosis of the proximal basilar with some long segment irregularity. The distal right vertebral artery largely terminates in a PICA.  4. No obvious intracranial aneurysm or arteriovenous malformation. Please note the CT angiogram is protocol to evaluate for stroke and utilizes rapid AI for this purpose. It is limited for evaluation for intracranial aneurysm.  5. There is a large amount of venous contrast reflux including reflux into the dural venous sinuses.    Signer Name: Cris Loco MD   Signed: 1/12/2022 12:05 AM   Workstation Name: [email or web address]    [facility]    CT Head Without Contrast [[id number]] Collected: 01/11/22 2229     Updated: 01/11/22 2231    Narrative:      CT Head WO    HISTORY:   Fall. Head injury. Patient on blood thinners.    TECHNIQUE:   Axial unenhanced head CT with multiplanar reformats. Radiation dose reduction techniques included automated exposure control or exposure modulation based on body size. Count of known CT and cardiac nuc med studies performed in previous 12 months: 0.     COMPARISON:   None.    FINDINGS:   Abnormal examination. There is generalized atrophy with chronic small vessel ischemic disease in the white matter. Ventricular size is within normal limits. There is  acute subdural hemorrhage along the interhemispheric falx measuring up to 5 mm in  thickness. There is adjacent subarachnoid hemorrhage in the medial left frontal lobe and near the right vertex. Some of the hemorrhage near the vertex could also be of subdural origin. There is subarachnoid hemorrhage in the left sylvian fissure  measuring up to 7 mm in thickness. There is also subarachnoid hemorrhage in the basilar cisterns left greater than right. There also appears to be some subdural hemorrhage along the left side of the tentorium. No intraventricular hemorrhage is  identified. There is no evidence of acute infarct. There is an occipital scalp hematoma. There is atherosclerotic disease in the vertebral arteries and carotid siphons. There is a distal left vertebral artery stent versus atherosclerotic calcification.  Correlate with history. No skull fracture is identified.    SAH detected                     YES     Diffuse or vertical layer of SAH blood < 1 mm thick                 NO     Localized clot and/or vertical layer of SAH blood  > 1 mm thick           YES     ICH or IVH with diffuse or no SAH          NO       Impression:        1. Acute subdural hemorrhage along the interhemispheric falx measuring up to 5 mm in thickness. There is also some subdural hemorrhage along the left tentorium.  2. Subarachnoid hemorrhage is noted at the right vertex and in the left frontal region and left sylvian fissure. There is also subarachnoid hemorrhage in the basilar cisterns worse to the left of midline.  3. No intraventricular hemorrhage.  4. Atrophy with chronic small vessel ischemic disease in the white matter.  5. Occipital scalp hematoma without skull fracture.      NOTIFICATION: Critical Value/emergent results were called by telephone at the time of interpretation on 1/11/2022 10:25 PM to Larry Gonzalez MD who verbally acknowledged these results.      Signer Name: Hari Simon MD   Signed: 1/11/2022 10:29  PM   Workstation Name: [facility]    Radiology [facility]    CT Cervical Spine Without Contrast [[id number]] Collected: 01/11/22 2229     Updated: 01/11/22 2231    Narrative:      CT Spine Cervical WO    INDICATION:   Neck pain after fall.    TECHNIQUE:   CT of the cervical spine without IV contrast. Coronal and sagittal reconstructions were obtained.  Radiation dose reduction techniques included automated exposure control or exposure modulation based on body size. Count of known CT and cardiac nuc med  studies performed in previous 12 months: 0.     COMPARISON:  None available.    FINDINGS:  Note is made of extensive carotid atherosclerotic disease. There is mild grade 1 retrolisthesis of C5 on C6. Cervical alignment is otherwise normal. No acute cervical spine fracture is seen. There is multilevel degenerative disc disease and facet  arthropathy, most severe at C5-6 where there is a disc osteophyte complex with mild narrowing of the central canal and moderate bilateral foraminal stenosis. Intracranial hemorrhage noted the skull base. Please see head CT report dictated separately.      Impression:      No acute cervical spine fracture.    Signer Name: Hari Simon MD   Signed: 1/11/2022 10:29 PM   Workstation Name: [facility]    Radiology [facility]          Assessment/Plan: 1 syncope leading to subdural hematoma-patient gives a history of lightheadedness upon standing.  We will check orthostatic blood pressure  2 CAD/aortic stenosis-the patient is experiencing some recent onset dyspnea on exertion and some tightness in his throat with activity.  I am not sure the last time he had a cardiac cath.  His murmur is quite loud but echo shows moderate AAS.  Upon discharge I recommend the patient have an ischemic evaluation either a nuclear perfusion scan or cath.  Would also consider possible stress echo to see if his gradient across his aortic valve increases with exercise.  We will start  low-dose bisoprolol  3 normal LV function and wall motion  No arrhythmias documented by ICD  If the patient has significant orthostasis we will address it.  Otherwise the patient be discharged home on the beta-blocker he needs to follow-up with his primary cardiologist for further evaluation of ischemia and/or possible significant aortic stenosis      Hari Tilley MD  01/13/22  10:20 EST

## 2022-01-13 NOTE — THERAPY EVALUATION
Patient Name: Derian Joya  : 1940    MRN: [id number]                              Today's Date: 2022       Admit Date: 2022    Visit Dx:     ICD-10-CM ICD-9-CM   1. SAH (subarachnoid hemorrhage) ([facility])  I60.9 430   2. SDH (subdural hematoma) ([facility])  S06.5X9A 432.1   3. Head trauma, initial encounter  S09.90XA 959.01   4. Fall, initial encounter  W19.XXXA E888.9   5. Cognitive communication deficit  R41.841 799.52     Patient Active Problem List   Diagnosis   • Uncontrolled type 2 diabetes mellitus (HCC)   • Other hyperlipidemia   • Essential hypertension   • Aortic stenosis   • Cardiomyopathy, nonischemic (HCC)   • SAH (subarachnoid hemorrhage) ([facility])   • SDH (subdural hematoma) ([facility])   • Head trauma, initial encounter   • Head trauma     Past Medical History:   Diagnosis Date   • Allergic rhinitis    • Aortic stenosis    • Atopic rhinitis 2016   • Benign non-nodular prostatic hyperplasia with lower urinary tract symptoms 9/15/2017   • CAD (coronary artery disease)    • Cardiomyopathy, ischemic    • Colon polyps     tubular adenoma    • Community acquired pneumonia    • Depression 2016   • Diabetes ([facility])    • Diverticulosis of intestine 2016    Description: Left and sigmoid   • Dyslipidemia    • LBBB (left bundle branch block)    • PAD (peripheral artery disease) ([facility])      Past Surgical History:   Procedure Laterality Date   • CARDIAC CATHETERIZATION      100% cx   • CATARACT EXTRACTION     • COLONOSCOPY W/ BIOPSIES AND POLYPECTOMY  2021    1 benign polyp   • HIP SURGERY Left 2020    Dr Mandy BOUDREAUX   • LEG SURGERY Left 2016    Popliteal Artery stenting by Dr Hdz   • LEG SURGERY Left 2016    skin graft by Dr Nance   • LIPOMA EXCISION      s/p excision on chest   • PACEMAKER IMPLANTATION  2019    and defibrillator      General Information     Row Name 22 0910          Physical Therapy Time and Intention    Document Type evaluation (P)   -ML     Mode of  Treatment physical therapy (P)   -ML     Row Name 01/13/22 0910          General Information    Patient Profile Reviewed yes (P)   -ML     Prior Level of Function independent:; all household mobility; community mobility; gait; transfer; using stairs; bed mobility (P)   Pt questionable historian  -ML     Existing Precautions/Restrictions fall (P)   -ML     Barriers to Rehab cognitive status (P)   -ML     Row Name 01/13/22 0910          Living Environment    Lives With spouse (P)   -ML     Row Name 01/13/22 0910          Home Main Entrance    Number of Stairs, Main Entrance three (P)   -ML     Stair Railings, Main Entrance railings on both sides of stairs (P)   -ML     Row Name 01/13/22 0910          Stairs Within Home, Primary    Number of Stairs, Within Home, Primary five (P)   -ML     Stair Railings, Within Home, Primary railing on left side (ascending) (P)   -ML     Row Name 01/13/22 0910          Cognition    Orientation Status (Cognition) oriented x 4 (P)   -ML     [facility] Name 01/13/22 0910          Safety Issues, Functional Mobility    Safety Issues Affecting Function (Mobility) awareness of need for assistance; insight into deficits/self-awareness; positioning of assistive device; safety precaution awareness; safety precautions follow-through/compliance (P)   -ML     Impairments Affecting Function (Mobility) balance; endurance/activity tolerance; strength; cognition (P)   -ML     Cognitive Impairments, Mobility Safety/Performance awareness, need for assistance; insight into deficits/self-awareness (P)   -ML           User Key  (r) = Recorded By, (t) = Taken By, (c) = Cosigned By    Initials Name Provider Type    ML Liter, Angela S, PT Student PT Student               Mobility     Row Name 01/13/22 1045          Bed Mobility    Comment (Bed Mobility) Pt recieved UIC with OT (P)   -ML     Row Name 01/13/22 1045          Transfers    Comment (Transfers) VC for hand placement (P)   -ML     Row Name 01/13/22 1049           Sit-Stand Transfer    Sit-Stand [place] (Transfers) contact guard; 1 person assist; verbal cues (P)   -ML     Assistive Device (Sit-Stand Transfers) walker, front-wheeled (P)   -ML     Row Name 01/13/22 1045          Gait/Stairs (Locomotion)    [place] Level (Gait) contact guard; 1 person assist; verbal cues (P)   -ML     Assistive Device (Gait) walker, front-wheeled (P)   -ML     Distance in Feet (Gait) 120 (P)   -ML     Deviations/Abnormal Patterns (Gait) base of support, narrow; sheela decreased; stride length decreased; gait speed decreased (P)   -ML     Bilateral Gait Deviations forward flexed posture (P)   -ML     [place] Level (Stairs) contact guard; 1 person assist; verbal cues (P)   -ML     Handrail Location (Stairs) right side (ascending); left side (descending) (P)   -ML     Number of Steps (Stairs) 5 (P)   -ML     Ascending Technique (Stairs) step-to-step (P)   -ML     Descending Technique (Stairs) step-to-step (P)   -ML     Stairs, Impairments strength decreased (P)   -ML     Comment (Gait/Stairs) Pt ambulated step through gait pattern CGAx1 with FWW, able to progress ambulation without FWW. Required VC for upright posture and increased step length. Step to step with stair navigation requiring cueing for up with LLE and down with RLE. Ambulation and stair navigation limited by fatigue. (P)   -ML           User Key  (r) = Recorded By, (t) = Taken By, (c) = Cosigned By    Initials Name Provider Type    ML Liter, Angela S, PT Student PT Student               Obj/Interventions     Row Name 01/13/22 1053          Range of Motion Comprehensive    General Range of Motion bilateral lower extremity ROM WFL (P)   -ML     Row Name 01/13/22 1053          Strength Comprehensive (MMT)    General Manual Muscle Testing (MMT) Assessment lower extremity strength deficits identified (P)   -ML     Comment, General Manual Muscle Testing (MMT) Assessment LLE grossly 4+/5, RLE grossly 3+/5 (P)   -ML     Row  Name 01/13/22 1053          Balance    Balance Assessment sitting static balance; sitting dynamic balance; standing static balance; standing dynamic balance (P)   -ML     Static Sitting Balance WFL; sitting, edge of bed (P)   -ML     Dynamic Sitting Balance WFL; sitting, edge of bed (P)   -ML     Static Standing Balance WFL; supported; standing (P)   -ML     Dynamic Standing Balance supported; standing; WFL (P)   -ML     Balance Interventions sitting; standing; sit to stand (P)   -ML     Row Name 01/13/22 1053          Sensory Assessment (Somatosensory)    Sensory Assessment (Somatosensory) LE sensation intact (P)   -ML           User Key  (r) = Recorded By, (t) = Taken By, (c) = Cosigned By    Initials Name Provider Type    ML Liter, Angela S, PT Student PT Student               Goals/Plan     Row Name 01/13/22 1059          Bed Mobility Goal 1 (PT)    Activity/Assistive Device (Bed Mobility Goal 1, PT) sit to supine/supine to sit (P)   -ML     [place] Level/Cues Needed (Bed Mobility Goal 1, PT) supervision required (P)   -ML     Time Frame (Bed Mobility Goal 1, PT) long term goal (LTG); 2 weeks (P)   -ML     Row Name 01/13/22 1059          Transfer Goal 1 (PT)    Activity/Assistive Device (Transfer Goal 1, PT) sit-to-stand/stand-to-sit; walker, rolling (P)   -ML     [place] Level/Cues Needed (Transfer Goal 1, PT) supervision required (P)   -ML     Time Frame (Transfer Goal 1, PT) long term goal (LTG); 2 weeks (P)   -ML     Row Name 01/13/22 1059          Gait Training Goal 1 (PT)    Activity/Assistive Device (Gait Training Goal 1, PT) gait (walking locomotion); walker, rolling (P)   -ML     [place] Level (Gait Training Goal 1, PT) supervision required (P)   -ML     Distance (Gait Training Goal 1, PT) 300 (P)   -ML     Time Frame (Gait Training Goal 1, PT) long term goal (LTG); 2 weeks (P)   -ML     Row Name 01/13/22 1059          Stairs Goal 1 (PT)    Activity/Assistive Device (Stairs Goal 1, PT)  ascending stairs; descending stairs; using handrail, left; using handrail, right (P)   -ML     [place] Level/Cues Needed (Stairs Goal 1, PT) supervision required (P)   -ML     Number of Stairs (Stairs Goal 1, PT) 5 (P)   -ML     Time Frame (Stairs Goal 1, PT) long term goal (LTG); 2 weeks (P)   -ML           User Key  (r) = Recorded By, (t) = Taken By, (c) = Cosigned By    Initials Name Provider Type    ML Liter, Angela S, PT Student PT Student               Clinical Impression     [facility] Name 01/13/22 1055          Pain    Additional Documentation Pain Scale: Numbers Pre/Post-Treatment (Group) (P)   -ML     Row Name 01/13/22 1055          Pain Scale: Numbers Pre/Post-Treatment    Pretreatment Pain Rating 0/10 - no pain (P)   -ML     Posttreatment Pain Rating 0/10 - no pain (P)   -ML     Row Name 01/13/22 105          Plan of Care Review    Plan of Care Reviewed With patient (P)   -ML     Outcome Summary PT initial evaluation completed. Pt presents with deficits including strength, functional endurance, and balance. Ambulated 120ft CGAx1 with FWW able to progress to no AD and navigated 5 stairs. Treatment limited by fatigue. Rec cont skilled IP PT in order to increase indep with mobility. Rec DC home with 24/7 assist, rolling walker, and HH. (P)   -ML     Row Name 01/13/22 1055          Therapy Assessment/Plan (PT)    Rehab Potential (PT) good, to achieve stated therapy goals (P)   -ML     Criteria for Skilled Interventions Met (PT) yes; meets criteria; skilled treatment is necessary (P)   -ML     [facility] Name 01/13/22 1055          Vital Signs    Pre Systolic BP Rehab 123 (P)   -ML     Pre Treatment Diastolic BP 80 (P)   -ML     Post Systolic BP Rehab 135 (P)   -ML     Post Treatment Diastolic BP 77 (P)   -ML     Pretreatment Heart Rate (beats/min) 92 (P)   -ML     Posttreatment Heart Rate (beats/min) 93 (P)   -ML     Pre SpO2 (%) 94 (P)   -ML     O2 Delivery Pre Treatment room air (P)   -ML     O2 Delivery Intra  Treatment room air (P)   -ML     Post SpO2 (%) 93 (P)   -ML     O2 Delivery Post Treatment room air (P)   -ML     Pre Patient Position Sitting (P)   -ML     Intra Patient Position Standing (P)   -ML     Post Patient Position Sitting (P)   -ML     Row Name 01/13/22 1055          Positioning and Restraints    Pre-Treatment Position in bed (P)   Sitting EOB with OT  -ML     Post Treatment Position chair (P)   -ML     In Chair notified nsg; reclined; sitting; call light within reach; encouraged to call for assist; exit alarm on; legs elevated; waffle cushion (P)   -ML           User Key  (r) = Recorded By, (t) = Taken By, (c) = Cosigned By    Initials Name Provider Type    ML Liter, Angela S, PT Student PT Student               Outcome Measures     Row Name 01/13/22 1104          How much help from another person do you currently need...    Turning from your back to your side while in flat bed without using bedrails? 2 (P)   -ML     Moving from lying on back to sitting on the side of a flat bed without bedrails? 2 (P)   -ML     Moving to and from a bed to a chair (including a wheelchair)? 3 (P)   -ML     Standing up from a chair using your arms (e.g., wheelchair, bedside chair)? 3 (P)   -ML     Climbing 3-5 steps with a railing? 3 (P)   -ML     To walk in hospital room? 3 (P)   -ML     AM-PAC 6 Clicks Score (PT) 16 (P)   -ML     Row Name 01/13/22 1104          Functional Assessment    Outcome Measure Options AM-PAC 6 Clicks Basic Mobility (PT) (P)   -ML           User Key  (r) = Recorded By, (t) = Taken By, (c) = Cosigned By    Initials Name Provider Type    ML Liter, Angela S, PT Student PT Student                             Physical Therapy Education                 Title: PT OT SLP Therapies (In Progress)     Topic: Physical Therapy (Done)     Point: Mobility training (Done)     Learning Progress Summary           Patient Acceptance, E, VU,NR by  at 1/13/2022 1105                   Point: Home exercise program (Done)      Learning Progress Summary           Patient Acceptance, E, VU by NT at 1/13/2022 1259   Family Acceptance, E, VU by NT at 1/13/2022 1259                   Point: Body mechanics (Done)     Learning Progress Summary           Patient Acceptance, E, VU,NR by  at 1/13/2022 1105                   Point: Precautions (Done)     Learning Progress Summary           Patient Acceptance, E, VU,NR by  at 1/13/2022 1105                               User Key     Initials Effective Dates Name Provider Type Discipline    NT 06/16/21 -  Chelsea Blackwell, MARYURI Registered Nurse Nurse     12/06/21 -  Liter, Angela S, PT Student PT Student PT              PT Recommendation and Plan  Planned Therapy Interventions (PT): (P) balance training, bed mobility training, gait training, home exercise program, patient/family education, stair training, strengthening, transfer training  Plan of Care Reviewed With: (P) patient  Outcome Summary: (P) PT initial evaluation completed. Pt presents with deficits including strength, functional endurance, and balance. Ambulated 120ft CGAx1 with FWW able to progress to no AD and navigated 5 stairs. Treatment limited by fatigue. Rec cont skilled IP PT in order to increase indep with mobility. Rec DC home with 24/7 assist, rolling walker, and HH.     Time Calculation:    PT Charges     Row Name 01/13/22 1105             Time Calculation    Start Time 0835 (P)   -ML      PT Received On 01/13/22 (P)   -ML      PT Goal Re-Cert Due Date 01/23/22 (P)   -ML              Untimed Charges    PT Eval/Re-eval Minutes 46 (P)   -ML              Total Minutes    Untimed Charges Total Minutes 46 (P)   -ML       Total Minutes 46 (P)   -ML            User Key  (r) = Recorded By, (t) = Taken By, (c) = Cosigned By    Initials Name Provider Type    ML Liter, Angela S, PT Student PT Student              Therapy Charges for Today     Code Description Service Date Service Provider Modifiers Qty    88203714432 HC PT EVAL LOW COMPLEXITY  4 1/13/2022 Angela Mcneil, PT Student GP 1          PT G-Codes  Outcome Measure Options: (P) AM-PAC 6 Clicks Basic Mobility (PT)  AM-PAC 6 Clicks Score (PT): (P) 16    Angela Mcneil PT Student  1/13/2022

## 2022-01-13 NOTE — CONSULTS
"Diabetes Education    Patient Name:  Derian Joya  YOB: 1940  MRN: [id number]  Admit Date:  1/11/2022    Chart reviewed for diabetes ed opportunity this AM, noted Mr. Joya plans for possible transfer to floor this AM;     upon afternoon review, pt noted to be discharged this afternoon. Pt to be mailed packet including a copy of [employer]'s \"What is Diabetes\" handout, \"Blood Glucose Goals\" handout, and \"What is A1c\" handout, [facility] \"Connection Between Diabetes and Stroke\", as well as letter encouraging him to contact our outpatient office for follow-up. Contact information included.    Patient does not qualify for the follow up stroke class based on the exclusion criteria of documented BMI< 25 (22.09).    Thank you.    Electronically signed by:  Kenyatta Massey RN  01/13/22 14:55 EST  "

## 2022-01-13 NOTE — PROGRESS NOTES
"Critical Care Note     LOS: 0 days   Patient Care Team:  Diaz Lee MD as PCP - General    Chief Complaint/Reason for visit:    Chief Complaint   Patient presents with   • Fall   • Head Injury   • Hand Injury       Subjective     82-year-old gentleman with coronary disease, aortic valve disease, diabetes, ventricular pacemaker who fell down the stairs after loss of consciousness.  He does not recall falling but awoke at the bottom of the stairs.  He had an occipital hematoma, subdural hematoma and subarachnoid hemorrhage.  He also had severe basilar artery stenosis on his CTA.  He was hypertensive requiring Cardene drip.  Echocardiogram revealed mild aortic valve stenosis, normal LV systolic pressure.  This morning he was transferred to the floor.    Interval History:     Tonight I was called by nursing staff that he was more confused and agitated.  He was moved back to the ICU for mental status changes.  CT scan was done and revealed no change in his bleed.  He is oriented to name, date and year but not day.  He still thinks he is at [facility].  He does not know the president.  On arrival to the ICU he was hypertensive and Cardene was restarted and is currently at 7.5 mg/h.    Review of Systems:    All systems were reviewed and negative except as noted in subjective.    Medical history, surgical history, social history, family history reviewed    Objective     Intake/Output:    Intake/Output Summary (Last 24 hours) at 1/12/2022 2111  Last data filed at 1/12/2022 1745  Gross per 24 hour   Intake 518.33 ml   Output 2 ml   Net 516.33 ml       Nutrition:  Diet Soft Texture; [employer]; Cardiac    Infusions:  niCARdipine, 5-15 mg/hr, Last Rate: 7.5 mg/hr (01/12/22 2102)          Telemetry: Ventricular paced             Vital Signs  Blood pressure 159/86, pulse 84, temperature 100.1 °F (37.8 °C), temperature source Oral, resp. rate 18, height 165.1 cm (65\"), weight 62.6 kg (138 lb 0.1 oz), SpO2 (!) " 89 %.    Physical Exam:  General Appearance:   Elderly gentleman in no respiratory distress   Head:   Palpable hematoma over the occiput   Eyes:          Conjunctiva pink, no jaundice   Ears:     Throat:  Oral mucosa dry   Neck:  Trachea midline, no carotid bruits   Back:      Lungs:    Symmetric chest expansion.  Breath sounds are bilateral without wheeze    Heart:   Regular rhythm, diminished S2 with a harsh systolic ejection murmur   Abdomen:    Flat, bowel sounds present, soft   Rectal:   Deferred   Extremities:  Ecchymosis over his right hand with some tenderness to palpation.  Able to make a fist without difficulty   Pulses:    Skin:  Warm and dry   Lymph nodes:    Neurologic:  Awake, speech fluent, disoriented, no focal weakness      Results Review:     I reviewed the patient's new clinical results.   Results from last 7 days   Lab Units 01/11/22  2356   SODIUM mmol/L 140   POTASSIUM mmol/L 3.4*   CHLORIDE mmol/L 102   CO2 mmol/L 23.0   BUN mg/dL 19   CREATININE mg/dL 0.60*   CALCIUM mg/dL 9.1   BILIRUBIN mg/dL 0.6   ALK PHOS U/L 60   ALT (SGPT) U/L 21   AST (SGOT) U/L 29   GLUCOSE mg/dL 157*     Results from last 7 days   Lab Units 01/11/22  2253   WBC 10*3/mm3 12.65*   HEMOGLOBIN g/dL 13.0   HEMATOCRIT % 39.8   PLATELETS 10*3/mm3 165         No results found for: BLOODCX  No results found for: URINECX    I reviewed the patient's new imaging including images and reports.    FINDINGS:   Stable small subdural hemorrhages along the bilateral interhemispheric falx and left tentorium. Stable subarachnoid hemorrhage in the medial left frontal region, left sylvian fissure, and basilar cisterns. Improved small amount of subarachnoid blood at  the right frontal convexity. Stable small amount of intraventricular hemorrhage in the bilateral occipital horns. No hydrocephalus or midline shift. Moderate generalized atrophy and chronic small vessel disease throughout the supratentorial white matter.  No acute osseous  abnormality. Bilateral maxillary sinus mucosal thickening. Visualized mastoid air cells are clear.     IMPRESSION:     1. Stable subdural, subarachnoid, and intraventricular hemorrhage compared to the prior examination performed earlier the same date at 6:02 AM.  2. No hydrocephalus or midline shift.  3. Bilateral maxillary sinus mucosal thickening.              Signer Name: Lucio Asher MD   Signed: 1/12/2022 7:38 PM   Workstation Name: [email or web address]    [facility]       Findings:     The awake tracing shows diffuse low to medium amplitude 5 to 7 Hz theta which is present symmetrically over both hemispheres.  A clear posterior rhythm is not seen.  As the study proceeds, brief bursts of medium amplitude 2 to 3 Hz generalized delta are seen.  Drowsiness is seen with mild slowing of the background but stage II sleep is not seen.  Photic stimulation does not change background.  Hyperventilation is not performed.  No focal features or epileptiform activity are seen     Technical quality: Excellent     EKG: Regular, 80 bpm     SUMMARY:     Mild-moderate generalized slow     No epileptiform activity is seen     IMPRESSION:     This study shows evidence for diffuse cerebral dysfunction of mild degree but is nonspecific as to cause     No evidence for epilepsy is seen       All medications reviewed.   atorvastatin, 40 mg, Oral, Nightly  famotidine, 20 mg, Intravenous, Q12H  insulin detemir, 5 Units, Subcutaneous, Daily  insulin regular, 0-9 Units, Subcutaneous, Q6H  senna-docusate sodium, 2 tablet, Oral, BID  sodium chloride, 10 mL, Intravenous, Q12H  sodium chloride, 10 mL, Intravenous, Q12H          Assessment/Plan       SAH (subarachnoid hemorrhage) ([facility])    SDH (subdural hematoma) ([facility])    Head trauma, initial encounter    Aortic stenosis    Uncontrolled type 2 diabetes mellitus (HCC)    Other hyperlipidemia    Essential hypertension    Cardiomyopathy, nonischemic (HCC)    Head  trauma      82-year-old who suffered a syncopal episode resulting in a fall down the stairs, subarachnoid hemorrhage, subdural hematoma.  He was transferred out of the ICU today but became more confused and has returned.  CT scan shows no worsening of his bleeding.  Echo revealed only mild aortic stenosis.  We do not have a cause for his syncopal episode.  EEG revealed some moderate slowing but no seizure activity.  Unfortunately, pacemaker has not been interrogated to see if an arrhythmia resulted in his loss of consciousness.  He does have a known history of diabetes.  His A1c is 8.1 indicating inadequate control.  On low-dose Levemir and sliding scale insulin blood sugars have been 110-235, however, oral intake has been minimal    PLAN:  Restart some oral antihypertensives  Wean Cardene drip  Ask cardiology to see tomorrow and interrogate his pacemaker  Levemir with sliding scale insulin  Continue to hold Plavix and aspirin  Continue Lipitor  Change Pepcid to p.o.  Venous compression stockings  Continue to monitor neurologic exam  Cardiac, diabetic diet      Lydia Franklin MD  01/12/22  21:11 EST      Time: Critical care 30 min  I personally provided care to this critically ill patient as documented above.  Critical care time does not include time spent on separately billed procedures.  None of my critical care time was concurrent with other critical care providers.

## 2022-01-13 NOTE — ED PROVIDER NOTES
EMERGENCY DEPARTMENT ENCOUNTER    Pt Name: Derian Joya  MRN: [id number]  Pt :   1940  Room Number:  N230/1  Date of encounter:  2022  PCP: Diaz Lee MD  ED Provider: Larry Gonzalez MD    Historian: Patient, spouse      HPI:  Chief Complaint: Fall with loss of consciousness,        Context: Derian Joya is a 82-year-old man who presents to the emergency department for evaluation of a fall versus syncope earlier today.  As he was cleaning the stairs and does not remember falling but remembers waking up leaning against the wall opposite the stairs.  He denies any prodrome of chest pain or palpitations.  He says he has been in his normal state of health and thinks he must of tripped.  Discussed over the phone with his wife who was there for the episode and she believes it was a fall and not a syncopal episode and says he was unresponsive for a few seconds with his eyes open but then was back to baseline immediately.  He is on blood thinners and has a large hematoma in the back of his head so came in for evaluation of the injuries.  He also complains of some right hand pain and has obvious swelling to the hand but he says he thinks it is gone down some and says the pain has gotten better with time and he now describes as mild.  He has no other complaints at this time.      PAST MEDICAL HISTORY  Past Medical History:   Diagnosis Date   • Allergic rhinitis    • Aortic stenosis    • Atopic rhinitis 2016   • Benign non-nodular prostatic hyperplasia with lower urinary tract symptoms 9/15/2017   • CAD (coronary artery disease)    • Cardiomyopathy, ischemic    • Colon polyps     tubular adenoma    • Community acquired pneumonia    • Depression 2016   • Diabetes (HCC)    • Diverticulosis of intestine 2016    Description: Left and sigmoid   • Dyslipidemia    • LBBB (left bundle branch block)    • PAD (peripheral artery disease) ([facility])          PAST SURGICAL HISTORY  Past Surgical  History:   Procedure Laterality Date   • CARDIAC CATHETERIZATION  2018    100% cx   • CATARACT EXTRACTION     • COLONOSCOPY W/ BIOPSIES AND POLYPECTOMY  2021    1 benign polyp   • HIP SURGERY Left 2020    Dr Mandy BOUDREAUX   • LEG SURGERY Left 2016    Popliteal Artery stenting by Dr Hdz   • LEG SURGERY Left 2016    skin graft by Dr Nance   • LIPOMA EXCISION      s/p excision on chest   • PACEMAKER IMPLANTATION  2019    and defibrillator         FAMILY HISTORY  Family History   Problem Relation Age of Onset   • Diabetes Other    • Hypertension Other    • Stroke Other    • Coronary artery disease Other          SOCIAL HISTORY  Social History     Socioeconomic History   • Marital status:    Tobacco Use   • Smoking status: Former Smoker     Types: Cigarettes     Quit date: 1997     Years since quittin.0   • Smokeless tobacco: Never Used   Substance and Sexual Activity   • Drug use: No         ALLERGIES  Patient has no known allergies.        REVIEW OF SYSTEMS  Review of Systems       All systems reviewed and negative except for those discussed in HPI.       PHYSICAL EXAM    I have reviewed the triage vital signs and nursing notes.    ED Triage Vitals [22 2101]   Temp Heart Rate Resp BP SpO2   97.8 °F (36.6 °C) 83 18 161/97 92 %      Temp src Heart Rate Source Patient Position BP Location FiO2 (%)   Oral Monitor Sitting Right arm --       Physical Exam  GENERAL:   Appears awake and alert in no acute distress  HENT: Nares patent. Large hematoma in the midline over the occipital area, no palpable underlying fracture, midface is stable, otherwise atraumatic  EYES: No scleral icterus. Pupils equal and reactive, extraocular movements intact  CV: Regular rhythm, regular rate.  RESPIRATORY: Normal effort.  No audible wheezes, rales or rhonchi.  ABDOMEN: Soft, nontender  MUSCULOSKELETAL: Bruising over the dorsum of the right hand without focal bony tenderness, no other focal bony  tenderness or deformities on full musculoskeletal exam.  NEURO: Alert, moves all extremities, follows commands.  SKIN: Warm, dry, no rash visualized.        LAB RESULTS  Recent Results (from the past 24 hour(s))   Lavender Top    Collection Time: 01/11/22 10:53 PM   Result Value Ref Range    Extra Tube hold for add-on    Gold Top - SST    Collection Time: 01/11/22 10:53 PM   Result Value Ref Range    Extra Tube Hold for add-ons.    Gray Top    Collection Time: 01/11/22 10:53 PM   Result Value Ref Range    Extra Tube Hold for add-ons.    CBC Auto Differential    Collection Time: 01/11/22 10:53 PM    Specimen: Blood   Result Value Ref Range    WBC 12.65 (H) 3.40 - 10.80 10*3/mm3    RBC 3.99 (L) 4.14 - 5.80 10*6/mm3    Hemoglobin 13.0 13.0 - 17.7 g/dL    Hematocrit 39.8 37.5 - 51.0 %    MCV 99.7 (H) 79.0 - 97.0 fL    MCH 32.6 26.6 - 33.0 pg    MCHC 32.7 31.5 - 35.7 g/dL    RDW 12.4 12.3 - 15.4 %    RDW-SD 45.3 37.0 - 54.0 fl    MPV 10.9 6.0 - 12.0 fL    Platelets 165 140 - 450 10*3/mm3    Neutrophil % 83.6 (H) 42.7 - 76.0 %    Lymphocyte % 9.3 (L) 19.6 - 45.3 %    Monocyte % 5.6 5.0 - 12.0 %    Eosinophil % 0.3 0.3 - 6.2 %    Basophil % 0.3 0.0 - 1.5 %    Immature Grans % 0.9 (H) 0.0 - 0.5 %    Neutrophils, Absolute 10.56 (H) 1.70 - 7.00 10*3/mm3    Lymphocytes, Absolute 1.18 0.70 - 3.10 10*3/mm3    Monocytes, Absolute 0.71 0.10 - 0.90 10*3/mm3    Eosinophils, Absolute 0.04 0.00 - 0.40 10*3/mm3    Basophils, Absolute 0.04 0.00 - 0.20 10*3/mm3    Immature Grans, Absolute 0.12 (H) 0.00 - 0.05 10*3/mm3    nRBC 0.0 0.0 - 0.2 /100 WBC   Hemoglobin A1c    Collection Time: 01/11/22 10:53 PM    Specimen: Blood   Result Value Ref Range    Hemoglobin A1C 8.10 (H) 4.80 - 5.60 %   Light Blue Top    Collection Time: 01/11/22 11:00 PM   Result Value Ref Range    Extra Tube hold for add-on    Protime-INR    Collection Time: 01/11/22 11:00 PM    Specimen: Blood   Result Value Ref Range    Protime 13.5 11.4 - 14.4 Seconds    INR 1.06  0.85 - 1.16   aPTT    Collection Time: 01/11/22 11:00 PM    Specimen: Blood   Result Value Ref Range    PTT 31.4 22.0 - 39.0 seconds   Comprehensive Metabolic Panel    Collection Time: 01/11/22 11:56 PM    Specimen: Blood   Result Value Ref Range    Glucose 157 (H) 65 - 99 mg/dL    BUN 19 8 - 23 mg/dL    Creatinine 0.60 (L) 0.76 - 1.27 mg/dL    Sodium 140 136 - 145 mmol/L    Potassium 3.4 (L) 3.5 - 5.2 mmol/L    Chloride 102 98 - 107 mmol/L    CO2 23.0 22.0 - 29.0 mmol/L    Calcium 9.1 8.6 - 10.5 mg/dL    Total Protein 6.3 6.0 - 8.5 g/dL    Albumin 4.00 3.50 - 5.20 g/dL    ALT (SGPT) 21 1 - 41 U/L    AST (SGOT) 29 1 - 40 U/L    Alkaline Phosphatase 60 39 - 117 U/L    Total Bilirubin 0.6 0.0 - 1.2 mg/dL    eGFR Non African Amer 129 >60 mL/min/1.73    Globulin 2.3 gm/dL    A/G Ratio 1.7 g/dL    BUN/Creatinine Ratio 31.7 (H) 7.0 - 25.0    Anion Gap 15.0 5.0 - 15.0 mmol/L   Magnesium    Collection Time: 01/11/22 11:56 PM    Specimen: Blood   Result Value Ref Range    Magnesium 1.7 1.6 - 2.4 mg/dL   Troponin    Collection Time: 01/11/22 11:56 PM    Specimen: Blood   Result Value Ref Range    Troponin T     Green Top (Gel)    Collection Time: 01/11/22 11:56 PM   Result Value Ref Range    Extra Tube Hold for add-ons.    Phosphorus    Collection Time: 01/11/22 11:56 PM    Specimen: Blood   Result Value Ref Range    Phosphorus 3.5 2.5 - 4.5 mg/dL   Lipid Panel    Collection Time: 01/11/22 11:56 PM    Specimen: Blood   Result Value Ref Range    Total Cholesterol 172 0 - 200 mg/dL    Triglycerides 247 (H) 0 - 150 mg/dL    HDL Cholesterol 73 (H) 40 - 60 mg/dL    LDL Cholesterol  60 0 - 100 mg/dL    VLDL Cholesterol 39 5 - 40 mg/dL    LDL/HDL Ratio 0.68    POC Glucose Once    Collection Time: 01/12/22  1:49 AM    Specimen: Blood   Result Value Ref Range    Glucose 177 (H) 70 - 130 mg/dL   Respiratory Panel PCR w/COVID-19(SARS-CoV-2) ANA LILIA/TOPHER/MELANIA/PAD/COR/MAD/CYNDY In-House, NP Swab in UTM/VTM, 3-4 HR TAT - Swab, Nasopharynx     Collection Time: 01/12/22  6:18 AM    Specimen: Nasopharynx; Swab   Result Value Ref Range    ADENOVIRUS, PCR Not Detected Not Detected    Coronavirus 229E Not Detected Not Detected    Coronavirus HKU1 Not Detected Not Detected    Coronavirus NL63 Not Detected Not Detected    Coronavirus OC43 Not Detected Not Detected    COVID19 Not Detected Not Detected - Ref. Range    Human Metapneumovirus Not Detected Not Detected    Human Rhinovirus/Enterovirus Not Detected Not Detected    Influenza A PCR Not Detected Not Detected    Influenza B PCR Not Detected Not Detected    Parainfluenza Virus 1 Not Detected Not Detected    Parainfluenza Virus 2 Not Detected Not Detected    Parainfluenza Virus 3 Not Detected Not Detected    Parainfluenza Virus 4 Not Detected Not Detected    RSV, PCR Not Detected Not Detected    Bordetella pertussis pcr Not Detected Not Detected    Bordetella parapertussis PCR Not Detected Not Detected    Chlamydophila pneumoniae PCR Not Detected Not Detected    Mycoplasma pneumo by PCR Not Detected Not Detected   POC Glucose Once    Collection Time: 01/12/22  8:34 AM    Specimen: Blood   Result Value Ref Range    Glucose 128 70 - 130 mg/dL   POC Glucose Once    Collection Time: 01/12/22 12:58 PM    Specimen: Blood   Result Value Ref Range    Glucose 108 70 - 130 mg/dL   Adult Transthoracic Echo Complete W/ Cont if Necessary Per Protocol    Collection Time: 01/12/22 12:59 PM   Result Value Ref Range    BSA 1.7 m^2    RVIDd 2.2 cm    IVSd 0.88 cm    LVIDd 3.6 cm    LVIDs 2.7 cm    LVPWd 0.99 cm    IVS/LVPW 0.89     FS 22.6 %    EDV(Teich) 52.7 ml    ESV(Teich) 28.3 ml    EF(Teich) 46.4 %    EDV(cubed) 44.8 ml    ESV(cubed) 20.8 ml    EF(cubed) 53.6 %    LV mass(C)d 96.4 grams    LV mass(C)dI 57.0 grams/m^2    SV(Teich) 24.4 ml    SI(Teich) 14.5 ml/m^2    SV(cubed) 24.0 ml    SI(cubed) 14.2 ml/m^2    Ao root diam 3.3 cm    Ao root area 8.5 cm^2    LA dimension 2.9 cm    asc Aorta Diam 3.2 cm    LA/Ao 0.88      LVOT diam 2.0 cm    LVOT area 3.2 cm^2    LVOT area(traced) 3.1 cm^2    LAd major 4.4 cm    LVLd ap4 7.1 cm    EDV(MOD-sp4) 57.0 ml    LVLs ap4 6.7 cm    ESV(MOD-sp4) 31.0 ml    EF(MOD-sp4) 45.6 %    LVLd ap2 7.3 cm    EDV(MOD-sp2) 54.0 ml    LVLs ap2 6.5 cm    ESV(MOD-sp2) 29.0 ml    EF(MOD-sp2) 46.3 %    LA volume 39.0 ml    EF(MOD-bp) 50 %    SV(MOD-sp4) 26.0 ml    SI(MOD-sp4) 15.4 ml/m^2    SV(MOD-sp2) 25.0 ml    SI(MOD-sp2) 14.8 ml/m^2    Ao root area (BSA corrected) 2.0     LV Verduzco Vol (BSA corrected) 33.7 ml/m^2    LV Sys Vol (BSA corrected) 18.3 ml/m^2    LA Volume Index 23.1 ml/m^2    MV E max jason 86.4 cm/sec    MV A max jason 153.5 cm/sec    MV E/A 0.56     LV IVRT 0.12 sec    MV P1/2t max jason 96.4 cm/sec    MV P1/2t 100.2 msec    MVA(P1/2t) 2.2 cm^2    MV dec slope 281.7 cm/sec^2    MV dec time 0.15 sec    Ao pk jason 285.0 cm/sec    Ao max PG 32.5 mmHg    Ao max PG (full) 29.2 mmHg    Ao V2 mean 210.6 cm/sec    Ao mean PG 20.1 mmHg    Ao mean PG (full) 18.1 mmHg    Ao V2 VTI 58.3 cm    TAWANDA(I,A) 0.99 cm^2    TAWANDA(I,D) 0.99 cm^2    TAWANDA(V,A) 1.0 cm^2    TAWANDA(V,D) 1.0 cm^2    LV V1 max PG 3.3 mmHg    LV V1 mean PG 2.0 mmHg    LV V1 max 90.8 cm/sec    LV V1 mean 62.1 cm/sec    LV V1 VTI 18.1 cm    SV(Ao) 498.0 ml    SI(Ao) 294.8 ml/m^2    SV(LVOT) 57.8 ml    SI(LVOT) 34.2 ml/m^2    PA acc slope 974.3 cm/sec^2    PA acc time 0.09 sec    TR max jason 278.3 cm/sec    TR max PG 31.0 mmHg    RVSP(TR) 34.0 mmHg    RAP systole 3.0 mmHg    PA pr(Accel) 39.4 mmHg    MVA P1/2T LCG 2.3 cm^2    Lat E/e'  7.3     Med E/e' 8.4     Lat Peak E' Jason 11.6 cm/sec    Med Peak E' Jason 10.1 cm/sec     CV ECHO CRESENCIO - BZI_BMI 23.0 kilograms/m^2     CV ECHO CRESENCIO - BSA(HAYCOCK) 1.7 m^2     CV ECHO CRESENCIO - BZI_METRIC_WEIGHT 62.6 kg     CV ECHO CRESENCIO - BZI_METRIC_HEIGHT 165.1 cm    Avg E/e' ratio 7.96      CV VAS BP RIGHT /80 mmHg    RV S' 13.50 cm/sec    RV Base 2.70 cm    RV Length 7.30 cm    RV Mid 2.60 cm    Ascending aorta  3.2 cm    TAPSE (>1.6) 1.20 cm   POC Glucose Once    Collection Time: 01/12/22  4:13 PM    Specimen: Blood   Result Value Ref Range    Glucose 235 (H) 70 - 130 mg/dL   POC Glucose Once    Collection Time: 01/12/22  9:18 PM    Specimen: Blood   Result Value Ref Range    Glucose 178 (H) 70 - 130 mg/dL       If labs were ordered, I independently reviewed the results.        RADIOLOGY  Adult Transthoracic Echo Complete W/ Cont if Necessary Per Protocol    Result Date: 1/12/2022  · Mild aortic valve stenosis is present. · Estimated right ventricular systolic pressure from tricuspid regurgitation is normal (<35 mmHg). · LVSF is normal · MAC      EEG    Result Date: 1/12/2022  Reason for referral: 82 y.o.male with syncope Technical Summary:  A 19 channel digital EEG was performed using the international 10-20 placement system, including eye leads and EKG leads. Duration: 27 minutes Video: Off Findings: The awake tracing shows diffuse low to medium amplitude 5 to 7 Hz theta which is present symmetrically over both hemispheres.  A clear posterior rhythm is not seen.  As the study proceeds, brief bursts of medium amplitude 2 to 3 Hz generalized delta are seen.  Drowsiness is seen with mild slowing of the background but stage II sleep is not seen.  Photic stimulation does not change background.  Hyperventilation is not performed.  No focal features or epileptiform activity are seen Technical quality: Excellent EKG: Regular, 80 bpm SUMMARY: Mild-moderate generalized slow No epileptiform activity is seen     This study shows evidence for diffuse cerebral dysfunction of mild degree but is nonspecific as to cause No evidence for epilepsy is seen This report is transcribed using the Dragon dictation system.      XR Hand 3+ View Right    Result Date: 1/12/2022  CR Hand Min 3 Vws RT INDICATION: Fell with swelling right hand COMPARISON: None available. FINDINGS: PA, lateral, and oblique views of the right hand.  No fracture or  dislocation.  No bone erosion or destruction.  . There are vascular calcifications. There are mild degenerative arthritis changes. Please correlate with the site of tenderness clinically. I see dorsal soft tissue swelling.     Dorsal soft tissue swelling without evidence for acute fracture dislocation or definite radiopaque foreign body. Small densities at the base of the thumb are probably on the surface of the skin. Please confirm clinically. Signer Name: Cris Loco MD  Signed: 1/12/2022 12:27 AM  Workstation Name: [facility]  [facility]    CT Head Without Contrast    Result Date: 1/12/2022  CT Head WO HISTORY: Altered mental status and clinical deterioration today. Known traumatic subarachnoid hemorrhage. TECHNIQUE: Routine noncontrast head CT. Coronal reformatted images. Radiation dose reduction techniques included automated exposure control or exposure modulation based on body size. Count of known CT and cardiac nuc med studies performed in previous 12 months: 5. COMPARISON: CT head 1/12/2022 and 1/11/2022 FINDINGS: Stable small subdural hemorrhages along the bilateral interhemispheric falx and left tentorium. Stable subarachnoid hemorrhage in the medial left frontal region, left sylvian fissure, and basilar cisterns. Improved small amount of subarachnoid blood at the right frontal convexity. Stable small amount of intraventricular hemorrhage in the bilateral occipital horns. No hydrocephalus or midline shift. Moderate generalized atrophy and chronic small vessel disease throughout the supratentorial white matter. No acute osseous abnormality. Bilateral maxillary sinus mucosal thickening. Visualized mastoid air cells are clear.     1. Stable subdural, subarachnoid, and intraventricular hemorrhage compared to the prior examination performed earlier the same date at 6:02 AM. 2. No hydrocephalus or midline shift. 3. Bilateral maxillary sinus mucosal thickening. Signer Name: Lucio Asher,  MD  Signed: 1/12/2022 7:38 PM  Workstation Name: [facility]  Radiology [facility]    CT Head Without Contrast    Result Date: 1/12/2022  CT Head WO HISTORY: Follow-up traumatic subarachnoid hemorrhage. TECHNIQUE: Routine noncontrast head CT. Coronal reformatted images. Radiation dose reduction techniques included automated exposure control or exposure modulation based on body size. Count of known CT and cardiac nuc med studies performed in previous 12 months: 4. COMPARISON: CT head and CTA head/neck 1/11/2022 FINDINGS: Stable small amount of subdural hemorrhage along the bilateral interhemispheric falx and along the left tentorium. Stable subarachnoid hemorrhage in the medial left frontal region, right frontal convexity, and left sylvian fissure. Stable subarachnoid hemorrhage in the basilar cisterns. Interval development of a small amount of intraventricular blood in the bilateral occipital horns of the lateral ventricles. No hydrocephalus. No midline shift or focal mass effect. Moderate generalized atrophy and chronic small vessel disease throughout the supratentorial white matter. No acute osseous abnormality. Mucosal thickening bilateral maxillary sinuses with a small air-fluid level in the left maxillary sinus. Visualized mastoid air cells are clear.     1. Stable subdural and subarachnoid hemorrhage, detailed above. 2. Interval development of a small amount of intraventricular hemorrhage in the bilateral occipital horns of the lateral ventricles. No hydrocephalus or midline shift. 3. Bilateral maxillary sinus mucosal thickening with small left-sided air-fluid level. Signer Name: Lcuio Asher MD  Signed: 1/12/2022 6:30 AM  Workstation Name: [facility]  Radiology [facility]    CT Angiogram Neck    Result Date: 1/12/2022  INDICATION: Acute intracranial hemorrhage after a fall. Patient on blood thinners TECHNIQUE: CT angiogram of the head and neck with IV contrast. Contrast  dose recorded in the patient's chart. 3-D MIP reformatted images were acquired and reviewed. Evaluation for a significant carotid arterial stenosis is based on the NASCET criteria. Radiation dose reduction techniques included automated exposure control or exposure modulation based on body size. Radiation audit for number of CT and nuclear cardiology exams performed in the last year:  0. Precontrast imaging also obtained. Rapid AI utilized for LVO COMPARISON: Earlier noncontrast head CT. FINDINGS: CTA neck:  There our atherosclerotic vascular calcifications at the aortic arch. There is involvement of great vessel origins without hemodynamically significant stenosis. There is probably mild stenosis at the origin of the right subclavian artery. There is mild narrowing at the origin the right common carotid artery due to vascular ectasia. There is calcified plaque at the right carotid bifurcation but by NASCET criteria there is essentially 0% diameter stenosis. There is moderate stenosis at the origin of the right external carotid artery. There is calcified plaque at the left carotid bifurcation and along the left common carotid artery. By NASCET criteria there is 0% diameter stenosis. There is mild narrowing at the origin of the left external carotid artery. There is calcified disease at both carotid siphons with mild to moderate stenosis likely. Both vertebral arteries are patent. The left is dominant. There is disease at the origin of the vertebral arteries partly obscured by venous vascular opacification. CTA head:  Study is performed with a stroke evaluation protocol and is not post processed for the purpose of evaluating for intracranial aneurysm. There are acute blood products present as identified on the earlier noncontrast head CT. There our atherosclerotic vascular calcifications involving both the 4 segments with likely hemodynamically significant stenosis of the hypoplastic distal right vertebral artery.  This vessel mostly terminates in a PICA vessel. There is irregular ectasia of the basilar due to atherosclerotic disease with high-grade short segment proximal stenosis followed by some poststenotic dilatation. There is a moderate-sized anterior communicating artery present. The left A1 vessels mildly hypoplastic. No intracranial vascular cut off is appreciated. There is no gross intracranial aneurysm or arteriovenous malformation appreciated on this study performed for stroke evaluation. There is a small to moderate sized right posterior communicating artery. There is a large left posterior communicator with largely fetal origin to the left posterior cerebral artery distribution. Left posterior cerebral artery is irregular is probably some intracranial atherosclerotic disease. There is venous contrast reflux intracranially from the injection into the dural venous sinuses. No intracranial vascular cut off is suspected.     1. By NASCET criteria, there is 0% stenosis at either carotid bifurcation. 2. Both vertebral arteries are patent with left being dominant. 3. There is considerable atherosclerotic disease involving the bilateral carotid siphons and intracranial vertebral arteries. There is severe disease of the basilar artery with calcified and noncalcified plaque. This includes short segment high-grade stenosis of the proximal basilar with some long segment irregularity. The distal right vertebral artery largely terminates in a PICA. 4. No obvious intracranial aneurysm or arteriovenous malformation. Please note the CT angiogram is protocol to evaluate for stroke and utilizes rapid AI for this purpose. It is limited for evaluation for intracranial aneurysm. 5. There is a large amount of venous contrast reflux including reflux into the dural venous sinuses. Signer Name: Cris Loco MD  Signed: 1/12/2022 12:05 AM  Workstation Name: [email or web address]  [facility] [place]    CT Angiogram Head w AI Analysis of  LVO    Result Date: 1/12/2022  INDICATION: Acute intracranial hemorrhage after a fall. Patient on blood thinners TECHNIQUE: CT angiogram of the head and neck with IV contrast. Contrast dose recorded in the patient's chart. 3-D MIP reformatted images were acquired and reviewed. Evaluation for a significant carotid arterial stenosis is based on the NASCET criteria. Radiation dose reduction techniques included automated exposure control or exposure modulation based on body size. Radiation audit for number of CT and nuclear cardiology exams performed in the last year:  0. Precontrast imaging also obtained. Rapid AI utilized for LVO COMPARISON: Earlier noncontrast head CT. FINDINGS: CTA neck:  There our atherosclerotic vascular calcifications at the aortic arch. There is involvement of great vessel origins without hemodynamically significant stenosis. There is probably mild stenosis at the origin of the right subclavian artery. There is mild narrowing at the origin the right common carotid artery due to vascular ectasia. There is calcified plaque at the right carotid bifurcation but by NASCET criteria there is essentially 0% diameter stenosis. There is moderate stenosis at the origin of the right external carotid artery. There is calcified plaque at the left carotid bifurcation and along the left common carotid artery. By NASCET criteria there is 0% diameter stenosis. There is mild narrowing at the origin of the left external carotid artery. There is calcified disease at both carotid siphons with mild to moderate stenosis likely. Both vertebral arteries are patent. The left is dominant. There is disease at the origin of the vertebral arteries partly obscured by venous vascular opacification. CTA head:  Study is performed with a stroke evaluation protocol and is not post processed for the purpose of evaluating for intracranial aneurysm. There are acute blood products present as identified on the earlier noncontrast head  CT. There our atherosclerotic vascular calcifications involving both the 4 segments with likely hemodynamically significant stenosis of the hypoplastic distal right vertebral artery. This vessel mostly terminates in a PICA vessel. There is irregular ectasia of the basilar due to atherosclerotic disease with high-grade short segment proximal stenosis followed by some poststenotic dilatation. There is a moderate-sized anterior communicating artery present. The left A1 vessels mildly hypoplastic. No intracranial vascular cut off is appreciated. There is no gross intracranial aneurysm or arteriovenous malformation appreciated on this study performed for stroke evaluation. There is a small to moderate sized right posterior communicating artery. There is a large left posterior communicator with largely fetal origin to the left posterior cerebral artery distribution. Left posterior cerebral artery is irregular is probably some intracranial atherosclerotic disease. There is venous contrast reflux intracranially from the injection into the dural venous sinuses. No intracranial vascular cut off is suspected.     1. By NASCET criteria, there is 0% stenosis at either carotid bifurcation. 2. Both vertebral arteries are patent with left being dominant. 3. There is considerable atherosclerotic disease involving the bilateral carotid siphons and intracranial vertebral arteries. There is severe disease of the basilar artery with calcified and noncalcified plaque. This includes short segment high-grade stenosis of the proximal basilar with some long segment irregularity. The distal right vertebral artery largely terminates in a PICA. 4. No obvious intracranial aneurysm or arteriovenous malformation. Please note the CT angiogram is protocol to evaluate for stroke and utilizes rapid AI for this purpose. It is limited for evaluation for intracranial aneurysm. 5. There is a large amount of venous contrast reflux including reflux into the  dural venous sinuses. Signer Name: Cris Loco MD  Signed: 1/12/2022 12:05 AM  Workstation Name: [id number]  [facility]      I ordered and reviewed the above noted radiographic studies.      I viewed images of noncontrast head CT and reviewed with the on-call radiologist and he has bilateral subarachnoid hemorrhage and subdural hemorrhage at the falx without evidence of herniation. CTA of the head and neck did not reveal any significant aneurysms or other abnormalities that I can appreciate.  See radiologist's dictation for official interpretation.        PROCEDURES    Procedures    ECG 12 Lead             MEDICATIONS GIVEN IN ER    Medications   sodium chloride 0.9 % flush 10 mL (has no administration in time range)   sodium chloride 0.9 % flush 10 mL (10 mL Intravenous Given 1/12/22 2051)   sodium chloride 0.9 % flush 10 mL (has no administration in time range)   dextrose (GLUTOSE) oral gel 15 g (has no administration in time range)   dextrose (D50W) (25 g/50 mL) IV injection 25 g (has no administration in time range)   glucagon (human recombinant) (GLUCAGEN DIAGNOSTIC) injection 1 mg (has no administration in time range)   insulin detemir (LEVEMIR) injection 5 Units (5 Units Subcutaneous Given 1/12/22 1021)   ondansetron (ZOFRAN) injection 4 mg (has no administration in time range)   sennosides-docusate (PERICOLACE) 8.6-50 MG per tablet 2 tablet (2 tablets Oral Given 1/12/22 2057)     And   polyethylene glycol (MIRALAX) packet 17 g (has no administration in time range)     And   bisacodyl (DULCOLAX) EC tablet 5 mg (has no administration in time range)     And   bisacodyl (DULCOLAX) suppository 10 mg (has no administration in time range)   aluminum-magnesium hydroxide-simethicone (MAALOX MAX) 400-400-40 MG/5ML suspension 7.5 mL (has no administration in time range)   acetaminophen (TYLENOL) tablet 650 mg (has no administration in time range)     Or   acetaminophen (TYLENOL) suppository 650  mg (has no administration in time range)   atorvastatin (LIPITOR) tablet 40 mg (40 mg Oral Given 1/12/22 2057)   famotidine (PEPCID) injection 20 mg (20 mg Intravenous Given 1/12/22 2048)   sodium chloride 0.9 % flush 10 mL (10 mL Intravenous Given 1/12/22 2048)   sodium chloride 0.9 % flush 10 mL (has no administration in time range)   niCARdipine (CARDENE) 25mg in 250mL NS infusion (5 mg/hr Intravenous Rate/Dose Change 1/12/22 2115)   sacubitril-valsartan (ENTRESTO) 49-51 MG tablet 1 tablet (1 tablet Oral Given 1/12/22 2145)   bisoprolol (ZEBeta) tablet 5 mg (has no administration in time range)   insulin lispro (humaLOG) injection 0-9 Units (2 Units Subcutaneous Given 1/12/22 2145)   iopamidol (ISOVUE-370) 76 % injection 100 mL (75 mL Intravenous Given 1/11/22 2312)         PROGRESS, DATA ANALYSIS, CONSULTS, AND MEDICAL DECISION MAKING    All labs have been independently reviewed by me.  All radiology studies have been reviewed by me and the radiologist dictating the report.   EKG's have been independently viewed and interpreted by me.      Differential diagnoses: Hematoma, intracranial hemorrhage, skull fracture, hand fracture, syncope, traumatic brain injury, stroke      ED Course as of 01/12/22 2223 Tue Jan 11, 2022 2204 In summary is a very nice 82-year-old man who presents to the emergency department for evaluation of a fall versus syncope earlier today.  As he was cleaning the stairs and does not remember falling but remembers waking up leaning against the wall opposite the stairs.  He denies any prodrome of chest pain or palpitations.  He says he has been in his normal state of health and thinks he must of tripped.  Discussed over the phone with his wife who was there for the episode and she believes it was a fall and not a syncopal episode and says he was unresponsive for a few seconds with his eyes open but then was back to baseline immediately.  He is on blood thinners and has a large hematoma in  the back of his head so came in for evaluation of the injuries.  He also complains of some right hand pain and has obvious swelling to the hand but he says he thinks it is gone down some and says the pain has gotten better with time and he now describes as mild.  He has no other complaints at this time. [CC]      ED Course User Index  [CC] Larry Gonzalez MD     Upon arrival the patient is awake alert hypertensive otherwise within normal limits. He has a large hematoma to the posterior occipital area as well as bruising over the right hand but no other appreciated traumatic injuries on full exam. He is awake and alert and answering my questions appropriately but has no memory of the fall. Discussed over the phone with his wife who also did not witness it and believes he fell but syncope cannot be definitively ruled out. He has a pacemaker so ordering pacemaker interrogation. Ordering CT scan of the head as well as CT C-spine. He was placed on a c-collar. He has bruising over the right hand but x-ray does not reveal any acute fractures. No significant anemia on initial CBC. Hyperglycemia and mild hypokalemia. Noncon head CT was performed and I was immediately contacted by the on-call radiologist to recognize bilateral subarachnoid and subdural of the falx. Head of the bed elevated, neuro stroke team notified, starting a nicardipine drip to target systolic of 140 or less. I recontacted the wife and we reviewed all of his medications over the phone he was initially billed as being on blood thinners but is actually only on Plavix. No acute fractures on C-spine and he was neurologically intact and cleared of his c-collar. I was then notified by nursing staff that he was slurring his speech and confused. Upon reevaluation he is awake and alert and answering my questions appropriately and seems to be demonstrating waxing and waning neurologic status. CTAs did not reveal any significant aneurysm and on-call  neurosurgeon was contacted by the stroke team who does not recommend platelet transfusion at this time but does want repeat scan in 6 hours. He will need frequent neurochecks and as such needs ICU admission. I discussed with the ICU attending who was agreeable to admission. At this point he is on a nicardipine drip with systolic less than 140 and is neurologically intact. Remained stable throughout his time in the ED.    45 minutes of critical care provided. This time excludes other billable procedures. Time does include preparation of documents, medical consultations, review of old records, and direct bedside care. Patient was at high risk for life-threatening deterioration due to acute traumatic subdural and subarachnoid hemorrhages following syncope versus fall with loss of consciousness complicated by antiplatelet medication.       AS OF 22:23 EST VITALS:    BP - 123/79  HR - 94  TEMP - 100.1 °F (37.8 °C) (Oral)  O2 SATS - 92%                  DIAGNOSIS  Final diagnoses:   SAH (subarachnoid hemorrhage) (HCC)   SDH (subdural hematoma) (HCC)   Head trauma, initial encounter   Fall, initial encounter         DISPOSITION  ICU             Larry Gonzalez MD  01/12/22 4947

## 2022-01-13 NOTE — NURSING NOTE
Patient alert and oriented upon arrival to unit. At 1800 round he was trying to get up from bed unassisted and saying he needed to get to the hospital. He could no longer answer orientation questions about place or president. He complained that the lights were bothering him and requested they all be turned off. Dr. Franklin was called, new orders for STAT CT and transfer to ICU was placed. Patient was transported with his personal belongings including his dentures and indentification/insurance cards and cell phone in his green belonging bag.

## 2022-01-13 NOTE — PLAN OF CARE
Goal Outcome Evaluation:  Plan of Care Reviewed With: patient         SLP evaluation completed. Will continue to address cog-comm deficits. Recommended continue tx after d/c. Please see note for further details and recommendations.

## 2022-01-13 NOTE — THERAPY EVALUATION
Patient Name: Derian Joya  : 1940    MRN: [id number]                              Today's Date: 2022       Admit Date: 2022    Visit Dx:     ICD-10-CM ICD-9-CM   1. SAH (subarachnoid hemorrhage) (HCC)  I60.9 430   2. SDH (subdural hematoma) (HCC)  S06.5X9A 432.1   3. Head trauma, initial encounter  S09.90XA 959.01   4. Fall, initial encounter  W19.XXXA E888.9   5. Cognitive communication deficit  R41.841 799.52     Patient Active Problem List   Diagnosis   • Uncontrolled type 2 diabetes mellitus (HCC)   • Other hyperlipidemia   • Essential hypertension   • Aortic stenosis   • Cardiomyopathy, nonischemic (HCC)   • SAH (subarachnoid hemorrhage) (HCC)   • SDH (subdural hematoma) (HCC)   • Head trauma, initial encounter   • Head trauma     Past Medical History:   Diagnosis Date   • Allergic rhinitis    • Aortic stenosis    • Atopic rhinitis 2016   • Benign non-nodular prostatic hyperplasia with lower urinary tract symptoms 9/15/2017   • CAD (coronary artery disease)    • Cardiomyopathy, ischemic    • Colon polyps     tubular adenoma    • Community acquired pneumonia    • Depression 2016   • Diabetes (HCC)    • Diverticulosis of intestine 2016    Description: Left and sigmoid   • Dyslipidemia    • LBBB (left bundle branch block)    • PAD (peripheral artery disease) ([facility])      Past Surgical History:   Procedure Laterality Date   • CARDIAC CATHETERIZATION      100% cx   • CATARACT EXTRACTION     • COLONOSCOPY W/ BIOPSIES AND POLYPECTOMY  2021    1 benign polyp   • HIP SURGERY Left 2020    Dr Mandy BOUDREAUX   • LEG SURGERY Left 2016    Popliteal Artery stenting by Dr Hdz   • LEG SURGERY Left 2016    skin graft by Dr Nance   • LIPOMA EXCISION      s/p excision on chest   • PACEMAKER IMPLANTATION  2019    and defibrillator      General Information     Row Name 22 1428          OT Time and Intention    Document Type evaluation  -CS     Mode of Treatment  occupational therapy  -     Row Name 01/13/22 1428          General Information    Patient Profile Reviewed yes  -CS     Prior Level of Function independent:; all household mobility; ADL's  -CS     Existing Precautions/Restrictions fall  -CS     Barriers to Rehab medically complex; cognitive status  -     Row Name 01/13/22 1428          Living Environment    Lives With spouse  Pt questionnable historian, per RN report from son  -     Row Name 01/13/22 1428          Home Main Entrance    Number of Stairs, Main Entrance three  -     Row Name 01/13/22 1428          Cognition    Orientation Status (Cognition) oriented to; person; place  -     Row Name 01/13/22 1428          Safety Issues, Functional Mobility    Impairments Affecting Function (Mobility) balance; cognition; endurance/activity tolerance; strength  -CS     Cognitive Impairments, Mobility Safety/Performance awareness, need for assistance; insight into deficits/self-awareness  -           User Key  (r) = Recorded By, (t) = Taken By, (c) = Cosigned By    Initials Name Provider Type    CS Cheryle Hightower OT Occupational Therapist                 Mobility/ADL's     Row Name 01/13/22 1430          Bed Mobility    Bed Mobility supine-sit  -     Supine-Sit Bennett (Bed Mobility) minimum assist (75% patient effort); verbal cues  -     Assistive Device (Bed Mobility) bed rails; head of bed elevated  -     Row Name 01/13/22 1430          Transfers    Transfers sit-stand transfer  -     Sit-Stand Bennett (Transfers) contact guard; verbal cues  -[facility] Name 01/13/22 1430          Sit-Stand Transfer    Assistive Device (Sit-Stand Transfers) walker, front-wheeled  -     Row Name 01/13/22 1430          Activities of Daily Living    BADL Assessment/Intervention lower body dressing  -     Row Name 01/13/22 1430          Lower Body Dressing Assessment/Training    Bennett Level (Lower Body Dressing) don; socks; dependent (less than  25% patient effort)  -     Position (Lower Body Dressing) supine  -           User Key  (r) = Recorded By, (t) = Taken By, (c) = Cosigned By    Initials Name Provider Type    CS Cheryle Hightower OT Occupational Therapist               Obj/Interventions     Row Name 01/13/22 1430          Sensory Assessment (Somatosensory)    Sensory Assessment (Somatosensory) UE sensation intact  -[facility] Name 01/13/22 1430          Vision Assessment/Intervention    Visual Impairment/Limitations WFL  -     Row Name 01/13/22 1430          Range of Motion Comprehensive    General Range of Motion bilateral upper extremity ROM WFL  -[facility] Name 01/13/22 1430          Strength Comprehensive (MMT)    Comment, General Manual Muscle Testing (MMT) Assessment BUE grossly 4-/5  -[facility] Name 01/13/22 1430          Motor Skills    Motor Skills coordination  -     Coordination WFL; bilateral; finger to nose  -[facility] Name 01/13/22 1430          Balance    Balance Assessment sitting static balance; sitting dynamic balance; standing static balance; standing dynamic balance  -     Static Sitting Balance WFL; sitting, edge of bed  -CS     Dynamic Sitting Balance WFL; sitting, edge of bed  -CS     Static Standing Balance WFL; supported  -     Dynamic Standing Balance mild impairment; supported  -CS           User Key  (r) = Recorded By, (t) = Taken By, (c) = Cosigned By    Initials Name Provider Type    Cheryle Sen OT Occupational Therapist               Goals/Plan     [facility] Name 01/13/22 1434          Transfer Goal 1 (OT)    Activity/Assistive Device (Transfer Goal 1, OT) sit-to-stand/stand-to-sit; toilet  -     [place] Level/Cues Needed (Transfer Goal 1, OT) standby assist  -     Time Frame (Transfer Goal 1, OT) long term goal (LTG); 10 days  -     Progress/Outcome (Transfer Goal 1, OT) goal ongoing  -[facility] Name 01/13/22 1434          Dressing Goal 1 (OT)    Activity/Device (Dressing Goal 1, OT) lower  body dressing  don socks/pants  -CS     [place]/Cues Needed (Dressing Goal 1, OT) standby assist  -CS     Time Frame (Dressing Goal 1, OT) long term goal (LTG); 10 days  -CS     Progress/Outcome (Dressing Goal 1, OT) goal ongoing  -CS     Row Name 01/13/22 1434          Grooming Goal 1 (OT)    Activity/Device (Grooming Goal 1, OT) hair care; wash face, hands; oral care  standing at sink  -CS     [place] (Grooming Goal 1, OT) standby assist  -CS     Time Frame (Grooming Goal 1, OT) long term goal (LTG); 10 days  -CS     Progress/Outcome (Grooming Goal 1, OT) goal ongoing  -CS     Row Name 01/13/22 1434          Therapy Assessment/Plan (OT)    Planned Therapy Interventions (OT) activity tolerance training; BADL retraining; occupation/activity based interventions; transfer/mobility retraining  -CS           User Key  (r) = Recorded By, (t) = Taken By, (c) = Cosigned By    Initials Name Provider Type    CS Cheryle Hightower, ANTOINE Occupational Therapist               Clinical Impression     Row Name 01/13/22 1431          Pain Scale: Numbers Pre/Post-Treatment    Pretreatment Pain Rating 0/10 - no pain  -CS     Posttreatment Pain Rating 0/10 - no pain  -CS     Row Name 01/13/22 1431          Plan of Care Review    Plan of Care Reviewed With patient  -CS     Outcome Summary OT eval complete. Pt presents w/ deficits in balance and generalized weakness limiting functional independence. Pt is Min A for bed mobility and Dep for LBD. Recommend cont skilled IPOT POC. Recommend pt DC home w/ 24/7 assist and HH OT/PT services.  -CS     Row Name 01/13/22 1431          Therapy Assessment/Plan (OT)    Patient/Family Therapy Goal Statement (OT) Return to PLOF  -CS     Rehab Potential (OT) good, to achieve stated therapy goals  -CS     Criteria for Skilled Therapeutic Interventions Met (OT) yes; skilled treatment is necessary  -CS     Therapy Frequency (OT) daily  -CS     Row Name 01/13/22 1431          Therapy Plan  Review/Discharge Plan (OT)    Anticipated Discharge Disposition (OT) home with 24/7 care; home with home health  -CS     Row Name 01/13/22 1431          Vital Signs    Pre Systolic BP Rehab 135  -CS     Pre Treatment Diastolic BP 77  -CS     Post Systolic BP Rehab --  w/ PT  -CS     Pretreatment Heart Rate (beats/min) 88  -CS     Pre SpO2 (%) 93  -CS     O2 Delivery Pre Treatment room air  -CS     Pre Patient Position Supine  -CS     Intra Patient Position Sitting  -CS     Post Patient Position Standing  -CS     Row Name 01/13/22 1431          Positioning and Restraints    Pre-Treatment Position in bed  -CS     Post Treatment Position other  -CS     Other Position with other staff  w/ PT  -CS           User Key  (r) = Recorded By, (t) = Taken By, (c) = Cosigned By    Initials Name Provider Type    CS Cheryle Hightower, OT Occupational Therapist               Outcome Measures     Row Name 01/13/22 1436          How much help from another is currently needed...    Putting on and taking off regular lower body clothing? 2  -CS     Bathing (including washing, rinsing, and drying) 2  -CS     Toileting (which includes using toilet bed pan or urinal) 2  -CS     Putting on and taking off regular upper body clothing 3  -CS     Taking care of personal grooming (such as brushing teeth) 3  -CS     Eating meals 4  -CS     AM-PAC 6 Clicks Score (OT) 16  -CS     Row Name 01/13/22 1104          How much help from another person do you currently need...    Turning from your back to your side while in flat bed without using bedrails? 2  -AY (r) ML (t) AY (c)     Moving from lying on back to sitting on the side of a flat bed without bedrails? 2  -AY (r) ML (t) AY (c)     Moving to and from a bed to a chair (including a wheelchair)? 3  -AY (r) ML (t) AY (c)     Standing up from a chair using your arms (e.g., wheelchair, bedside chair)? 3  -AY (r) ML (t) AY (c)     Climbing 3-5 steps with a railing? 3  -AY (r) ML (t) AY (c)     To walk in  hospital room? 3  -AY (r) ML (t) AY (c)     AM-PAC 6 Clicks Score (PT) 16  -AY (r) ML (t)     Row Name 01/13/22 1436          Modified Screven Scale    Pre-Stroke Modified Screven Scale 6 - Unable to determine (UTD) from the medical record documentation  -     Modified Barb Scale 3 - Moderate disability.  Requiring some help, but able to walk without assistance.  -     Row Name 01/13/22 1436 01/13/22 1104       Functional Assessment    Outcome Measure Options AM-PAC 6 Clicks Daily Activity (OT); Modified Barb  -CS AM-PAC 6 Clicks Basic Mobility (PT)  -AY (r) ML (t) AY (c)          User Key  (r) = Recorded By, (t) = Taken By, (c) = Cosigned By    Initials Name Provider Type     Cheryle Hightower, OT Occupational Therapist    Arabella Keller, PT Physical Therapist    ML Tarun, Angela S, PT Student PT Student                Occupational Therapy Education                 Title: PT OT SLP Therapies (In Progress)     Topic: Occupational Therapy (In Progress)     Point: ADL training (In Progress)     Description:   Instruct learner(s) on proper safety adaptation and remediation techniques during self care or transfers.   Instruct in proper use of assistive devices.              Learning Progress Summary           Patient Acceptance, E, NR by  at 1/13/2022 1441                   Point: Home exercise program (Not Started)     Description:   Instruct learner(s) on appropriate technique for monitoring, assisting and/or progressing therapeutic exercises/activities.              Learner Progress:  Not documented in this visit.          Point: Precautions (In Progress)     Description:   Instruct learner(s) on prescribed precautions during self-care and functional transfers.              Learning Progress Summary           Patient Acceptance, E, NR by  at 1/13/2022 1441                   Point: Body mechanics (In Progress)     Description:   Instruct learner(s) on proper positioning and spine alignment during self-care,  functional mobility activities and/or exercises.              Learning Progress Summary           Patient Acceptance, E, NR by  at 1/13/2022 1441                               User Key     Initials Effective Dates Name Provider Type Discipline     09/02/21 -  Cheryle Hightower OT Occupational Therapist OT              OT Recommendation and Plan  Planned Therapy Interventions (OT): activity tolerance training, BADL retraining, occupation/activity based interventions, transfer/mobility retraining  Therapy Frequency (OT): daily  Plan of Care Review  Plan of Care Reviewed With: patient  Outcome Summary: OT eval complete. Pt presents w/ deficits in balance and generalized weakness limiting functional independence. Pt is Min A for bed mobility and Dep for LBD. Recommend cont skilled IPOT POC. Recommend pt DC home w/ 24/7 assist and HH OT/PT services.     Time Calculation:    Time Calculation- OT     Row Name 01/13/22 1442             Time Calculation- OT    OT Start Time 0825  -CS      OT Received On 01/13/22  -CS      OT Goal Re-Cert Due Date 01/23/22  -CS              Untimed Charges    OT Eval/Re-eval Minutes 46  -CS              Total Minutes    Untimed Charges Total Minutes 46  -CS       Total Minutes 46  -CS            User Key  (r) = Recorded By, (t) = Taken By, (c) = Cosigned By    Initials Name Provider Type     Cheryle Hightower OT Occupational Therapist              Therapy Charges for Today     Code Description Service Date Service Provider Modifiers Qty    [id number] HC OT EVAL LOW COMPLEXITY 4 1/13/2022 Cheryle Hightower OT GO 1               Cheryle Hightower OT  1/13/2022

## 2022-01-13 NOTE — DISCHARGE INSTR - OTHER ORDERS
[facility] will call you to arrange your home health services. Please call them at [phone] if you have any questions.

## 2022-01-13 NOTE — TELEPHONE ENCOUNTER
Appointments scheduled. (Patient is still currently marked as admitted to hospital.) Appointment reminders and paperwork mailed to patient.

## 2022-01-13 NOTE — CASE MANAGEMENT/SOCIAL WORK
Discharge Planning Assessment  [facility]     Patient Name: Derian Joya  MRN: [id number]  Today's Date: 1/13/2022    Admit Date: 1/11/2022     Discharge Needs Assessment     Row Name 01/13/22 2827       Living Environment    Lives With spouse    Name(s) of Who Lives With Patient Gina-wife    Current Living Arrangements home/apartment/condo  Lives in [facility]    Primary Care Provided by self    Provides Primary Care For no one    Family Caregiver if Needed spouse    Family Caregiver Names Gina    Quality of Family Relationships unable to assess    Able to Return to Prior Arrangements yes       Resource/Environmental Concerns    Resource/Environmental Concerns none    Transportation Concerns car, none       Transition Planning    Patient/Family Anticipates Transition to home with family; home with help/services    Patient/Family Anticipated Services at Transition ; home health care    Transportation Anticipated family or friend will provide       Discharge Needs Assessment    Equipment Currently Used at Home walker, rolling; cane, straight  Has them but doesn't always use them.    Concerns to be Addressed discharge planning    Anticipated Changes Related to Illness none    Equipment Needed After Discharge none    Discharge Facility/Level of Care Needs home with home health    Current Discharge Risk dependent with mobility/activities of daily living; chronically ill               Discharge Plan     Row Name 01/13/22 0731       Plan    Plan Home with wife and [facility]    Patient/Family in Agreement with Plan yes    Plan Comments Met with pt at . He lives with his wife in [facility]. Ind of ADL's. Has a RW and cane, but doesn't use them inside the home much. He fell at home going up the stairs and came to [facility] for subdural hematoma and subarachnoid hemorrhage. PT/SLP recommended for HH. Pt is agreeable. Called referral to Roscoe at [facility] and they can accept referral and will see pt  tomorrow. CM will follow. Mila ext. [phone]    Final Discharge Disposition Code 06 - home with home health care              Continued Care and Services - Admitted Since 1/11/2022     Home Medical Care Coordination complete.    Service Provider Request Status Selected Services Address Phone Fax Patient Preferred     [facility]   Selected Home Health Services [address] [facility] [id number] [phone] --              Expected Discharge Date and Time     Expected Discharge Date Expected Discharge Time    Jan 13, 2022          Demographic Summary     Row Name 01/13/22 1406       General Information    Referral Source admission list    Preferred Language English     Used During This Interaction no    General Information Comments PCP is Diaz Lee. Arby-son is TERESA.       Contact Information    Permission Granted to Share Info With                Functional Status     Row Name 01/13/22 1407       Functional Status    Usual Activity Tolerance fair    Current Activity Tolerance fair       Functional Status, IADL    Medications assistive person    Meal Preparation assistive person    Housekeeping assistive person    Laundry assistive person    Shopping assistive person       Employment/    Employment/ Comments Has [employer] Medicare withno issues affording meds.               Psychosocial    No documentation.                Abuse/Neglect    No documentation.                Legal    No documentation.                Substance Abuse    No documentation.                Patient Forms    No documentation.                   Mila Sher, MARYURI

## 2022-01-13 NOTE — PLAN OF CARE
Goal Outcome Evaluation:  Plan of Care Reviewed With: patient           Outcome Summary: OT eval complete. Pt presents w/ deficits in balance and generalized weakness limiting functional independence. Pt is Min A for bed mobility and Dep for LBD. Recommend cont skilled IPOT POC. Recommend pt DC home w/ 24/7 assist and HH OT/PT services.

## 2022-01-13 NOTE — PROGRESS NOTES
Discussed home health and agreeable to [facility]--Roscoe Mcdonald RN [facility] Liaison

## 2022-01-13 NOTE — THERAPY EVALUATION
Acute Care - Speech Language Pathology Initial Evaluation  [facility]   Cognitive-Communication Evaluation     Patient Name: Derian Joya  : 1940  MRN: [id number]  Today's Date: 2022               Admit Date: 2022     Visit Dx:    ICD-10-CM ICD-9-CM   1. SAH (subarachnoid hemorrhage) (HCC)  I60.9 430   2. SDH (subdural hematoma) (HCC)  S06.5X9A 432.1   3. Head trauma, initial encounter  S09.90XA 959.01   4. Fall, initial encounter  W19.XXXA E888.9   5. Cognitive communication deficit  R41.841 799.52     Patient Active Problem List   Diagnosis   • Uncontrolled type 2 diabetes mellitus (HCC)   • Other hyperlipidemia   • Essential hypertension   • Aortic stenosis   • Cardiomyopathy, nonischemic (HCC)   • SAH (subarachnoid hemorrhage) (HCC)   • SDH (subdural hematoma) ([facility])   • Head trauma, initial encounter   • Head trauma     Past Medical History:   Diagnosis Date   • Allergic rhinitis    • Aortic stenosis    • Atopic rhinitis 2016   • Benign non-nodular prostatic hyperplasia with lower urinary tract symptoms 9/15/2017   • CAD (coronary artery disease)    • Cardiomyopathy, ischemic    • Colon polyps     tubular adenoma    • Community acquired pneumonia    • Depression 2016   • Diabetes ([facility])    • Diverticulosis of intestine 2016    Description: Left and sigmoid   • Dyslipidemia    • LBBB (left bundle branch block)    • PAD (peripheral artery disease) ([facility])      Past Surgical History:   Procedure Laterality Date   • CARDIAC CATHETERIZATION      100% cx   • CATARACT EXTRACTION     • COLONOSCOPY W/ BIOPSIES AND POLYPECTOMY  2021    1 benign polyp   • HIP SURGERY Left 2020    Dr Mandy BOUDREAUX   • LEG SURGERY Left 2016    Popliteal Artery stenting by Dr Hdz   • LEG SURGERY Left 2016    skin graft by Dr Nance   • LIPOMA EXCISION      s/p excision on chest   • PACEMAKER IMPLANTATION  2019    and defibrillator       SLP Recommendation and Plan  SLP Diagnosis:  Mild-moderate cog-comm deficits. Pt does well with simple-level material, deficits as abstraction increased. Decreased judgement/insight. Would benefit from continued from continued tx after d/c (spoke to ). Pt reluctantly in agreement. (01/13/22 1015)        [facility] [place] Criteria for Skilled Therapy Interventions Met: yes (01/13/22 1015)  Anticipated Discharge Disposition (SLP): home with home health, anticipate therapy at next level of care (01/13/22 1015)        Predicted Duration Therapy Intervention (Days): until discharge (01/13/22 1015)                    Plan of Care Reviewed With: patient (01/13/22 1101)      SLP EVALUATION (last 72 hours)     SLP SLC Evaluation     Row Name 01/13/22 1015                   Communication Assessment/Intervention    Document Type evaluation  -        Subjective Information no complaints  -        Patient Observations alert; cooperative  -                  General Information    Patient Profile Reviewed yes  -SM        Pertinent History Of Current Problem B SAH after fall. Reported delirium/confusion at nights.  -        Plans/Goals Discussed with patient; agreed upon  -        Barriers to Rehab previous functional deficit  -        Patient's Goals for Discharge return to home  -                  Pain    Additional Documentation Pain Scale: FACES Pre/Post-Treatment (Group)  -                  Pain Scale: FACES Pre/Post-Treatment    Pain: FACES Scale, Pretreatment 0-->no hurt  -SM        Posttreatment Pain Rating 0-->no hurt  -SM                  Comprehension Assessment/Intervention    Comprehension Assessment/Intervention Auditory Comprehension  -SM                  Auditory Comprehension Assessment/Intervention    Answers Questions (Communication) yes/no; wh questions; personal; simple; WFL  -SM        Able to Follow Commands (Communication) 1-step; WFL  -SM                  Expression Assessment/Intervention    Expression Assessment/Intervention verbal  expression  -                  Verbal Expression Assessment/Intervention    Verbal Expression WFL  -SM        Conversational Discourse/Fluency WFL  -SM                  Motor Speech Assessment/Intervention    Motor Speech Function WFL  -SM                  Cognitive Assessment Intervention- SLP    Memory (Cognitive) simple; functional; short-term; related; mental manipulation; moderate impairment  -SM        Attention (Cognitive) attention to detail; moderate impairment  -SM        Thought Organization (Cognitive) concrete divergent; abstract divergent; mental manipulation; moderate impairment  -SM        Reasoning (Cognitive) simple; mental flexibility; moderate impairment  -SM        Problem Solving (Cognitive) simple; mild impairment  -SM        Executive Function (Cognition) deficit awareness; judgement; moderate impairment  -SM                  SLP Evaluation Clinical Impressions    SLP Diagnosis Mild-moderate cog-comm deficits. Pt does well with simple-level material, deficits as abstraction increased. Decreased judgement/insight. Would benefit from continued from continued tx after d/c (spoke to ). Pt reluctantly in agreement.  -        Rehab Potential/Prognosis good  -        SLC Criteria for Skilled Therapy Interventions Met yes  -        Functional Impact difficulty completing home management task; decreased ability to respond to situations safely  -                  Recommendations    Therapy Frequency (SLP SLC) 5 days per week  -        Predicted Duration Therapy Intervention (Days) until discharge  -        Anticipated Discharge Disposition (SLP) home with home health; anticipate therapy at next level of care  -              User Key  (r) = Recorded By, (t) = Taken By, (c) = Cosigned By    Initials Name Effective Dates    Shruthi Soto, MS CCC-SLP 06/16/21 -                    EDUCATION  The patient has been educated in the following areas:     Cognitive Impairment  Communication Impairment.           SLP GOALS     Row Name 01/13/22 1015             Memory Skills Goal 1 (SLP)    Improve Memory Skills Through Goal 1 (SLP) recalling related word lists with an imposed delay; repeat list in sequential order; 90%; with minimal cues (75-90%)  -SM      Time Frame (Memory Skills Goal 1, SLP) short term goal (STG)  -SM              Organizational Skills Goal 1 (SLP)    Improve Thought Organization Through Goal 1 (SLP) completing a verbal sequencing task; completing mental manipulation task; 90%; with minimal cues (75-90%)  -SM      Time Frame (Thought Organization Skills Goal 1, SLP) short term goal (STG)  -SM              Functional Problem Solving Skills Goal 1 (SLP)    Improve Problem Solving Through Goal 1 (SLP) determine solutions to simple ADL/safety problems; complete organization/home management task; sequence steps in a task; 90%; with minimal cues (75-90%)  -SM      Time Frame (Problem Solving Goal 1, SLP) short term goal (STG)  -SM              Executive Functional Skills Goal 1 (SLP)    Improve Executive Function Skills Goal 1 (SLP) demonstrate awareness of deficit; identify strategies, strengths, limitations; identify anticipated needs; home management activity; exhibit cognitive flexibility; perform self-correction; perform self-evaluation; 90%; with minimal cues (75-90%); with moderate cues (50-74%)  -SM      Time Frame (Executive Function Skills Goal 1, SLP) short term goal (STG)  -SM              Additional Goal 1 (SLP)    Additional Goal 1, SLP LTG: Improve cog-comm skills in order to safely return to home environment with supervision.  -      Time Frame (Additional Goal 1, SLP) by discharge  -            User Key  (r) = Recorded By, (t) = Taken By, (c) = Cosigned By    Initials Name Provider Type    Shruthi Soto MS CCC-SLP Speech and Language Pathologist                        Time Calculation:      Time Calculation- SLP     Row Name 01/13/22 1109              Time Calculation- SLP    SLP Start Time 1015  -      SLP Received On 01/13/22  -              Untimed Charges    [id number] Eval Speech and Production w/ Language Minutes 40  -SM              Total Minutes    Untimed Charges Total Minutes 40  -SM       Total Minutes 40  -SM            User Key  (r) = Recorded By, (t) = Taken By, (c) = Cosigned By    Initials Name Provider Type    Shruthi Soto MS CCC-SLP Speech and Language Pathologist                Therapy Charges for Today     Code Description Service Date Service Provider Modifiers Qty    [id number] HC ST EVAL SPEECH AND PROD W LANG  3 1/13/2022 Shruthi Ordonez MS CCC-SLP GN 1            Patient was not wearing a face mask and did not exhibit coughing during this therapy encounter.  Procedure performed was not aerosolizing, did not involve close contact (within 6 feet for at least 15 minutes or longer), and did not involve contact with infectious secretions or specimens.  Therapist used appropriate personal protective equipment including gloves, standard procedure mask and eye protection.  Appropriate PPE was worn during the entire therapy session.  Hand hygiene was completed before and after therapy session.            Shruthi Ordonez MS CCC-SLP  1/13/2022

## 2022-01-14 ENCOUNTER — TRANSITIONAL CARE MANAGEMENT TELEPHONE ENCOUNTER (OUTPATIENT)
Dept: CALL CENTER | Facility: HOSPITAL | Age: 82
End: 2022-01-14

## 2022-01-14 ENCOUNTER — HOME CARE VISIT (OUTPATIENT)
Dept: HOME HEALTH SERVICES | Facility: HOME HEALTHCARE | Age: 82
End: 2022-01-14

## 2022-01-14 VITALS
SYSTOLIC BLOOD PRESSURE: 100 MMHG | TEMPERATURE: 98.6 F | HEART RATE: 98 BPM | RESPIRATION RATE: 16 BRPM | DIASTOLIC BLOOD PRESSURE: 70 MMHG | OXYGEN SATURATION: 92 %

## 2022-01-14 LAB
QT INTERVAL: 400 MS
QTC INTERVAL: 492 MS

## 2022-01-14 PROCEDURE — G0153 HHCP-SVS OF S/L PATH,EA 15MN: HCPCS

## 2022-01-14 NOTE — OUTREACH NOTE
Call Center TCM Note      Responses   [facility] patient discharged from? [place]   Does the patient have one of the following disease processes/diagnoses(primary or secondary)? Stroke (TIA)   TCM attempt successful? No   Unsuccessful attempts Attempt 1   Comments regarding appointments needs cardio FU   Does the patient have a primary care provider?  Yes   Comments regarding PCP HOSP DC FU billy t1/21/22 @ 11:45 am          Genesis Dent RN    1/14/2022, 10:21 EST

## 2022-01-14 NOTE — OUTREACH NOTE
Call Center TCM Note      Responses   [facility] patient discharged from? [place]   Does the patient have one of the following disease processes/diagnoses(primary or secondary)? Stroke (TIA)   TCM attempt successful? Yes   Call start time 1555   Call end time 1610   Discharge diagnosis Head trauma subarachnoid hemorrhage subdural hematoma   Person spoke with today (if not patient) and relationship Daughter    Meds reviewed with patient/caregiver? Yes   Is the patient having any side effects they believe may be caused by any medication additions or changes? No   Does the patient have all medications ordered at discharge? Yes   Is the patient taking all medications as directed (includes completed medication regime)? Yes   Does the patient have a primary care provider?  Yes   Does the patient have an appointment with their PCP within 7 days of discharge? Greater than 7 days   Comments regarding PCP HOSP DC FU billy 1/21/22 @ 11:45 am   What is preventing the patient from scheduling follow up appointments within 7 days of discharge? Earlier appointment not available   Nursing Interventions Verified appointment date/time/provider   What is the Home health agency?  [facility]   Has home health visited the patient within 72 hours of discharge? Yes   Psychosocial issues? No   Does the patient require any assistance with activities of daily living such as eating, bathing, dressing, walking, etc.? No   Residual symptoms comments Today double vision began . Telugu is new. States ST saw Pt today    TCM call completed? Yes   Wrap up additional comments Double vision with left eye that just started today. ST was in home and was aware. Advised Pt and dov rto call PCP at this time. IF unable to reach PCP to seek attention at ER as Pt has had a change in vision in one and had been seen in hospital for brain bleed. New symptom that was not present prior.           Genesis Dent RN    1/14/2022, 16:12 EST

## 2022-01-14 NOTE — OUTREACH NOTE
Prep Survey      Responses   [facility] patient discharged from? [place]   Is LACE score < 7 ? No   Emergency Room discharge w/ pulse ox? No   Eligibility [facility]   Date of Admission 01/11/22   Date of Discharge 01/13/22   Discharge Disposition Home or Self Care   Discharge diagnosis Head trauma subarachnoid hemorrhage subdural hematoma   Does the patient have one of the following disease processes/diagnoses(primary or secondary)? Stroke (TIA)   Does the patient have Home health ordered? Yes   What is the Home health agency?  [facility]   Is there a DME ordered? No   Prep survey completed? Yes          Kacie Martinez RN

## 2022-01-15 ENCOUNTER — APPOINTMENT (OUTPATIENT)
Dept: GENERAL RADIOLOGY | Facility: HOSPITAL | Age: 82
End: 2022-01-15

## 2022-01-15 ENCOUNTER — APPOINTMENT (OUTPATIENT)
Dept: CT IMAGING | Facility: HOSPITAL | Age: 82
End: 2022-01-15

## 2022-01-15 ENCOUNTER — HOSPITAL ENCOUNTER (INPATIENT)
Facility: HOSPITAL | Age: 82
LOS: 4 days | Discharge: NURSING, REHAB OR LONG-TERM CARE | End: 2022-01-19
Attending: EMERGENCY MEDICINE | Admitting: INTERNAL MEDICINE

## 2022-01-15 ENCOUNTER — APPOINTMENT (OUTPATIENT)
Dept: NEUROLOGY | Facility: HOSPITAL | Age: 82
End: 2022-01-15

## 2022-01-15 DIAGNOSIS — R47.1 DYSARTHRIA: ICD-10-CM

## 2022-01-15 DIAGNOSIS — R41.841 COGNITIVE COMMUNICATION DEFICIT: ICD-10-CM

## 2022-01-15 DIAGNOSIS — R41.82 ALTERED MENTAL STATUS, UNSPECIFIED ALTERED MENTAL STATUS TYPE: Primary | ICD-10-CM

## 2022-01-15 DIAGNOSIS — N40.1 BENIGN NON-NODULAR PROSTATIC HYPERPLASIA WITH LOWER URINARY TRACT SYMPTOMS: ICD-10-CM

## 2022-01-15 DIAGNOSIS — T50.905A MEDICATION SIDE EFFECT, INITIAL ENCOUNTER: ICD-10-CM

## 2022-01-15 DIAGNOSIS — E09.10 DIABETIC KETOACIDOSIS WITHOUT COMA ASSOCIATED WITH DRUG OR CHEMICAL INDUCED DIABETES MELLITUS: ICD-10-CM

## 2022-01-15 PROBLEM — E87.20 LACTIC ACIDOSIS: Status: ACTIVE | Noted: 2022-01-15

## 2022-01-15 PROBLEM — I60.9 SUBARACHNOID HEMORRHAGE (HCC): Status: ACTIVE | Noted: 2022-01-15

## 2022-01-15 PROBLEM — E87.29 HIGH ANION GAP METABOLIC ACIDOSIS: Status: ACTIVE | Noted: 2022-01-15

## 2022-01-15 LAB
ALBUMIN SERPL-MCNC: 4.2 G/DL (ref 3.5–5.2)
ALBUMIN SERPL-MCNC: 4.5 G/DL (ref 3.5–5.2)
ALBUMIN/GLOB SERPL: 1.3 G/DL
ALBUMIN/GLOB SERPL: 1.8 G/DL
ALP SERPL-CCNC: 60 U/L (ref 39–117)
ALP SERPL-CCNC: 70 U/L (ref 39–117)
ALT SERPL W P-5'-P-CCNC: 100 U/L (ref 1–41)
ALT SERPL W P-5'-P-CCNC: 69 U/L (ref 1–41)
ANION GAP SERPL CALCULATED.3IONS-SCNC: 20 MMOL/L (ref 5–15)
ANION GAP SERPL CALCULATED.3IONS-SCNC: 23 MMOL/L (ref 5–15)
ANION GAP SERPL CALCULATED.3IONS-SCNC: 30 MMOL/L (ref 5–15)
APTT PPP: 25.6 SECONDS (ref 22–39)
ARTERIAL PATENCY WRIST A: ABNORMAL
AST SERPL-CCNC: 104 U/L (ref 1–40)
AST SERPL-CCNC: 86 U/L (ref 1–40)
ATMOSPHERIC PRESS: ABNORMAL MM[HG]
B-OH-BUTYR SERPL-SCNC: 7.26 MMOL/L (ref 0.02–0.27)
BACTERIA UR QL AUTO: NORMAL /HPF
BASE EXCESS BLDA CALC-SCNC: -18.9 MMOL/L (ref 0–2)
BASOPHILS # BLD AUTO: 0.01 10*3/MM3 (ref 0–0.2)
BASOPHILS # BLD AUTO: 0.02 10*3/MM3 (ref 0–0.2)
BASOPHILS NFR BLD AUTO: 0.1 % (ref 0–1.5)
BASOPHILS NFR BLD AUTO: 0.2 % (ref 0–1.5)
BDY SITE: ABNORMAL
BILIRUB SERPL-MCNC: 0.7 MG/DL (ref 0–1.2)
BILIRUB SERPL-MCNC: 1.1 MG/DL (ref 0–1.2)
BILIRUB UR QL STRIP: NEGATIVE
BODY TEMPERATURE: 37 C
BUN SERPL-MCNC: 25 MG/DL (ref 8–23)
BUN SERPL-MCNC: 27 MG/DL (ref 8–23)
BUN SERPL-MCNC: 30 MG/DL (ref 8–23)
BUN/CREAT SERPL: 28.7 (ref 7–25)
BUN/CREAT SERPL: 29 (ref 7–25)
BUN/CREAT SERPL: 32.3 (ref 7–25)
CALCIUM SPEC-SCNC: 8.8 MG/DL (ref 8.6–10.5)
CALCIUM SPEC-SCNC: 9 MG/DL (ref 8.6–10.5)
CALCIUM SPEC-SCNC: 9.8 MG/DL (ref 8.6–10.5)
CHLORIDE SERPL-SCNC: 102 MMOL/L (ref 98–107)
CHLORIDE SERPL-SCNC: 111 MMOL/L (ref 98–107)
CHLORIDE SERPL-SCNC: 113 MMOL/L (ref 98–107)
CLARITY UR: CLEAR
CO2 BLDA-SCNC: 8.7 MMOL/L (ref 22–33)
CO2 SERPL-SCNC: 11 MMOL/L (ref 22–29)
CO2 SERPL-SCNC: 12 MMOL/L (ref 22–29)
CO2 SERPL-SCNC: 14 MMOL/L (ref 22–29)
COHGB MFR BLD: 0.9 % (ref 0–2)
COLOR UR: YELLOW
CREAT SERPL-MCNC: 0.87 MG/DL (ref 0.76–1.27)
CREAT SERPL-MCNC: 0.93 MG/DL (ref 0.76–1.27)
CREAT SERPL-MCNC: 0.93 MG/DL (ref 0.76–1.27)
D-LACTATE SERPL-SCNC: 1.5 MMOL/L (ref 0.5–2)
D-LACTATE SERPL-SCNC: 2.6 MMOL/L (ref 0.5–2)
DEPRECATED RDW RBC AUTO: 48.3 FL (ref 37–54)
DEPRECATED RDW RBC AUTO: 48.9 FL (ref 37–54)
EOSINOPHIL # BLD AUTO: 0 10*3/MM3 (ref 0–0.4)
EOSINOPHIL # BLD AUTO: 0 10*3/MM3 (ref 0–0.4)
EOSINOPHIL NFR BLD AUTO: 0 % (ref 0.3–6.2)
EOSINOPHIL NFR BLD AUTO: 0 % (ref 0.3–6.2)
EPAP: 0
ERYTHROCYTE [DISTWIDTH] IN BLOOD BY AUTOMATED COUNT: 12.6 % (ref 12.3–15.4)
ERYTHROCYTE [DISTWIDTH] IN BLOOD BY AUTOMATED COUNT: 12.6 % (ref 12.3–15.4)
GFR SERPL CREATININE-BSD FRML MDRD: 78 ML/MIN/1.73
GFR SERPL CREATININE-BSD FRML MDRD: 78 ML/MIN/1.73
GFR SERPL CREATININE-BSD FRML MDRD: 84 ML/MIN/1.73
GLOBULIN UR ELPH-MCNC: 2.4 GM/DL
GLOBULIN UR ELPH-MCNC: 3.4 GM/DL
GLUCOSE BLDC GLUCOMTR-MCNC: 113 MG/DL (ref 70–130)
GLUCOSE BLDC GLUCOMTR-MCNC: 131 MG/DL (ref 70–130)
GLUCOSE BLDC GLUCOMTR-MCNC: 143 MG/DL (ref 70–130)
GLUCOSE BLDC GLUCOMTR-MCNC: 143 MG/DL (ref 70–130)
GLUCOSE BLDC GLUCOMTR-MCNC: 181 MG/DL (ref 70–130)
GLUCOSE BLDC GLUCOMTR-MCNC: 185 MG/DL (ref 70–130)
GLUCOSE BLDC GLUCOMTR-MCNC: 204 MG/DL (ref 70–130)
GLUCOSE BLDC GLUCOMTR-MCNC: 216 MG/DL (ref 70–130)
GLUCOSE BLDC GLUCOMTR-MCNC: 279 MG/DL (ref 70–130)
GLUCOSE BLDC GLUCOMTR-MCNC: 344 MG/DL (ref 70–130)
GLUCOSE BLDC GLUCOMTR-MCNC: 393 MG/DL (ref 70–130)
GLUCOSE BLDC GLUCOMTR-MCNC: 458 MG/DL (ref 70–130)
GLUCOSE BLDC GLUCOMTR-MCNC: 97 MG/DL (ref 70–130)
GLUCOSE SERPL-MCNC: 123 MG/DL (ref 65–99)
GLUCOSE SERPL-MCNC: 184 MG/DL (ref 65–99)
GLUCOSE SERPL-MCNC: 199 MG/DL (ref 65–99)
GLUCOSE UR STRIP-MCNC: ABNORMAL MG/DL
HBA1C MFR BLD: 7.8 % (ref 4.8–5.6)
HCO3 BLDA-SCNC: 8 MMOL/L (ref 20–26)
HCT VFR BLD AUTO: 42.5 % (ref 37.5–51)
HCT VFR BLD AUTO: 45.7 % (ref 37.5–51)
HCT VFR BLD CALC: 38.6 % (ref 38–51)
HGB BLD-MCNC: 13.2 G/DL (ref 13–17.7)
HGB BLD-MCNC: 14.4 G/DL (ref 13–17.7)
HGB BLDA-MCNC: 12.6 G/DL (ref 13.5–17.5)
HGB UR QL STRIP.AUTO: NEGATIVE
HOLD SPECIMEN: NORMAL
HYALINE CASTS UR QL AUTO: NORMAL /LPF
IMM GRANULOCYTES # BLD AUTO: 0.04 10*3/MM3 (ref 0–0.05)
IMM GRANULOCYTES # BLD AUTO: 0.05 10*3/MM3 (ref 0–0.05)
IMM GRANULOCYTES NFR BLD AUTO: 0.4 % (ref 0–0.5)
IMM GRANULOCYTES NFR BLD AUTO: 0.5 % (ref 0–0.5)
INHALED O2 CONCENTRATION: 21 %
INR PPP: 1.12 (ref 0.85–1.16)
IPAP: 0
KETONES UR QL STRIP: ABNORMAL
LEUKOCYTE ESTERASE UR QL STRIP.AUTO: NEGATIVE
LYMPHOCYTES # BLD AUTO: 0.83 10*3/MM3 (ref 0.7–3.1)
LYMPHOCYTES # BLD AUTO: 1.11 10*3/MM3 (ref 0.7–3.1)
LYMPHOCYTES NFR BLD AUTO: 11.6 % (ref 19.6–45.3)
LYMPHOCYTES NFR BLD AUTO: 9 % (ref 19.6–45.3)
Lab: ABNORMAL
MAGNESIUM SERPL-MCNC: 2.2 MG/DL (ref 1.6–2.4)
MAGNESIUM SERPL-MCNC: 2.2 MG/DL (ref 1.6–2.4)
MAGNESIUM SERPL-MCNC: 2.6 MG/DL (ref 1.6–2.4)
MCH RBC QN AUTO: 32.4 PG (ref 26.6–33)
MCH RBC QN AUTO: 32.6 PG (ref 26.6–33)
MCHC RBC AUTO-ENTMCNC: 31.1 G/DL (ref 31.5–35.7)
MCHC RBC AUTO-ENTMCNC: 31.5 G/DL (ref 31.5–35.7)
MCV RBC AUTO: 103.4 FL (ref 79–97)
MCV RBC AUTO: 104.2 FL (ref 79–97)
METHGB BLD QL: ABNORMAL
MODALITY: ABNORMAL
MONOCYTES # BLD AUTO: 0.28 10*3/MM3 (ref 0.1–0.9)
MONOCYTES # BLD AUTO: 0.81 10*3/MM3 (ref 0.1–0.9)
MONOCYTES NFR BLD AUTO: 2.9 % (ref 5–12)
MONOCYTES NFR BLD AUTO: 8.8 % (ref 5–12)
NEUTROPHILS NFR BLD AUTO: 7.51 10*3/MM3 (ref 1.7–7)
NEUTROPHILS NFR BLD AUTO: 8.11 10*3/MM3 (ref 1.7–7)
NEUTROPHILS NFR BLD AUTO: 81.6 % (ref 42.7–76)
NEUTROPHILS NFR BLD AUTO: 84.9 % (ref 42.7–76)
NITRITE UR QL STRIP: NEGATIVE
NOTE: ABNORMAL
NOTIFIED BY: ABNORMAL
NOTIFIED WHO: ABNORMAL
NRBC BLD AUTO-RTO: 0 /100 WBC (ref 0–0.2)
NRBC BLD AUTO-RTO: 0 /100 WBC (ref 0–0.2)
OSMOLALITY SERPL: 321 MOSM/KG (ref 275–295)
OSMOLALITY SERPL: 322 MOSM/KG (ref 275–295)
OXYHGB MFR BLDV: 93.4 % (ref 94–99)
PAW @ PEAK INSP FLOW SETTING VENT: 0 CMH2O
PCO2 BLDA: 22.2 MM HG (ref 35–45)
PCO2 TEMP ADJ BLD: 22.2 MM HG (ref 35–48)
PH BLDA: 7.16 PH UNITS (ref 7.35–7.45)
PH UR STRIP.AUTO: <=5 [PH] (ref 5–8)
PH, TEMP CORRECTED: 7.16 PH UNITS
PHOSPHATE SERPL-MCNC: 3.1 MG/DL (ref 2.5–4.5)
PHOSPHATE SERPL-MCNC: 3.5 MG/DL (ref 2.5–4.5)
PHOSPHATE SERPL-MCNC: 4.6 MG/DL (ref 2.5–4.5)
PLATELET # BLD AUTO: 173 10*3/MM3 (ref 140–450)
PLATELET # BLD AUTO: 199 10*3/MM3 (ref 140–450)
PMV BLD AUTO: 10.4 FL (ref 6–12)
PMV BLD AUTO: 10.9 FL (ref 6–12)
PO2 BLDA: 83.4 MM HG (ref 83–108)
PO2 TEMP ADJ BLD: 83.4 MM HG (ref 83–108)
POTASSIUM SERPL-SCNC: 3.8 MMOL/L (ref 3.5–5.2)
POTASSIUM SERPL-SCNC: 4 MMOL/L (ref 3.5–5.2)
POTASSIUM SERPL-SCNC: 4.5 MMOL/L (ref 3.5–5.2)
PROT SERPL-MCNC: 6.6 G/DL (ref 6–8.5)
PROT SERPL-MCNC: 7.9 G/DL (ref 6–8.5)
PROT UR QL STRIP: ABNORMAL
PROTHROMBIN TIME: 14.1 SECONDS (ref 11.4–14.4)
QT INTERVAL: 404 MS
QTC INTERVAL: 515 MS
RBC # BLD AUTO: 4.08 10*6/MM3 (ref 4.14–5.8)
RBC # BLD AUTO: 4.42 10*6/MM3 (ref 4.14–5.8)
RBC # UR STRIP: NORMAL /HPF
REF LAB TEST METHOD: NORMAL
SODIUM SERPL-SCNC: 143 MMOL/L (ref 136–145)
SODIUM SERPL-SCNC: 146 MMOL/L (ref 136–145)
SODIUM SERPL-SCNC: 147 MMOL/L (ref 136–145)
SP GR UR STRIP: 1.03 (ref 1–1.03)
SQUAMOUS #/AREA URNS HPF: NORMAL /HPF
TOTAL RATE: 0 BREATHS/MINUTE
TROPONIN T SERPL-MCNC: <0.01 NG/ML (ref 0–0.03)
UROBILINOGEN UR QL STRIP: ABNORMAL
WBC # UR STRIP: NORMAL /HPF
WBC NRBC COR # BLD: 9.21 10*3/MM3 (ref 3.4–10.8)
WBC NRBC COR # BLD: 9.56 10*3/MM3 (ref 3.4–10.8)
WHOLE BLOOD HOLD SPECIMEN: NORMAL
WHOLE BLOOD HOLD SPECIMEN: NORMAL

## 2022-01-15 PROCEDURE — 83930 ASSAY OF BLOOD OSMOLALITY: CPT | Performed by: STUDENT IN AN ORGANIZED HEALTH CARE EDUCATION/TRAINING PROGRAM

## 2022-01-15 PROCEDURE — 95819 EEG AWAKE AND ASLEEP: CPT

## 2022-01-15 PROCEDURE — 83735 ASSAY OF MAGNESIUM: CPT | Performed by: STUDENT IN AN ORGANIZED HEALTH CARE EDUCATION/TRAINING PROGRAM

## 2022-01-15 PROCEDURE — 82010 KETONE BODYS QUAN: CPT | Performed by: STUDENT IN AN ORGANIZED HEALTH CARE EDUCATION/TRAINING PROGRAM

## 2022-01-15 PROCEDURE — 93005 ELECTROCARDIOGRAM TRACING: CPT | Performed by: EMERGENCY MEDICINE

## 2022-01-15 PROCEDURE — 82375 ASSAY CARBOXYHB QUANT: CPT

## 2022-01-15 PROCEDURE — 85025 COMPLETE CBC W/AUTO DIFF WBC: CPT | Performed by: EMERGENCY MEDICINE

## 2022-01-15 PROCEDURE — 81001 URINALYSIS AUTO W/SCOPE: CPT | Performed by: EMERGENCY MEDICINE

## 2022-01-15 PROCEDURE — 83735 ASSAY OF MAGNESIUM: CPT | Performed by: EMERGENCY MEDICINE

## 2022-01-15 PROCEDURE — 25010000002 ENOXAPARIN PER 10 MG: Performed by: STUDENT IN AN ORGANIZED HEALTH CARE EDUCATION/TRAINING PROGRAM

## 2022-01-15 PROCEDURE — 71045 X-RAY EXAM CHEST 1 VIEW: CPT

## 2022-01-15 PROCEDURE — 85730 THROMBOPLASTIN TIME PARTIAL: CPT | Performed by: EMERGENCY MEDICINE

## 2022-01-15 PROCEDURE — 36600 WITHDRAWAL OF ARTERIAL BLOOD: CPT

## 2022-01-15 PROCEDURE — 85610 PROTHROMBIN TIME: CPT | Performed by: EMERGENCY MEDICINE

## 2022-01-15 PROCEDURE — 99285 EMERGENCY DEPT VISIT HI MDM: CPT

## 2022-01-15 PROCEDURE — 82805 BLOOD GASES W/O2 SATURATION: CPT

## 2022-01-15 PROCEDURE — 87040 BLOOD CULTURE FOR BACTERIA: CPT | Performed by: STUDENT IN AN ORGANIZED HEALTH CARE EDUCATION/TRAINING PROGRAM

## 2022-01-15 PROCEDURE — 80053 COMPREHEN METABOLIC PANEL: CPT | Performed by: EMERGENCY MEDICINE

## 2022-01-15 PROCEDURE — 99223 1ST HOSP IP/OBS HIGH 75: CPT

## 2022-01-15 PROCEDURE — 82962 GLUCOSE BLOOD TEST: CPT

## 2022-01-15 PROCEDURE — 25010000002 LEVETIRACETAM IN NACL 0.82% 500 MG/100ML SOLUTION: Performed by: STUDENT IN AN ORGANIZED HEALTH CARE EDUCATION/TRAINING PROGRAM

## 2022-01-15 PROCEDURE — 84484 ASSAY OF TROPONIN QUANT: CPT | Performed by: STUDENT IN AN ORGANIZED HEALTH CARE EDUCATION/TRAINING PROGRAM

## 2022-01-15 PROCEDURE — 84100 ASSAY OF PHOSPHORUS: CPT | Performed by: STUDENT IN AN ORGANIZED HEALTH CARE EDUCATION/TRAINING PROGRAM

## 2022-01-15 PROCEDURE — 71046 X-RAY EXAM CHEST 2 VIEWS: CPT

## 2022-01-15 PROCEDURE — 83605 ASSAY OF LACTIC ACID: CPT | Performed by: STUDENT IN AN ORGANIZED HEALTH CARE EDUCATION/TRAINING PROGRAM

## 2022-01-15 PROCEDURE — 63710000001 INSULIN REGULAR HUMAN PER 5 UNITS: Performed by: EMERGENCY MEDICINE

## 2022-01-15 PROCEDURE — 70450 CT HEAD/BRAIN W/O DYE: CPT

## 2022-01-15 PROCEDURE — 99222 1ST HOSP IP/OBS MODERATE 55: CPT | Performed by: STUDENT IN AN ORGANIZED HEALTH CARE EDUCATION/TRAINING PROGRAM

## 2022-01-15 PROCEDURE — 83050 HGB METHEMOGLOBIN QUAN: CPT

## 2022-01-15 PROCEDURE — 85025 COMPLETE CBC W/AUTO DIFF WBC: CPT | Performed by: STUDENT IN AN ORGANIZED HEALTH CARE EDUCATION/TRAINING PROGRAM

## 2022-01-15 PROCEDURE — 0 LEVETIRACETAM IN NACL 0.75% 1000 MG/100ML SOLUTION

## 2022-01-15 PROCEDURE — 83036 HEMOGLOBIN GLYCOSYLATED A1C: CPT | Performed by: STUDENT IN AN ORGANIZED HEALTH CARE EDUCATION/TRAINING PROGRAM

## 2022-01-15 RX ORDER — LEVETIRACETAM 10 MG/ML
1000 INJECTION INTRAVASCULAR EVERY 12 HOURS SCHEDULED
Status: COMPLETED | OUTPATIENT
Start: 2022-01-15 | End: 2022-01-15

## 2022-01-15 RX ORDER — DEXTROSE MONOHYDRATE 25 G/50ML
12.5 INJECTION, SOLUTION INTRAVENOUS AS NEEDED
Status: DISCONTINUED | OUTPATIENT
Start: 2022-01-15 | End: 2022-01-16

## 2022-01-15 RX ORDER — DEXTROSE, SODIUM CHLORIDE, AND POTASSIUM CHLORIDE 5; .45; .15 G/100ML; G/100ML; G/100ML
150 INJECTION INTRAVENOUS CONTINUOUS PRN
Status: DISCONTINUED | OUTPATIENT
Start: 2022-01-15 | End: 2022-01-16

## 2022-01-15 RX ORDER — SODIUM CHLORIDE 0.9 % (FLUSH) 0.9 %
3 SYRINGE (ML) INJECTION EVERY 12 HOURS SCHEDULED
Status: DISCONTINUED | OUTPATIENT
Start: 2022-01-15 | End: 2022-01-16

## 2022-01-15 RX ORDER — SODIUM CHLORIDE AND POTASSIUM CHLORIDE 300; 900 MG/100ML; MG/100ML
250 INJECTION, SOLUTION INTRAVENOUS CONTINUOUS PRN
Status: DISCONTINUED | OUTPATIENT
Start: 2022-01-15 | End: 2022-01-16

## 2022-01-15 RX ORDER — DEXTROSE, SODIUM CHLORIDE, AND POTASSIUM CHLORIDE 5; .45; .3 G/100ML; G/100ML; G/100ML
150 INJECTION INTRAVENOUS CONTINUOUS PRN
Status: DISCONTINUED | OUTPATIENT
Start: 2022-01-15 | End: 2022-01-16

## 2022-01-15 RX ORDER — SODIUM CHLORIDE 0.9 % (FLUSH) 0.9 %
10 SYRINGE (ML) INJECTION AS NEEDED
Status: DISCONTINUED | OUTPATIENT
Start: 2022-01-15 | End: 2022-01-18

## 2022-01-15 RX ORDER — PAROXETINE HYDROCHLORIDE 20 MG/1
20 TABLET, FILM COATED ORAL DAILY
Status: DISCONTINUED | OUTPATIENT
Start: 2022-01-16 | End: 2022-01-19 | Stop reason: HOSPADM

## 2022-01-15 RX ORDER — SODIUM CHLORIDE 0.9 % (FLUSH) 0.9 %
10 SYRINGE (ML) INJECTION EVERY 12 HOURS SCHEDULED
Status: DISCONTINUED | OUTPATIENT
Start: 2022-01-15 | End: 2022-01-18

## 2022-01-15 RX ORDER — DEXTROSE AND SODIUM CHLORIDE 5; .45 G/100ML; G/100ML
150 INJECTION, SOLUTION INTRAVENOUS CONTINUOUS PRN
Status: DISCONTINUED | OUTPATIENT
Start: 2022-01-15 | End: 2022-01-16

## 2022-01-15 RX ORDER — DEXTROSE AND SODIUM CHLORIDE 5; .9 G/100ML; G/100ML
50 INJECTION, SOLUTION INTRAVENOUS CONTINUOUS
Status: DISCONTINUED | OUTPATIENT
Start: 2022-01-15 | End: 2022-01-17

## 2022-01-15 RX ORDER — SODIUM CHLORIDE 0.9 % (FLUSH) 0.9 %
10 SYRINGE (ML) INJECTION AS NEEDED
Status: DISCONTINUED | OUTPATIENT
Start: 2022-01-15 | End: 2022-01-16

## 2022-01-15 RX ORDER — LEVETIRACETAM 5 MG/ML
500 INJECTION INTRAVASCULAR EVERY 12 HOURS SCHEDULED
Status: DISCONTINUED | OUTPATIENT
Start: 2022-01-15 | End: 2022-01-17

## 2022-01-15 RX ORDER — ATORVASTATIN CALCIUM 40 MG/1
80 TABLET, FILM COATED ORAL NIGHTLY
Status: DISCONTINUED | OUTPATIENT
Start: 2022-01-15 | End: 2022-01-19 | Stop reason: HOSPADM

## 2022-01-15 RX ORDER — DEXTROSE, SODIUM CHLORIDE, AND POTASSIUM CHLORIDE 5; .9; .15 G/100ML; G/100ML; G/100ML
150 INJECTION INTRAVENOUS CONTINUOUS PRN
Status: DISCONTINUED | OUTPATIENT
Start: 2022-01-15 | End: 2022-01-16

## 2022-01-15 RX ORDER — SODIUM CHLORIDE AND POTASSIUM CHLORIDE 150; 900 MG/100ML; MG/100ML
250 INJECTION, SOLUTION INTRAVENOUS CONTINUOUS PRN
Status: DISCONTINUED | OUTPATIENT
Start: 2022-01-15 | End: 2022-01-16

## 2022-01-15 RX ORDER — SODIUM CHLORIDE 9 MG/ML
250 INJECTION, SOLUTION INTRAVENOUS CONTINUOUS PRN
Status: DISCONTINUED | OUTPATIENT
Start: 2022-01-15 | End: 2022-01-16

## 2022-01-15 RX ORDER — SODIUM CHLORIDE 0.9 % (FLUSH) 0.9 %
10 SYRINGE (ML) INJECTION EVERY 12 HOURS SCHEDULED
Status: DISCONTINUED | OUTPATIENT
Start: 2022-01-15 | End: 2022-01-19 | Stop reason: HOSPADM

## 2022-01-15 RX ORDER — SODIUM CHLORIDE 0.9 % (FLUSH) 0.9 %
10 SYRINGE (ML) INJECTION AS NEEDED
Status: DISCONTINUED | OUTPATIENT
Start: 2022-01-15 | End: 2022-01-19 | Stop reason: HOSPADM

## 2022-01-15 RX ORDER — POTASSIUM CHLORIDE, DEXTROSE MONOHYDRATE AND SODIUM CHLORIDE 300; 5; 900 MG/100ML; G/100ML; MG/100ML
150 INJECTION, SOLUTION INTRAVENOUS CONTINUOUS PRN
Status: DISCONTINUED | OUTPATIENT
Start: 2022-01-15 | End: 2022-01-16

## 2022-01-15 RX ORDER — SODIUM CHLORIDE AND POTASSIUM CHLORIDE 150; 450 MG/100ML; MG/100ML
250 INJECTION, SOLUTION INTRAVENOUS CONTINUOUS PRN
Status: DISCONTINUED | OUTPATIENT
Start: 2022-01-15 | End: 2022-01-16

## 2022-01-15 RX ORDER — DEXTROSE AND SODIUM CHLORIDE 5; .9 G/100ML; G/100ML
150 INJECTION, SOLUTION INTRAVENOUS CONTINUOUS PRN
Status: DISCONTINUED | OUTPATIENT
Start: 2022-01-15 | End: 2022-01-16

## 2022-01-15 RX ORDER — SODIUM CHLORIDE 9 MG/ML
10 INJECTION, SOLUTION INTRAVENOUS CONTINUOUS PRN
Status: DISCONTINUED | OUTPATIENT
Start: 2022-01-15 | End: 2022-01-16

## 2022-01-15 RX ORDER — SODIUM CHLORIDE 0.9 % (FLUSH) 0.9 %
10 SYRINGE (ML) INJECTION ONCE AS NEEDED
Status: DISCONTINUED | OUTPATIENT
Start: 2022-01-15 | End: 2022-01-16

## 2022-01-15 RX ORDER — SODIUM CHLORIDE 450 MG/100ML
250 INJECTION, SOLUTION INTRAVENOUS CONTINUOUS PRN
Status: DISCONTINUED | OUTPATIENT
Start: 2022-01-15 | End: 2022-01-16

## 2022-01-15 RX ADMIN — SODIUM CHLORIDE, PRESERVATIVE FREE 10 ML: 5 INJECTION INTRAVENOUS at 21:10

## 2022-01-15 RX ADMIN — SODIUM CHLORIDE, POTASSIUM CHLORIDE, SODIUM LACTATE AND CALCIUM CHLORIDE 1000 ML: 600; 310; 30; 20 INJECTION, SOLUTION INTRAVENOUS at 10:10

## 2022-01-15 RX ADMIN — DEXTROSE MONOHYDRATE, SODIUM CHLORIDE, AND POTASSIUM CHLORIDE 150 ML/HR: 50; 9; 1.49 INJECTION, SOLUTION INTRAVENOUS at 19:21

## 2022-01-15 RX ADMIN — INSULIN HUMAN 4 UNITS: 100 INJECTION, SOLUTION PARENTERAL at 12:52

## 2022-01-15 RX ADMIN — SODIUM CHLORIDE 1000 ML: 9 INJECTION, SOLUTION INTRAVENOUS at 12:53

## 2022-01-15 RX ADMIN — LEVETIRACETAM 500 MG: 5 INJECTION INTRAVASCULAR at 21:08

## 2022-01-15 RX ADMIN — SODIUM CHLORIDE, PRESERVATIVE FREE 3 ML: 5 INJECTION INTRAVENOUS at 20:40

## 2022-01-15 RX ADMIN — DEXTROSE AND SODIUM CHLORIDE 125 ML/HR: 5; 900 INJECTION, SOLUTION INTRAVENOUS at 12:41

## 2022-01-15 RX ADMIN — SODIUM CHLORIDE, PRESERVATIVE FREE 10 ML: 5 INJECTION INTRAVENOUS at 18:19

## 2022-01-15 RX ADMIN — LEVETIRACETAM 1000 MG: 10 INJECTION INTRAVASCULAR at 10:07

## 2022-01-15 RX ADMIN — INSULIN HUMAN 5 UNITS/HR: 1 INJECTION, SOLUTION INTRAVENOUS at 15:02

## 2022-01-15 RX ADMIN — ATORVASTATIN CALCIUM 80 MG: 40 TABLET, FILM COATED ORAL at 21:16

## 2022-01-15 RX ADMIN — ENOXAPARIN SODIUM 40 MG: 40 INJECTION SUBCUTANEOUS at 18:18

## 2022-01-15 RX ADMIN — SODIUM CHLORIDE, PRESERVATIVE FREE 10 ML: 5 INJECTION INTRAVENOUS at 20:39

## 2022-01-15 NOTE — ED PROVIDER NOTES
"Subjective   This is a pleasant 82-year-old male who was recently admit from 1 11-1 13 with the events below.  I have reviewed his chart.  EMS was called this morning as the patient apparently was having speech difficulties when he woke up this morning and more problems getting around.  EMS arrived he was sitting on a couch she was able to stand with assistance to make it to the gurney.  Fingerstick blood sugar was a little high and his blood pressure was about 170 systolic per EMS.  He was transported to our ER for further evaluation I saw the patient in the CT scanner.  Currently the patient speaks he has some garbled speech this apparently is worse than what he was at discharge.  I really cannot get any other review of systems or history from the patient but he does shake his head yes and no.  He tells me he does not have a headache currently.    I have reviewed his discharge summary which is noted below.     \"Patient is a 82 y.o. male with coronary artery disease, aortic valve stenosis, diabetes mellitus, history of cardiomyopathy, ventricular pacemaker, who reports that he was starting to walk up the stairs and that is the last thing he remembers.  He fell down the steps with loss of consciousness.  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  There was a large amount of venous contrast reflux into the dural venous sinuses.  CT scan of the head revealed acute subdural hematoma, subarachnoid hemorrhage occipital scalp hematoma without skull fracture.  CT of the cervical spine, negative for acute fracture, multiple degenerative disc disease most severe at C5-6 with mild narrowing of the central canal.  Intracranial hemorrhage noted at the base of the skull.  There is no history of seizure disorder.  He does take Plavix but no other blood thinners.  He was hypertensive in the ER and started on a " "Cardene drip.  He had swelling and bruising of his right hand.  Films were negative for a fracture.  He denies headache nausea or vomiting.  He does not recall the fall just waking up at the bottom of the steps.     Hospital Course     On admission he was alert and cooperative. Imaging was not consistent with any cervical fractures or skull fractures. Despite being on Plavix, neurosurgery did not wish platelet transfusion to be completed. EEG to rule out seizures was negative for epileptiform activity however did show evidence of diffuse cerebral dysfunction which was characterized by nonspecific. ECHO revealed only mild aortic stenosis. Serial CT did not show any extension of hemorrhages.      He was deemed acceptable for downgrade to floor on 1/12, unfortunately he developed increasing confusion and agitated so he was moved back to the ICU for mental status changes. CT scan was done and revealed no change in his bleed. On arrival to the ICU he was hypertensive and Cardene was restarted. This has since been discontinued and was started on Zebeta. Cardiology was consulted and wanted orthostatic BP's to be obtained. He did have a drop in his pressures so his home Entresto has been stopped; however Zebeta was continued. They did feel patient was appropriate for discharge and for him to follow up with his primary cardiologist for possible stress ECHO to see if his gradient across his aortic valve increases with exercise. Follow up with primary cardiologisted here with a traumatic subarachnoid bleed.\"          Review of Systems   Unable to perform ROS: Patient nonverbal       Past Medical History:   Diagnosis Date   • Allergic rhinitis    • Aortic stenosis    • Atopic rhinitis 7/6/2016   • Benign non-nodular prostatic hyperplasia with lower urinary tract symptoms 9/15/2017   • CAD (coronary artery disease)    • Cardiomyopathy, ischemic    • Colon polyps     tubular adenoma 2016   • Community acquired pneumonia    • " Depression 2016   • Diabetes (HCC)    • Diverticulosis of intestine 2016    Description: Left and sigmoid   • Dyslipidemia    • LBBB (left bundle branch block)    • PAD (peripheral artery disease) (HCC)        No Known Allergies    Past Surgical History:   Procedure Laterality Date   • CARDIAC CATHETERIZATION      100% cx   • CATARACT EXTRACTION     • COLONOSCOPY W/ BIOPSIES AND POLYPECTOMY  2021    1 benign polyp   • HIP SURGERY Left 2020    Dr Mandy BOUDREAUX   • LEG SURGERY Left 2016    Popliteal Artery stenting by Dr Hdz   • LEG SURGERY Left 2016    skin graft by Dr Nance   • LIPOMA EXCISION      s/p excision on chest   • PACEMAKER IMPLANTATION  2019    and defibrillator       Family History   Problem Relation Age of Onset   • Diabetes Other    • Hypertension Other    • Stroke Other    • Coronary artery disease Other        Social History     Socioeconomic History   • Marital status:    Tobacco Use   • Smoking status: Former Smoker     Types: Cigarettes     Quit date: 1997     Years since quittin.0   • Smokeless tobacco: Never Used   Substance and Sexual Activity   • Drug use: No           Objective   Physical Exam  Vitals and nursing note reviewed.   Constitutional:       Comments: This is a pleasant older man no acute distress he is alert he speaks but I can understand his speech when I ask him questions he will shake his head yes and no seems to be appropriate.  GCS is 14.   HENT:      Head: Normocephalic and atraumatic.      Right Ear: External ear normal.      Left Ear: External ear normal.      Nose: Nose normal.      Mouth/Throat:      Mouth: Mucous membranes are dry.      Pharynx: Oropharynx is clear.   Eyes:      Extraocular Movements: Extraocular movements intact.      Conjunctiva/sclera: Conjunctivae normal.      Pupils: Pupils are equal, round, and reactive to light.   Neck:      Vascular: No carotid bruit.   Cardiovascular:      Rate and Rhythm: Normal  rate and regular rhythm.      Pulses: Normal pulses.      Heart sounds: Normal heart sounds.   Pulmonary:      Effort: Pulmonary effort is normal.      Breath sounds: Normal breath sounds.   Abdominal:      General: Abdomen is flat. Bowel sounds are normal. There is no distension.      Palpations: Abdomen is soft. There is no mass.      Tenderness: There is no abdominal tenderness.   Musculoskeletal:         General: No swelling, tenderness or deformity. Normal range of motion.      Cervical back: Normal range of motion and neck supple. No rigidity or tenderness.      Comments: No point tenderness his hands little bit red apparently from his previous fall but no point tenderness in his cervical thoracic or lumbosacral spine or his extremities.  He has good pulses 3/4 in his extremities.   Lymphadenopathy:      Cervical: No cervical adenopathy.   Skin:     General: Skin is warm and dry.      Capillary Refill: Capillary refill takes less than 2 seconds.   Neurological:      Mental Status: He is alert.      Comments: Face symmetric voice is garbled hearing insight seem to be intact.  His mild bilateral weakness of the upper extremities but his left leg is weak compared with the right he can lift it with just assistance but it falls back to the table where the right he can get up up for a few seconds.  His knee jerks 1+ bilaterally and his plantar response equivocal bilaterally.         Critical Care  Performed by: Joshua Gamboa MD  Authorized by: Joshua Gamboa MD     Critical care provider statement:     Critical care time (minutes):  35    Critical care was necessary to treat or prevent imminent or life-threatening deterioration of the following conditions:  Metabolic crisis    Critical care was time spent personally by me on the following activities:  Development of treatment plan with patient or surrogate, discussions with consultants, evaluation of patient's response to treatment, examination of patient,  obtaining history from patient or surrogate, review of old charts, re-evaluation of patient's condition, pulse oximetry, ordering and review of radiographic studies, ordering and review of laboratory studies and ordering and performing treatments and interventions               ED Course           Recent Results (from the past 24 hour(s))   Green Top (Gel)    Collection Time: 01/15/22  9:57 AM   Result Value Ref Range    Extra Tube Hold for add-ons.    Lavender Top    Collection Time: 01/15/22  9:57 AM   Result Value Ref Range    Extra Tube hold for add-on    Gold Top - SST    Collection Time: 01/15/22  9:57 AM   Result Value Ref Range    Extra Tube Hold for add-ons.    Gray Top    Collection Time: 01/15/22  9:57 AM   Result Value Ref Range    Extra Tube Hold for add-ons.    Light Blue Top    Collection Time: 01/15/22  9:57 AM   Result Value Ref Range    Extra Tube hold for add-on    CBC Auto Differential    Collection Time: 01/15/22  9:57 AM    Specimen: Blood   Result Value Ref Range    WBC 9.56 3.40 - 10.80 10*3/mm3    RBC 4.42 4.14 - 5.80 10*6/mm3    Hemoglobin 14.4 13.0 - 17.7 g/dL    Hematocrit 45.7 37.5 - 51.0 %    .4 (H) 79.0 - 97.0 fL    MCH 32.6 26.6 - 33.0 pg    MCHC 31.5 31.5 - 35.7 g/dL    RDW 12.6 12.3 - 15.4 %    RDW-SD 48.9 37.0 - 54.0 fl    MPV 10.9 6.0 - 12.0 fL    Platelets 199 140 - 450 10*3/mm3    Neutrophil % 84.9 (H) 42.7 - 76.0 %    Lymphocyte % 11.6 (L) 19.6 - 45.3 %    Monocyte % 2.9 (L) 5.0 - 12.0 %    Eosinophil % 0.0 (L) 0.3 - 6.2 %    Basophil % 0.2 0.0 - 1.5 %    Immature Grans % 0.4 0.0 - 0.5 %    Neutrophils, Absolute 8.11 (H) 1.70 - 7.00 10*3/mm3    Lymphocytes, Absolute 1.11 0.70 - 3.10 10*3/mm3    Monocytes, Absolute 0.28 0.10 - 0.90 10*3/mm3    Eosinophils, Absolute 0.00 0.00 - 0.40 10*3/mm3    Basophils, Absolute 0.02 0.00 - 0.20 10*3/mm3    Immature Grans, Absolute 0.04 0.00 - 0.05 10*3/mm3    nRBC 0.0 0.0 - 0.2 /100 WBC   Comprehensive Metabolic Panel    Collection  Time: 01/15/22  9:57 AM    Specimen: Blood   Result Value Ref Range    Glucose 184 (H) 65 - 99 mg/dL    BUN 30 (H) 8 - 23 mg/dL    Creatinine 0.93 0.76 - 1.27 mg/dL    Sodium 143 136 - 145 mmol/L    Potassium 3.8 3.5 - 5.2 mmol/L    Chloride 102 98 - 107 mmol/L    CO2 11.0 (L) 22.0 - 29.0 mmol/L    Calcium 9.8 8.6 - 10.5 mg/dL    Total Protein 7.9 6.0 - 8.5 g/dL    Albumin 4.50 3.50 - 5.20 g/dL    ALT (SGPT) 69 (H) 1 - 41 U/L    AST (SGOT) 86 (H) 1 - 40 U/L    Alkaline Phosphatase 70 39 - 117 U/L    Total Bilirubin 1.1 0.0 - 1.2 mg/dL    eGFR Non African Amer 78 >60 mL/min/1.73    Globulin 3.4 gm/dL    A/G Ratio 1.3 g/dL    BUN/Creatinine Ratio 32.3 (H) 7.0 - 25.0    Anion Gap 30.0 (H) 5.0 - 15.0 mmol/L   Magnesium    Collection Time: 01/15/22  9:57 AM    Specimen: Blood   Result Value Ref Range    Magnesium 2.6 (H) 1.6 - 2.4 mg/dL   Protime-INR    Collection Time: 01/15/22  9:57 AM    Specimen: Blood   Result Value Ref Range    Protime 14.1 11.4 - 14.4 Seconds    INR 1.12 0.85 - 1.16   aPTT    Collection Time: 01/15/22  9:57 AM    Specimen: Blood   Result Value Ref Range    PTT 25.6 22.0 - 39.0 seconds   Lactic Acid, Plasma    Collection Time: 01/15/22  9:57 AM    Specimen: Blood   Result Value Ref Range    Lactate 2.6 (C) 0.5 - 2.0 mmol/L   Phosphorus    Collection Time: 01/15/22  9:57 AM    Specimen: Blood   Result Value Ref Range    Phosphorus 4.6 (H) 2.5 - 4.5 mg/dL   Osmolality, Serum    Collection Time: 01/15/22  9:57 AM    Specimen: Blood   Result Value Ref Range    Osmolality 321 (H) 275 - 295 mOsm/kg   Hemoglobin A1c    Collection Time: 01/15/22  9:57 AM    Specimen: Blood   Result Value Ref Range    Hemoglobin A1C 7.80 (H) 4.80 - 5.60 %   Troponin    Collection Time: 01/15/22  9:57 AM    Specimen: Blood   Result Value Ref Range    Troponin T <0.010 0.000 - 0.030 ng/mL   Urinalysis With Microscopic If Indicated (No Culture) - Urine, Clean Catch    Collection Time: 01/15/22 10:44 AM    Specimen: Urine,  Clean Catch   Result Value Ref Range    Color, UA Yellow Yellow, Straw    Appearance, UA Clear Clear    pH, UA <=5.0 5.0 - 8.0    Specific Gravity, UA 1.027 1.001 - 1.030    Glucose, UA >=1000 mg/dL (3+) (A) Negative    Ketones, UA >=160 mg/dL (4+) (A) Negative    Bilirubin, UA Negative Negative    Blood, UA Negative Negative    Protein, UA 30 mg/dL (1+) (A) Negative    Leuk Esterase, UA Negative Negative    Nitrite, UA Negative Negative    Urobilinogen, UA 0.2 E.U./dL 0.2 - 1.0 E.U./dL   Urinalysis, Microscopic Only - Urine, Clean Catch    Collection Time: 01/15/22 10:44 AM    Specimen: Urine, Clean Catch   Result Value Ref Range    RBC, UA 0-2 None Seen, 0-2 /HPF    WBC, UA None Seen None Seen, 0-2 /HPF    Bacteria, UA None Seen None Seen, Trace /HPF    Squamous Epithelial Cells, UA None Seen None Seen, 0-2 /HPF    Hyaline Casts, UA 0-6 0 - 6 /LPF    Methodology Automated Microscopy    POC Glucose Once    Collection Time: 01/15/22 12:48 PM    Specimen: Blood   Result Value Ref Range    Glucose 185 (H) 70 - 130 mg/dL   Blood Gas, Arterial With Co-Ox    Collection Time: 01/15/22  1:08 PM    Specimen: Arterial Blood   Result Value Ref Range    Site Right Brachial     Toan's Test N/A     pH, Arterial 7.164 (C) 7.350 - 7.450 pH units    pCO2, Arterial 22.2 (L) 35.0 - 45.0 mm Hg    pO2, Arterial 83.4 83.0 - 108.0 mm Hg    HCO3, Arterial 8.0 (L) 20.0 - 26.0 mmol/L    Base Excess, Arterial -18.9 (L) 0.0 - 2.0 mmol/L    Hemoglobin, Blood Gas 12.6 (L) 13.5 - 17.5 g/dL    Hematocrit, Blood Gas 38.6 38.0 - 51.0 %    Oxyhemoglobin 93.4 (L) 94 - 99 %    Methemoglobin      Carboxyhemoglobin 0.9 0 - 2 %    CO2 Content 8.7 (L) 22 - 33 mmol/L    Temperature 37.0 C    Barometric Pressure for Blood Gas      Modality Room Air     FIO2 21 %    Rate 0 Breaths/minute    PIP 0 cmH2O    IPAP 0     EPAP 0     Note      Notified Who eden boland     Notified By [id number]     Notified Time 01/15/2022 13:12     pH, Temp Corrected 7.164 pH Units  "   pCO2, Temperature Corrected 22.2 (L) 35 - 48 mm Hg    pO2, Temperature Corrected 83.4 83 - 108 mm Hg   POC Glucose Once    Collection Time: 01/15/22  2:59 PM    Specimen: Blood   Result Value Ref Range    Glucose 279 (H) 70 - 130 mg/dL     Note: In addition to lab results from this visit, the labs listed above may include labs taken at another facility or during a different encounter within the last 24 hours. Please correlate lab times with ED admission and discharge times for further clarification of the services performed during this visit.    XR Chest 2 View   Final Result   Stable appearance of the chest without evidence of acute   disease.       This report was finalized on 1/15/2022 1:36 PM by Tal Garza.          XR Chest 1 View   Final Result   Mild chronic changes as above without evidence of acute   disease in the chest.       This report was finalized on 1/15/2022 10:19 AM by Tal Garza.          CT Head Without Contrast   Final Result   Expected evolutionary changes noted involving the previously   described areas of multicompartment intracranial hemorrhage. Prominent   subarachnoid hemorrhage is grossly unchanged, with essential resolution   of previously noted dependent interventricular hemorrhage. Unchanged   parafalcine hemorrhage. No new hemorrhage, mass effect or evidence of   acute territorial ischemia.       Scan performed 9:37 AM 1/15/2022, results related to the stroke team via   the CT technologist by Tal Garza at 9:47 AM same day                   This report was finalized on 1/15/2022 9:51 AM by Tal Garza.          MRI Brain Without Contrast    (Results Pending)     Vitals:    01/15/22 0955 01/15/22 1130   BP: 170/92 139/75   BP Location: Left arm    Patient Position: Lying    Pulse: 99 98   Resp: 16    Temp: 97.6 °F (36.4 °C)    TempSrc: Oral    SpO2: 95% 95%   Weight: 59.9 kg (132 lb)    Height: 165.1 cm (65\")      Medications   sodium chloride 0.9 % flush 10 " mL (has no administration in time range)   levETIRAcetam in NaCl 0.82% (KEPPRA) IVPB 500 mg (has no administration in time range)   dextrose 5 % and sodium chloride 0.9 % infusion (125 mL/hr Intravenous New Bag 1/15/22 1241)   sodium chloride 0.9 % flush 3 mL (has no administration in time range)   sodium chloride 0.9 % flush 10 mL (has no administration in time range)   sodium chloride 0.9 % infusion (has no administration in time range)   sodium chloride 0.9 % with KCl 20 mEq/L infusion (has no administration in time range)   sodium chloride 0.9 % with KCl 40 mEq/L infusion (has no administration in time range)   dextrose 5 % and sodium chloride 0.9 % infusion (has no administration in time range)   dextrose 5 % and sodium chloride 0.9 % with KCl 40 mEq/L infusion (has no administration in time range)   dextrose 5 % and sodium chloride 0.9 % with KCl 20 mEq/L infusion (has no administration in time range)   sodium chloride 0.45 % infusion (has no administration in time range)   sodium chloride 0.45 % with KCl 20 mEq/L infusion (has no administration in time range)   sodium chloride 0.45 % 1,000 mL with potassium chloride 40 mEq infusion (has no administration in time range)   dextrose 5 % and sodium chloride 0.45 % infusion (has no administration in time range)   dextrose 5 % and sodium chloride 0.45 % with KCl 20 mEq/L infusion (has no administration in time range)   dextrose 5 % and sodium chloride 0.45 % with KCl 40 mEq/L infusion (has no administration in time range)   insulin regular 1 unit/mL in 0.9% sodium chloride (5 Units/hr Intravenous New Bag 1/15/22 1502)   dextrose (D50W) (25 g/50 mL) IV injection 12.5 g (has no administration in time range)   sodium chloride 0.9 % flush 10 mL (has no administration in time range)   sodium chloride 0.9 % infusion (has no administration in time range)   sodium chloride 0.9 % flush 10 mL (has no administration in time range)   sodium chloride 0.9 % flush 10 mL (has no  administration in time range)   atorvastatin (LIPITOR) tablet 80 mg (has no administration in time range)   levETIRAcetam in NaCl 0.75% (KEPPRA) IVPB 1,000 mg (0 mg Intravenous Stopped 1/15/22 1022)   lactated ringers bolus 1,000 mL (0 mL Intravenous Stopped 1/15/22 1040)   sodium chloride 0.9 % bolus 1,000 mL (1,000 mL Intravenous New Bag 1/15/22 1253)   insulin regular (humuLIN R,novoLIN R) injection 4 Units (4 Units Intravenous Given 1/15/22 1252)     ECG/EMG Results (last 24 hours)     Procedure Component Value Units Date/Time    ECG 12 Lead [[id number]] Collected: 01/15/22 0951     Updated: 01/15/22 0952        ECG 12 Lead                                                 MDM  Number of Diagnoses or Management Options  Altered mental status, unspecified altered mental status type  Diabetic ketoacidosis without coma associated with drug or chemical induced diabetes mellitus (HCC)  Medication side effect, initial encounter  Diagnosis management comments:       I have reviewed all available studies at the bedside with the patient I spoke with the patient's daughter Audra via phone.  Mr. Joya still remains confused but I have reviewed his CT of the head personally with Dr. Lexy escobar and it is stable showing no change in his pattern of blood.    On review of his labs I did notice that the patient had an anion With a CO2 of 11 but his glucose was only in the 180s.  I reviewed his medications and it appears he is on an SGLT2 inhibitor.  I suspect he has a near euglycemic DKA presentation due to this medication.    I have started the patient on IV fluids I have started him on D5 normal saline as well as a dose of IV insulin.    The patient was seen in consultation by the stroke team who is going to monitor his brain function but I spoke with Dr. Vitale, on-call hospital medicine, and her and her colleagues will admit the patient for ongoing treatment.    My recheck patient remained confused but was alert would wake up  and mumble.    All are agreeable with the plan       Amount and/or Complexity of Data Reviewed  Clinical lab tests: reviewed  Tests in the radiology section of CPT®: reviewed  Tests in the medicine section of CPT®: reviewed        Final diagnoses:   Altered mental status, unspecified altered mental status type   Diabetic ketoacidosis without coma associated with drug or chemical induced diabetes mellitus (HCC)   Medication side effect, initial encounter       ED Disposition  ED Disposition     ED Disposition Condition Comment    Decision to Admit  Level of Care: Telemetry [5]   Diagnosis: Altered mental status, unspecified altered mental status type [[id number]]   Admitting Physician: JESSICA APONTE [[id number]]   Attending Physician: JESSICA APONTE [[id number]]   Certification: I Certify That Inpatient Hospital Services Are Medically Necessary For Greater Than 2 Midnights            No follow-up provider specified.       Medication List      No changes were made to your prescriptions during this visit.          Joshua Gamboa MD  01/15/22 2943

## 2022-01-15 NOTE — ED NOTES
Report given to [facility] on 3E. Patient ready for transport.      Larry Fermin, MARYURI  01/15/22 6623

## 2022-01-15 NOTE — CONSULTS
Stroke Consult Note    Patient Name: Derian Joya   MRN: [id number]  Age: 82 y.o.  Sex: male  : 1940    Primary Care Physician: Diaz Lee MD  Referring Physician:  Dr. Joshua Gamboa    TIME STROKE TEAM CALLED: 0935 EST     TIME PATIENT SEEN: 0945 EST    Handedness: Unknown  Race:     Chief Complaint/Reason for Consultation: Increased confusion and slurred speech    HPI: Mr. oJya is an 82-year-old  male with known medical diagnosis of hypertension, hyperlipidemia, diabetes mellitus type 2, coronary artery disease, peripheral artery disease, and recent traumatic SDH/SAH (2022) who presents to the emergency department for further evaluation of increased confusion and slurred speech.  The patient's wife told EMS that he was in his normal state of health last evening prior to going to bed at 2100 and upon awakening this morning had difficulty with speech and increased confusion.      Upon chart review patient was seen by neurosurgery during his last admission from 2022 through 2022 for his traumatic SDH/SAH.  At this time he was not deemed a candidate for surgical intervention.  He was scheduled to follow-up with Miguel Ángel Scruggs PA-C in February with a repeat CT of the head without contrast.  He has remained off of his Plavix since discharge.    Last Known Normal Date/Time: 2022 2100 EST     Review of Systems   Unable to perform ROS: Mental status change      Past Medical History:   Diagnosis Date   • Allergic rhinitis    • Aortic stenosis    • Atopic rhinitis 2016   • Benign non-nodular prostatic hyperplasia with lower urinary tract symptoms 9/15/2017   • CAD (coronary artery disease)    • Cardiomyopathy, ischemic    • Colon polyps     tubular adenoma    • Community acquired pneumonia    • Depression 2016   • Diabetes (HCC)    • Diverticulosis of intestine 2016    Description: Left and sigmoid   • Dyslipidemia    • LBBB (left bundle branch block)     • PAD (peripheral artery disease) (HCC)      Past Surgical History:   Procedure Laterality Date   • CARDIAC CATHETERIZATION  2018    100% cx   • CATARACT EXTRACTION     • COLONOSCOPY W/ BIOPSIES AND POLYPECTOMY  2021    1 benign polyp   • HIP SURGERY Left 2020    Dr Mandy BOUDREAUX   • LEG SURGERY Left 2016    Popliteal Artery stenting by Dr Hdz   • LEG SURGERY Left 2016    skin graft by Dr Nance   • LIPOMA EXCISION      s/p excision on chest   • PACEMAKER IMPLANTATION  2019    and defibrillator     Family History   Problem Relation Age of Onset   • Diabetes Other    • Hypertension Other    • Stroke Other    • Coronary artery disease Other      Social History     Socioeconomic History   • Marital status:    Tobacco Use   • Smoking status: Former Smoker     Types: Cigarettes     Quit date: 1997     Years since quittin.0   • Smokeless tobacco: Never Used   Substance and Sexual Activity   • Drug use: No     No Known Allergies  Prior to Admission medications    Medication Sig Start Date End Date Taking? Authorizing Provider   atorvastatin (LIPITOR) 40 MG tablet Take 1 tablet by mouth Every Night. 22   Marcia Ceja APRN   bisoprolol (ZEBeta) 10 MG tablet Take 10 mg by mouth Daily.    Provider, MD Joselin   doxazosin (CARDURA) 2 MG tablet TAKE 1 TABLET EVERY NIGHT  Patient taking differently: take 1/2 tablet every night 3/3/21   Diaz Lee MD   empagliflozin (JARDIANCE) 25 MG tablet tablet Take 1 tablet by mouth Daily.  Patient taking differently: Take 10 mg by mouth Daily. 22   Marcia Ceja APRN   fluticasone (FLONASE) 50 MCG/ACT nasal spray USE 1 SPRAY NASALLY TWICE DAILY  Patient taking differently: 2 sprays into the nostril(s) as directed by provider Daily As Needed for Rhinitis or Allergies. OTC 18   Diaz Lee MD   levocetirizine (XYZAL) 5 MG tablet Take 5 mg by mouth Every Evening.    ProviderJoselin MD   metFORMIN (GLUCOPHAGE) 850 MG  "tablet TAKE 1 TABLET TWICE DAILY WITH MEALS 3/3/21   Diaz Lee MD   PARoxetine (PAXIL) 20 MG tablet TAKE 1 TABLET EVERY DAY 10/18/21   Diaz Lee MD         Temp:  [97.6 °F (36.4 °C)-98.6 °F (37 °C)] 97.6 °F (36.4 °C)  Heart Rate:  [98-99] 99  Resp:  [16] 16  BP: (100-170)/(70-92) 170/92  Neurological Exam  Mental Status  Alert. Oriented only to person. Mild dysarthria present. Expressive aphasia present. Language: Repetitive speech pattern, patient answers \"2, 2, 4, 4\" when asked questions related to person, place, and time.    Cranial Nerves  CN II: Visual fields full to confrontation.  CN III, IV, VI: Extraocular movements intact bilaterally. Pupils equal round and reactive to light bilaterally.  CN V: Unable to assess secondary to altered mental status.  CN VII: Full and symmetric facial movement.  CN VIII: Hearing appears intact bilaterally.  CN IX, X: Unable to assess secondary to altered mental status.  CN XI: Shoulder shrug strength is normal.  CN XII: Tongue midline without atrophy or fasciculations.    Motor  Normal muscle bulk throughout. No fasciculations present. Normal muscle tone.  Patient has antigravity strength all extremities, 4/5 strength throughout.    Sensory  Sensation: Unable to assess secondary to altered mental status however patient responds equally to light touch in all extremities.     Coordination  Unable to assess as patient is unable to perform.    Gait  Not assessed.      Physical Exam  Vitals reviewed.   Constitutional:       General: He is not in acute distress.     Appearance: He is ill-appearing.   HENT:      Head: Normocephalic and atraumatic.      Mouth/Throat:      Mouth: Mucous membranes are dry.      Pharynx: Oropharynx is clear.      Comments: Cracked mucous membranes  Eyes:      Extraocular Movements: Extraocular movements intact.      Pupils: Pupils are equal, round, and reactive to light.   Cardiovascular:      Rate and Rhythm: Normal rate and regular " rhythm.   Pulmonary:      Effort: Pulmonary effort is normal. No respiratory distress.      Comments: On room air  Neurological:      Mental Status: He is alert. He is disoriented.      Cranial Nerves: Dysarthria present. No cranial nerve deficit.      Sensory: No sensory deficit.      Motor: Weakness (Generalized) present.   Psychiatric:         Mood and Affect: Mood normal.         Speech: He is noncommunicative. Speech is slurred.         Behavior: Behavior is cooperative.         Cognition and Memory: Cognition is impaired.         Acute Stroke Data    Alteplase (tPA) Inclusion / Exclusion Criteria    Time: 09:56 EST  Person Administering Scale: SCARLETT Tang    Inclusion Criteria  [x]   18 years of age or greater   []   Onset of symptoms < 4.5 hours before beginning treatment (stroke onset = time patient was last seen well or without symptoms).   []   Diagnosis of acute ischemic stroke causing measurable disabling deficit (Complete Hemianopia, Any Aphasia, Visual or Sensory Extinction, Any weakness limiting sustained effort against gravity)   []   Any remaining deficit considered potentially disabling in view of patient and practitioner   Exclusion criteria (Do not proceed with Alteplase if any are checked under exclusion criteria)  [x]   Onset unknown or GREATER than 4.5 hours   [x]   ICH on CT/MRI   []   CT demonstrates hypodensity representing acute or subacute infarct   []   Significant head trauma or prior stroke in the previous 3 months   []   Symptoms suggestive of subarachnoid hemorrhage   []   History of un-ruptured intracranial aneurysm GREATER than 10 mm   []   Recent intracranial or intraspinal surgery within the last 3 months   []   Arterial puncture at a non-compressible site in the previous 7 days   []   Active internal bleeding   []   Acute bleeding tendency   []   Platelet count LESS than 100,000 for known hematological diseases such as leukemia, thrombocytopenia or chronic  cirrhosis   []   Current use of anticoagulant with INR GREATER than 1.7 or PT GREATER than 15 seconds, aPTT GREATER than 40 seconds   []   Heparin received within 48 hours, resulting in abnormally elevated aPTT GREATER than upper limit of normal   []   Current use of direct thrombin inhibitors or direct factor Xa inhibitors in the past 48 hours   []   Elevated blood pressure refractory to treatment (systolic GREATER than 185 mm/Hg or diastolic  GREATER than 110 mm/Hg   []   Suspected infective endocarditis and aortic arch dissection   []   Current use of therapeutic treatment dose of low-molecular-weight heparin (LMWH) within the previous 24 hours   []   Structural GI malignancy or bleed   Relative exclusion for all patients  []   Only minor non-disabling symptoms   []   Pregnancy   []   Seizure at onset with postictal residual neurological impairments   []   Major surgery or previous trauma within past 14 days   []   History of previous spontaneous ICH, intracranial neoplasm, or AV malformation   []   Postpartum (within previous 14 days)   []   Recent GI or urinary tract hemorrhage (within previous 21 days)   []   Recent acute MI (within previous 3 months)   []   History of un-ruptured intracranial aneurysm LESS than 10 mm   []   History of ruptured intracranial aneurysm   []   Blood glucose LESS than 50 mg/dL (2.7 mmol/L)   []   Dural puncture within the last 7 days   []   Known GREATER than 10 cerebral microbleeds   Additional exclusions for patients with symptoms onset between 3 and 4.5 hours.  [x]   Age > 80.   []   On any anticoagulants regardless of INR  >>> Warfarin (Coumadin), Heparin, Enoxaparin (Lovenox), fondaparinux (Arixtra), bivalirudin (Angiomax), Argatroban, dabigatran (Pradaxa), rivaroxaban (Xarelto), or apixaban (Eliquis)   []   Severe stroke (NIHSS > 25).   []   History of BOTH diabetes and previous ischemic stroke.   []   The risks and benefits have been discussed with the patient or family  related to the administration of IV Alteplase for stroke symptoms.   []   I have discussed and reviewed the patient's case and imaging with the attending prior to IV Alteplase.   N/A Time Alteplase administered       Hospital Meds:  Scheduled- levETIRAcetam, 1,000 mg, Intravenous, Q12H  levETIRAcetam, 500 mg, Intravenous, Q12H      Infusions-     PRNs- sodium chloride    Functional Status Prior to Current Stroke/Centre Score: 3-4    NIH Stroke Scale  Time: 09:56 EST  Person Administering Scale: SCARLETT Tang    Interval: baseline  1a. Level of Consciousness: 0-->Alert, keenly responsive  1b. LOC Questions: 2-->Answers neither question correctly  1c. LOC Commands: 0-->Performs both tasks correctly  2. Best Gaze: 0-->Normal  3. Visual: 0-->No visual loss  4. Facial Palsy: 0-->Normal symmetrical movements  5a. Motor Arm, Left: 0-->No drift, limb holds 90 (or 45) degrees for full 10 secs  5b. Motor Arm, Right: 0-->No drift, limb holds 90 (or 45) degrees for full 10 secs  6a. Motor Leg, Left: 0-->No drift, leg holds 30 degree position for full 5 secs  6b. Motor Leg, Right: 0-->No drift, leg holds 30 degree position for full 5 secs  7. Limb Ataxia: 0-->Absent  8. Sensory: 0-->Normal, no sensory loss  9. Best Language: 2-->Severe aphasia, all communication is through fragmentary expression, great need for inference, questioning, and guessing by the listener. Range of information that can be exchanged is limited, listener carries burden of. . . (see row details)  10. Dysarthria: 1-->Mild-to-moderate dysarthria, patient slurs at least some words and, at worst, can be understood with some difficulty  11. Extinction and Inattention (formerly Neglect): 0-->No abnormality    Total (NIH Stroke Scale): 5        Results Reviewed:  I have personally reviewed current lab, radiology, and data and agree with results.    CT head without contrast reveals stable appearance of multicompartment intracranial hemorrhage and  subdural hematoma.  This was reviewed by Dr. Sears.    CTA head/neck from last admission revealed bilateral carotid atherosclerotic disease, severe basilar artery disease with calcified and noncalcified plaque with short segment of high-grade stenosis in the proximal basilar artery.      Results for orders placed during the hospital encounter of 01/11/22    Adult Transthoracic Echo Complete W/ Cont if Necessary Per Protocol    Interpretation Summary  · Mild aortic valve stenosis is present.  · Estimated right ventricular systolic pressure from tricuspid regurgitation is normal (<35 mmHg).  · LVSF is normal  · MAC        Assessment/Plan:    This is an 82-year-old  male with known medical diagnosis of hypertension, hyperlipidemia, diabetes mellitus type 2, coronary artery disease, peripheral artery disease (previously on Plavix), and recent traumatic SDH/SAH (1/11/2022) who presents to the emergency department for further evaluation of increased confusion and slurred speech.  He is not a candidate for IV alteplase therapy secondary to last known well time >4.5 hours in presence of hemorrhage on CT.  He is not candidate for endovascular therapy as his symptoms are not consistent with large occlusive of stroke.      1. Altered mental status with speech difficulties  Differentials include partial complex seizure vs. infectious process vs. Dehydration vs. TIA/CVA  -Will obtain STAT EEG  -Load with Keppra 1g IV now then 500mg BID  -Seizure precautions  -Recommend infectious workup and IVF   -TIA/CVA order set without thrombolytic therapy has been initiated  -We will order MRI of the brain without contrast, per TTE on 1/12 it appears that the patient has a electronic lead present in his right ventricle.  Will have MRI/cardiology evaluate if this test can be performed.  -N.p.o. until nursing bedside dysphagia screen completed  -No need to repeat lipid panel or A1c; performed on last admission    2. Recent  traumatic ICH/SAH/SDH, 1/11/2022  -Images reviewed by Dr. Sears, no change in bleed size therefore there is role for surgical intervention  -Patient currently off antiplatelet agents    3.   Extracranial and intracranial atherosclerotic disease, bilaterally  -Patient will need to resume DAPT when cleared from neurosurgical standpoint  -Dr. Sears has reviewed patient's imaging and may consider performing a diagnostic carotid/cerebral angiogram  -Will obtain carotid ultrasound  -Increase atorvastatin to 80 mg nightly given extensive atherosclerotic disease laterally    4.   Hypertension  -Normal blood pressure parameters, <140/80  -Management per hospitalist    5.  Diabetes mellitus type 2  -A1c on last admission was 8.10  -We will have diabetes educator meet with patient when appropriate  -Management per hospitalist  -Maintain euglycemia    6.   Hyperlipidemia  -LDL last admission was 73, goal less than 70  -Increase atorvastatin to 80 mg nightly given extensive atherosclerotic disease bilaterally    Discussed patient with Dr. Gamboa and Dr. Wood.  Neurology will continue to follow patient.  Please call with any questions or concerns.  Thank you for this consult.    Leeanne Pereira, APRN  January 15, 2022  09:55 EST

## 2022-01-15 NOTE — H&P
[facility]  HISTORY AND PHYSICAL    Patient Name: Derian Joya  : 1940  MRN: [id number]  Primary Care Physician: Diaz Lee MD  Date of admission: 1/15/2022      Subjective   Subjective     Chief Complaint:  For mental status    HPI:  Derian Joya is a 82 y.o. male with a history of hypertension, hyperlipidemia, diabetes, recent fall with subarachnoid hemorrhage who is brought in by EMS for altered mental status.  Discussed with wife and son over the phone.  He had been progressively becoming more confused the first complaint started with slurred speech and changes in vision.  Mostly prominent at around noon yesterday.  And then progressively got to the point where he was totally confused.  No recent medication changes, new ingestions of toxins.  He did have a fall a few days ago and was reviewed in the ED found to have a subarachnoid hemorrhage.        COVID Details:    Symptoms:    [x] NONE [] Fever []  Cough [] Shortness of breath [] Change in taste/smell      Review of Systems   Unable to assess  All other systems reviewed and are negative.     Personal History     Past Medical History:   Diagnosis Date   • Allergic rhinitis    • Aortic stenosis    • Atopic rhinitis 2016   • Benign non-nodular prostatic hyperplasia with lower urinary tract symptoms 9/15/2017   • CAD (coronary artery disease)    • Cardiomyopathy, ischemic    • Colon polyps     tubular adenoma    • Community acquired pneumonia    • Depression 2016   • Diabetes (HCC)    • Diverticulosis of intestine 2016    Description: Left and sigmoid   • Dyslipidemia    • LBBB (left bundle branch block)    • PAD (peripheral artery disease) (HCC)        Past Surgical History:   Procedure Laterality Date   • CARDIAC CATHETERIZATION      100% cx   • CATARACT EXTRACTION     • COLONOSCOPY W/ BIOPSIES AND POLYPECTOMY  2021    1 benign polyp   • HIP SURGERY Left 2020      [facility]   • LEG SURGERY Left 07/2016    Popliteal Artery stenting by Dr Hdz   • LEG SURGERY Left 07/2016    skin graft by Dr Nance   • LIPOMA EXCISION      s/p excision on chest   • PACEMAKER IMPLANTATION  01/2019    and defibrillator       Family History:  family history includes Coronary artery disease in an other family member; Diabetes in an other family member; Hypertension in an other family member; Stroke in an other family member. Otherwise pertinent FHx was reviewed and unremarkable.     Social History:  reports that he quit smoking about 25 years ago. His smoking use included cigarettes. He has never used smokeless tobacco. He reports that he does not use drugs.  Social History     Social History Narrative   • Not on file       Medications:  Available home medication information reviewed.  (Not in a hospital admission)      No Known Allergies    Objective   Objective     Vital Signs:   Temp:  [97.6 °F (36.4 °C)] 97.6 °F (36.4 °C)  Heart Rate:  [98-99] 98  Resp:  [16] 16  BP: (139-170)/(75-92) 139/75  Total (NIH Stroke Scale): 5    Physical Exam   Constitutional: Frail, elderly resting in bed in no acute distress  HENT: NCAT, mucous membranes moist  Respiratory: Clear to auscultation bilaterally, respiratory effort normal   Cardiovascular: RRR, holosystolic murmur, rubs, or gallops  Gastrointestinal: Positive bowel sounds, soft, nontender, nondistended  Musculoskeletal: No bilateral ankle edema  Psychiatric: Unable to assess  Neurologic: Garbled speech, follows some commands more on the left than the right and in the upper extremities rather than lower extremities  Skin: Ecchymosis scattered      Result Review:  I have personally reviewed the results from the time of this admission to 1/15/2022 13:42 EST and agree with these findings:  [x]  Laboratory  [x]  Microbiology  [x]  Radiology  [x]  EKG/Telemetry   [x]  Cardiology/Vascular   []  Pathology  []  Old records  []  Other:  Most notable  findings include:  BMP remarkable for bicarb of 11, anion gap of 30, AST and ALT elevations  Elevated lactate at 2.6  Glucose is 185  pH is 7.164, PCO2 is 22.2  No leukocytosis, CBC largely unremarkable  UA unremarkable  Chest x-ray does not show any acute process  CT head shows expected evolutionary changes noted involving the previous multicompartment intracranial hemorrhage but no new hemorrhage mass-effect or evidence of acute territorial ischemia    LAB RESULTS:      Lab 01/15/22  0957 01/13/22  0602 01/11/22  2300 01/11/22  2253   WBC 9.56 9.50  --  12.65*   HEMOGLOBIN 14.4 13.6  --  13.0   HEMATOCRIT 45.7 42.5  --  39.8   PLATELETS 199 152  --  165   NEUTROS ABS 8.11*  --   --  10.56*   IMMATURE GRANS (ABS) 0.04  --   --  0.12*   LYMPHS ABS 1.11  --   --  1.18   MONOS ABS 0.28  --   --  0.71   EOS ABS 0.00  --   --  0.04   .4* 101.2*  --  99.7*   LACTATE 2.6*  --   --   --    PROTIME 14.1  --  13.5  --    INR 1.12  --  1.06  --    APTT 25.6  --  31.4  --          Lab 01/15/22  0957 01/13/22  0602 01/11/22  2356 01/11/22  2253   SODIUM 143 142 140  --    POTASSIUM 3.8 3.4* 3.4*  --    CHLORIDE 102 103 102  --    CO2 11.0* 25.0 23.0  --    ANION GAP 30.0* 14.0 15.0  --    BUN 30* 16 19  --    CREATININE 0.93 0.62* 0.60*  --    GLUCOSE 184* 176* 157*  --    CALCIUM 9.8 9.0 9.1  --    MAGNESIUM 2.6* 2.1 1.7  --    PHOSPHORUS 4.6*  --  3.5  --    HEMOGLOBIN A1C  --   --   --  8.10*         Lab 01/15/22  0957 01/11/22  2356   TOTAL PROTEIN 7.9 6.3   ALBUMIN 4.50 4.00   GLOBULIN 3.4 2.3   ALT (SGPT) 69* 21   AST (SGOT) 86* 29   BILIRUBIN 1.1 0.6   ALK PHOS 70 60         Lab 01/15/22  0957   TROPONIN T <0.010         Lab 01/11/22  2356   CHOLESTEROL 172   LDL CHOL 60   HDL CHOL 73*   TRIGLYCERIDES 247*             Lab 01/15/22  1308   PH, ARTERIAL 7.164*   PCO2, ARTERIAL 22.2*   PO2 ART 83.4   FIO2 21   HCO3 ART 8.0*   BASE EXCESS ART -18.9*   CARBOXYHEMOGLOBIN 0.9     UA    Urinalysis 1/15/22 1/15/22    1044  1044   Squamous Epithelial Cells, UA  None Seen   Specific Gravity, UA 1.027    Ketones, UA >=160 mg/dL (4+) (A)    Blood, UA Negative    Leukocytes, UA Negative    Nitrite, UA Negative    RBC, UA  0-2   WBC, UA  None Seen   Bacteria, UA  None Seen   (A) Abnormal value              Microbiology Results (last 10 days)     Procedure Component Value - Date/Time    Respiratory Panel PCR w/COVID-19(SARS-CoV-2) ANA LILIA/TOPHER/MELANIA/PAD/COR/MAD/CYNDY In-House, NP Swab in UTM/VTM, 3-4 HR TAT - Swab, Nasopharynx [[id number]]  (Normal) Collected: 01/12/22 0618    Lab Status: Final result Specimen: Swab from Nasopharynx Updated: 01/12/22 0745     ADENOVIRUS, PCR Not Detected     Coronavirus 229E Not Detected     Coronavirus HKU1 Not Detected     Coronavirus NL63 Not Detected     Coronavirus OC43 Not Detected     COVID19 Not Detected     Human Metapneumovirus Not Detected     Human Rhinovirus/Enterovirus Not Detected     Influenza A PCR Not Detected     Influenza B PCR Not Detected     Parainfluenza Virus 1 Not Detected     Parainfluenza Virus 2 Not Detected     Parainfluenza Virus 3 Not Detected     Parainfluenza Virus 4 Not Detected     RSV, PCR Not Detected     Bordetella pertussis pcr Not Detected     Bordetella parapertussis PCR Not Detected     Chlamydophila pneumoniae PCR Not Detected     Mycoplasma pneumo by PCR Not Detected    Narrative:      In the setting of a positive respiratory panel with a viral infection PLUS a negative procalcitonin without other underlying concern for bacterial infection, consider observing off antibiotics or discontinuation of antibiotics and continue supportive care. If the respiratory panel is positive for atypical bacterial infection (Bordetella pertussis, Chlamydophila pneumoniae, or Mycoplasma pneumoniae), consider antibiotic de-escalation to target atypical bacterial infection.          EEG    Result Date: 1/15/2022  Reason for referral: 82 y.o.male with altered mental status, consideration of  seizure Technical Summary:  A 19 channel digital EEG was performed using the international 10-20 placement system, including eye leads and EKG leads. Duration: 22 minutes Video: Off Findings: The awake tracing shows diffuse medium amplitude 3 to 6 Hz intermixed delta and theta activity which are present symmetrically over both hemispheres.  At times, a sharp and slow wave is seen with phase reversal at T3 in the left temporal leads.  Light sleep is seen with mild slowing of the background.  No focal slowing is seen.  No electrographic seizures are present.  Photic stimulation and hyperventilation are not performed. Technical quality: Excellent EKG: Regular, 80 bpm SUMMARY: Mild-moderate generalized slow Left temporal sharp/slow wave discharges, rare     Impression: The discharges noted above may be seen in the setting of seizure disorders of the partial type.  Clinical correlation suggested This report is transcribed using the Dragon dictation system.      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 The left hemidiaphragm is elevated, similar to prior. There is otherwise no new focal airspace opacity. No effusion or pneumothorax. Multilevel thoracic spondylosis changes are present.      Impression: Stable appearance of the chest without evidence of acute disease.  This report was finalized on 1/15/2022 1:36 PM by Tal Garza.      CT Head Without Contrast    Result Date: 1/15/2022  EXAMINATION: CT HEAD WO CONTRAST-  INDICATION: r/o stroke  TECHNIQUE: Axial noncontrast CT of the head with multiplanar reconstruction  The radiation dose reduction device was turned on for  each scan per the ALARA (As Low as Reasonably Achievable) protocol.  COMPARISON: 1/12/2022  FINDINGS: Redemonstrated multicompartment intracranial hemorrhage including areas of subarachnoid hemorrhage within the prepontine cistern and suprasellar cistern extending to the sylvian fissure, with additional small areas of parafalcine extra-axial hemorrhage. Essentially resolved tiny amount of intraventricular hemorrhage. No new hemorrhage identified. No evidence of increased mass effect or definite new acute territorial ischemia. Unchanged ventricular caliber without evidence of progressive hydrocephalus. The orbits are unremarkable and the paranasal sinuses are grossly clear.      Impression: Expected evolutionary changes noted involving the previously described areas of multicompartment intracranial hemorrhage. Prominent subarachnoid hemorrhage is grossly unchanged, with essential resolution of previously noted dependent interventricular hemorrhage. Unchanged parafalcine hemorrhage. No new hemorrhage, mass effect or evidence of acute territorial ischemia.  Scan performed 9:37 AM 1/15/2022, results related to the stroke team via the CT technologist by Tal Garza at 9:47 AM same day     This report was finalized on 1/15/2022 9:51 AM by Tal Garza.      XR Chest 1 View    Result Date: 1/15/2022  EXAMINATION: XR CHEST 1 VW-  INDICATION: Stroke Protocol (Onset > 12 Hrs)  COMPARISON: NONE  FINDINGS: There is mild elevation of the left hemidiaphragm. The lungs are clear. Left chest wall ICD projects in place. No effusion or pneumothorax. Tortuous mildly atherosclerotic thoracic aorta.      Impression: Mild chronic changes as above without evidence of acute disease in the chest.  This report was finalized on 1/15/2022 10:19 AM by Tal Garza.        Results for orders placed during the hospital encounter of 01/11/22    Adult Transthoracic Echo Complete W/ Cont if Necessary Per Protocol    Interpretation  Summary  · Mild aortic valve stenosis is present.  · Estimated right ventricular systolic pressure from tricuspid regurgitation is normal (<35 mmHg).  · LVSF is normal  · MAC      Assessment/Plan   Assessment & Plan     Active Hospital Problems    Diagnosis  POA   • Altered mental status [R41.82]  Yes       Mr. Joya is an 82-year-old male with a history of hypertension, hyperlipidemia, diabetes, recent fall with subarachnoid hemorrhage admitted for acute encephalopathy of unknown etiology    Acute encephalopathy  - Etiologies include metabolic from suspected euglycemic DKA, versus intracranial process such as seizure or stroke  - Neurology consulted, recommends continuing Keppra with seizure precautions.  Also recommend MRI of the brain without contrast.  Discussed with son who is a [employer] rep that he may have an MRI they just need to call the rep before that MRI  - N.p.o. until swallow complete  - Imaging so far shows stable hemorrhage and no new hemorrhage.  Neurosurgery was consulted and recommends no surgical intervention at this time  - Carotid ultrasound pending  - Atorvastatin increased to 80 by neurology given his atherosclerotic disease    Questionable euglycemic DKA  Multiple metabolic derangements  Anion gap metabolic acidosis, primary metabolic acidosis with secondary respiratory alkalosis and additional metabolic alkalosis  - We will initiate DKA protocol  - Suspected secondary to Jardiance, however will rule out other etiologies so check blood cultures    Hypertension  - Home meds resumed    Hyperlipidemia  - Atorvastatin increased to 80        DVT prophylaxis: Lovenox      CODE STATUS: Full  Code Status and Medical Interventions:   Ordered at: 01/15/22 1341     Level Of Support Discussed With:    Next of Kin (If No Surrogate)     Code Status (Patient has no pulse and is not breathing):    CPR (Attempt to Resuscitate)     Medical Interventions (Patient has pulse or is breathing):    Full  Support       Admission Status:  I believe this patient meets inpatient criteria, given need for IV infusions of insulin and fluids and persistent encephalopathy.  He is a high risk patient    Anitra Dimas MD  01/15/22

## 2022-01-16 ENCOUNTER — HOME CARE VISIT (OUTPATIENT)
Dept: HOME HEALTH SERVICES | Facility: HOME HEALTHCARE | Age: 82
End: 2022-01-16

## 2022-01-16 ENCOUNTER — READMISSION MANAGEMENT (OUTPATIENT)
Dept: CALL CENTER | Facility: HOSPITAL | Age: 82
End: 2022-01-16

## 2022-01-16 LAB
ANION GAP SERPL CALCULATED.3IONS-SCNC: 12 MMOL/L (ref 5–15)
ANION GAP SERPL CALCULATED.3IONS-SCNC: 14 MMOL/L (ref 5–15)
BASOPHILS # BLD AUTO: 0.02 10*3/MM3 (ref 0–0.2)
BASOPHILS NFR BLD AUTO: 0.2 % (ref 0–1.5)
BUN SERPL-MCNC: 19 MG/DL (ref 8–23)
BUN SERPL-MCNC: 23 MG/DL (ref 8–23)
BUN/CREAT SERPL: 27.9 (ref 7–25)
BUN/CREAT SERPL: 31.5 (ref 7–25)
CALCIUM SPEC-SCNC: 8.4 MG/DL (ref 8.6–10.5)
CALCIUM SPEC-SCNC: 8.9 MG/DL (ref 8.6–10.5)
CHLORIDE SERPL-SCNC: 112 MMOL/L (ref 98–107)
CHLORIDE SERPL-SCNC: 113 MMOL/L (ref 98–107)
CO2 SERPL-SCNC: 19 MMOL/L (ref 22–29)
CO2 SERPL-SCNC: 19 MMOL/L (ref 22–29)
CREAT SERPL-MCNC: 0.68 MG/DL (ref 0.76–1.27)
CREAT SERPL-MCNC: 0.73 MG/DL (ref 0.76–1.27)
DEPRECATED RDW RBC AUTO: 45.6 FL (ref 37–54)
EOSINOPHIL # BLD AUTO: 0.01 10*3/MM3 (ref 0–0.4)
EOSINOPHIL NFR BLD AUTO: 0.1 % (ref 0.3–6.2)
ERYTHROCYTE [DISTWIDTH] IN BLOOD BY AUTOMATED COUNT: 12.4 % (ref 12.3–15.4)
GFR SERPL CREATININE-BSD FRML MDRD: 103 ML/MIN/1.73
GFR SERPL CREATININE-BSD FRML MDRD: 111 ML/MIN/1.73
GLUCOSE BLDC GLUCOMTR-MCNC: 105 MG/DL (ref 70–130)
GLUCOSE BLDC GLUCOMTR-MCNC: 114 MG/DL (ref 70–130)
GLUCOSE BLDC GLUCOMTR-MCNC: 121 MG/DL (ref 70–130)
GLUCOSE BLDC GLUCOMTR-MCNC: 125 MG/DL (ref 70–130)
GLUCOSE BLDC GLUCOMTR-MCNC: 135 MG/DL (ref 70–130)
GLUCOSE BLDC GLUCOMTR-MCNC: 193 MG/DL (ref 70–130)
GLUCOSE BLDC GLUCOMTR-MCNC: 247 MG/DL (ref 70–130)
GLUCOSE BLDC GLUCOMTR-MCNC: 284 MG/DL (ref 70–130)
GLUCOSE BLDC GLUCOMTR-MCNC: 284 MG/DL (ref 70–130)
GLUCOSE BLDC GLUCOMTR-MCNC: 302 MG/DL (ref 70–130)
GLUCOSE BLDC GLUCOMTR-MCNC: 307 MG/DL (ref 70–130)
GLUCOSE BLDC GLUCOMTR-MCNC: 379 MG/DL (ref 70–130)
GLUCOSE SERPL-MCNC: 139 MG/DL (ref 65–99)
GLUCOSE SERPL-MCNC: 306 MG/DL (ref 65–99)
HCT VFR BLD AUTO: 35.2 % (ref 37.5–51)
HGB BLD-MCNC: 11.5 G/DL (ref 13–17.7)
IMM GRANULOCYTES # BLD AUTO: 0.04 10*3/MM3 (ref 0–0.05)
IMM GRANULOCYTES NFR BLD AUTO: 0.4 % (ref 0–0.5)
LYMPHOCYTES # BLD AUTO: 0.72 10*3/MM3 (ref 0.7–3.1)
LYMPHOCYTES NFR BLD AUTO: 7.8 % (ref 19.6–45.3)
MAGNESIUM SERPL-MCNC: 2.1 MG/DL (ref 1.6–2.4)
MAGNESIUM SERPL-MCNC: 2.5 MG/DL (ref 1.6–2.4)
MCH RBC QN AUTO: 32.4 PG (ref 26.6–33)
MCHC RBC AUTO-ENTMCNC: 32.7 G/DL (ref 31.5–35.7)
MCV RBC AUTO: 99.2 FL (ref 79–97)
MONOCYTES # BLD AUTO: 0.97 10*3/MM3 (ref 0.1–0.9)
MONOCYTES NFR BLD AUTO: 10.5 % (ref 5–12)
NEUTROPHILS NFR BLD AUTO: 7.49 10*3/MM3 (ref 1.7–7)
NEUTROPHILS NFR BLD AUTO: 81 % (ref 42.7–76)
NRBC BLD AUTO-RTO: 0 /100 WBC (ref 0–0.2)
PHOSPHATE SERPL-MCNC: 2 MG/DL (ref 2.5–4.5)
PHOSPHATE SERPL-MCNC: 2.3 MG/DL (ref 2.5–4.5)
PLATELET # BLD AUTO: 158 10*3/MM3 (ref 140–450)
PMV BLD AUTO: 10.7 FL (ref 6–12)
POTASSIUM SERPL-SCNC: 3.3 MMOL/L (ref 3.5–5.2)
POTASSIUM SERPL-SCNC: 3.6 MMOL/L (ref 3.5–5.2)
RBC # BLD AUTO: 3.55 10*6/MM3 (ref 4.14–5.8)
SODIUM SERPL-SCNC: 143 MMOL/L (ref 136–145)
SODIUM SERPL-SCNC: 146 MMOL/L (ref 136–145)
WBC NRBC COR # BLD: 9.25 10*3/MM3 (ref 3.4–10.8)

## 2022-01-16 PROCEDURE — 85025 COMPLETE CBC W/AUTO DIFF WBC: CPT | Performed by: STUDENT IN AN ORGANIZED HEALTH CARE EDUCATION/TRAINING PROGRAM

## 2022-01-16 PROCEDURE — 99231 SBSQ HOSP IP/OBS SF/LOW 25: CPT | Performed by: PSYCHIATRY & NEUROLOGY

## 2022-01-16 PROCEDURE — 83735 ASSAY OF MAGNESIUM: CPT | Performed by: STUDENT IN AN ORGANIZED HEALTH CARE EDUCATION/TRAINING PROGRAM

## 2022-01-16 PROCEDURE — 99233 SBSQ HOSP IP/OBS HIGH 50: CPT | Performed by: HOSPITALIST

## 2022-01-16 PROCEDURE — 82962 GLUCOSE BLOOD TEST: CPT

## 2022-01-16 PROCEDURE — 25010000002 LEVETIRACETAM IN NACL 0.82% 500 MG/100ML SOLUTION: Performed by: STUDENT IN AN ORGANIZED HEALTH CARE EDUCATION/TRAINING PROGRAM

## 2022-01-16 PROCEDURE — 25010000002 ENOXAPARIN PER 10 MG: Performed by: STUDENT IN AN ORGANIZED HEALTH CARE EDUCATION/TRAINING PROGRAM

## 2022-01-16 PROCEDURE — 80048 BASIC METABOLIC PNL TOTAL CA: CPT | Performed by: STUDENT IN AN ORGANIZED HEALTH CARE EDUCATION/TRAINING PROGRAM

## 2022-01-16 PROCEDURE — 63710000001 INSULIN LISPRO (HUMAN) PER 5 UNITS: Performed by: HOSPITALIST

## 2022-01-16 PROCEDURE — 84100 ASSAY OF PHOSPHORUS: CPT | Performed by: STUDENT IN AN ORGANIZED HEALTH CARE EDUCATION/TRAINING PROGRAM

## 2022-01-16 RX ORDER — NICOTINE POLACRILEX 4 MG
15 LOZENGE BUCCAL
Status: DISCONTINUED | OUTPATIENT
Start: 2022-01-16 | End: 2022-01-19 | Stop reason: HOSPADM

## 2022-01-16 RX ORDER — DEXTROSE MONOHYDRATE 25 G/50ML
25 INJECTION, SOLUTION INTRAVENOUS
Status: DISCONTINUED | OUTPATIENT
Start: 2022-01-16 | End: 2022-01-19 | Stop reason: HOSPADM

## 2022-01-16 RX ADMIN — ENOXAPARIN SODIUM 40 MG: 40 INJECTION SUBCUTANEOUS at 18:32

## 2022-01-16 RX ADMIN — LEVETIRACETAM 500 MG: 5 INJECTION INTRAVASCULAR at 20:20

## 2022-01-16 RX ADMIN — SODIUM CHLORIDE, PRESERVATIVE FREE 10 ML: 5 INJECTION INTRAVENOUS at 09:41

## 2022-01-16 RX ADMIN — SODIUM CHLORIDE, PRESERVATIVE FREE 10 ML: 5 INJECTION INTRAVENOUS at 20:20

## 2022-01-16 RX ADMIN — LEVETIRACETAM 500 MG: 5 INJECTION INTRAVASCULAR at 09:41

## 2022-01-16 RX ADMIN — PAROXETINE HYDROCHLORIDE 20 MG: 20 TABLET, FILM COATED ORAL at 09:41

## 2022-01-16 RX ADMIN — INSULIN LISPRO 8 UNITS: 100 INJECTION, SOLUTION INTRAVENOUS; SUBCUTANEOUS at 18:33

## 2022-01-16 RX ADMIN — DEXTROSE MONOHYDRATE 12.5 G: 500 INJECTION PARENTERAL at 00:25

## 2022-01-16 RX ADMIN — ATORVASTATIN CALCIUM 80 MG: 40 TABLET, FILM COATED ORAL at 20:20

## 2022-01-16 RX ADMIN — POTASSIUM CHLORIDE, DEXTROSE MONOHYDRATE AND SODIUM CHLORIDE 150 ML/HR: 150; 5; 450 INJECTION, SOLUTION INTRAVENOUS at 00:24

## 2022-01-16 NOTE — PROGRESS NOTES
"    [facility]    Subjective   Derian Joya 1940 82 y.o. male     01/16/22    Chief complaint: Confusion    HPI  This is a 82-year-old gentleman who has suffered several falls and hit his head, he was previously admitted for work-up which revealed a multi compartment intracranial hemorrhage including subarachnoid hemorrhage and tiny anterior ventricular hemorrhage.  The patient was readmitted last evening with confusion.  Follow-up CT scan of the head is completed.    /82 (BP Location: Left arm, Patient Position: Lying)   Pulse 70   Temp 99.5 °F (37.5 °C) (Oral)   Resp 18   Ht 165.1 cm (65\")   Wt 61 kg (134 lb 8 oz)   SpO2 93%   BMI 22.38 kg/m²      Scheduled Meds:atorvastatin, 80 mg, Oral, Nightly  enoxaparin, 40 mg, Subcutaneous, Q24H  insulin lispro, 0-9 Units, Subcutaneous, TID AC  levETIRAcetam, 500 mg, Intravenous, Q12H  PARoxetine, 20 mg, Oral, Daily  sodium chloride, 10 mL, Intravenous, Q12H  sodium chloride, 10 mL, Intravenous, Q12H        PRN Meds:.dextrose  •  dextrose  •  glucagon (human recombinant)  •  sodium chloride  •  sodium chloride  •  sodium chloride    Intake/Output                 01/15/22 0701 - 01/16/22 0700     [id number] Total              Intake    IV Piggyback  1100  -- 1100    Total Intake 1100 -- 1100       Output    Total Output -- -- --          Patient Active Problem List    Diagnosis    • Altered mental status [R41.82]    • High anion gap metabolic acidosis [E87.2]    • Subarachnoid hemorrhage (HCC) [I60.9]    • Lactic acidosis [E87.2]    • Head trauma [S09.90XA]    • SAH (subarachnoid hemorrhage) (HCC) [I60.9]    • SDH (subdural hematoma) (HCC) [S06.5X9A]    • Head trauma, initial encounter [S09.90XA]    • Cardiomyopathy, nonischemic (HCC) [I42.8]    • Aortic stenosis [I35.0]    • Uncontrolled type 2 diabetes mellitus (HCC) [E11.65]    • Other hyperlipidemia [E78.49]    • Essential hypertension [I10]  "     Radiological data:  Follow-up CT scan of the head reveals multi compartmental intracranial hemorrhage with subarachnoid hemorrhage and parafalcine hemorrhage.  The previous intraventricular hemorrhage has resolved.  There is no new hemorrhage or mass-effect, there is no evidence of hydrocephalus.    Neurologic Exam  Confused    Assessment/Plan   1.  ICH/SAH/parafalcine hemorrhage-stable.  We will check a CT scan in the a.m.  2.  Confusion  3.  Aortic valve stenosis  4.  Ventricular pacemaker  5.  Occipital scalp hematoma  6.  Coronary artery disease    JEANNE Negrete Dr.

## 2022-01-16 NOTE — NURSING NOTE
Patient ate well at lunch and then daughter bought him a vanilla milk shake, fed him a sherbet and turkey sandwich, with crackers. He has been more alert and talkative today, 1600 glucose       Dr Reinoso completed MRI permit form and was faxed to MRI.

## 2022-01-16 NOTE — NURSING NOTE
Jose Orona the MRI rep for [employer] states the device;  [employer] CRTD defibrillator is compatible with the MRI his number is [phone]  Cardiologist on call will be notified per protocol

## 2022-01-16 NOTE — PROGRESS NOTES
[facility] Medicine Services  PROGRESS NOTE    Patient Name: Derian Joya  : 1940  MRN: [id number]    Date of Admission: 1/15/2022  Primary Care Physician: Diaz Lee MD    Subjective   Subjective     CC:  F/U AMS    HPI:  Seen this morning, he is sleeping on arrival but able to wake up and answer very simple questions for me, follows some commands. Denies any pain or dyspnea.    ROS:  Limited due to mental status  Denies pain, dyspnea, nausea    Objective   Objective     Vital Signs:   Temp:  [97.6 °F (36.4 °C)-99.5 °F (37.5 °C)] 99.5 °F (37.5 °C)  Heart Rate:  [70-99] 70  Resp:  [16-18] 18  BP: (114-170)/(58-92) 167/82     Physical Exam:  Gen-no acute distress, frail appearance  HENT-NCAT, mucous membranes moist  CV-RRR, S1 S2 normal, no m/r/g  Resp-CTAB, no wheezes or rales  Abd-soft, NT, ND, +BS  Ext-no edema  Neuro-lethargic but does arouse when spoken to and answers very simple questions, follows some commands, speech appears mostly clear  Skin-no rashes  Psych-appropriate mood, pleasant    Results Reviewed:  LAB RESULTS:      Lab 22  0402 01/15/22  1802 01/15/22  1609 01/15/22  0957 22  0602 22  2300 22  2253   WBC 9.25 9.21  --  9.56 9.50  --  12.65*   HEMOGLOBIN 11.5* 13.2  --  14.4 13.6  --  13.0   HEMATOCRIT 35.2* 42.5  --  45.7 42.5  --  39.8   PLATELETS 158 173  --  199 152  --  165   NEUTROS ABS 7.49* 7.51*  --  8.11*  --   --  10.56*   IMMATURE GRANS (ABS) 0.04 0.05  --  0.04  --   --  0.12*   LYMPHS ABS 0.72 0.83  --  1.11  --   --  1.18   MONOS ABS 0.97* 0.81  --  0.28  --   --  0.71   EOS ABS 0.01 0.00  --  0.00  --   --  0.04   MCV 99.2* 104.2*  --  103.4* 101.2*  --  99.7*   LACTATE  --   --  1.5 2.6*  --   --   --    PROTIME  --   --   --  14.1  --  13.5  --    APTT  --   --   --  25.6  --  31.4  --          Lab 22  0402 22  0040 01/15/22  2115 01/15/22  1609 01/15/22  0957 22  2976 22  1223   SODIUM 146*  143 147* 146* 143   < >  --    POTASSIUM 3.3* 3.6 4.5 4.0 3.8   < >  --    CHLORIDE 113* 112* 113* 111* 102   < >  --    CO2 19.0* 19.0* 14.0* 12.0* 11.0*   < >  --    ANION GAP 14.0 12.0 20.0* 23.0* 30.0*   < >  --    BUN 19 23 25* 27* 30*   < >  --    CREATININE 0.68* 0.73* 0.87 0.93 0.93   < >  --    GLUCOSE 139* 306* 123* 199* 184*   < >  --    CALCIUM 8.9 8.4* 9.0 8.8 9.8   < >  --    MAGNESIUM 2.5* 2.1 2.2 2.2 2.6*   < >  --    PHOSPHORUS 2.0* 2.3* 3.1 3.5 4.6*   < >  --    HEMOGLOBIN A1C  --   --   --   --  7.80*  --  8.10*    < > = values in this interval not displayed.         Lab 01/15/22  1609 01/15/22  0957 01/11/22  2356   TOTAL PROTEIN 6.6 7.9 6.3   ALBUMIN 4.20 4.50 4.00   GLOBULIN 2.4 3.4 2.3   ALT (SGPT) 100* 69* 21   AST (SGOT) 104* 86* 29   BILIRUBIN 0.7 1.1 0.6   ALK PHOS 60 70 60         Lab 01/15/22  0957 01/11/22  2300   TROPONIN T <0.010  --    PROTIME 14.1 13.5   INR 1.12 1.06         Lab 01/11/22  2356   CHOLESTEROL 172   LDL CHOL 60   HDL CHOL 73*   TRIGLYCERIDES 247*             Lab 01/15/22  1308   PH, ARTERIAL 7.164*   PCO2, ARTERIAL 22.2*   PO2 ART 83.4   FIO2 21   HCO3 ART 8.0*   BASE EXCESS ART -18.9*   CARBOXYHEMOGLOBIN 0.9     Brief Urine Lab Results  (Last result in the past 365 days)      Color   Clarity   Blood   Leuk Est   Nitrite   Protein   CREAT   Urine HCG        01/15/22 1044 Yellow   Clear   Negative   Negative   Negative   30 mg/dL (1+)                 Microbiology Results Abnormal     None          EEG    Result Date: 1/15/2022  Reason for referral: 82 y.o.male with altered mental status, consideration of seizure Technical Summary:  A 19 channel digital EEG was performed using the international 10-20 placement system, including eye leads and EKG leads. Duration: 22 minutes Video: Off Findings: The awake tracing shows diffuse medium amplitude 3 to 6 Hz intermixed delta and theta activity which are present symmetrically over both hemispheres.  At times, a sharp and slow  wave is seen with phase reversal at T3 in the left temporal leads.  Light sleep is seen with mild slowing of the background.  No focal slowing is seen.  No electrographic seizures are present.  Photic stimulation and hyperventilation are not performed. Technical quality: Excellent EKG: Regular, 80 bpm SUMMARY: Mild-moderate generalized slow Left temporal sharp/slow wave discharges, rare     Impression: The discharges noted above may be seen in the setting of seizure disorders of the partial type.  Clinical correlation suggested This report is transcribed using the Dragon dictation system.      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 The left hemidiaphragm is elevated, similar to prior. There is otherwise no new focal airspace opacity. No effusion or pneumothorax. Multilevel thoracic spondylosis changes are present.      Impression: Stable appearance of the chest without evidence of acute disease.  This report was finalized on 1/15/2022 1:36 PM by Tal Garza.      CT Head Without Contrast    Result Date: 1/15/2022  EXAMINATION: CT HEAD WO CONTRAST-  INDICATION: r/o stroke  TECHNIQUE: Axial noncontrast CT of the head with multiplanar reconstruction  The radiation dose reduction device was turned on for each scan per the ALARA (As Low as Reasonably Achievable) protocol.  COMPARISON: 1/12/2022  FINDINGS: Redemonstrated multicompartment intracranial hemorrhage including areas of subarachnoid hemorrhage within the prepontine cistern and suprasellar cistern extending to the sylvian fissure, with additional small areas of parafalcine extra-axial hemorrhage. Essentially  resolved tiny amount of intraventricular hemorrhage. No new hemorrhage identified. No evidence of increased mass effect or definite new acute territorial ischemia. Unchanged ventricular caliber without evidence of progressive hydrocephalus. The orbits are unremarkable and the paranasal sinuses are grossly clear.      Impression: Expected evolutionary changes noted involving the previously described areas of multicompartment intracranial hemorrhage. Prominent subarachnoid hemorrhage is grossly unchanged, with essential resolution of previously noted dependent interventricular hemorrhage. Unchanged parafalcine hemorrhage. No new hemorrhage, mass effect or evidence of acute territorial ischemia.  Scan performed 9:37 AM 1/15/2022, results related to the stroke team via the CT technologist by Tal Garza at 9:47 AM same day     This report was finalized on 1/15/2022 9:51 AM by Tal Garza.      XR Chest 1 View    Result Date: 1/15/2022  EXAMINATION: XR CHEST 1 VW-  INDICATION: Stroke Protocol (Onset > 12 Hrs)  COMPARISON: NONE  FINDINGS: There is mild elevation of the left hemidiaphragm. The lungs are clear. Left chest wall ICD projects in place. No effusion or pneumothorax. Tortuous mildly atherosclerotic thoracic aorta.      Impression: Mild chronic changes as above without evidence of acute disease in the chest.  This report was finalized on 1/15/2022 10:19 AM by Tal Garza.        Results for orders placed during the hospital encounter of 01/11/22    Adult Transthoracic Echo Complete W/ Cont if Necessary Per Protocol    Interpretation Summary  · Mild aortic valve stenosis is present.  · Estimated right ventricular systolic pressure from tricuspid regurgitation is normal (<35 mmHg).  · LVSF is normal  · MAC      I have reviewed the medications:  Scheduled Meds:atorvastatin, 80 mg, Oral, Nightly  enoxaparin, 40 mg, Subcutaneous, Q24H  levETIRAcetam, 500 mg, Intravenous, Q12H  PARoxetine, 20 mg, Oral,  Daily  sodium chloride, 10 mL, Intravenous, Q12H  sodium chloride, 10 mL, Intravenous, Q12H  sodium chloride, 3 mL, Intravenous, Q12H      Continuous Infusions:dextrose 5 % and sodium chloride 0.45 %, 150 mL/hr  dextrose 5 % and sodium chloride 0.45 % with KCl 20 mEq/L, 150 mL/hr, Last Rate: 150 mL/hr (01/16/22 0024)  dextrose 5 % and sodium chloride 0.45 % with KCl 40 mEq/L, 150 mL/hr  dextrose 5 % and sodium chloride 0.9 %, 125 mL/hr, Last Rate: Stopped (01/15/22 1933)  dextrose 5 % and sodium chloride 0.9 %, 150 mL/hr  dextrose 5 % and sodium chloride 0.9 % with KCl 20 mEq, 150 mL/hr, Last Rate: 150 mL/hr (01/15/22 1921)  dextrose 5% and sodium chloride 0.9% with KCl 40 mEq/L, 150 mL/hr  insulin, 5 Units/hr, Last Rate: 0.5 Units/hr (01/16/22 0600)  custom IV KCl infusion builder, 250 mL/hr  sodium chloride, 250 mL/hr  sodium chloride 0.45 % with KCl 20 mEq, 250 mL/hr  sodium chloride, 250 mL/hr  sodium chloride, 10 mL/hr  sodium chloride 0.9 % with KCl 20 mEq, 250 mL/hr  sodium chloride 0.9 % with KCl 40 mEq/L, 250 mL/hr      PRN Meds:.dextrose  •  dextrose 5 % and sodium chloride 0.45 %  •  dextrose 5 % and sodium chloride 0.45 % with KCl 20 mEq/L  •  dextrose 5 % and sodium chloride 0.45 % with KCl 40 mEq/L  •  dextrose 5 % and sodium chloride 0.9 %  •  dextrose 5 % and sodium chloride 0.9 % with KCl 20 mEq  •  dextrose 5% and sodium chloride 0.9% with KCl 40 mEq/L  •  custom IV KCl infusion builder  •  sodium chloride  •  sodium chloride 0.45 % with KCl 20 mEq  •  sodium chloride  •  sodium chloride  •  sodium chloride  •  sodium chloride  •  sodium chloride  •  sodium chloride  •  sodium chloride  •  sodium chloride 0.9 % with KCl 20 mEq  •  sodium chloride 0.9 % with KCl 40 mEq/L    Assessment/Plan   Assessment & Plan     Active Hospital Problems    Diagnosis  POA   • Altered mental status [R41.82]  Yes   • High anion gap metabolic acidosis [E87.2]  Unknown   • Subarachnoid hemorrhage (HCC) [I60.9]  Unknown    • Lactic acidosis [E87.2]  Unknown      Resolved Hospital Problems   No resolved problems to display.        Brief Hospital Course to date:  Derain Joya is a 82 y.o. male with hx of HTN, HLD, DM2, and recent fall with subarachnoid hemorrhage who presents due to altered mental status and slurred speech. He had been admitted to the ICU 1/11-1/13/22 after a fall down the stairs with LOC--CT head showed acute subdural hematoma and subarachnoid hemorrhage. His Plavix was stopped. Returns with worsening neurologic symptoms. CT head showed expected evolutionary changes of his previous areas of intracranial hemorrhage--no new hemorrhage. Admitted for further management.    This patient's problems and plans were partially entered by my partner and updated as appropriate by me 01/16/22.    *All problems are new to me today.    Acute encephalopathy, possible seizures  - Etiologies include metabolic from suspected euglycemic DKA, vs intracranial process such as seizure or stroke.  - Neurology consulted, recommends continuing Keppra with seizure precautions. EEG showed left temporal sharp/slow wave discharges. Also recommend MRI of the brain without contrast. My partner discussed with son who is a [employer] rep that he may have an MRI, they just need to call the rep beforehand.  - Imaging so far shows stable hemorrhage and no new hemorrhage. Neurosurgery was consulted and recommends no surgical intervention at this time.  - Carotid ultrasound pending.  - Atorvastatin increased to 80 mg by Neurology given his atherosclerotic disease.  - PT/OT evaluation.     Questionable euglycemic DKA  Multiple metabolic derangements  Anion gap metabolic acidosis, primary metabolic acidosis with secondary respiratory alkalosis and additional metabolic alkalosis  DM2, HbA1c 7.8%  - Anion gap 30, CO2 11.  -- Initiated DKA protocol and IV fluids, now AG has closed and acidosis improved. Will stop insulin drip, decrease fluids.   --  Start moderate dose SSI.   - Suspected secondary to Jardiance?    Hypokalemia  -- Replace per protocol.     HTN  HLD  - Home meds resumed.  - Atorvastatin increased to 80 mg daily.    DVT prophylaxis:  Medical and mechanical DVT prophylaxis orders are present.            Disposition: I expect the patient to be discharged TBD    CODE STATUS:   Code Status and Medical Interventions:   Ordered at: 01/15/22 1341     Level Of Support Discussed With:    Next of Kin (If No Surrogate)     Code Status (Patient has no pulse and is not breathing):    CPR (Attempt to Resuscitate)     Medical Interventions (Patient has pulse or is breathing):    Full Support       Eileen Gatica MD  01/16/22

## 2022-01-16 NOTE — OUTREACH NOTE
Stroke Week 2 Survey      Responses   [facility] patient discharged from? [place]   Does the patient have one of the following disease processes/diagnoses(primary or secondary)? Stroke (TIA)   Week 2 attempt successful? No   Revoke Readmitted          Stephanie Lopez RN

## 2022-01-16 NOTE — SIGNIFICANT NOTE
01/16/22 1108   SLP Priority   SLP Priority Re-eval  (Attempted to see patient this am. The patient was lethargic and kept eyes closed. The patient minimally responded to questions. SLP will continue to follow and evaluate as appropriate. Thank you for this consult.)

## 2022-01-16 NOTE — PROGRESS NOTES
Neurology Note    Patient:  Derian Joya    YOB: 1940    REFERRING PHYSICIAN:  Dr. Gatica    CHIEF COMPLAINT:    AMS    HISTORY OF PRESENT ILLNESS:   The patient is a 82 y.o. male with recent traumatic left hemispheric SAH/ICH/SDH on 22, p/w new AMS last am, difficulty with speech, CTH stable to improved. EEG with left temporal sharps. Started on Keppra upon suspicion of new onset partial seizures. Doing better today. MRI ordered. Patient has an AICD/PPM.    Past Medical History:  Past Medical History:   Diagnosis Date   • Allergic rhinitis    • Aortic stenosis    • Atopic rhinitis 2016   • Benign non-nodular prostatic hyperplasia with lower urinary tract symptoms 9/15/2017   • CAD (coronary artery disease)    • Cardiomyopathy, ischemic    • Colon polyps     tubular adenoma    • Community acquired pneumonia    • Depression 2016   • Diabetes (HCC)    • Diverticulosis of intestine 2016    Description: Left and sigmoid   • Dyslipidemia    • LBBB (left bundle branch block)    • PAD (peripheral artery disease) (HCC)        Past Surgical History:  Past Surgical History:   Procedure Laterality Date   • CARDIAC CATHETERIZATION      100% cx   • CATARACT EXTRACTION     • COLONOSCOPY W/ BIOPSIES AND POLYPECTOMY  2021    1 benign polyp   • HIP SURGERY Left 2020    Dr Mandy BOUDREAUX   • LEG SURGERY Left 2016    Popliteal Artery stenting by Dr Hdz   • LEG SURGERY Left 2016    skin graft by Dr Nance   • LIPOMA EXCISION      s/p excision on chest   • PACEMAKER IMPLANTATION  2019    and defibrillator       Social History:   Social History     Socioeconomic History   • Marital status:    Tobacco Use   • Smoking status: Former Smoker     Types: Cigarettes     Quit date: 1997     Years since quittin.0   • Smokeless tobacco: Never Used   Substance and Sexual Activity   • Drug use: No        Family History:   Family History   Problem Relation Age of Onset   •  Diabetes Other    • Hypertension Other    • Stroke Other    • Coronary artery disease Other        Medications Prior to Admission:    Prior to Admission medications    Medication Sig Start Date End Date Taking? Authorizing Provider   atorvastatin (LIPITOR) 40 MG tablet Take 1 tablet by mouth Every Night. 1/13/22  Yes Marcia Ceja APRN   bisoprolol (ZEBeta) 10 MG tablet Take 10 mg by mouth Daily.    ProviderJoselin MD   doxazosin (CARDURA) 2 MG tablet TAKE 1 TABLET EVERY NIGHT  Patient taking differently: take 1/2 tablet every night 3/3/21   Diaz Lee MD   empagliflozin (JARDIANCE) 25 MG tablet tablet Take 1 tablet by mouth Daily.  Patient taking differently: Take 10 mg by mouth Daily. 1/13/22   Marcia Ceja APRN   fluticasone (FLONASE) 50 MCG/ACT nasal spray USE 1 SPRAY NASALLY TWICE DAILY  Patient taking differently: 2 sprays into the nostril(s) as directed by provider Daily As Needed for Rhinitis or Allergies. OTC 9/28/18   Diaz Lee MD   levocetirizine (XYZAL) 5 MG tablet Take 5 mg by mouth Every Evening.    Provider, MD Joselin   metFORMIN (GLUCOPHAGE) 850 MG tablet TAKE 1 TABLET TWICE DAILY WITH MEALS 3/3/21   Diaz Lee MD   PARoxetine (PAXIL) 20 MG tablet TAKE 1 TABLET EVERY DAY 10/18/21   Diaz Lee MD       Allergies:  Patient has no known allergies.      Review of system  Review of Systems   Unable to perform ROS: Mental status change       Vitals:    01/16/22 0734   BP: 167/82   Pulse: 70   Resp: 18   Temp: 99.5 °F (37.5 °C)   SpO2: 93%       Physical exam  Physical Exam  HENT:      Head: Normocephalic and atraumatic.   Eyes:      Extraocular Movements: Extraocular movements intact.      Pupils: Pupils are equal, round, and reactive to light.   Cardiovascular:      Rate and Rhythm: Normal rate.   Pulmonary:      Effort: Pulmonary effort is normal.   Neurological:      Mental Status: He is alert.      Comments: Oriented to hospital, December. Speech clear,  occasional paraphasic errors, VFF, no facial droop, no limb drift.           Lab Results   Component Value Date    WBC 9.25 01/16/2022    HGB 11.5 (L) 01/16/2022    HCT 35.2 (L) 01/16/2022    MCV 99.2 (H) 01/16/2022     01/16/2022     Lab Results   Component Value Date    GLUCOSE 139 (H) 01/16/2022    BUN 19 01/16/2022    CREATININE 0.68 (L) 01/16/2022    EGFRIFNONA 111 01/16/2022    EGFRIFAFRI 135 10/22/2021    BCR 27.9 (H) 01/16/2022    CO2 19.0 (L) 01/16/2022    CALCIUM 8.9 01/16/2022    PROTENTOTREF 6.2 10/22/2021    ALBUMIN 4.20 01/15/2022    LABIL2 2.1 10/22/2021     (H) 01/15/2022     (H) 01/15/2022         Radiological Studies:  Adult Transthoracic Echo Complete W/ Cont if Necessary Per Protocol    Result Date: 1/12/2022  · Mild aortic valve stenosis is present. · Estimated right ventricular systolic pressure from tricuspid regurgitation is normal (<35 mmHg). · LVSF is normal · MAC      EEG    Result Date: 1/15/2022  Reason for referral: 82 y.o.male with altered mental status, consideration of seizure Technical Summary:  A 19 channel digital EEG was performed using the international 10-20 placement system, including eye leads and EKG leads. Duration: 22 minutes Video: Off Findings: The awake tracing shows diffuse medium amplitude 3 to 6 Hz intermixed delta and theta activity which are present symmetrically over both hemispheres.  At times, a sharp and slow wave is seen with phase reversal at T3 in the left temporal leads.  Light sleep is seen with mild slowing of the background.  No focal slowing is seen.  No electrographic seizures are present.  Photic stimulation and hyperventilation are not performed. Technical quality: Excellent EKG: Regular, 80 bpm SUMMARY: Mild-moderate generalized slow Left temporal sharp/slow wave discharges, rare     The discharges noted above may be seen in the setting of seizure disorders of the partial type.  Clinical correlation suggested This report is  transcribed using the Dragon dictation system.      EEG    Result Date: 1/12/2022  Reason for referral: 82 y.o.male with syncope Technical Summary:  A 19 channel digital EEG was performed using the international 10-20 placement system, including eye leads and EKG leads. Duration: 27 minutes Video: Off Findings: The awake tracing shows diffuse low to medium amplitude 5 to 7 Hz theta which is present symmetrically over both hemispheres.  A clear posterior rhythm is not seen.  As the study proceeds, brief bursts of medium amplitude 2 to 3 Hz generalized delta are seen.  Drowsiness is seen with mild slowing of the background but stage II sleep is not seen.  Photic stimulation does not change background.  Hyperventilation is not performed.  No focal features or epileptiform activity are seen Technical quality: Excellent EKG: Regular, 80 bpm SUMMARY: Mild-moderate generalized slow No epileptiform activity is seen     This study shows evidence for diffuse cerebral dysfunction of mild degree but is nonspecific as to cause No evidence for epilepsy is seen This report is transcribed using the Dragon dictation system.      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 The left hemidiaphragm is elevated, similar to prior. There is otherwise no new focal airspace opacity. No effusion or pneumothorax. Multilevel thoracic spondylosis changes are present.      Stable appearance of the chest without evidence of acute disease.  This report was finalized on 1/15/2022 1:36 PM by Tal Garza.      XR Hand 3+ View Right    Result Date: 1/12/2022  CR Hand Min 3 Vws RT INDICATION: Fell with  swelling right hand COMPARISON: None available. FINDINGS: PA, lateral, and oblique views of the right hand.  No fracture or dislocation.  No bone erosion or destruction.  . There are vascular calcifications. There are mild degenerative arthritis changes. Please correlate with the site of tenderness clinically. I see dorsal soft tissue swelling.     Dorsal soft tissue swelling without evidence for acute fracture dislocation or definite radiopaque foreign body. Small densities at the base of the thumb are probably on the surface of the skin. Please confirm clinically. Signer Name: Cris Loco MD  Signed: 1/12/2022 12:27 AM  Workstation Name: [email or web address]  [facility]    CT Head Without Contrast    Result Date: 1/15/2022  EXAMINATION: CT HEAD WO CONTRAST-  INDICATION: r/o stroke  TECHNIQUE: Axial noncontrast CT of the head with multiplanar reconstruction  The radiation dose reduction device was turned on for each scan per the ALARA (As Low as Reasonably Achievable) protocol.  COMPARISON: 1/12/2022  FINDINGS: Redemonstrated multicompartment intracranial hemorrhage including areas of subarachnoid hemorrhage within the prepontine cistern and suprasellar cistern extending to the sylvian fissure, with additional small areas of parafalcine extra-axial hemorrhage. Essentially resolved tiny amount of intraventricular hemorrhage. No new hemorrhage identified. No evidence of increased mass effect or definite new acute territorial ischemia. Unchanged ventricular caliber without evidence of progressive hydrocephalus. The orbits are unremarkable and the paranasal sinuses are grossly clear.      Expected evolutionary changes noted involving the previously described areas of multicompartment intracranial hemorrhage. Prominent subarachnoid hemorrhage is grossly unchanged, with essential resolution of previously noted dependent interventricular hemorrhage. Unchanged parafalcine hemorrhage. No new hemorrhage, mass  effect or evidence of acute territorial ischemia.  Scan performed 9:37 AM 1/15/2022, results related to the stroke team via the CT technologist by Tal Garza at 9:47 AM same day     This report was finalized on 1/15/2022 9:51 AM by Tal Garza.      CT Head Without Contrast    Result Date: 1/12/2022  CT Head WO HISTORY: Altered mental status and clinical deterioration today. Known traumatic subarachnoid hemorrhage. TECHNIQUE: Routine noncontrast head CT. Coronal reformatted images. Radiation dose reduction techniques included automated exposure control or exposure modulation based on body size. Count of known CT and cardiac nuc med studies performed in previous 12 months: 5. COMPARISON: CT head 1/12/2022 and 1/11/2022 FINDINGS: Stable small subdural hemorrhages along the bilateral interhemispheric falx and left tentorium. Stable subarachnoid hemorrhage in the medial left frontal region, left sylvian fissure, and basilar cisterns. Improved small amount of subarachnoid blood at the right frontal convexity. Stable small amount of intraventricular hemorrhage in the bilateral occipital horns. No hydrocephalus or midline shift. Moderate generalized atrophy and chronic small vessel disease throughout the supratentorial white matter. No acute osseous abnormality. Bilateral maxillary sinus mucosal thickening. Visualized mastoid air cells are clear.     1. Stable subdural, subarachnoid, and intraventricular hemorrhage compared to the prior examination performed earlier the same date at 6:02 AM. 2. No hydrocephalus or midline shift. 3. Bilateral maxillary sinus mucosal thickening. Signer Name: Lucio Asher MD  Signed: 1/12/2022 7:38 PM  Workstation [facility]: [email or web address]  [facility]    CT Head Without Contrast    Result Date: 1/12/2022  CT Head WO HISTORY: Follow-up traumatic subarachnoid hemorrhage. TECHNIQUE: Routine noncontrast head CT. Coronal reformatted images. Radiation dose reduction  techniques included automated exposure control or exposure modulation based on body size. Count of known CT and cardiac nuc med studies performed in previous 12 months: 4. COMPARISON: CT head and CTA head/neck 1/11/2022 FINDINGS: Stable small amount of subdural hemorrhage along the bilateral interhemispheric falx and along the left tentorium. Stable subarachnoid hemorrhage in the medial left frontal region, right frontal convexity, and left sylvian fissure. Stable subarachnoid hemorrhage in the basilar cisterns. Interval development of a small amount of intraventricular blood in the bilateral occipital horns of the lateral ventricles. No hydrocephalus. No midline shift or focal mass effect. Moderate generalized atrophy and chronic small vessel disease throughout the supratentorial white matter. No acute osseous abnormality. Mucosal thickening bilateral maxillary sinuses with a small air-fluid level in the left maxillary sinus. Visualized mastoid air cells are clear.     1. Stable subdural and subarachnoid hemorrhage, detailed above. 2. Interval development of a small amount of intraventricular hemorrhage in the bilateral occipital horns of the lateral ventricles. No hydrocephalus or midline shift. 3. Bilateral maxillary sinus mucosal thickening with small left-sided air-fluid level. Signer Name: Lucio Asher MD  Signed: 1/12/2022 6:30 AM  [facility]: [email or web address]  [facility]    CT Head Without Contrast    Result Date: 1/11/2022  CT Head WO HISTORY: Fall. Head injury. Patient on blood thinners. TECHNIQUE: Axial unenhanced head CT with multiplanar reformats. Radiation dose reduction techniques included automated exposure control or exposure modulation based on body size. Count of known CT and cardiac nuc med studies performed in previous 12 months: 0. COMPARISON: None. FINDINGS: Abnormal examination. There is generalized atrophy with chronic small vessel ischemic disease in the white  matter. Ventricular size is within normal limits. There is acute subdural hemorrhage along the interhemispheric falx measuring up to 5 mm in thickness. There is adjacent subarachnoid hemorrhage in the medial left frontal lobe and near the right vertex. Some of the hemorrhage near the vertex could also be of subdural origin. There is subarachnoid hemorrhage in the left sylvian fissure measuring up to 7 mm in thickness. There is also subarachnoid hemorrhage in the basilar cisterns left greater than right. There also appears to be some subdural hemorrhage along the left side of the tentorium. No intraventricular hemorrhage is identified. There is no evidence of acute infarct. There is an occipital scalp hematoma. There is atherosclerotic disease in the vertebral arteries and carotid siphons. There is a distal left vertebral artery stent versus atherosclerotic calcification. Correlate with history. No skull fracture is identified. SAH detected                     YES Diffuse or vertical layer of SAH blood < 1 mm thick                 NO Localized clot and/or vertical layer of SAH blood  > 1 mm thick           YES ICH or IVH with diffuse or no SAH          NO     1. Acute subdural hemorrhage along the interhemispheric falx measuring up to 5 mm in thickness. There is also some subdural hemorrhage along the left tentorium. 2. Subarachnoid hemorrhage is noted at the right vertex and in the left frontal region and left sylvian fissure. There is also subarachnoid hemorrhage in the basilar cisterns worse to the left of midline. 3. No intraventricular hemorrhage. 4. Atrophy with chronic small vessel ischemic disease in the white matter. 5. Occipital scalp hematoma without skull fracture. NOTIFICATION: Critical Value/emergent results were called by telephone at the time of interpretation on 1/11/2022 10:25 PM to Larry Gonzalez MD who verbally acknowledged these results. Signer Name: Hari Simon MD  Signed: 1/11/2022  10:29 PM  [facility]station [facility]: [email or web address]  [facility]    CT Angiogram Neck    Result Date: 1/12/2022  INDICATION: Acute intracranial hemorrhage after a fall. Patient on blood thinners TECHNIQUE: CT angiogram of the head and neck with IV contrast. Contrast dose recorded in the patient's chart. 3-D MIP reformatted images were acquired and reviewed. Evaluation for a significant carotid arterial stenosis is based on the NASCET criteria. Radiation dose reduction techniques included automated exposure control or exposure modulation based on body size. Radiation audit for number of CT and nuclear cardiology exams performed in the last year:  0. Precontrast imaging also obtained. Rapid AI utilized for LVO COMPARISON: Earlier noncontrast head CT. FINDINGS: CTA neck:  There our atherosclerotic vascular calcifications at the aortic arch. There is involvement of great vessel origins without hemodynamically significant stenosis. There is probably mild stenosis at the origin of the right subclavian artery. There is mild narrowing at the origin the right common carotid artery due to vascular ectasia. There is calcified plaque at the right carotid bifurcation but by NASCET criteria there is essentially 0% diameter stenosis. There is moderate stenosis at the origin of the right external carotid artery. There is calcified plaque at the left carotid bifurcation and along the left common carotid artery. By NASCET criteria there is 0% diameter stenosis. There is mild narrowing at the origin of the left external carotid artery. There is calcified disease at both carotid siphons with mild to moderate stenosis likely. Both vertebral arteries are patent. The left is dominant. There is disease at the origin of the vertebral arteries partly obscured by venous vascular opacification. CTA head:  Study is performed with a stroke evaluation protocol and is not post processed for the purpose of evaluating for intracranial  aneurysm. There are acute blood products present as identified on the earlier noncontrast head CT. There our atherosclerotic vascular calcifications involving both the 4 segments with likely hemodynamically significant stenosis of the hypoplastic distal right vertebral artery. This vessel mostly terminates in a PICA vessel. There is irregular ectasia of the basilar due to atherosclerotic disease with high-grade short segment proximal stenosis followed by some poststenotic dilatation. There is a moderate-sized anterior communicating artery present. The left A1 vessels mildly hypoplastic. No intracranial vascular cut off is appreciated. There is no gross intracranial aneurysm or arteriovenous malformation appreciated on this study performed for stroke evaluation. There is a small to moderate sized right posterior communicating artery. There is a large left posterior communicator with largely fetal origin to the left posterior cerebral artery distribution. Left posterior cerebral artery is irregular is probably some intracranial atherosclerotic disease. There is venous contrast reflux intracranially from the injection into the dural venous sinuses. No intracranial vascular cut off is suspected.     1. By NASCET criteria, there is 0% stenosis at either carotid bifurcation. 2. Both vertebral arteries are patent with left being dominant. 3. There is considerable atherosclerotic disease involving the bilateral carotid siphons and intracranial vertebral arteries. There is severe disease of the basilar artery with calcified and noncalcified plaque. This includes short segment high-grade stenosis of the proximal basilar with some long segment irregularity. The distal right vertebral artery largely terminates in a PICA. 4. No obvious intracranial aneurysm or arteriovenous malformation. Please note the CT angiogram is protocol to evaluate for stroke and utilizes rapid AI for this purpose. It is limited for evaluation for  intracranial aneurysm. 5. There is a large amount of venous contrast reflux including reflux into the dural venous sinuses. Signer Name: Cris Loco MD  Signed: 1/12/2022 12:05 AM  Workstation Name: [email or web address]  [facility]    CT Cervical Spine Without Contrast    Result Date: 1/11/2022  CT Spine Cervical WO INDICATION: Neck pain after fall. TECHNIQUE: CT of the cervical spine without IV contrast. Coronal and sagittal reconstructions were obtained.  Radiation dose reduction techniques included automated exposure control or exposure modulation based on body size. Count of known CT and cardiac nuc med studies performed in previous 12 months: 0. COMPARISON: None available. FINDINGS: Note is made of extensive carotid atherosclerotic disease. There is mild grade 1 retrolisthesis of C5 on C6. Cervical alignment is otherwise normal. No acute cervical spine fracture is seen. There is multilevel degenerative disc disease and facet arthropathy, most severe at C5-6 where there is a disc osteophyte complex with mild narrowing of the central canal and moderate bilateral foraminal stenosis. Intracranial hemorrhage noted the skull base. Please see head CT report dictated separately.     No acute cervical spine fracture. Signer Name: Hari Simon MD  Signed: 1/11/2022 10:29 PM  Workstation [facility]: [email or web address]  [facility]    XR Chest 1 View    Result Date: 1/15/2022  EXAMINATION: XR CHEST 1 VW-  INDICATION: Stroke Protocol (Onset > 12 Hrs)  COMPARISON: NONE  FINDINGS: There is mild elevation of the left hemidiaphragm. The lungs are clear. Left chest wall ICD projects in place. No effusion or pneumothorax. Tortuous mildly atherosclerotic thoracic aorta.      Mild chronic changes as above without evidence of acute disease in the chest.  This report was finalized on 1/15/2022 10:19 AM by Tal Garza.      CT Angiogram Head w AI Analysis of LVO    Result Date: 1/12/2022  INDICATION:  Acute intracranial hemorrhage after a fall. Patient on blood thinners TECHNIQUE: CT angiogram of the head and neck with IV contrast. Contrast dose recorded in the patient's chart. 3-D MIP reformatted images were acquired and reviewed. Evaluation for a significant carotid arterial stenosis is based on the NASCET criteria. Radiation dose reduction techniques included automated exposure control or exposure modulation based on body size. Radiation audit for number of CT and nuclear cardiology exams performed in the last year:  0. Precontrast imaging also obtained. Rapid AI utilized for LVO COMPARISON: Earlier noncontrast head CT. FINDINGS: CTA neck:  There our atherosclerotic vascular calcifications at the aortic arch. There is involvement of great vessel origins without hemodynamically significant stenosis. There is probably mild stenosis at the origin of the right subclavian artery. There is mild narrowing at the origin the right common carotid artery due to vascular ectasia. There is calcified plaque at the right carotid bifurcation but by NASCET criteria there is essentially 0% diameter stenosis. There is moderate stenosis at the origin of the right external carotid artery. There is calcified plaque at the left carotid bifurcation and along the left common carotid artery. By NASCET criteria there is 0% diameter stenosis. There is mild narrowing at the origin of the left external carotid artery. There is calcified disease at both carotid siphons with mild to moderate stenosis likely. Both vertebral arteries are patent. The left is dominant. There is disease at the origin of the vertebral arteries partly obscured by venous vascular opacification. CTA head:  Study is performed with a stroke evaluation protocol and is not post processed for the purpose of evaluating for intracranial aneurysm. There are acute blood products present as identified on the earlier noncontrast head CT. There our atherosclerotic vascular  calcifications involving both the 4 segments with likely hemodynamically significant stenosis of the hypoplastic distal right vertebral artery. This vessel mostly terminates in a PICA vessel. There is irregular ectasia of the basilar due to atherosclerotic disease with high-grade short segment proximal stenosis followed by some poststenotic dilatation. There is a moderate-sized anterior communicating artery present. The left A1 vessels mildly hypoplastic. No intracranial vascular cut off is appreciated. There is no gross intracranial aneurysm or arteriovenous malformation appreciated on this study performed for stroke evaluation. There is a small to moderate sized right posterior communicating artery. There is a large left posterior communicator with largely fetal origin to the left posterior cerebral artery distribution. Left posterior cerebral artery is irregular is probably some intracranial atherosclerotic disease. There is venous contrast reflux intracranially from the injection into the dural venous sinuses. No intracranial vascular cut off is suspected.     1. By NASCET criteria, there is 0% stenosis at either carotid bifurcation. 2. Both vertebral arteries are patent with left being dominant. 3. There is considerable atherosclerotic disease involving the bilateral carotid siphons and intracranial vertebral arteries. There is severe disease of the basilar artery with calcified and noncalcified plaque. This includes short segment high-grade stenosis of the proximal basilar with some long segment irregularity. The distal right vertebral artery largely terminates in a PICA. 4. No obvious intracranial aneurysm or arteriovenous malformation. Please note the CT angiogram is protocol to evaluate for stroke and utilizes rapid AI for this purpose. It is limited for evaluation for intracranial aneurysm. 5. There is a large amount of venous contrast reflux including reflux into the dural venous sinuses. Signer Name: Cris  Beronica ESCOBAR  Signed: 1/12/2022 12:05 AM  Workstation Name: [facility]  [facility]        During this visit the following were done:  Labs Reviewed [x]    Labs Ordered []    Radiology Reports Reviewed [x]    Radiology Ordered []    EKG, echo, and/or stress test reviewed [x]    EEG results reviewed  []    EEG reviewed and interpreted per myself   []    Discussed case with neurointerventionalist or neuroradiologist []    Referring Provider Records Reviewed []    ER Records Reviewed []    Hospital Records Reviewed []    History Obtained From Family []    Radiological images view and Interpreted per myself [x]    Case Discussed with referring provider []     Decision to obtain and request outside records  []        Assessment and Plan     AMS and slurred speech, resolved, still mildly confused, suspect new onset partial seizure 22 recent traumatic SAH/ICH, EEG with left temporal sharp waves..    - Observation on telemetry.   - F/U CT in am per NS.   - MRI ordered, may not be able to get 22 AICD/PPM.   - Continue Keppra, switch to po.   - No driving on discharge until further notice.   - Outpatient neurology F/U in 6-8 weeks.    Call for questions, will see prn. Thanks,              Electronically signed by Merritt Jiang MD on 1/16/2022 at 13:24 EST

## 2022-01-17 ENCOUNTER — APPOINTMENT (OUTPATIENT)
Dept: CT IMAGING | Facility: HOSPITAL | Age: 82
End: 2022-01-17

## 2022-01-17 ENCOUNTER — APPOINTMENT (OUTPATIENT)
Dept: MRI IMAGING | Facility: HOSPITAL | Age: 82
End: 2022-01-17

## 2022-01-17 ENCOUNTER — HOME CARE VISIT (OUTPATIENT)
Dept: HOME HEALTH SERVICES | Facility: HOME HEALTHCARE | Age: 82
End: 2022-01-17

## 2022-01-17 LAB
ANION GAP SERPL CALCULATED.3IONS-SCNC: 10 MMOL/L (ref 5–15)
ANION GAP SERPL CALCULATED.3IONS-SCNC: 20 MMOL/L (ref 5–15)
BASOPHILS # BLD AUTO: 0.03 10*3/MM3 (ref 0–0.2)
BASOPHILS NFR BLD AUTO: 0.3 % (ref 0–1.5)
BUN SERPL-MCNC: 11 MG/DL (ref 8–23)
BUN SERPL-MCNC: 16 MG/DL (ref 8–23)
BUN/CREAT SERPL: 18.3 (ref 7–25)
BUN/CREAT SERPL: 24.2 (ref 7–25)
CALCIUM SPEC-SCNC: 9.1 MG/DL (ref 8.6–10.5)
CALCIUM SPEC-SCNC: 9.2 MG/DL (ref 8.6–10.5)
CHLORIDE SERPL-SCNC: 100 MMOL/L (ref 98–107)
CHLORIDE SERPL-SCNC: 101 MMOL/L (ref 98–107)
CO2 SERPL-SCNC: 19 MMOL/L (ref 22–29)
CO2 SERPL-SCNC: 26 MMOL/L (ref 22–29)
CREAT SERPL-MCNC: 0.6 MG/DL (ref 0.76–1.27)
CREAT SERPL-MCNC: 0.66 MG/DL (ref 0.76–1.27)
DEPRECATED RDW RBC AUTO: 45.6 FL (ref 37–54)
EOSINOPHIL # BLD AUTO: 0.01 10*3/MM3 (ref 0–0.4)
EOSINOPHIL NFR BLD AUTO: 0.1 % (ref 0.3–6.2)
ERYTHROCYTE [DISTWIDTH] IN BLOOD BY AUTOMATED COUNT: 12.5 % (ref 12.3–15.4)
GFR SERPL CREATININE-BSD FRML MDRD: 115 ML/MIN/1.73
GFR SERPL CREATININE-BSD FRML MDRD: 129 ML/MIN/1.73
GLUCOSE BLDC GLUCOMTR-MCNC: 190 MG/DL (ref 70–130)
GLUCOSE BLDC GLUCOMTR-MCNC: 243 MG/DL (ref 70–130)
GLUCOSE BLDC GLUCOMTR-MCNC: 249 MG/DL (ref 70–130)
GLUCOSE BLDC GLUCOMTR-MCNC: 320 MG/DL (ref 70–130)
GLUCOSE SERPL-MCNC: 182 MG/DL (ref 65–99)
GLUCOSE SERPL-MCNC: 208 MG/DL (ref 65–99)
HCT VFR BLD AUTO: 39.4 % (ref 37.5–51)
HGB BLD-MCNC: 12.9 G/DL (ref 13–17.7)
IMM GRANULOCYTES # BLD AUTO: 0.02 10*3/MM3 (ref 0–0.05)
IMM GRANULOCYTES NFR BLD AUTO: 0.2 % (ref 0–0.5)
LYMPHOCYTES # BLD AUTO: 0.9 10*3/MM3 (ref 0.7–3.1)
LYMPHOCYTES NFR BLD AUTO: 10.3 % (ref 19.6–45.3)
MCH RBC QN AUTO: 32.5 PG (ref 26.6–33)
MCHC RBC AUTO-ENTMCNC: 32.7 G/DL (ref 31.5–35.7)
MCV RBC AUTO: 99.2 FL (ref 79–97)
MONOCYTES # BLD AUTO: 0.78 10*3/MM3 (ref 0.1–0.9)
MONOCYTES NFR BLD AUTO: 9 % (ref 5–12)
NEUTROPHILS NFR BLD AUTO: 6.96 10*3/MM3 (ref 1.7–7)
NEUTROPHILS NFR BLD AUTO: 80.1 % (ref 42.7–76)
NRBC BLD AUTO-RTO: 0 /100 WBC (ref 0–0.2)
PLATELET # BLD AUTO: 180 10*3/MM3 (ref 140–450)
PMV BLD AUTO: 10.7 FL (ref 6–12)
POTASSIUM SERPL-SCNC: 3.1 MMOL/L (ref 3.5–5.2)
POTASSIUM SERPL-SCNC: 3.6 MMOL/L (ref 3.5–5.2)
RBC # BLD AUTO: 3.97 10*6/MM3 (ref 4.14–5.8)
SODIUM SERPL-SCNC: 137 MMOL/L (ref 136–145)
SODIUM SERPL-SCNC: 139 MMOL/L (ref 136–145)
WBC NRBC COR # BLD: 8.7 10*3/MM3 (ref 3.4–10.8)

## 2022-01-17 PROCEDURE — 97165 OT EVAL LOW COMPLEX 30 MIN: CPT

## 2022-01-17 PROCEDURE — 80048 BASIC METABOLIC PNL TOTAL CA: CPT | Performed by: HOSPITALIST

## 2022-01-17 PROCEDURE — 70551 MRI BRAIN STEM W/O DYE: CPT

## 2022-01-17 PROCEDURE — 70450 CT HEAD/BRAIN W/O DYE: CPT

## 2022-01-17 PROCEDURE — 25010000002 LEVETIRACETAM IN NACL 0.82% 500 MG/100ML SOLUTION: Performed by: STUDENT IN AN ORGANIZED HEALTH CARE EDUCATION/TRAINING PROGRAM

## 2022-01-17 PROCEDURE — 63710000001 INSULIN DETEMIR PER 5 UNITS: Performed by: HOSPITALIST

## 2022-01-17 PROCEDURE — 97116 GAIT TRAINING THERAPY: CPT

## 2022-01-17 PROCEDURE — 63710000001 INSULIN LISPRO (HUMAN) PER 5 UNITS: Performed by: HOSPITALIST

## 2022-01-17 PROCEDURE — 97530 THERAPEUTIC ACTIVITIES: CPT

## 2022-01-17 PROCEDURE — 85025 COMPLETE CBC W/AUTO DIFF WBC: CPT | Performed by: STUDENT IN AN ORGANIZED HEALTH CARE EDUCATION/TRAINING PROGRAM

## 2022-01-17 PROCEDURE — 97161 PT EVAL LOW COMPLEX 20 MIN: CPT

## 2022-01-17 PROCEDURE — 99233 SBSQ HOSP IP/OBS HIGH 50: CPT | Performed by: HOSPITALIST

## 2022-01-17 PROCEDURE — 82962 GLUCOSE BLOOD TEST: CPT

## 2022-01-17 PROCEDURE — 92523 SPEECH SOUND LANG COMPREHEN: CPT

## 2022-01-17 PROCEDURE — 25010000002 ENOXAPARIN PER 10 MG: Performed by: STUDENT IN AN ORGANIZED HEALTH CARE EDUCATION/TRAINING PROGRAM

## 2022-01-17 RX ORDER — BISOPROLOL FUMARATE 5 MG/1
10 TABLET, FILM COATED ORAL DAILY
Status: DISCONTINUED | OUTPATIENT
Start: 2022-01-17 | End: 2022-01-19 | Stop reason: HOSPADM

## 2022-01-17 RX ORDER — SODIUM CHLORIDE 9 MG/ML
75 INJECTION, SOLUTION INTRAVENOUS CONTINUOUS
Status: DISCONTINUED | OUTPATIENT
Start: 2022-01-17 | End: 2022-01-19 | Stop reason: HOSPADM

## 2022-01-17 RX ORDER — POTASSIUM CHLORIDE 1.5 G/1.77G
40 POWDER, FOR SOLUTION ORAL AS NEEDED
Status: DISCONTINUED | OUTPATIENT
Start: 2022-01-17 | End: 2022-01-19 | Stop reason: HOSPADM

## 2022-01-17 RX ORDER — LEVETIRACETAM 500 MG/1
500 TABLET ORAL 2 TIMES DAILY
Status: DISCONTINUED | OUTPATIENT
Start: 2022-01-17 | End: 2022-01-19 | Stop reason: HOSPADM

## 2022-01-17 RX ORDER — POTASSIUM CHLORIDE 750 MG/1
40 CAPSULE, EXTENDED RELEASE ORAL AS NEEDED
Status: DISCONTINUED | OUTPATIENT
Start: 2022-01-17 | End: 2022-01-19 | Stop reason: HOSPADM

## 2022-01-17 RX ORDER — TERAZOSIN 1 MG/1
1 CAPSULE ORAL NIGHTLY
Refills: 3 | Status: DISCONTINUED | OUTPATIENT
Start: 2022-01-17 | End: 2022-01-19 | Stop reason: HOSPADM

## 2022-01-17 RX ORDER — POTASSIUM CHLORIDE 7.45 MG/ML
10 INJECTION INTRAVENOUS
Status: DISCONTINUED | OUTPATIENT
Start: 2022-01-17 | End: 2022-01-19 | Stop reason: HOSPADM

## 2022-01-17 RX ADMIN — TERAZOSIN HYDROCHLORIDE 1 MG: 1 CAPSULE ORAL at 20:29

## 2022-01-17 RX ADMIN — INSULIN DETEMIR 5 UNITS: 100 INJECTION, SOLUTION SUBCUTANEOUS at 12:29

## 2022-01-17 RX ADMIN — POTASSIUM CHLORIDE 40 MEQ: 750 CAPSULE, EXTENDED RELEASE ORAL at 12:29

## 2022-01-17 RX ADMIN — POTASSIUM CHLORIDE 40 MEQ: 750 CAPSULE, EXTENDED RELEASE ORAL at 17:14

## 2022-01-17 RX ADMIN — ENOXAPARIN SODIUM 40 MG: 40 INJECTION SUBCUTANEOUS at 17:18

## 2022-01-17 RX ADMIN — PAROXETINE HYDROCHLORIDE 20 MG: 20 TABLET, FILM COATED ORAL at 08:08

## 2022-01-17 RX ADMIN — INSULIN LISPRO 4 UNITS: 100 INJECTION, SOLUTION INTRAVENOUS; SUBCUTANEOUS at 08:17

## 2022-01-17 RX ADMIN — INSULIN LISPRO 7 UNITS: 100 INJECTION, SOLUTION INTRAVENOUS; SUBCUTANEOUS at 12:29

## 2022-01-17 RX ADMIN — SODIUM CHLORIDE, PRESERVATIVE FREE 10 ML: 5 INJECTION INTRAVENOUS at 08:03

## 2022-01-17 RX ADMIN — POTASSIUM CHLORIDE 40 MEQ: 750 CAPSULE, EXTENDED RELEASE ORAL at 08:08

## 2022-01-17 RX ADMIN — LEVETIRACETAM 500 MG: 5 INJECTION INTRAVASCULAR at 08:03

## 2022-01-17 RX ADMIN — ATORVASTATIN CALCIUM 80 MG: 40 TABLET, FILM COATED ORAL at 20:29

## 2022-01-17 RX ADMIN — BISOPROLOL FUMARATE 10 MG: 5 TABLET, FILM COATED ORAL at 08:08

## 2022-01-17 RX ADMIN — LEVETIRACETAM 500 MG: 500 TABLET, FILM COATED ORAL at 20:29

## 2022-01-17 RX ADMIN — INSULIN LISPRO 2 UNITS: 100 INJECTION, SOLUTION INTRAVENOUS; SUBCUTANEOUS at 17:15

## 2022-01-17 RX ADMIN — SODIUM CHLORIDE, PRESERVATIVE FREE 10 ML: 5 INJECTION INTRAVENOUS at 20:30

## 2022-01-17 RX ADMIN — SODIUM CHLORIDE 75 ML/HR: 9 INJECTION, SOLUTION INTRAVENOUS at 08:02

## 2022-01-17 RX ADMIN — SODIUM CHLORIDE, PRESERVATIVE FREE 10 ML: 5 INJECTION INTRAVENOUS at 20:29

## 2022-01-17 NOTE — CASE MANAGEMENT/SOCIAL WORK
Discharge Planning Assessment  [facility]     Patient Name: Derian Joya  MRN: [id number]  Today's Date: 1/17/2022    Admit Date: 1/15/2022     Discharge Needs Assessment     Row Name 01/17/22 1154       Living Environment    Lives With spouse    Name(s) of Who Lives With Patient Gina    Current Living Arrangements home/apartment/condo    Primary Care Provided by self    Able to Return to Prior Arrangements yes       Transition Planning    Patient/Family Anticipates Transition to home with family    Transportation Anticipated family or friend will provide       Discharge Needs Assessment    Readmission Within the Last 30 Days previous discharge plan unsuccessful    Equipment Currently Used at Home none    Concerns to be Addressed discharge planning    Equipment Needed After Discharge walker, rolling; commode    Outpatient/Agency/Support Group Needs homecare agency    Discharge Facility/Level of Care Needs home with home health    Current Discharge Risk chronically ill               Discharge Plan     Row Name 01/17/22 1157       Plan    Plan home with home health    Patient/Family in Agreement with Plan yes    Plan Comments I met with Mr. Joya at the bedside. I also spoke with his wife Gina on the phone.Mr. Joya and his wife live in [facility]. At baseline, Mr. Joya ambulates independently and is independent with activities of daily living. He was discharged from [facility] last week. [facility] home care was arranged with PT and SLP. Only SLP was able to see him prior to his readmission. His wife Gina reports that she anticipates that he will return home after this admission. PT and OT consults are pending and case management will follow up with their post discharge recommendations. Gina requests a rolling walker and bedside commode for home use. CAse management can made these arrangements prior to his discharge.    Final Discharge Disposition Code 06 - home with home health care               Continued Care and Services - Admitted Since 1/15/2022    Coordination has not been started for this encounter.     Selected Continued Care - Prior Encounters Includes selections from prior encounters from 10/17/2021 to 1/17/2022    Discharged on 1/13/2022 Admission date: 1/11/2022 - Discharge disposition: Home or Self Care    Home Medical Care     Service Provider Selected Services Address Phone Fax Patient Preferred    [facility] [address] [facility] [id number] [phone] --                       Demographic Summary     Row Name 01/17/22 1153       General Information    General Information Comments I confirmed that Diaz Lee is Mr. Joya's PCP. [employer] is his  insurer.               Functional Status     Row Name 01/17/22 1154       Functional Status, IADL    Medications independent    Meal Preparation independent    Housekeeping assistive person    Laundry assistive person    Shopping assistive person               Psychosocial    No documentation.                Abuse/Neglect    No documentation.                Legal    No documentation.                Substance Abuse    No documentation.                Patient Forms    No documentation.                   Cb Deluna RN

## 2022-01-17 NOTE — PLAN OF CARE
Goal Outcome Evaluation:   Pt A&Ox4 with intermittent confusion. VSS on RA. Paced per monitor. No c/o pain during shift. No further needs assessed at this time.

## 2022-01-17 NOTE — THERAPY EVALUATION
Patient Name: Derian Joya  : 1940    MRN: [id number]                              Today's Date: 2022       Admit Date: 1/15/2022    Visit Dx:     ICD-10-CM ICD-9-CM   1. Altered mental status, unspecified altered mental status type  R41.82 780.97   2. Diabetic ketoacidosis without coma associated with drug or chemical induced diabetes mellitus (HCC)  E09.10 249.11   3. Medication side effect, initial encounter  T50.905A E947.9   4. Dysarthria  R47.1 784.51   5. Cognitive communication deficit  R41.841 799.52     Patient Active Problem List   Diagnosis   • Uncontrolled type 2 diabetes mellitus (HCC)   • Other hyperlipidemia   • Essential hypertension   • Aortic stenosis   • Cardiomyopathy, nonischemic (HCC)   • SAH (subarachnoid hemorrhage) (HCC)   • SDH (subdural hematoma) (HCC)   • Head trauma, initial encounter   • Head trauma   • Altered mental status   • High anion gap metabolic acidosis   • Subarachnoid hemorrhage (HCC)   • Lactic acidosis     Past Medical History:   Diagnosis Date   • Allergic rhinitis    • Aortic stenosis    • Atopic rhinitis 2016   • Benign non-nodular prostatic hyperplasia with lower urinary tract symptoms 9/15/2017   • CAD (coronary artery disease)    • Cardiomyopathy, ischemic    • Colon polyps     tubular adenoma    • Community acquired pneumonia    • Depression 2016   • Diabetes (HCC)    • Diverticulosis of intestine 2016    Description: Left and sigmoid   • Dyslipidemia    • LBBB (left bundle branch block)    • PAD (peripheral artery disease) (HCC)      Past Surgical History:   Procedure Laterality Date   • CARDIAC CATHETERIZATION      100% cx   • CATARACT EXTRACTION     • COLONOSCOPY W/ BIOPSIES AND POLYPECTOMY  2021    1 benign polyp   • HIP SURGERY Left 2020    Dr Mandy BOUDREAUX   • LEG SURGERY Left 2016    Popliteal Artery stenting by Dr Hdz   • LEG SURGERY Left 2016    skin graft by Dr Nance   • LIPOMA EXCISION      s/p excision on  chest   • PACEMAKER IMPLANTATION  01/2019    and defibrillator      General Information     Row Name 01/17/22 1356          OT Time and Intention    Document Type evaluation  -CS     Mode of Treatment occupational therapy  -CS     Row Name 01/17/22 1356          General Information    Patient Profile Reviewed yes  -CS     Prior Level of Function independent:; all household mobility; bed mobility; ADL's  Per chart review and son report: Prior to recent admit/decline Pt was independent w/ mobility (no AD) and ADLs. Son reports need for BSC and RW (using spouse's currently). Walk-in shower w/ shower chair.  -CS     Existing Precautions/Restrictions fall  posterior lean in standing  -CS     Barriers to Rehab medically complex; visual deficit  -CS     Row Name 01/17/22 1356          Living Environment    Lives With spouse  -CS     Row Name 01/17/22 1356          Home Main Entrance    Number of Stairs, Main Entrance seven; other (see comments)  5 + 2  -CS     Stair Railings, Main Entrance railings safe and in good condition; railing on right side (ascending)  -CS     Row Name 01/17/22 1356          Stairs Within Home, Primary    Stairs, Within Home, Primary Split level foyer w/ 6 + 6 stairs, walk-in shower w/ shower chair  -CS     Number of Stairs, Within Home, Primary other (see comments)  -CS     Row Name 01/17/22 1356          Cognition    Orientation Status (Cognition) oriented to; person; verbal cues/prompts needed for orientation; place; time  -CS     Row Name 01/17/22 1354          Safety Issues, Functional Mobility    Safety Issues Affecting Function (Mobility) awareness of need for assistance; insight into deficits/self-awareness; safety precaution awareness; safety precautions follow-through/compliance; sequencing abilities  -CS     Impairments Affecting Function (Mobility) balance; cognition; postural/trunk control; endurance/activity tolerance; strength; visual/perceptual  -CS     Cognitive Impairments,  Mobility Safety/Performance awareness, need for assistance; insight into deficits/self-awareness; safety precaution awareness; safety precaution follow-through; sequencing abilities  -           User Key  (r) = Recorded By, (t) = Taken By, (c) = Cosigned By    Initials Name Provider Type    CS Timothy Pimentel OT Occupational Therapist                 Mobility/ADL's     Row Name 01/17/22 1400          Bed Mobility    Bed Mobility supine-sit; scooting/bridging  -CS     Scooting/Bridging [place] (Bed Mobility) minimum assist (75% patient effort); verbal cues; nonverbal cues (demo/gesture)  -     Supine-Sit [place] (Bed Mobility) minimum assist (75% patient effort); verbal cues; nonverbal cues (demo/gesture)  -     Bed Mobility, Safety Issues impaired trunk control for bed mobility; decreased use of arms for pushing/pulling; decreased use of legs for bridging/pushing  -CS     Assistive Device (Bed Mobility) bed rails; head of bed elevated  -     Comment (Bed Mobility) adjustable bed at baseline, assist at trunk, mild posterior lean upon sitting EOB w/ improved balance following B foot placement and cues for A weight shifting  -     Row Name 01/17/22 1400          Transfers    Transfers sit-stand transfer; bed-chair transfer  -     Comment (Transfers) cues for sequencing safe hand placement w/ RW use, mild posterior lean upon sitting EOB, CGA for balance during targeted reaching trials  -     Bed-Chair [place] (Transfers) contact guard; verbal cues; nonverbal cues (demo/gesture)  -     Sit-Stand [place] (Transfers) contact guard; verbal cues; nonverbal cues (demo/gesture)  -     Row Name 01/17/22 1400          Functional Mobility    Functional Mobility- Comment CGA for in-room distance from EOB to recliner, cues for AD mngt  -     Row Name 01/17/22 1400          Activities of Daily Living    BADL Assessment/Intervention lower body dressing; grooming; toileting  -     Row Name  01/17/22 1400          Lower Body Dressing Assessment/Training    [place] Level (Lower Body Dressing) don; pants/bottoms; moderate assist (50% patient effort); nonverbal cues (demo/gesture); verbal cues  -CS     Position (Lower Body Dressing) edge of bed sitting; supported sitting  -CS     Comment (Lower Body Dressing) cues for safe sequencing w/ RW use and pants pull-up, assist for threading and balance  -CS     Row Name 01/17/22 1400          Grooming Assessment/Training    [place] Level (Grooming) hair care, combing/brushing; wash face, hands; set up; verbal cues  -CS     Position (Grooming) unsupported sitting  -CS     Row Name 01/17/22 1400          Toileting Assessment/Training    [place] Level (Toileting) toileting skills; other (see comments)  -CS     Comment (Toileting) brief donned upon arrival  -CS           User Key  (r) = Recorded By, (t) = Taken By, (c) = Cosigned By    Initials Name Provider Type    CS Timothy Pimentel, OT Occupational Therapist               Obj/Interventions     Row Name 01/17/22 1406          Sensory Assessment (Somatosensory)    Sensory Assessment (Somatosensory) UE sensation intact  -CS     Row Name 01/17/22 1406          Vision Assessment/Intervention    Visual Impairment/Limitations corrective lenses for reading; peripheral vision impaired right  -CS     Row Name 01/17/22 1406          Range of Motion Comprehensive    General Range of Motion bilateral upper extremity ROM WFL  -CS     Row Name 01/17/22 1406          Strength Comprehensive (MMT)    General Manual Muscle Testing (MMT) Assessment upper extremity strength deficits identified  -CS     Comment, General Manual Muscle Testing (MMT) Assessment generalized deconditioning, BUE grossly 4/5  -CS     Row Name 01/17/22 1406          Motor Skills    Motor Skills coordination; functional endurance  -CS     Coordination bilateral; upper extremity; finger to nose; WFL  -CS     Functional Endurance RA throughout, O2  sats stable  -     Row Name 01/17/22 1406          Balance    Balance Assessment sitting static balance; sitting dynamic balance; standing static balance; standing dynamic balance  -CS     Static Sitting Balance WFL; unsupported; sitting, edge of bed  -CS     Dynamic Sitting Balance mild impairment; unsupported; sitting, edge of bed  -CS     Static Standing Balance mild impairment; supported; standing  -CS     Dynamic Standing Balance moderate impairment; supported; standing  -CS     Balance Interventions sitting; sit to stand; standing; core stability exercise; dynamic reaching; UE activity with balance activity  -CS     Comment, Balance CGA/Matthew for dynamic reaching activities at RW, posterior lean upon sitting/standing, A/P weight shifting ex  -CS           User Key  (r) = Recorded By, (t) = Taken By, (c) = Cosigned By    Initials Name Provider Type    CS Timothy Pimentel, OT Occupational Therapist               Goals/Plan     Row Name 01/17/22 1420          Bed Mobility Goal 1 (OT)    Activity/Assistive Device (Bed Mobility Goal 1, OT) supine to sit; scooting  -CS     [place] Level/Cues Needed (Bed Mobility Goal 1, OT) standby assist  -CS     Time Frame (Bed Mobility Goal 1, OT) long term goal (LTG); 10 days  -CS     Progress/Outcomes (Bed Mobility Goal 1, OT) goal ongoing  -     Row Name 01/17/22 1420          Transfer Goal 1 (OT)    Activity/Assistive Device (Transfer Goal 1, OT) sit-to-stand/stand-to-sit; toilet; walker, rolling  -CS     [place] Level/Cues Needed (Transfer Goal 1, OT) standby assist  -CS     Time Frame (Transfer Goal 1, OT) long term goal (LTG); 10 days  -CS     Progress/Outcome (Transfer Goal 1, OT) goal ongoing  -     Row Name 01/17/22 1420          Dressing Goal 1 (OT)    Activity/Device (Dressing Goal 1, OT) lower body dressing  -CS     [place]/Cues Needed (Dressing Goal 1, OT) standby assist  -CS     Time Frame (Dressing Goal 1, OT) long term goal (LTG); 10 days  -CS      Progress/Outcome (Dressing Goal 1, OT) goal ongoing  -CS     Row Name 01/17/22 1420          Grooming Goal 1 (OT)    Activity/Device (Grooming Goal 1, OT) hair care; oral care; wash face, hands  -CS     [place] (Grooming Goal 1, OT) standby assist  -CS     Time Frame (Grooming Goal 1, OT) long term goal (LTG); 10 days  -CS     Strategies/Barriers (Grooming Goal 1, OT) sinkside  -CS     Row Name 01/17/22 1420          Therapy Assessment/Plan (OT)    Planned Therapy Interventions (OT) activity tolerance training; functional balance retraining; occupation/activity based interventions; ROM/therapeutic exercise; strengthening exercise; transfer/mobility retraining; adaptive equipment training; patient/caregiver education/training  -CS           User Key  (r) = Recorded By, (t) = Taken By, (c) = Cosigned By    Initials Name Provider Type    CS Timothy Pimentel, OT Occupational Therapist               Clinical Impression     Row Name 01/17/22 1414          Pain Assessment    Additional Documentation Pain Scale: Numbers Pre/Post-Treatment (Group)  -CS     Row Name 01/17/22 1414          Pain Scale: Numbers Pre/Post-Treatment    Pretreatment Pain Rating 0/10 - no pain  -CS     Posttreatment Pain Rating 0/10 - no pain  -CS     Pre/Posttreatment Pain Comment tolerated  -CS     Pain Intervention(s) Repositioned; Ambulation/increased activity  -CS     Row Name 01/17/22 1416          Plan of Care Review    Plan of Care Reviewed With patient; son  -CS     Progress no change  OT eval  -CS     Outcome Summary OT evaluation completed. ADL performance limited by generalized weakness, impaired balance, and visual deficit warranting skilled IP OT services to promote return to PLOF. Matthew/CGA for bed and functional mobility, ModA for LB dressing, JAY for seated grooming. Pt engaged in core stability and dynamic reaching activities. Pt/family requests RW and BSC, OT agrees with need. Pt would benefit from short rehab stay, if home  then increased assist and HHOT.  -CS     Row Name 01/17/22 1414          Therapy Assessment/Plan (OT)    Rehab Potential (OT) good, to achieve stated therapy goals  -CS     Criteria for Skilled Therapeutic Interventions Met (OT) yes; meets criteria; skilled treatment is necessary  -CS     Therapy Frequency (OT) daily  -CS     Row Name 01/17/22 1414          Therapy Plan Review/Discharge Plan (OT)    Anticipated Discharge Disposition (OT) inpatient rehabilitation facility; other (see comments)  vs. home w/ increased assist  -CS     Row Name 01/17/22 1414          Vital Signs    Pre Systolic BP Rehab 147  -CS     Pre Treatment Diastolic BP 66  -CS     Post Systolic BP Rehab 158  -CS     Post Treatment Diastolic BP 88  -CS     Posttreatment Heart Rate (beats/min) 77  -CS     O2 Delivery Pre Treatment room air  -CS     O2 Delivery Intra Treatment room air  -CS     O2 Delivery Post Treatment room air  -CS     Pre Patient Position Supine  -CS     Intra Patient Position Standing  -CS     Post Patient Position Sitting  -CS     Row Name 01/17/22 1414          Positioning and Restraints    Pre-Treatment Position in bed  -CS     Post Treatment Position chair  -CS     In Chair notified nsg; sitting; with PT  -CS           User Key  (r) = Recorded By, (t) = Taken By, (c) = Cosigned By    Initials Name Provider Type    CS Timothy Pimentel, OT Occupational Therapist               Outcome Measures     Row Name 01/17/22 1420          How much help from another is currently needed...    Putting on and taking off regular lower body clothing? 2  -CS     Bathing (including washing, rinsing, and drying) 2  -CS     Toileting (which includes using toilet bed pan or urinal) 2  -CS     Putting on and taking off regular upper body clothing 3  -CS     Taking care of personal grooming (such as brushing teeth) 3  -CS     Eating meals 4  -CS     AM-PAC 6 Clicks Score (OT) 16  -CS     Row Name 01/17/22 1420          Functional Assessment     Outcome Measure Options AM-PAC 6 Clicks Daily Activity (OT)  -           User Key  (r) = Recorded By, (t) = Taken By, (c) = Cosigned By    Initials Name Provider Type    CS Timothy Pimentel OT Occupational Therapist                Occupational Therapy Education                 Title: PT OT SLP Therapies (In Progress)     Topic: Occupational Therapy (In Progress)     Point: ADL training (In Progress)     Description:   Instruct learner(s) on proper safety adaptation and remediation techniques during self care or transfers.   Instruct in proper use of assistive devices.              Learning Progress Summary           Patient Acceptance, E,D, NR by  at 1/17/2022 1421   Family Acceptance, E,D, NR by CS at 1/17/2022 1421                   Point: Home exercise program (In Progress)     Description:   Instruct learner(s) on appropriate technique for monitoring, assisting and/or progressing therapeutic exercises/activities.              Learning Progress Summary           Patient Acceptance, E,D, NR by CS at 1/17/2022 1421   Family Acceptance, E,D, NR by CS at 1/17/2022 1421                   Point: Precautions (In Progress)     Description:   Instruct learner(s) on prescribed precautions during self-care and functional transfers.              Learning Progress Summary           Patient Acceptance, E,D, NR by CS at 1/17/2022 1421   Family Acceptance, E,D, NR by CS at 1/17/2022 1421                   Point: Body mechanics (In Progress)     Description:   Instruct learner(s) on proper positioning and spine alignment during self-care, functional mobility activities and/or exercises.              Learning Progress Summary           Patient Acceptance, E,D, NR by  at 1/17/2022 1421   Family Acceptance, E,D, NR by  at 1/17/2022 1421                               User Key     Initials Effective Dates Name Provider Type Discipline     06/16/21 -  Timothy Pimentel OT Occupational Therapist OT              OT  Recommendation and Plan  Planned Therapy Interventions (OT): activity tolerance training, functional balance retraining, occupation/activity based interventions, ROM/therapeutic exercise, strengthening exercise, transfer/mobility retraining, adaptive equipment training, patient/caregiver education/training  Therapy Frequency (OT): daily  Plan of Care Review  Plan of Care Reviewed With: patient, son  Progress: no change (OT eval)  Outcome Summary: OT evaluation completed. ADL performance limited by generalized weakness, impaired balance, and visual deficit warranting skilled IP OT services to promote return to PLOF. Matthew/CGA for bed and functional mobility, ModA for LB dressing, JAY for seated grooming. Pt engaged in core stability and dynamic reaching activities. Pt/family requests RW and BSC, OT agrees with need. Pt would benefit from short rehab stay, if home then increased assist and HHOT.     Time Calculation:    Time Calculation- OT     Row Name 01/17/22 1422             Time Calculation- OT    OT Start Time 1312  -CS      OT Received On 01/17/22  -CS      OT Goal Re-Cert Due Date 01/27/22  -CS              Timed Charges    67417 - OT Therapeutic Activity Minutes 6  -CS      69360 - OT Self Care/Mgmt Minutes 4  -CS              Untimed Charges    OT Eval/Re-eval Minutes 48  -CS              Total Minutes    Timed Charges Total Minutes 10  -CS      Untimed Charges Total Minutes 48  -CS       Total Minutes 58  -CS            User Key  (r) = Recorded By, (t) = Taken By, (c) = Cosigned By    Initials Name Provider Type    CS Timothy Pimentel, OT Occupational Therapist              Therapy Charges for Today     Code Description Service Date Service Provider Modifiers Qty    38857004794 HC OT THERAPEUTIC ACT EA 15 MIN 1/17/2022 Timothy Pimentel, OT GO 1    [id number] HC OT EVAL LOW COMPLEXITY 4 1/17/2022 Timothy Pimentel, OT GO 1               [place] L Loren OT  1/17/2022

## 2022-01-17 NOTE — CONSULTS
Chart reviewed intiated per stroke protocol Not a candidate for the outpatient stroke/diabetes class as BMI less than 25. Will follow daily for educational opportunities

## 2022-01-17 NOTE — PLAN OF CARE
Patient's repeat head CT this morning shows stable hemorrhage.  There is no plan for any type of neurosurgical intervention.  Defer to neurology for management of seizures.  Patient already has scheduled follow-up with SUZY Oneal with neurosurgery in February.  He should continue with this appointment.  We will sign off at this time.  Please call with questions.

## 2022-01-17 NOTE — NURSING NOTE
Daily Low Steve <=14   Steve score: 14  No new concerns noted per staff. Interventions in place and documented per protocol. Apply barrier cream every shift/PRN. Keep patient dry and turn q2h. Elevate and offload heels.  Contact [facility] for any concerns.  On waffle mattress, will not order specialty bed today as patient might go home tomorrow per nurse, we will follow-up Wednesday.

## 2022-01-17 NOTE — CASE COMMUNICATION
Patient missed a PT evaluation isit from [facility] on 1.17.2022     Reason:Pt is back in the hospital. Will need a new order upon dc from hospital.      For your records only.   As per home health protocol, MD must be notified of missed/cancelled visits; therefore the prescribed frequency was not met.

## 2022-01-17 NOTE — THERAPY EVALUATION
Acute Care - Speech Language Pathology Initial Evaluation  [facility]   Cognitive-Communication Evaluation       Patient Name: Derian Joya  : 1940  MRN: [id number]  Today's Date: 2022               Admit Date: 1/15/2022     Visit Dx:    ICD-10-CM ICD-9-CM   1. Altered mental status, unspecified altered mental status type  R41.82 780.97   2. Diabetic ketoacidosis without coma associated with drug or chemical induced diabetes mellitus (HCC)  E09.10 249.11   3. Medication side effect, initial encounter  T50.905A E947.9   4. Dysarthria  R47.1 784.51   5. Cognitive communication deficit  R41.841 799.52     Patient Active Problem List   Diagnosis   • Uncontrolled type 2 diabetes mellitus (HCC)   • Other hyperlipidemia   • Essential hypertension   • Aortic stenosis   • Cardiomyopathy, nonischemic (HCC)   • SAH (subarachnoid hemorrhage) (HCC)   • SDH (subdural hematoma) (HCC)   • Head trauma, initial encounter   • Head trauma   • Altered mental status   • High anion gap metabolic acidosis   • Subarachnoid hemorrhage (HCC)   • Lactic acidosis     Past Medical History:   Diagnosis Date   • Allergic rhinitis    • Aortic stenosis    • Atopic rhinitis 2016   • Benign non-nodular prostatic hyperplasia with lower urinary tract symptoms 9/15/2017   • CAD (coronary artery disease)    • Cardiomyopathy, ischemic    • Colon polyps     tubular adenoma    • Community acquired pneumonia    • Depression 2016   • Diabetes (HCC)    • Diverticulosis of intestine 2016    Description: Left and sigmoid   • Dyslipidemia    • LBBB (left bundle branch block)    • PAD (peripheral artery disease) (HCC)      Past Surgical History:   Procedure Laterality Date   • CARDIAC CATHETERIZATION      100% cx   • CATARACT EXTRACTION     • COLONOSCOPY W/ BIOPSIES AND POLYPECTOMY  2021    1 benign polyp   • HIP SURGERY Left 2020    Dr Mandy BOUDREAUX   • LEG SURGERY Left 2016    Popliteal Artery stenting by Dr Hdz    • LEG SURGERY Left 07/2016    skin graft by Dr Nance   • LIPOMA EXCISION      s/p excision on chest   • PACEMAKER IMPLANTATION  01/2019    and defibrillator       SLP Recommendation and Plan  SLP Diagnosis: Moderate dysarthria and moderate cognitive-linguistic disorder. Further assessment needed in tx when pt more alert. (01/17/22 1020)        [facility] Criteria for Skilled Therapy Interventions Met: yes (01/17/22 1020)  Anticipated Discharge Disposition (SLP): anticipate therapy at next level of care (01/17/22 1020)        Predicted Duration Therapy Intervention (Days): until discharge (01/17/22 1020)           Communication Strategy Suggestions: eliminate distractions when speaking with patient (01/17/22 1020)        Plan of Care Reviewed With: patient (01/17/22 1106)  Progress: no change (01/17/22 1106)      SLP EVALUATION (last 72 hours)     SLP SLC Evaluation     Row Name 01/17/22 1020                Communication Assessment/Intervention    Document Type evaluation  -AC       Subjective Information complains of; fatigue  -AC       Patient Observations lethargic  -AC       Patient/Family/Caregiver Comments/Observations No family present.  -AC       Patient Effort fair  -               General Information    Patient Profile Reviewed yes  -AC       Pertinent History Of Current Problem Re-adm from home w/ incr'd confusion & dysarthria. Recent adm 1/11 w/ traumatic L SDH/SAH. Was being tx by SLP for cognitive-linguistic deficits following hemorrhage. MRI revealed stable hemorrhage. Possible sz. Passed RN CVA swallow rescreen.  -AC       Patient Level of Education Former   -AC       Prior Level of Function-Communication cognitive-linguistic impairment; other (see comments)  following recent SDH/SAH  -AC       Plans/Goals Discussed with patient; agreed upon  -       Barriers to Rehab previous functional deficit; cognitive status  -       Patient's Goals for Discharge patient did not state  -AC                Pain    Additional Documentation Pain Scale: FACES Pre/Post-Treatment (Group)  -AC               Pain Scale: FACES Pre/Post-Treatment    Pain: FACES Scale, Pretreatment 0-->no hurt  -AC       Posttreatment Pain Rating 0-->no hurt  -AC               Comprehension Assessment/Intervention    Comprehension Assessment/Intervention Auditory Comprehension  -AC               Auditory Comprehension Assessment/Intervention    Auditory Comprehension (Communication) mild impairment  -AC       Answers Questions (Communication) WFL; complex; yes/no; simple; wh questions  -AC       Able to Follow Commands (Communication) WFL; 2-step  -AC       Narrative Discourse mild impairment; conversational level  -AC       Auditory Comprehension Communication, Comment Slow to respond. Suspect r/t cog impairments & lethargy.  -AC               Expression Assessment/Intervention    Expression Assessment/Intervention verbal expression  -AC               Verbal Expression Assessment/Intervention    Automatic Speech (Communication) WFL; response to greeting  -AC       Responsive Naming WFL; complex  -AC       Confrontational Naming WFL; high frequency  -AC       Sentence Formulation mild impairment; delayed responses  -AC       Conversational Discourse/Fluency mild impairment; delayed responses  -AC       Verbal Expression, Comment Slow to respond. Suspect r/t cog impairments & lethargy.  -AC               Motor Speech Assessment/Intervention    Motor Speech Function moderate impairment  -AC       Characteristics Consistent with Dysarthria decreased articulation; decreased intensity; slow rate; decreased breath support  -AC       Verbal Repetition (Communication) moderate impairment; polysyllabic words  -AC       Conversational Speech (Communication) moderate impairment; simple  -AC               Cognitive Assessment Intervention- SLP    Cognitive Function (Cognition) moderate impairment  u/a to fully assess 2' lethargy  -AC        Orientation Status (Cognition) moderate impairment; situation; WFL; person; place; time  -AC       Memory (Cognitive) moderate impairment; immediate; related; other (see comments)  3 words  -AC       Attention (Cognitive) moderate impairment; sustained  -AC       Thought Organization (Cognitive) moderate impairment; abstract convergent; verbal sequencing  -AC               SLP Evaluation Clinical Impressions    SLP Diagnosis Moderate dysarthria and moderate cognitive-linguistic disorder. Further assessment needed in tx when pt more alert.  -AC       Rehab Potential/Prognosis good  -AC       SLC Criteria for Skilled Therapy Interventions Met yes  -AC       Functional Impact difficulty communicating in an emergency; difficulty in expressing complex messages; decreased ability to respond to situations safely; Poor Judgement; difficulty completing home management task  -AC               Recommendations    Therapy Frequency (SLP SLC) 5 days per week  -AC       Predicted Duration Therapy Intervention (Days) until discharge  -AC       Anticipated Discharge Disposition (SLP) anticipate therapy at next level of care  -AC       Communication Strategy Suggestions eliminate distractions when speaking with patient  -AC             User Key  (r) = Recorded By, (t) = Taken By, (c) = Cosigned By    Initials Name Effective Dates     Alyssa Agarwal MS CCC-SLP 06/16/21 -                    EDUCATION  The patient has been educated in the following areas:     Cognitive Impairment Communication Impairment.           SLP GOALS     Row Name 01/17/22 1020             Phonation Goal 1 (SLP)    Improve Phonation By Goal 1 (SLP) using loud speech; 80%; with minimal cues (75-90%)  -AC      Time Frame (Phonation Goal 1, SLP) short term goal (STG)  -AC              Articulation Goal 1 (SLP)    Improve Articulation Goal 1 (SLP) by over-articulating at word level; 80%; with minimal cues (75-90%)  -AC      Time Frame (Articulation Goal 1, SLP)  short term goal (STG)  -AC              Attention Goal 1 (SLP)    Improve Attention by Goal 1 (SLP) complete sustained attention task; 70%; with minimal cues (75-90%)  -AC      Time Frame (Attention Goal 1, SLP) short term goal (STG)  -AC              Orientation Goal 1 (SLP)    Improve Orientation Through Goal 1 (SLP) demonstrating orientation to disease/impairment; 100%; independently (over 90% accuracy)  -AC      Time Frame (Orientation Goal 1, SLP) short term goal (STG)  -AC              Memory Skills Goal 1 (SLP)    Improve Memory Skills Through Goal 1 (SLP) recalling related word lists immediately; 90%; with minimal cues (75-90%)  3 words  -AC      Time Frame (Memory Skills Goal 1, SLP) short term goal (STG)  -AC              Organizational Skills Goal 1 (SLP)    Improve Thought Organization Through Goal 1 (SLP) completing a convergent naming task; abstract; completing a verbal sequencing task; 70%; with minimal cues (75-90%)  -AC      Time Frame (Thought Organization Skills Goal 1, SLP) short term goal (STG)  -AC              Additional Goal 1 (SLP)    Additional Goal 1, SLP LTG: Pt will improve cognitive-communication skills in order to participate in care in hospital setting 100% of the time w/ min cues.  -AC      Time Frame (Additional Goal 1, SLP) by discharge  -AC            User Key  (r) = Recorded By, (t) = Taken By, (c) = Cosigned By    Initials Name Provider Type    AC Alyssa Agarwal MS CCC-SLP Speech and Language Pathologist                        Time Calculation:      Time Calculation- SLP     Row Name 01/17/22 1107             Time Calculation- SLP    SLP Start Time 1020  -      SLP Received On 01/17/22  -              Untimed Charges    30801-DY Eval Speech and Production w/ Language Minutes 54  -AC              Total Minutes    Untimed Charges Total Minutes 54  -AC       Total Minutes 54  -AC            User Key  (r) = Recorded By, (t) = Taken By, (c) = Cosigned By    Initials Name  Provider Type     Alyssa Agarwal MS CCC-SLP Speech and Language Pathologist                Therapy Charges for Today     Code Description Service Date Service Provider Modifiers Qty    20792646954 HC ST EVAL SPEECH AND PROD W LANG  4 1/17/2022 Alyssa Agarwal MS CCC-SLP GN 1        Patient was not wearing a face mask and did not exhibit coughing during this therapy encounter.  Procedure performed was not aerosolizing, involved close contact (within 6 feet for at least 15 minutes or longer), and did not involve contact with infectious secretions or specimens.  Therapist used appropriate personal protective equipment including gloves, standard procedure mask and eye protection.  Appropriate PPE was worn during the entire therapy session.  Hand hygiene was completed before and after therapy session.                Alyssa Agarwal MS CCC-SLP  1/17/2022

## 2022-01-17 NOTE — PLAN OF CARE
Goal Outcome Evaluation:  Plan of Care Reviewed With: patient           Outcome Summary: PT PRESENTS WITH EVOLVING SYMPTOMS TO INCLUDE IMPAIRED BALANCE, GENERALIZED WEAKNESSS, VISUAL DEFICIT, AND DECLINE IN FUNCTIONAL MOBILITY. PT REQUIRED CGA ASSIST TO STAND AND MIN ASSIST TO AMBULATE WITH R WALKER . DEMONSTRATES POSTERIOR LEAN IN STANDING AND NEEDS MANUAL ASSIST TO GUIDE R WALKER TO AVOID OBSTACLES ON R. RECOMMEND IRF AT D/C. IF GOES HOME WILL NEED 24/7 ASSIST WITH MOBILITY UNTIL DEEMED SAFE BY HHPT. WILL NEED R WALKER AND BSC FOR HOME.

## 2022-01-17 NOTE — PLAN OF CARE
Goal Outcome Evaluation:  Plan of Care Reviewed With: patient        Progress: no change   SLP evaluation completed. Will address dysarthria & cognitive-linguistic impairments in tx. Please see note for further details and recommendations.

## 2022-01-17 NOTE — CONSULTS
"                  Clinical Nutrition     Reason for Visit: Admission assessment, Physician consult    Patient Name: Derian Joya  YOB: 1940  MRN: [id number]  Date of Encounter: 01/17/22 07:37 EST  Admission date: 1/15/2022    Nutrition Assessment   Admission Problem List:    Altered mental status    High anion gap metabolic acidosis    Subarachnoid hemorrhage (HCC)    Lactic acidosis    Suspected Euglycemic DKA vs. Seizure or CVA    Applicable PMH:  HTN  HLP  DM  Diverticulosis  PAD    Applicable medical tests/procedures since admission:  (1/15) CT head  (1/15) CT Chest      PMH:   He  has a past medical history of Allergic rhinitis, Aortic stenosis, Atopic rhinitis (7/6/2016), Benign non-nodular prostatic hyperplasia with lower urinary tract symptoms (9/15/2017), CAD (coronary artery disease), Cardiomyopathy, ischemic, Colon polyps, Community acquired pneumonia, Depression (7/6/2016), Diabetes (HCC), Diverticulosis of intestine (7/6/2016), Dyslipidemia, LBBB (left bundle branch block), and PAD (peripheral artery disease) ([facility]).  PSxH:  He  has a past surgical history that includes Lipoma Excision; Leg Surgery (Left, 07/2016); Leg Surgery (Left, 07/2016); Pacemaker Implantation (01/2019); Hip surgery (Left, 08/2020); Cataract extraction; Cardiac catheterization (2018); and Colonoscopy w/ biopsies and polypectomy (05/2021).      Reported/Observed/Food/Nutrition Related History:     Some decreased appetite, pt ate well, drank a vanilla milkshake brought from outside today. Feeling better, more alert per staff.     Anthropometrics   Height: Height: 165.1 cm (65\")  Weight: Weight: 61 kg (134 lb 8 oz) (01/15/22 1530)  BMI: BMI (Calculated): 22.4  BMI classification: Normal: 18.5-24.9kg/m2   IBW: 135 lbs    UBW: ~140 lbs  Weight change: Gradual loss of ~6 lbs over the last 8 months (-4%)    Labs reviewed   Results from last 7 days   Lab Units 01/17/22  0511 01/15/22  2115 01/15/22  1609   SODIUM mmol/L 139 "   < > 146*   POTASSIUM mmol/L 3.1*   < > 4.0   CHLORIDE mmol/L 100   < > 111*   CO2 mmol/L 19.0*   < > 12.0*   BUN mg/dL 11   < > 27*   CREATININE mg/dL 0.60*   < > 0.93   CALCIUM mg/dL 9.1   < > 8.8   BILIRUBIN mg/dL  --   --  0.7   ALK PHOS U/L  --   --  60   ALT (SGPT) U/L  --   --  100*   AST (SGOT) U/L  --   --  104*   GLUCOSE mg/dL 182*   < > 199*    < > = values in this interval not displayed.       Results from last 7 days   Lab Units 01/16/22 2001 01/16/22  1655 01/16/22  1134 01/16/22  0939 01/16/22  0730 01/16/22  0641   GLUCOSE mg/dL 284* 379* 121 105 284* 135*     Lab Results   Lab Value Date/Time    HGBA1C 7.80 (H) 01/15/2022 0957    HGBA1C 8.10 (H) 01/11/2022 2253       Medications reviewed     atorvastatin, 80 mg, Oral, Nightly  enoxaparin, 40 mg, Subcutaneous, Q24H  levETIRAcetam, 500 mg, Intravenous, Q12H  PARoxetine, 20 mg, Oral, Daily  sodium chloride, 10 mL, Intravenous, Q12H    Intake/Ouptut 24 hrs (7:00AM - 6:59 AM)     Intake & Output (last day)       01/16 0701  01/17 0700 01/17 0701 01/18 0700    P.O. 100     IV Piggyback      Total Intake(mL/kg) 100 (1.6)     Net +100           Urine Unmeasured Occurrence 2 x           Current Nutrition Prescription     PO: Diet Regular; Consistent Carbohydrate  Oral Nutrition Supplement:     Evaluation of Received Nutrient/Fluid Intake:  Insufficient data 50% of 2 meals recorded     Nutrition Diagnosis   Date: 1/16 Updated:   Problem Altered nutrition related laboratory values   Etiology ? Euglycemic DKA   Signs/Symptoms Elevated Glucose, AST, ALT   Status: ongoing    Date:  1/16 Updated:  Problem Predicted suboptimal energy intake   Etiology AMS   Signs/Symptoms Reported decreased appetite   Status:  Nutrition Intervention   1.  Follow treatment progress, Care plan reviewed, Advise alternate selection, Encourage intake, Supplement provided  2.  Boost GC BID    Goal:   General: Nutrition support treatment  PO: Establish PO, Tolerate PO    Additional  goals:    Monitoring/Evaluation:   Per protocol, I&O, PO intake, Supplement intake, Pertinent labs, GI status, Symptoms, POC/GOC    Will Continue to follow per protocol      Nessa Champion, EDOUARD, LD, CLC  Time Spent: 35min

## 2022-01-17 NOTE — THERAPY EVALUATION
Patient Name: Derian Joya  : 1940    MRN: [id number]                              Today's Date: 2022       Admit Date: 1/15/2022    Visit Dx:     ICD-10-CM ICD-9-CM   1. Altered mental status, unspecified altered mental status type  R41.82 780.97   2. Diabetic ketoacidosis without coma associated with drug or chemical induced diabetes mellitus (HCC)  E09.10 249.11   3. Medication side effect, initial encounter  T50.905A E947.9   4. Dysarthria  R47.1 784.51   5. Cognitive communication deficit  R41.841 799.52     Patient Active Problem List   Diagnosis   • Uncontrolled type 2 diabetes mellitus (HCC)   • Other hyperlipidemia   • Essential hypertension   • Aortic stenosis   • Cardiomyopathy, nonischemic (HCC)   • SAH (subarachnoid hemorrhage) (HCC)   • SDH (subdural hematoma) (HCC)   • Head trauma, initial encounter   • Head trauma   • Altered mental status   • High anion gap metabolic acidosis   • Subarachnoid hemorrhage (HCC)   • Lactic acidosis     Past Medical History:   Diagnosis Date   • Allergic rhinitis    • Aortic stenosis    • Atopic rhinitis 2016   • Benign non-nodular prostatic hyperplasia with lower urinary tract symptoms 9/15/2017   • CAD (coronary artery disease)    • Cardiomyopathy, ischemic    • Colon polyps     tubular adenoma    • Community acquired pneumonia    • Depression 2016   • Diabetes (HCC)    • Diverticulosis of intestine 2016    Description: Left and sigmoid   • Dyslipidemia    • LBBB (left bundle branch block)    • PAD (peripheral artery disease) (HCC)      Past Surgical History:   Procedure Laterality Date   • CARDIAC CATHETERIZATION      100% cx   • CATARACT EXTRACTION     • COLONOSCOPY W/ BIOPSIES AND POLYPECTOMY  2021    1 benign polyp   • HIP SURGERY Left 2020    Dr Mandy BOUDREAUX   • LEG SURGERY Left 2016    Popliteal Artery stenting by Dr Hdz   • LEG SURGERY Left 2016    skin graft by Dr Nance   • LIPOMA EXCISION      s/p excision on  chest   • PACEMAKER IMPLANTATION  01/2019    and defibrillator      General Information     Row Name 01/17/22 1404          Physical Therapy Time and Intention    Mode of Treatment physical therapy  -CD     Row Name 01/17/22 1404          General Information    Patient Profile Reviewed yes  -CD     Prior Level of Function --  Pt was independent w/ mobility (no AD) and ADL'S prior to recent admit/decline.  Has need for BSC and RW (using spouse's currently).  -CD     Existing Precautions/Restrictions fall  B LE WEAKNESS, L>R, POSTERIOR LEAN IN STANDING.  -CD     Barriers to Rehab medically complex; visual deficit  -CD     Row Name 01/17/22 1404          Living Environment    Lives With spouse  -CD     Row Name 01/17/22 1404          Home Main Entrance    Number of Stairs, Main Entrance seven  5+2 WITH 1 RAIL.  -CD     Stair Railings, Main Entrance railings safe and in good condition  -CD     Row Name 01/17/22 1404          Stairs Within Home, Primary    Stairs, Within Home, Primary SPLIT LEVEL FOYER (6+6)  -CD     Stair Railings, Within Home, Primary railings safe and in good condition  -CD     Row Name 01/17/22 1404          Cognition    Orientation Status (Cognition) oriented to; person; verbal cues/prompts needed for orientation; place; time  -CD     Row Name 01/17/22 1404          Safety Issues, Functional Mobility    Safety Issues Affecting Function (Mobility) awareness of need for assistance; insight into deficits/self-awareness; safety precaution awareness; safety precautions follow-through/compliance; sequencing abilities; positioning of assistive device  -CD     Impairments Affecting Function (Mobility) balance; cognition; postural/trunk control; endurance/activity tolerance; strength; grasp  -CD     Cognitive Impairments, Mobility Safety/Performance awareness, need for assistance; insight into deficits/self-awareness; safety precaution awareness; safety precaution follow-through; sequencing abilities  -CD      Comment, Safety Issues/Impairments (Mobility) PT VEERS R WITH R WALKER WITH GAIT IN [place].  -CD           User Key  (r) = Recorded By, (t) = Taken By, (c) = Cosigned By    Initials Name Provider Type    CD Marybeth Edwards, PT Physical Therapist               Mobility     Row Name 01/17/22 1412          Bed Mobility    Comment (Bed Mobility) PT UIC UPON ARRIVAL ON RA WITH O.T. JUST FINISHING EVAL.  -CD     Row Name 01/17/22 1412          Transfers    Comment (Transfers) CUES FOR HAND PLACEMENT. STS FROM RECLINER. HAS POSTERIOR LEAN UPON STANDING REQUIRING MANUAL ASSIST FOR WT OVER COG.  -CD     Row Name 01/17/22 1412          Sit-Stand Transfer    Sit-Stand [place] (Transfers) contact guard; verbal cues  -CD     Assistive Device (Sit-Stand Transfers) walker, front-wheeled  -CD     Row Name 01/17/22 1412          Gait/Stairs (Locomotion)    [place] Level (Gait) verbal cues; minimum assist (75% patient effort)  -CD     Assistive Device (Gait) walker, front-wheeled  -CD     Distance in Feet (Gait) 200 FEET.  -CD     Deviations/Abnormal Patterns (Gait) sheela decreased; gait speed decreased; stride length decreased  -CD     Comment (Gait/Stairs) PT VEERS R AND REQUIRES MAX CUEING TO AVOID OBSTACLES ON R AND MANUAL ASSIST TO GUIDE R WALKER.  -CD           User Key  (r) = Recorded By, (t) = Taken By, (c) = Cosigned By    Initials Name Provider Type    CD Marybeth Edwards PT Physical Therapist               Obj/Interventions     Row Name 01/17/22 1422          Range of Motion Comprehensive    General Range of Motion bilateral lower extremity ROM WFL  -CD     Row Name 01/17/22 1422          Strength Comprehensive (MMT)    General Manual Muscle Testing (MMT) Assessment lower extremity strength deficits identified  -CD     Comment, General Manual Muscle Testing (MMT) Assessment R LE GROSSLY 4/5. L HIP FLEX 3+/5, KNEE EXT 4/5, DF 4/5.  -CD     Row Name 01/17/22 1422          Motor Skills    Motor Skills coordination   -CD     Coordination gross motor deficit; bilateral; lower extremity; minimal impairment  -CD     Row Name 01/17/22 1422          Balance    Balance Assessment sitting static balance; sitting dynamic balance; standing static balance; standing dynamic balance  -CD     Static Sitting Balance WFL  -CD     Dynamic Sitting Balance WFL  -CD     Static Standing Balance supported; standing  -CD     Dynamic Standing Balance moderate impairment; supported; standing  -CD     Balance Interventions sitting; sit to stand; standing; supported; static; dynamic; weight shifting activity  -CD     Comment, Balance GAIT IN [place] AND TRANSFERS WITH R WALKER WITH CGA/MIN ASSIST. HAS POSTERIOR LEAN IN STANDING.  -CD           User Key  (r) = Recorded By, (t) = Taken By, (c) = Cosigned By    Initials Name Provider Type    CD Marybeth Edwards, PT Physical Therapist               Goals/Plan     Row Name 01/17/22 1428          Bed Mobility Goal 1 (PT)    Activity/Assistive Device (Bed Mobility Goal 1, PT) bed mobility activities, all  -CD     [place] Level/Cues Needed (Bed Mobility Goal 1, PT) independent  -CD     Time Frame (Bed Mobility Goal 1, PT) long term goal (LTG); 2 weeks  -CD     Row Name 01/17/22 1428          Transfer Goal 1 (PT)    Activity/Assistive Device (Transfer Goal 1, PT) sit-to-stand/stand-to-sit; bed-to-chair/chair-to-bed  -CD     [place] Level/Cues Needed (Transfer Goal 1, PT) independent  -CD     Time Frame (Transfer Goal 1, PT) long term goal (LTG); 2 weeks  -CD     Row Name 01/17/22 1428          Gait Training Goal 1 (PT)    Activity/Assistive Device (Gait Training Goal 1, PT) gait (walking locomotion); walker, rolling  -CD     [place] Level (Gait Training Goal 1, PT) standby assist  -CD     Distance (Gait Training Goal 1, PT) 400 FEET  -CD     Time Frame (Gait Training Goal 1, PT) long term goal (LTG); 2 weeks  -CD     Row Name 01/17/22 1428          Stairs Goal 1 (PT)    Activity/Assistive Device (Stairs  Goal 1, PT) ascending stairs; descending stairs; using handrail, left; using handrail, right; step-to-step  -CD     [place] Level/Cues Needed (Stairs Goal 1, PT) contact guard assist  -CD     Number of Stairs (Stairs Goal 1, PT) 7  -CD     Time Frame (Stairs Goal 1, PT) long term goal (LTG); 2 weeks  -CD           User Key  (r) = Recorded By, (t) = Taken By, (c) = Cosigned By    Initials Name Provider Type    CD Marybeth Edwards, PT Physical Therapist               Clinical Impression     Row Name 01/17/22 1425          Pain Scale: Numbers Pre/Post-Treatment    Pretreatment Pain Rating 0/10 - no pain  -CD     Posttreatment Pain Rating 0/10 - no pain  -CD     Row Name 01/17/22 1425          Plan of Care Review    Plan of Care Reviewed With patient  -CD     Outcome Summary PT PRESENTS WITH EVOLVING SYMPTOMS TO INCLUDE IMPAIRED BALANCE, GENERALIZED WEAKNESSS, VISUAL DEFICIT, AND DECLINE IN FUNCTIONAL MOBILITY. PT REQUIRED CGA ASSIST TO STAND AND MIN ASSIST TO AMBULATE WITH R WALKER . DEMONSTRATES POSTERIOR LEAN IN STANDING AND NEEDS MANUAL ASSIST TO GUIDE R WALKER TO AVOID OBSTACLES ON R. RECOMMEND IRF AT D/C. IF GOES HOME WILL NEED 24/7 ASSIST WITH MOBILITY UNTIL DEEMED SAFE BY HHPT. WILL NEED R WALKER AND BSC FOR HOME.  -CD     Row Name 01/17/22 1425          Therapy Assessment/Plan (PT)    Patient/Family Therapy Goals Statement (PT) DID NOT STATE.  -CD     Rehab Potential (PT) good, to achieve stated therapy goals  -CD     Criteria for Skilled Interventions Met (PT) yes  -CD     Row Name 01/17/22 1425          Vital Signs    Pre Systolic BP Rehab 158  -CD     Pre Treatment Diastolic BP 88  -CD     Post Systolic BP Rehab 148  -CD     Post Treatment Diastolic BP 88  -CD     Pretreatment Heart Rate (beats/min) 77  -CD     Posttreatment Heart Rate (beats/min) 74  -CD     Pre Patient Position Sitting  -CD     Intra Patient Position Standing  -CD     Post Patient Position Sitting  -CD     Row Name 01/17/22 1428           Positioning and Restraints    Pre-Treatment Position sitting in chair/recliner  -CD     Post Treatment Position chair  -CD     In Chair reclined; call light within reach; encouraged to call for assist; exit alarm on; legs elevated; notified nsg  -CD           User Key  (r) = Recorded By, (t) = Taken By, (c) = Cosigned By    Initials Name Provider Type    CD Marybeth Edwards, PT Physical Therapist               Outcome Measures     Row Name 01/17/22 1429          How much help from another person do you currently need...    Turning from your back to your side while in flat bed without using bedrails? 3  -CD     Moving from lying on back to sitting on the side of a flat bed without bedrails? 3  -CD     Moving to and from a bed to a chair (including a wheelchair)? 3  -CD     Standing up from a chair using your arms (e.g., wheelchair, bedside chair)? 3  -CD     Climbing 3-5 steps with a railing? 2  -CD     To walk in hospital room? 3  -CD     AM-PAC 6 Clicks Score (PT) 17  -CD     Row Name 01/17/22 1429 01/17/22 1420       Functional Assessment    Outcome Measure Options AM-PAC 6 Clicks Basic Mobility (PT); Modified [place]  -CD AM-PAC 6 Clicks Daily Activity (OT)  -CS          User Key  (r) = Recorded By, (t) = Taken By, (c) = Cosigned By    Initials Name Provider Type    CD Marybeth Edwards, PT Physical Therapist    CS Timothy Pimentel, OT Occupational Therapist                             Physical Therapy Education                 Title: PT OT SLP Therapies (In Progress)     Topic: Physical Therapy (Done)     Point: Mobility training (Done)     Learning Progress Summary           Patient Acceptance, E, VU,NR by CD at 1/17/2022 1430    Comment: BENEFITS OF OOB ACTIVITY, SAFETY WITH MOBILITY, PROGRESSION OF POC, D/C PLANNING,                   Point: Home exercise program (Done)     Learning Progress Summary           Patient Acceptance, E, VU,NR by CD at 1/17/2022 1430    Comment: BENEFITS OF OOB ACTIVITY, SAFETY WITH  MOBILITY, PROGRESSION OF POC, D/C PLANNING,                   Point: Body mechanics (Done)     Learning Progress Summary           Patient Acceptance, E, VU,NR by CD at 1/17/2022 1430    Comment: BENEFITS OF OOB ACTIVITY, SAFETY WITH MOBILITY, PROGRESSION OF POC, D/C PLANNING,                   Point: Precautions (Done)     Learning Progress Summary           Patient Acceptance, E, VU,NR by CD at 1/17/2022 1430    Comment: BENEFITS OF OOB ACTIVITY, SAFETY WITH MOBILITY, PROGRESSION OF POC, D/C PLANNING,                               User Key     Initials Effective Dates Name Provider Type Discipline    CD 06/16/21 -  Marybeth Edwards PT Physical Therapist PT              PT Recommendation and Plan  Planned Therapy Interventions (PT): balance training, bed mobility training, gait training, home exercise program, transfer training, strengthening, stair training, motor coordination training, postural re-education, patient/family education, neuromuscular re-education  Plan of Care Reviewed With: patient  Outcome Summary: PT PRESENTS WITH EVOLVING SYMPTOMS TO INCLUDE IMPAIRED BALANCE, GENERALIZED WEAKNESSS, VISUAL DEFICIT, AND DECLINE IN FUNCTIONAL MOBILITY. PT REQUIRED CGA ASSIST TO STAND AND MIN ASSIST TO AMBULATE WITH R WALKER . DEMONSTRATES POSTERIOR LEAN IN STANDING AND NEEDS MANUAL ASSIST TO GUIDE R WALKER TO AVOID OBSTACLES ON R. RECOMMEND IRF AT D/C. IF GOES HOME WILL NEED 24/7 ASSIST WITH MOBILITY UNTIL DEEMED SAFE BY HHPT. WILL NEED R WALKER AND BSC FOR HOME.     Time Calculation:    PT Charges     Row Name 01/17/22 1431             Time Calculation    Start Time 1338  -CD      PT Received On 01/17/22  -CD      PT Goal Re-Cert Due Date 01/27/22  -CD              Time Calculation- PT    Total Timed Code Minutes- PT 15 minute(s)  -CD              Timed Charges    33010 - Gait Training Minutes  15  -CD              Untimed Charges    PT Eval/Re-eval Minutes 53  -CD              Total Minutes    Timed Charges Total  Minutes 15  -CD      Untimed Charges Total Minutes 53  -CD       Total Minutes 68  -CD            User Key  (r) = Recorded By, (t) = Taken By, (c) = Cosigned By    Initials Name Provider Type    CD Marybeth Edwards, PT Physical Therapist              Therapy Charges for Today     Code Description Service Date Service Provider Modifiers Qty    [id number]  GAIT TRAINING EA 15 MIN 1/17/2022 Marybeth Edwards, PT GP 1    [id number] HC PT EVAL LOW COMPLEXITY 4 1/17/2022 Marybeth Edwards, PT GP 1          PT G-Codes  Outcome Measure Options: AM-PAC 6 Clicks Basic Mobility (PT), Modified [place]  AM-PAC 6 Clicks Score (PT): 17  AM-PAC 6 Clicks Score (OT): 16    Marybeth Edwards, PT  1/17/2022

## 2022-01-17 NOTE — PLAN OF CARE
Problem: Adult Inpatient Plan of Care  Goal: Plan of Care Review  Recent Flowsheet Documentation  Taken 1/17/2022 1414 by Timothy Pimentel OT  Progress: (OT eval) no change  Plan of Care Reviewed With:   patient   son  Outcome Summary: OT evaluation completed. ADL performance limited by generalized weakness, impaired balance, and visual deficit warranting skilled IP OT services to promote return to PLOF. Matthew/CGA for bed and functional mobility, ModA for LB dressing, JAY for seated grooming. Pt engaged in core stability and dynamic reaching activities. Pt/family requests RW and BSC, OT agrees with need. Pt would benefit from short rehab stay, if home then increased assist and HHOT.

## 2022-01-17 NOTE — PROGRESS NOTES
[facility] Medicine Services  PROGRESS NOTE    Patient Name: Derian Joya  : 1940  MRN: [id number]    Date of Admission: 1/15/2022  Primary Care Physician: Diaz Lee MD    Subjective   Subjective     CC:  F/U AMS    HPI:  Seen this morning, doing much better. No complaints.     ROS:  Gen-no fevers, no chills  CV-no chest pain, no palpitations  Resp-no cough, no dyspnea  GI-no N/V/D, no abd pain    All other systems reviewed and negative except any additional pertinent positives and negatives as discussed in HPI.      Objective   Objective     Vital Signs:   Temp:  [98.5 °F (36.9 °C)-98.7 °F (37.1 °C)] 98.6 °F (37 °C)  Heart Rate:  [] 70  Resp:  [18-20] 18  BP: (131-175)/(66-97) 147/66     Physical Exam:  Gen-no acute distress, frail appearance  HENT-NCAT, mucous membranes moist  CV-RRR, S1 S2 normal, no m/r/g  Resp-CTAB, no wheezes or rales  Abd-soft, NT, ND, +BS  Ext-no edema  Neuro-A&Ox3, speech much more clear  Skin-no rashes  Psych-appropriate mood, pleasant    Results Reviewed:  LAB RESULTS:      Lab 22  0511 22  0402 01/15/22  1802 01/15/22  1609 01/15/22  0957 22  0602 22  2300 22  2253 22  2253   WBC 8.70 9.25 9.21  --  9.56 9.50  --    < > 12.65*   HEMOGLOBIN 12.9* 11.5* 13.2  --  14.4 13.6  --    < > 13.0   HEMATOCRIT 39.4 35.2* 42.5  --  45.7 42.5  --    < > 39.8   PLATELETS 180 158 173  --  199 152  --    < > 165   NEUTROS ABS 6.96 7.49* 7.51*  --  8.11*  --   --   --  10.56*   IMMATURE GRANS (ABS) 0.02 0.04 0.05  --  0.04  --   --   --  0.12*   LYMPHS ABS 0.90 0.72 0.83  --  1.11  --   --   --  1.18   MONOS ABS 0.78 0.97* 0.81  --  0.28  --   --   --  0.71   EOS ABS 0.01 0.01 0.00  --  0.00  --   --   --  0.04   MCV 99.2* 99.2* 104.2*  --  103.4* 101.2*  --    < > 99.7*   LACTATE  --   --   --  1.5 2.6*  --   --   --   --    PROTIME  --   --   --   --  14.1  --  13.5  --   --    APTT  --   --   --   --  25.6  --  31.4   --   --     < > = values in this interval not displayed.         Lab 01/17/22  0511 01/16/22  0402 01/16/22  0040 01/15/22  2115 01/15/22  1609 01/15/22  0957 01/15/22  0957 01/11/22  2356 01/11/22  2253   SODIUM 139 146* 143 147* 146*   < > 143   < >  --    POTASSIUM 3.1* 3.3* 3.6 4.5 4.0   < > 3.8   < >  --    CHLORIDE 100 113* 112* 113* 111*   < > 102   < >  --    CO2 19.0* 19.0* 19.0* 14.0* 12.0*   < > 11.0*   < >  --    ANION GAP 20.0* 14.0 12.0 20.0* 23.0*   < > 30.0*   < >  --    BUN 11 19 23 25* 27*   < > 30*   < >  --    CREATININE 0.60* 0.68* 0.73* 0.87 0.93   < > 0.93   < >  --    GLUCOSE 182* 139* 306* 123* 199*   < > 184*   < >  --    CALCIUM 9.1 8.9 8.4* 9.0 8.8   < > 9.8   < >  --    MAGNESIUM  --  2.5* 2.1 2.2 2.2  --  2.6*   < >  --    PHOSPHORUS  --  2.0* 2.3* 3.1 3.5  --  4.6*   < >  --    HEMOGLOBIN A1C  --   --   --   --   --   --  7.80*  --  8.10*    < > = values in this interval not displayed.         Lab 01/15/22  1609 01/15/22  0957 01/11/22  2356   TOTAL PROTEIN 6.6 7.9 6.3   ALBUMIN 4.20 4.50 4.00   GLOBULIN 2.4 3.4 2.3   ALT (SGPT) 100* 69* 21   AST (SGOT) 104* 86* 29   BILIRUBIN 0.7 1.1 0.6   ALK PHOS 60 70 60         Lab 01/15/22  0957 01/11/22  2300   TROPONIN T <0.010  --    PROTIME 14.1 13.5   INR 1.12 1.06         Lab 01/11/22  2356   CHOLESTEROL 172   LDL CHOL 60   HDL CHOL 73*   TRIGLYCERIDES 247*             Lab 01/15/22  1308   PH, ARTERIAL 7.164*   PCO2, ARTERIAL 22.2*   PO2 ART 83.4   FIO2 21   HCO3 ART 8.0*   BASE EXCESS ART -18.9*   CARBOXYHEMOGLOBIN 0.9     Brief Urine Lab Results  (Last result in the past 365 days)      Color   Clarity   Blood   Leuk Est   Nitrite   Protein   CREAT   Urine HCG        01/15/22 1044 Yellow   Clear   Negative   Negative   Negative   30 mg/dL (1+)                 Microbiology Results Abnormal     Procedure Component Value - Date/Time    Blood Culture - Blood, Arm, Right [[id number]]  (Normal) Collected: 01/15/22 1802    Lab Status: Preliminary  result Specimen: Blood from Arm, Right Updated: 01/16/22 1915     Blood Culture No growth at 24 hours    Blood Culture - Blood, Hand, Right [[id number]]  (Normal) Collected: 01/15/22 1808    Lab Status: Preliminary result Specimen: Blood from Hand, Right Updated: 01/16/22 1915     Blood Culture No growth at 24 hours    Narrative:      Aerobic Only          XR Chest 2 View    Result Date: 1/15/2022  EXAMINATION: XR CHEST 2 VW-  INDICATION: DKA; R41.82-Altered mental status, unspecified; E09.10-Drug or chemical induced diabetes mellitus with ketoacidosis without coma; T50.905A-Adverse effect of unspecified drugs, medicaments and biological substances, initial encounter  COMPARISON: 1/15/2022  FINDINGS: Left chest wall ICD projects in place. The left hemidiaphragm is elevated, similar to prior. There is otherwise no new focal airspace opacity. No effusion or pneumothorax. Multilevel thoracic spondylosis changes are present.      Impression: Stable appearance of the chest without evidence of acute disease.  This report was finalized on 1/15/2022 1:36 PM by Tal Garza.      CT Head Without Contrast    Result Date: 1/17/2022  EXAMINATION: CT HEAD WO CONTRAST-  INDICATION: R41.82-Altered mental status, unspecified; E09.10-Drug or chemical induced diabetes mellitus with ketoacidosis without coma; T50.905A-Adverse effect of unspecified drugs, medicaments and biological substances, initial encounter; subarachnoid hemorrhage followup.  TECHNIQUE: Multiple axial CT imaging was obtained of the head from the skull base to the skull vertex without the administration of intravenous contrast.  The radiation dose reduction device was turned on for each scan per the ALARA (As Low as Reasonably Achievable) protocol.  COMPARISON: 01/15/2022.  FINDINGS: There is again extensive atrophy identified of the brain. There is redemonstration identified of the hemorrhage seen on the prior examination with subarachnoid hemorrhage again seen  in the suprasellar cistern extending into the left sylvian fissure. There is a small amount of extraaxial hemorrhage seen along the falx in the frontal region. Findings are essentially unchanged and evolving when compared to the prior examination. Arachnoid hemorrhage is again seen at the vertex bilaterally. No signs of new hemorrhage. No evidence of midline shift. No evidence of hydrocephalus. Minimal intraventricular hemorrhage identified stable and unchanged.      Impression: Stable expected evolving changes of the previously seen areas of intracranial hemorrhage. No new findings in the interval.  D:  01/17/2022 E:  01/17/2022      MRI Brain Without Contrast    Result Date: 1/17/2022  EXAMINATION: MRI BRAIN WO CONTRAST-  INDICATION: Stroke, followup; R41.82-Altered mental status, unspecified; E09.10-Drug or chemical induced diabetes mellitus with ketoacidosis without coma; T50.905A-Adverse effect of unspecified drugs, medicaments and biological substances, initial encounter.  TECHNIQUE: Multiplanar MRI of the brain without intravenous contrast administration.  COMPARISON: CT head 01/17/2021.  FINDINGS: No restricted diffusion to suggest acute ischemia with areas of T1 shortening noted of scattered subarachnoid hemorrhage including parafalcine involvement as seen on recent CT. No susceptibility artifact apart from these areas of hemosiderin deposition and acute/subacute blood products. Motion degraded examinations T2 and FLAIR axials, however, at least mild to moderate generalized atrophy and age-related volume loss along with moderate chronic small vessel ischemic disease. Globes and orbits are unremarkable. Paranasal sinuses and mastoid air cells are grossly clear and well pneumatized. Pituitary and sella within normal limits with noted prepontine and suprasellar cistern hemorrhage. Cervicomedullary junction widely patent.      Impression: Multifocal areas of parafalcine and scattered subarachnoid hemorrhage  including suprasellar and prepontine regions as seen on recent CT. No acute infarction or acute intracranial findings otherwise noted with moderate senescent changes of generalized atrophy and age-related volume loss as well as chronic small vessel ischemic disease.   D:  01/17/2022 E:  01/17/2022        Results for orders placed during the hospital encounter of 01/11/22    Adult Transthoracic Echo Complete W/ Cont if Necessary Per Protocol    Interpretation Summary  · Mild aortic valve stenosis is present.  · Estimated right ventricular systolic pressure from tricuspid regurgitation is normal (<35 mmHg).  · LVSF is normal  · MAC      I have reviewed the medications:  Scheduled Meds:atorvastatin, 80 mg, Oral, Nightly  bisoprolol, 10 mg, Oral, Daily  enoxaparin, 40 mg, Subcutaneous, Q24H  insulin lispro, 0-9 Units, Subcutaneous, TID AC  levETIRAcetam, 500 mg, Oral, BID  PARoxetine, 20 mg, Oral, Daily  sodium chloride, 10 mL, Intravenous, Q12H  sodium chloride, 10 mL, Intravenous, Q12H  terazosin, 1 mg, Oral, Nightly      Continuous Infusions:sodium chloride, 75 mL/hr, Last Rate: 75 mL/hr (01/17/22 0802)      PRN Meds:.dextrose  •  dextrose  •  glucagon (human recombinant)  •  potassium chloride **OR** potassium chloride **OR** potassium chloride  •  sodium chloride  •  sodium chloride  •  sodium chloride    Assessment/Plan   Assessment & Plan     Active Hospital Problems    Diagnosis  POA   • Altered mental status [R41.82]  Yes   • High anion gap metabolic acidosis [E87.2]  Unknown   • Subarachnoid hemorrhage (HCC) [I60.9]  Unknown   • Lactic acidosis [E87.2]  Unknown      Resolved Hospital Problems   No resolved problems to display.        Brief Hospital Course to date:  Derian Joya is a 82 y.o. male with hx of HTN, HLD, DM2, and recent fall with subarachnoid hemorrhage who presents due to altered mental status and slurred speech. He had been admitted to the ICU 1/11-1/13/22 after a fall down the stairs with  LOC--CT head showed acute subdural hematoma and subarachnoid hemorrhage. His Plavix was stopped. Returns with worsening neurologic symptoms. CT head showed expected evolutionary changes of his previous areas of intracranial hemorrhage--no new hemorrhage. Admitted for further management.    This patient's problems and plans were partially entered by my partner and updated as appropriate by me 01/17/22.    Acute encephalopathy, possible seizures  - Etiologies include metabolic from suspected euglycemic DKA, vs intracranial process such as seizure or stroke.  - Neurology consulted, recommends continuing Keppra with seizure precautions. EEG showed left temporal sharp/slow wave discharges. MRI of the brain without contrast done today and reviewed--this shows stable ICH and no evidence of acute infarcts.   - Stable hemorrhage and no new hemorrhage on imaging. Neurosurgery was consulted and recommends no surgical intervention at this time. F/U as previously scheduled next month.  - Atorvastatin increased to 80 mg by Neurology given his atherosclerotic disease.  -- PT/OT evaluation pending.     Questionable euglycemic DKA  Multiple metabolic derangements  Anion gap metabolic acidosis, primary metabolic acidosis with secondary respiratory alkalosis and additional metabolic alkalosis  DM2, HbA1c 7.8%  - Anion gap 30, CO2 11 on admission.  -- Initiated DKA protocol and IV fluids, AG closed and acidosis improved so stopped insulin drip. AG back up to 20 today?? Will continue IV fluids and repeat BMP this afternoon.   -- Continue moderate dose SSI.   - Suspected secondary to Jardiance? On hold.     Hypokalemia  -- Replace per protocol.     HTN  HLD  - Home meds resumed.  - Atorvastatin increased to 80 mg daily.    DVT prophylaxis:  Medical and mechanical DVT prophylaxis orders are present.       AM-PAC 6 Clicks Score (PT): 14 (01/16/22 2000)    Disposition: I expect the patient to be discharged likely home tomorrow if labs  improving and does okay with PT/OT    CODE STATUS:   Code Status and Medical Interventions:   Ordered at: 01/15/22 1341     Level Of Support Discussed With:    Next of Kin (If No Surrogate)     Code Status (Patient has no pulse and is not breathing):    CPR (Attempt to Resuscitate)     Medical Interventions (Patient has pulse or is breathing):    Full Support       Eileen Gatica MD  01/17/22

## 2022-01-18 ENCOUNTER — TELEPHONE (OUTPATIENT)
Dept: INTERNAL MEDICINE | Facility: CLINIC | Age: 82
End: 2022-01-18

## 2022-01-18 LAB
ALBUMIN SERPL-MCNC: 3.5 G/DL (ref 3.5–5.2)
ALP SERPL-CCNC: 58 U/L (ref 39–117)
ALT SERPL W P-5'-P-CCNC: 61 U/L (ref 1–41)
ANION GAP SERPL CALCULATED.3IONS-SCNC: 17 MMOL/L (ref 5–15)
AST SERPL-CCNC: 33 U/L (ref 1–40)
BILIRUB CONJ SERPL-MCNC: 0.3 MG/DL (ref 0–0.3)
BILIRUB INDIRECT SERPL-MCNC: 0.8 MG/DL
BILIRUB SERPL-MCNC: 1.1 MG/DL (ref 0–1.2)
BUN SERPL-MCNC: 14 MG/DL (ref 8–23)
BUN/CREAT SERPL: 28.6 (ref 7–25)
CALCIUM SPEC-SCNC: 8.6 MG/DL (ref 8.6–10.5)
CHLORIDE SERPL-SCNC: 100 MMOL/L (ref 98–107)
CO2 SERPL-SCNC: 19 MMOL/L (ref 22–29)
CREAT SERPL-MCNC: 0.49 MG/DL (ref 0.76–1.27)
D-LACTATE SERPL-SCNC: 1.2 MMOL/L (ref 0.5–2)
GFR SERPL CREATININE-BSD FRML MDRD: >150 ML/MIN/1.73
GLUCOSE BLDC GLUCOMTR-MCNC: 133 MG/DL (ref 70–130)
GLUCOSE BLDC GLUCOMTR-MCNC: 168 MG/DL (ref 70–130)
GLUCOSE BLDC GLUCOMTR-MCNC: 186 MG/DL (ref 70–130)
GLUCOSE BLDC GLUCOMTR-MCNC: 362 MG/DL (ref 70–130)
GLUCOSE SERPL-MCNC: 158 MG/DL (ref 65–99)
POTASSIUM SERPL-SCNC: 3.8 MMOL/L (ref 3.5–5.2)
PROT SERPL-MCNC: 5.9 G/DL (ref 6–8.5)
SARS-COV-2 RDRP RESP QL NAA+PROBE: NORMAL
SODIUM SERPL-SCNC: 136 MMOL/L (ref 136–145)

## 2022-01-18 PROCEDURE — 99232 SBSQ HOSP IP/OBS MODERATE 35: CPT | Performed by: HOSPITALIST

## 2022-01-18 PROCEDURE — 83605 ASSAY OF LACTIC ACID: CPT | Performed by: HOSPITALIST

## 2022-01-18 PROCEDURE — 97530 THERAPEUTIC ACTIVITIES: CPT

## 2022-01-18 PROCEDURE — 63710000001 INSULIN DETEMIR PER 5 UNITS: Performed by: HOSPITALIST

## 2022-01-18 PROCEDURE — 82962 GLUCOSE BLOOD TEST: CPT

## 2022-01-18 PROCEDURE — 25010000002 ENOXAPARIN PER 10 MG: Performed by: STUDENT IN AN ORGANIZED HEALTH CARE EDUCATION/TRAINING PROGRAM

## 2022-01-18 PROCEDURE — 80048 BASIC METABOLIC PNL TOTAL CA: CPT | Performed by: HOSPITALIST

## 2022-01-18 PROCEDURE — 80076 HEPATIC FUNCTION PANEL: CPT

## 2022-01-18 PROCEDURE — 87635 SARS-COV-2 COVID-19 AMP PRB: CPT | Performed by: HOSPITALIST

## 2022-01-18 PROCEDURE — 97116 GAIT TRAINING THERAPY: CPT

## 2022-01-18 PROCEDURE — 63710000001 INSULIN LISPRO (HUMAN) PER 5 UNITS: Performed by: HOSPITALIST

## 2022-01-18 RX ADMIN — LEVETIRACETAM 500 MG: 500 TABLET, FILM COATED ORAL at 21:04

## 2022-01-18 RX ADMIN — ENOXAPARIN SODIUM 40 MG: 40 INJECTION SUBCUTANEOUS at 17:19

## 2022-01-18 RX ADMIN — SODIUM CHLORIDE 75 ML/HR: 9 INJECTION, SOLUTION INTRAVENOUS at 16:07

## 2022-01-18 RX ADMIN — BISOPROLOL FUMARATE 10 MG: 5 TABLET, FILM COATED ORAL at 08:17

## 2022-01-18 RX ADMIN — SODIUM CHLORIDE 75 ML/HR: 9 INJECTION, SOLUTION INTRAVENOUS at 03:31

## 2022-01-18 RX ADMIN — PAROXETINE HYDROCHLORIDE 20 MG: 20 TABLET, FILM COATED ORAL at 08:17

## 2022-01-18 RX ADMIN — INSULIN DETEMIR 5 UNITS: 100 INJECTION, SOLUTION SUBCUTANEOUS at 08:17

## 2022-01-18 RX ADMIN — SODIUM CHLORIDE, PRESERVATIVE FREE 10 ML: 5 INJECTION INTRAVENOUS at 21:04

## 2022-01-18 RX ADMIN — INSULIN LISPRO 7 UNITS: 100 INJECTION, SOLUTION INTRAVENOUS; SUBCUTANEOUS at 12:54

## 2022-01-18 RX ADMIN — INSULIN LISPRO 2 UNITS: 100 INJECTION, SOLUTION INTRAVENOUS; SUBCUTANEOUS at 17:20

## 2022-01-18 RX ADMIN — SODIUM CHLORIDE, PRESERVATIVE FREE 10 ML: 5 INJECTION INTRAVENOUS at 08:18

## 2022-01-18 RX ADMIN — LEVETIRACETAM 500 MG: 500 TABLET, FILM COATED ORAL at 08:17

## 2022-01-18 RX ADMIN — TERAZOSIN HYDROCHLORIDE 1 MG: 1 CAPSULE ORAL at 21:04

## 2022-01-18 RX ADMIN — ATORVASTATIN CALCIUM 80 MG: 40 TABLET, FILM COATED ORAL at 21:04

## 2022-01-18 NOTE — CASE MANAGEMENT/SOCIAL WORK
Continued Stay Note  [facility]     Patient Name: Derian Joya  MRN: [id number]  Today's Date: 1/18/2022    Admit Date: 1/15/2022     Discharge Plan     Row Name 01/18/22 1057       Plan    Plan [facility]    Plan Comments PT and OT are recommending that Mr. Joya go to a skilled facility for rehab after this admission. I discussed this with him at the bedside. I also discussed this with his wife Gina on the phone. THey are agreeable to this. Referrals were made to all facilities in [facility]. [facility] can offer Mr. Joya a skilled rehab bed pending insurance approval. I have tentatively scheduled [facility] medical to transport Mr. Joya there tomorrow at 15:30 via wheelchair.    Final Discharge Disposition Code 03 - skilled nursing facility (SNF)               Discharge Codes    No documentation.               Expected Discharge Date and Time     Expected Discharge Date Expected Discharge Time    Jan 19, 2022             Cb Deluna RN

## 2022-01-18 NOTE — PLAN OF CARE
Goal Outcome Evaluation:  Plan of Care Reviewed With: patient        Progress: improving  Outcome Summary: Pt ambulated 200 feet with RW and CGA. Not ready for gait without walker has he had initital LOB standing from EOB without walker. Cont d/c recs for IRF or home with 24/7 assist and cont. therapy services.

## 2022-01-18 NOTE — CONSULTS
Chart reveiwed per stroke protocol, not a candiate for the outpatient stroke/diabetes class as BMI less than 25. Will follow for educational opportunities

## 2022-01-18 NOTE — THERAPY TREATMENT NOTE
Patient Name: Derian Joya  : 1940    MRN: [id number]                              Today's Date: 2022       Admit Date: 1/15/2022    Visit Dx:     ICD-10-CM ICD-9-CM   1. Altered mental status, unspecified altered mental status type  R41.82 780.97   2. Diabetic ketoacidosis without coma associated with drug or chemical induced diabetes mellitus (HCC)  E09.10 249.11   3. Medication side effect, initial encounter  T50.905A E947.9   4. Dysarthria  R47.1 784.51   5. Cognitive communication deficit  R41.841 799.52     Patient Active Problem List   Diagnosis   • Uncontrolled type 2 diabetes mellitus (HCC)   • Other hyperlipidemia   • Essential hypertension   • Aortic stenosis   • Cardiomyopathy, nonischemic (HCC)   • SAH (subarachnoid hemorrhage) (HCC)   • SDH (subdural hematoma) (HCC)   • Head trauma, initial encounter   • Head trauma   • Altered mental status   • High anion gap metabolic acidosis   • Subarachnoid hemorrhage (HCC)   • Lactic acidosis     Past Medical History:   Diagnosis Date   • Allergic rhinitis    • Aortic stenosis    • Atopic rhinitis 2016   • Benign non-nodular prostatic hyperplasia with lower urinary tract symptoms 9/15/2017   • CAD (coronary artery disease)    • Cardiomyopathy, ischemic    • Colon polyps     tubular adenoma    • Community acquired pneumonia    • Depression 2016   • Diabetes (HCC)    • Diverticulosis of intestine 2016    Description: Left and sigmoid   • Dyslipidemia    • LBBB (left bundle branch block)    • PAD (peripheral artery disease) (HCC)      Past Surgical History:   Procedure Laterality Date   • CARDIAC CATHETERIZATION      100% cx   • CATARACT EXTRACTION     • COLONOSCOPY W/ BIOPSIES AND POLYPECTOMY  2021    1 benign polyp   • HIP SURGERY Left 2020    Dr Mandy BOUDREAUX   • LEG SURGERY Left 2016    Popliteal Artery stenting by Dr Hdz   • LEG SURGERY Left 2016    skin graft by Dr Nance   • LIPOMA EXCISION      s/p excision on  chest   • PACEMAKER IMPLANTATION  01/2019    and defibrillator      General Information     Row Name 01/18/22 0922          Physical Therapy Time and Intention    Document Type therapy note (daily note)  -     Mode of Treatment physical therapy  -     Row Name 01/18/22 0922          General Information    Patient Profile Reviewed yes  -     Existing Precautions/Restrictions fall; other (see comments)  B LE WEAKNESS, L>R, POSTERIOR LEAN IN STANDING.  -     Row Name 01/18/22 0922          Cognition    Orientation Status (Cognition) oriented x 3  -     Row Name 01/18/22 0922          Safety Issues, Functional Mobility    Safety Issues Affecting Function (Mobility) safety precaution awareness; safety precautions follow-through/compliance; sequencing abilities; insight into deficits/self-awareness; awareness of need for assistance  -     Impairments Affecting Function (Mobility) balance; cognition; postural/trunk control; endurance/activity tolerance; strength; grasp  -     Cognitive Impairments, Mobility Safety/Performance problem-solving/reasoning; safety precaution awareness; safety precaution follow-through; sequencing abilities; awareness, need for assistance  -           User Key  (r) = Recorded By, (t) = Taken By, (c) = Cosigned By    Initials Name Provider Type     Severo Brown, MARCE Physical Therapist               Mobility     Row Name 01/18/22 0923          Bed Mobility    Bed Mobility supine-sit; scooting/bridging  -     Scooting/Bridging [place] (Bed Mobility) minimum assist (75% patient effort); verbal cues; nonverbal cues (demo/gesture)  -     Supine-Sit [place] (Bed Mobility) contact guard; verbal cues; nonverbal cues (demo/gesture)  -     Assistive Device (Bed Mobility) bed rails; head of bed elevated  -     Comment (Bed Mobility) Verbal cues for safe sequencing and using bed rails to pull self into sitting EOB, chux used to assist with scooting left hip  -[facility]  Name 01/18/22 0923          Transfers    Comment (Transfers) VCs for safe hand placement with use of RW, reinforcement needed as pt pulled on walker to stand  -     Row Name 01/18/22 0923          Sit-Stand Transfer    Sit-Stand [place] (Transfers) contact guard; verbal cues  -     Assistive Device (Sit-Stand Transfers) walker, front-wheeled  -     Row Name 01/18/22 0923          Gait/Stairs (Locomotion)    [place] Level (Gait) verbal cues; minimum assist (75% patient effort)  -     Assistive Device (Gait) walker, front-wheeled  -     Distance in Feet (Gait) 200  -     Deviations/Abnormal Patterns (Gait) sheela decreased; gait speed decreased; stride length decreased  -     Comment (Gait/Stairs) Gt training focused on safe management of RW with verbal cues for upright posture, proper positioning of walker, increasing step length, and directioning in middle of hallway without veering.  -           User Key  (r) = Recorded By, (t) = Taken By, (c) = Cosigned By    Initials Name Provider Type    Severo Leonard PT Physical Therapist               Obj/Interventions     Row Name 01/18/22 0925          Balance    Balance Assessment sitting static balance; sitting dynamic balance; standing static balance; standing dynamic balance  -     Static Sitting Balance WFL  -     Dynamic Sitting Balance WFL  -     Static Standing Balance mild impairment  -     Dynamic Standing Balance mild impairment  -     Balance Interventions sitting; standing; sit to stand; supported; static; dynamic; minimal challenge  -     Comment, Balance Planned to trial gait without AD for progression but pt had mild LOB standing from EOB needing UE support on IV pole for support to recover balance, not ready at this time and used walker for dynamic balance.  -           User Key  (r) = Recorded By, (t) = Taken By, (c) = Cosigned By    Initials Name Provider Type    Severo Leonard PT Physical Therapist                Goals/Plan    No documentation.                Clinical Impression     [facility] Name 01/18/22 0926          Pain    Additional Documentation Pain Scale: Numbers Pre/Post-Treatment (Group)  -[facility] Name 01/18/22 0926          Pain Scale: Numbers Pre/Post-Treatment    Pretreatment Pain Rating 0/10 - no pain  -     Posttreatment Pain Rating 0/10 - no pain  -     Pain Intervention(s) Repositioned; Ambulation/increased activity  -JH     Row Name 01/18/22 0926          Plan of Care Review    Plan of Care Reviewed With patient  -     Progress improving  -     Outcome Summary Pt ambulated 200 feet with RW and CGA. Not ready for gait without walker has he had initital LOB standing from EOB without walker. Cont d/c recs for IRF or home with 24/7 assist and cont. therapy services.  -[facility] Name 01/18/22 0926          Therapy Assessment/Plan (PT)    Rehab Potential (PT) good, to achieve stated therapy goals  -     Criteria for Skilled Interventions Met (PT) yes  -JH     Row Name 01/18/22 0926          Vital Signs    O2 Delivery Pre Treatment room air  -     O2 Delivery Intra Treatment room air  -     O2 Delivery Post Treatment room air  -     Pre Patient Position Supine  -     Intra Patient Position Standing  -     Post Patient Position Sitting  -[facility] Name 01/18/22 0926          Positioning and Restraints    Pre-Treatment Position in bed  -     Post Treatment Position chair  -     In Chair notified nsg; reclined; call light within reach; encouraged to call for assist; exit alarm on; legs elevated  -           User Key  (r) = Recorded By, (t) = Taken By, (c) = Cosigned By    Initials Name Provider Type     Severo Brown, PT Physical Therapist               Outcome Measures     [facility] Name 01/18/22 0928          How much help from another person do you currently need...    Turning from your back to your side while in flat bed without using bedrails? 3  -     Moving from lying on back  to sitting on the side of a flat bed without bedrails? 3  -JH     Moving to and from a bed to a chair (including a wheelchair)? 3  -JH     Standing up from a chair using your arms (e.g., wheelchair, bedside chair)? 3  -JH     Climbing 3-5 steps with a railing? 2  -JH     To walk in hospital room? 3  -JH     AM-PAC 6 Clicks Score (PT) 17  -     Row Name 01/18/22 0928          Functional Assessment    Outcome Measure Options AM-PAC 6 Clicks Basic Mobility (PT)  -           User Key  (r) = Recorded By, (t) = Taken By, (c) = Cosigned By    Initials Name Provider Type     Severo Brown PT Physical Therapist                             Physical Therapy Education                 Title: PT OT SLP Therapies (In Progress)     Topic: Physical Therapy (Done)     Point: Mobility training (Done)     Learning Progress Summary           Patient Acceptance, E,TB, VU by  at 1/18/2022 0929    Comment: safety with mobility    Acceptance, E, VU,NR by CD at 1/17/2022 1430    Comment: BENEFITS OF OOB ACTIVITY, SAFETY WITH MOBILITY, PROGRESSION OF POC, D/C PLANNING,                   Point: Home exercise program (Done)     Learning Progress Summary           Patient Acceptance, E,TB, VU by  at 1/18/2022 0929    Comment: safety with mobility    Acceptance, E, VU,NR by CD at 1/17/2022 1430    Comment: BENEFITS OF OOB ACTIVITY, SAFETY WITH MOBILITY, PROGRESSION OF POC, D/C PLANNING,                   Point: Body mechanics (Done)     Learning Progress Summary           Patient Acceptance, E,TB, VU by  at 1/18/2022 0929    Comment: safety with mobility    Acceptance, E, VU,NR by CD at 1/17/2022 1430    Comment: BENEFITS OF OOB ACTIVITY, SAFETY WITH MOBILITY, PROGRESSION OF POC, D/C PLANNING,                   Point: Precautions (Done)     Learning Progress Summary           Patient Acceptance, E,TB, VU by  at 1/18/2022 0929    Comment: safety with mobility    Acceptance, E, VU,NR by CD at 1/17/2022 1430    Comment: BENEFITS OF  OOB ACTIVITY, SAFETY WITH MOBILITY, PROGRESSION OF POC, D/C PLANNING,                               User Key     Initials Effective Dates Name Provider Type Discipline    CD 06/16/21 -  Marybeth Edwards PT Physical Therapist PT     09/22/20 -  Severo Brown PT Physical Therapist PT              PT Recommendation and Plan     Plan of Care Reviewed With: patient  Progress: improving  Outcome Summary: Pt ambulated 200 feet with RW and CGA. Not ready for gait without walker has he had initital LOB standing from EOB without walker. Cont d/c recs for IRF or home with 24/7 assist and cont. therapy services.     Time Calculation:    PT Charges     Row Name 01/18/22 0929             Time Calculation    Start Time 0850  -      PT Received On 01/18/22  -      PT Goal Re-Cert Due Date 01/27/22  -              Time Calculation- PT    Total Timed Code Minutes- PT 25 minute(s)  -              Timed Charges    57583 - Gait Training Minutes  15  -      45401 - PT Therapeutic Activity Minutes 10  -JH              Total Minutes    Timed Charges Total Minutes 25  -       Total Minutes 25  -            User Key  (r) = Recorded By, (t) = Taken By, (c) = Cosigned By    Initials Name Provider Type     Severo Brwon, PT Physical Therapist              Therapy Charges for Today     Code Description Service Date Service Provider Modifiers Qty    39959862803 HC GAIT TRAINING EA 15 MIN 1/18/2022 Severo Brown, PT GP 1    82456061719 HC PT THERAPEUTIC ACT EA 15 MIN 1/18/2022 Severo Brown, PT GP 1          PT G-Codes  Outcome Measure Options: AM-PAC 6 Clicks Basic Mobility (PT)  AM-PAC 6 Clicks Score (PT): 17  AM-PAC 6 Clicks Score (OT): 16    Severo Brown PT  1/18/2022

## 2022-01-18 NOTE — PLAN OF CARE
Goal Outcome Evaluation:           Progress: improving  Outcome Summary: Patient remains &Ox4, calm and cooperative with care.  AV paced and vpaced at times on telemetry.  Ambulates to bathroom with assistance x 1.  Neuro intact per flowsheets.  No new complaints or concerns voiced.     Patient also has decreased appetite.  Patient drinks boost with meals when available but doesn't eat much of the meals.

## 2022-01-18 NOTE — PROGRESS NOTES
[facility] Medicine Services  PROGRESS NOTE    Patient Name: Derian Joya  : 1940  MRN: [id number]    Date of Admission: 1/15/2022  Primary Care Physician: Diaz Lee MD    Subjective   Subjective     CC:  F/U AMS    HPI:  Seen this morning, just worked with PT. Says he did feel weak and unsteady. No other complaints.    ROS:  Gen-no fevers, no chills  CV-no chest pain, no palpitations  Resp-no cough, no dyspnea  GI-no N/V/D, no abd pain    All other systems reviewed and negative except any additional pertinent positives and negatives as discussed in HPI.      Objective   Objective     Vital Signs:   Temp:  [97.9 °F (36.6 °C)-98.6 °F (37 °C)] 98.1 °F (36.7 °C)  Heart Rate:  [70-80] 70  Resp:  [16-20] 18  BP: (136-165)/(73-96) 164/89     Physical Exam:  Gen-no acute distress, frail appearance  HENT-NCAT, mucous membranes moist  CV-RRR, S1 S2 normal, no m/r/g  Resp-CTAB, no wheezes or rales  Abd-soft, NT, ND, +BS  Ext-no edema  Neuro-A&Ox3, speech clear  Skin-no rashes  Psych-appropriate mood, pleasant     Results Reviewed:  LAB RESULTS:      Lab 22  0945 22  0511 22  0402 01/15/22  1802 01/15/22  1609 01/15/22  0957 22  0602 22  2300 22  2253 22  2253   WBC  --  8.70 9.25 9.21  --  9.56 9.50  --    < > 12.65*   HEMOGLOBIN  --  12.9* 11.5* 13.2  --  14.4 13.6  --    < > 13.0   HEMATOCRIT  --  39.4 35.2* 42.5  --  45.7 42.5  --    < > 39.8   PLATELETS  --  180 158 173  --  199 152  --    < > 165   NEUTROS ABS  --  6.96 7.49* 7.51*  --  8.11*  --   --   --  10.56*   IMMATURE GRANS (ABS)  --  0.02 0.04 0.05  --  0.04  --   --   --  0.12*   LYMPHS ABS  --  0.90 0.72 0.83  --  1.11  --   --   --  1.18   MONOS ABS  --  0.78 0.97* 0.81  --  0.28  --   --   --  0.71   EOS ABS  --  0.01 0.01 0.00  --  0.00  --   --   --  0.04   MCV  --  99.2* 99.2* 104.2*  --  103.4* 101.2*  --    < > 99.7*   LACTATE 1.2  --   --   --  1.5 2.6*  --   --   --    --    PROTIME  --   --   --   --   --  14.1  --  13.5  --   --    APTT  --   --   --   --   --  25.6  --  31.4  --   --     < > = values in this interval not displayed.         Lab 01/18/22  0520 01/17/22  1523 01/17/22  0511 01/16/22  0402 01/16/22  0040 01/15/22  2115 01/15/22  2115 01/15/22  1609 01/15/22  1609 01/15/22  0957 01/15/22  0957 01/11/22  2356 01/11/22  2253   SODIUM 136 137 139 146* 143   < > 147*   < > 146*   < > 143   < >  --    POTASSIUM 3.8 3.6 3.1* 3.3* 3.6   < > 4.5   < > 4.0   < > 3.8   < >  --    CHLORIDE 100 101 100 113* 112*   < > 113*   < > 111*   < > 102   < >  --    CO2 19.0* 26.0 19.0* 19.0* 19.0*   < > 14.0*   < > 12.0*   < > 11.0*   < >  --    ANION GAP 17.0* 10.0 20.0* 14.0 12.0   < > 20.0*   < > 23.0*   < > 30.0*   < >  --    BUN 14 16 11 19 23   < > 25*   < > 27*   < > 30*   < >  --    CREATININE 0.49* 0.66* 0.60* 0.68* 0.73*   < > 0.87   < > 0.93   < > 0.93   < >  --    GLUCOSE 158* 208* 182* 139* 306*   < > 123*   < > 199*   < > 184*   < >  --    CALCIUM 8.6 9.2 9.1 8.9 8.4*   < > 9.0   < > 8.8   < > 9.8   < >  --    MAGNESIUM  --   --   --  2.5* 2.1  --  2.2  --  2.2  --  2.6*   < >  --    PHOSPHORUS  --   --   --  2.0* 2.3*  --  3.1  --  3.5  --  4.6*   < >  --    HEMOGLOBIN A1C  --   --   --   --   --   --   --   --   --   --  7.80*  --  8.10*    < > = values in this interval not displayed.         Lab 01/18/22  0520 01/15/22  1609 01/15/22  0957 01/11/22  2356   TOTAL PROTEIN 5.9* 6.6 7.9 6.3   ALBUMIN 3.50 4.20 4.50 4.00   GLOBULIN  --  2.4 3.4 2.3   ALT (SGPT) 61* 100* 69* 21   AST (SGOT) 33 104* 86* 29   BILIRUBIN 1.1 0.7 1.1 0.6   INDIRECT BILIRUBIN 0.8  --   --   --    BILIRUBIN DIRECT 0.3  --   --   --    ALK PHOS 58 60 70 60         Lab 01/15/22  0957 01/11/22  2300   TROPONIN T <0.010  --    PROTIME 14.1 13.5   INR 1.12 1.06         Lab 01/11/22  2356   CHOLESTEROL 172   LDL CHOL 60   HDL CHOL 73*   TRIGLYCERIDES 247*             Lab 01/15/22  1308   PH, ARTERIAL  7.164*   PCO2, ARTERIAL 22.2*   PO2 ART 83.4   FIO2 21   HCO3 ART 8.0*   BASE EXCESS ART -18.9*   CARBOXYHEMOGLOBIN 0.9     Brief Urine Lab Results  (Last result in the past 365 days)      Color   Clarity   Blood   Leuk Est   Nitrite   Protein   CREAT   Urine HCG        01/15/22 1044 Yellow   Clear   Negative   Negative   Negative   30 mg/dL (1+)                 Microbiology Results Abnormal     Procedure Component Value - Date/Time    Blood Culture - Blood, Arm, Right [[id number]]  (Normal) Collected: 01/15/22 1802    Lab Status: Preliminary result Specimen: Blood from Arm, Right Updated: 01/17/22 1915     Blood Culture No growth at 2 days    Blood Culture - Blood, Hand, Right [[id number]]  (Normal) Collected: 01/15/22 1808    Lab Status: Preliminary result Specimen: Blood from Hand, Right Updated: 01/17/22 1915     Blood Culture No growth at 2 days    Narrative:      Aerobic Only          CT Head Without Contrast    Result Date: 1/18/2022  EXAMINATION: CT HEAD WO CONTRAST-  INDICATION: R41.82-Altered mental status, unspecified; E09.10-Drug or chemical induced diabetes mellitus with ketoacidosis without coma; T50.905A-Adverse effect of unspecified drugs, medicaments and biological substances, initial encounter; subarachnoid hemorrhage followup.  TECHNIQUE: Multiple axial CT imaging was obtained of the head from the skull base to the skull vertex without the administration of intravenous contrast.  The radiation dose reduction device was turned on for each scan per the ALARA (As Low as Reasonably Achievable) protocol.  COMPARISON: 01/15/2022.  FINDINGS: There is again extensive atrophy identified of the brain. There is redemonstration identified of the hemorrhage seen on the prior examination with subarachnoid hemorrhage again seen in the suprasellar cistern extending into the left sylvian fissure. There is a small amount of extraaxial hemorrhage seen along the falx in the frontal region. Findings are essentially  unchanged and evolving when compared to the prior examination. Arachnoid hemorrhage is again seen at the vertex bilaterally. No signs of new hemorrhage. No evidence of midline shift. No evidence of hydrocephalus. Minimal intraventricular hemorrhage identified stable and unchanged.      Impression: Stable expected evolving changes of the previously seen areas of intracranial hemorrhage. No new findings in the interval.  D:  01/17/2022 E:  01/17/2022  This report was finalized on 1/18/2022 9:33 AM by Dr. Danii Benito MD.      MRI Brain Without Contrast    Result Date: 1/17/2022  EXAMINATION: MRI BRAIN WO CONTRAST-  INDICATION: Stroke, followup; R41.82-Altered mental status, unspecified; E09.10-Drug or chemical induced diabetes mellitus with ketoacidosis without coma; T50.905A-Adverse effect of unspecified drugs, medicaments and biological substances, initial encounter.  TECHNIQUE: Multiplanar MRI of the brain without intravenous contrast administration.  COMPARISON: CT head 01/17/2021.  FINDINGS: No restricted diffusion to suggest acute ischemia with areas of T1 shortening noted of scattered subarachnoid hemorrhage including parafalcine involvement as seen on recent CT. No susceptibility artifact apart from these areas of hemosiderin deposition and acute/subacute blood products. Motion degraded examinations T2 and FLAIR axials, however, at least mild to moderate generalized atrophy and age-related volume loss along with moderate chronic small vessel ischemic disease. Globes and orbits are unremarkable. Paranasal sinuses and mastoid air cells are grossly clear and well pneumatized. Pituitary and sella within normal limits with noted prepontine and suprasellar cistern hemorrhage. Cervicomedullary junction widely patent.      Impression: Multifocal areas of parafalcine and scattered subarachnoid hemorrhage including suprasellar and prepontine regions as seen on recent CT. No acute infarction or acute intracranial  findings otherwise noted with moderate senescent changes of generalized atrophy and age-related volume loss as well as chronic small vessel ischemic disease.   D:  01/17/2022 E:  01/17/2022  This report was finalized on 1/17/2022 3:22 PM by Dr. Joon Clark.        Results for orders placed during the hospital encounter of 01/11/22    Adult Transthoracic Echo Complete W/ Cont if Necessary Per Protocol    Interpretation Summary  · Mild aortic valve stenosis is present.  · Estimated right ventricular systolic pressure from tricuspid regurgitation is normal (<35 mmHg).  · LVSF is normal  · MAC      I have reviewed the medications:  Scheduled Meds:atorvastatin, 80 mg, Oral, Nightly  bisoprolol, 10 mg, Oral, Daily  enoxaparin, 40 mg, Subcutaneous, Q24H  insulin detemir, 5 Units, Subcutaneous, Daily  insulin lispro, 0-9 Units, Subcutaneous, TID AC  levETIRAcetam, 500 mg, Oral, BID  PARoxetine, 20 mg, Oral, Daily  sodium chloride, 10 mL, Intravenous, Q12H  terazosin, 1 mg, Oral, Nightly      Continuous Infusions:sodium chloride, 75 mL/hr, Last Rate: 75 mL/hr (01/18/22 0331)      PRN Meds:.dextrose  •  dextrose  •  glucagon (human recombinant)  •  potassium chloride **OR** potassium chloride **OR** potassium chloride  •  sodium chloride    Assessment/Plan   Assessment & Plan     Active Hospital Problems    Diagnosis  POA   • Altered mental status [R41.82]  Yes   • High anion gap metabolic acidosis [E87.2]  Unknown   • Subarachnoid hemorrhage (HCC) [I60.9]  Unknown   • Lactic acidosis [E87.2]  Unknown      Resolved Hospital Problems   No resolved problems to display.        Brief Hospital Course to date:  Derian Joya is a 82 y.o. male with hx of HTN, HLD, DM2, and recent fall with subarachnoid hemorrhage who presents due to altered mental status and slurred speech. He had been admitted to the ICU 1/11-1/13/22 after a fall down the stairs with LOC--CT head showed acute subdural hematoma and subarachnoid hemorrhage. His  Plavix was stopped. Returns with worsening neurologic symptoms. CT head showed expected evolutionary changes of his previous areas of intracranial hemorrhage--no new hemorrhage. Admitted for further management.    This patient's problems and plans were partially entered by my partner and updated as appropriate by me 01/18/22.    Acute encephalopathy, possible seizures  - Etiologies include metabolic from suspected euglycemic DKA, vs intracranial process such as seizure or stroke.  - Neurology consulted, recommends continuing Keppra with seizure precautions. EEG showed left temporal sharp/slow wave discharges. MRI of the brain without contrast done today and reviewed--this shows stable ICH and no evidence of acute infarcts.   - Stable hemorrhage and no new hemorrhage on imaging. Neurosurgery was consulted and recommends no surgical intervention at this time. F/U as previously scheduled next month.  - Atorvastatin increased to 80 mg by Neurology given his atherosclerotic disease.  -- PT/OT.     Questionable euglycemic DKA  Multiple metabolic derangements  Anion gap metabolic acidosis, primary metabolic acidosis with secondary respiratory alkalosis and additional metabolic alkalosis  DM2, HbA1c 7.8%  - Anion gap 30, CO2 11 on admission.  -- Initiated DKA protocol and IV fluids, AG closed and acidosis improved so stopped insulin drip. AG back up however. Lactic acid checked and is normal. Has received additional IV fluids with some improvement. Unsure of etiology. Possibly secondary to Jardiance? On hold.   -- Continue moderate dose SSI.     Hypokalemia  -- Replaced per protocol with improvement.     HTN  HLD  - Home meds resumed.  - Atorvastatin increased to 80 mg daily.    DVT prophylaxis:  Medical and mechanical DVT prophylaxis orders are present.       AM-PAC 6 Clicks Score (PT): 17 (01/18/22 3581)    Disposition: I expect the patient to be discharged to [facility] tomorrow at 3:30 PM pending insurance  approval    CODE STATUS:   Code Status and Medical Interventions:   Ordered at: 01/15/22 1341     Level Of Support Discussed With:    Next of Kin (If No Surrogate)     Code Status (Patient has no pulse and is not breathing):    CPR (Attempt to Resuscitate)     Medical Interventions (Patient has pulse or is breathing):    Full Support       Eileen Gatica MD  01/18/22

## 2022-01-18 NOTE — TELEPHONE ENCOUNTER
PATIENT WIFE CALLED TO LET DR. ADHIKARI KNOW THAT HE IS BEING DISCHARGED FROM HOSPITAL Wednesday 01/19/22 AND GOING TO [facility].    RIYA RODRIGUEZ [phone]

## 2022-01-19 VITALS
BODY MASS INDEX: 22.41 KG/M2 | RESPIRATION RATE: 20 BRPM | HEIGHT: 65 IN | WEIGHT: 134.5 LBS | OXYGEN SATURATION: 98 % | DIASTOLIC BLOOD PRESSURE: 95 MMHG | SYSTOLIC BLOOD PRESSURE: 163 MMHG | HEART RATE: 70 BPM | TEMPERATURE: 98.6 F

## 2022-01-19 LAB
ANION GAP SERPL CALCULATED.3IONS-SCNC: 13 MMOL/L (ref 5–15)
BUN SERPL-MCNC: 14 MG/DL (ref 8–23)
BUN/CREAT SERPL: 31.1 (ref 7–25)
CALCIUM SPEC-SCNC: 8.4 MG/DL (ref 8.6–10.5)
CHLORIDE SERPL-SCNC: 99 MMOL/L (ref 98–107)
CO2 SERPL-SCNC: 25 MMOL/L (ref 22–29)
CREAT SERPL-MCNC: 0.45 MG/DL (ref 0.76–1.27)
GFR SERPL CREATININE-BSD FRML MDRD: >150 ML/MIN/1.73
GLUCOSE BLDC GLUCOMTR-MCNC: 220 MG/DL (ref 70–130)
GLUCOSE BLDC GLUCOMTR-MCNC: 223 MG/DL (ref 70–130)
GLUCOSE SERPL-MCNC: 192 MG/DL (ref 65–99)
POTASSIUM SERPL-SCNC: 3.1 MMOL/L (ref 3.5–5.2)
SODIUM SERPL-SCNC: 137 MMOL/L (ref 136–145)

## 2022-01-19 PROCEDURE — 63710000001 INSULIN DETEMIR PER 5 UNITS: Performed by: HOSPITALIST

## 2022-01-19 PROCEDURE — 82962 GLUCOSE BLOOD TEST: CPT

## 2022-01-19 PROCEDURE — 80048 BASIC METABOLIC PNL TOTAL CA: CPT | Performed by: HOSPITALIST

## 2022-01-19 PROCEDURE — 99239 HOSP IP/OBS DSCHRG MGMT >30: CPT | Performed by: NURSE PRACTITIONER

## 2022-01-19 PROCEDURE — 63710000001 INSULIN LISPRO (HUMAN) PER 5 UNITS: Performed by: HOSPITALIST

## 2022-01-19 RX ORDER — DOXAZOSIN 2 MG/1
1 TABLET ORAL NIGHTLY
Start: 2022-01-19

## 2022-01-19 RX ORDER — ATORVASTATIN CALCIUM 80 MG/1
80 TABLET, FILM COATED ORAL NIGHTLY
Start: 2022-01-19 | End: 2022-06-15

## 2022-01-19 RX ORDER — ACETAMINOPHEN 325 MG/1
650 TABLET ORAL EVERY 6 HOURS PRN
Status: DISCONTINUED | OUTPATIENT
Start: 2022-01-19 | End: 2022-01-19 | Stop reason: HOSPADM

## 2022-01-19 RX ORDER — LEVETIRACETAM 500 MG/1
500 TABLET ORAL 2 TIMES DAILY
Start: 2022-01-19 | End: 2022-02-05 | Stop reason: HOSPADM

## 2022-01-19 RX ADMIN — PAROXETINE HYDROCHLORIDE 20 MG: 20 TABLET, FILM COATED ORAL at 08:17

## 2022-01-19 RX ADMIN — POTASSIUM CHLORIDE 40 MEQ: 750 CAPSULE, EXTENDED RELEASE ORAL at 08:17

## 2022-01-19 RX ADMIN — SODIUM CHLORIDE 75 ML/HR: 9 INJECTION, SOLUTION INTRAVENOUS at 06:20

## 2022-01-19 RX ADMIN — POTASSIUM CHLORIDE 40 MEQ: 750 CAPSULE, EXTENDED RELEASE ORAL at 12:27

## 2022-01-19 RX ADMIN — BISOPROLOL FUMARATE 10 MG: 5 TABLET, FILM COATED ORAL at 08:17

## 2022-01-19 RX ADMIN — INSULIN DETEMIR 5 UNITS: 100 INJECTION, SOLUTION SUBCUTANEOUS at 08:21

## 2022-01-19 RX ADMIN — SODIUM CHLORIDE, PRESERVATIVE FREE 10 ML: 5 INJECTION INTRAVENOUS at 08:16

## 2022-01-19 RX ADMIN — ACETAMINOPHEN 650 MG: 325 TABLET, FILM COATED ORAL at 12:28

## 2022-01-19 RX ADMIN — INSULIN LISPRO 4 UNITS: 100 INJECTION, SOLUTION INTRAVENOUS; SUBCUTANEOUS at 08:18

## 2022-01-19 RX ADMIN — INSULIN LISPRO 4 UNITS: 100 INJECTION, SOLUTION INTRAVENOUS; SUBCUTANEOUS at 12:27

## 2022-01-19 RX ADMIN — LEVETIRACETAM 500 MG: 500 TABLET, FILM COATED ORAL at 08:17

## 2022-01-19 NOTE — CASE MANAGEMENT/SOCIAL WORK
Case Management Discharge Note      Final Note: Per Chyna in admissions, insurance has approved Mr. Joya's transfer to a skilled rehab bed. I notified Mr. Joya and his wife. They are agreeable to this. [facility] is scheduled to transport Mr. Joya today at 15:30. They will pick him up at the [address]. Nurse to call report to [phone].         Selected Continued Care - Admitted Since 1/15/2022     Destination Coordination complete.    Service Provider Selected Services Address Phone Fax Patient Preferred    [facility]  Skilled Nursing [address] [id number] [phone] --          Durable Medical Equipment    No services have been selected for the patient.              Dialysis/Infusion    No services have been selected for the patient.              Home Medical Care    No services have been selected for the patient.              Therapy    No services have been selected for the patient.              Community Resources    No services have been selected for the patient.              Community & DME    No services have been selected for the patient.                Selected Continued Care - Prior Encounters Includes selections from prior encounters from 10/17/2021 to 1/19/2022    Discharged on 1/13/2022 Admission date: 1/11/2022 - Discharge disposition: Home or Self Care    Home Medical Care     Service Provider Selected Services Address Phone Fax Patient Preferred    [facility]  [facility] [address] [facility] [address] [phone] --                    Transportation Services  W/C Van: Other ([facility])    Final Discharge Disposition Code: 03 - skilled nursing facility (SNF)

## 2022-01-19 NOTE — DISCHARGE SUMMARY
[facility] Medicine Services  DISCHARGE SUMMARY    Patient Name: Derian Joya  : 1940  MRN: [id number]    Date of Admission: 1/15/2022  9:45 AM  Date of Discharge:  2022  Primary Care Physician: Diaz Lee MD    Consults     Date and Time Order Name Status Description    2022 12:35 AM Inpatient Cardiology Consult Completed           Hospital Course     Presenting Problem:   Altered mental status, unspecified altered mental status type [R41.82]    Active Hospital Problems    Diagnosis  POA   • Altered mental status [R41.82]  Yes   • High anion gap metabolic acidosis [E87.2]  Unknown   • Subarachnoid hemorrhage (HCC) [I60.9]  Unknown   • Lactic acidosis [E87.2]  Unknown      Resolved Hospital Problems   No resolved problems to display.          Hospital Course:  Derian Joya is a 82 y.o. male with hx of HTN, HLD, DM2, and recent fall with subarachnoid hemorrhage who presents due to altered mental status and slurred speech. He had been admitted to the ICU -22 after a fall down the stairs with LOC--CT head showed acute subdural hematoma and subarachnoid hemorrhage. His Plavix was stopped. Returns with worsening neurologic symptoms. CT head showed expected evolutionary changes of his previous areas of intracranial hemorrhage--no new hemorrhage. Admitted for further management.      Acute encephalopathy, possible seizures  - Etiologies include metabolic from suspected euglycemic DKA, vs intracranial process such as seizure or stroke.  - Neurology consulted, recommends continuing Keppra with seizure precautions. EEG showed left temporal sharp/slow wave discharges. MRI of the brain without contrast done today and reviewed--this shows stable ICH and no evidence of acute infarcts.   - Stable hemorrhage and no new hemorrhage on imaging. Neurosurgery was consulted and recommends no surgical intervention at this time. F/U as previously scheduled next month.  -  Atorvastatin increased to 80 mg by Neurology given his atherosclerotic disease.  -- PT/OT.     Questionable euglycemic DKA  Multiple metabolic derangements  Anion gap metabolic acidosis, primary metabolic acidosis with secondary respiratory alkalosis and additional metabolic alkalosis  DM2, HbA1c 7.8%  - Anion gap 30, CO2 11 on admission.  -- Initiated DKA protocol and IV fluids, AG closed and acidosis improved so stopped insulin drip. AG back up however. Lactic acid checked and is normal. Has received additional IV fluids with some improvement. Unsure of etiology. Possibly secondary to Jardiance? Will dc .   -- will restart metformin at dc and cont levemir and SSI     Hypokalemia  -- Replaced per protocol with improvement.     HTN  HLD  - Home meds resumed.  - Atorvastatin increased to 80 mg daily.     Patient has remained clinically stable and will be discharged to facility today.       Discharge Follow Up Recommendations for outpatient labs/diagnostics:   follow up with pcp after dc from rehab   Follow up with NS as scheduled   Follow up with Neurology Dr. Jiang 6-8 weeks   No driving until cleared by Neurology     Day of Discharge     HPI:   Patient is resting in bed in NAD. He was up to bathroom. Slept well. Tolerating diet. No acute events overnight per nursing,     Review of Systems  Gen- No fevers, chills  CV- No chest pain, palpitations  Resp- No cough, dyspnea  GI- No N/V/D, abd pain        Vital Signs:   Temp:  [97.9 °F (36.6 °C)-98.7 °F (37.1 °C)] 98.6 °F (37 °C)  Heart Rate:  [64-78] 72  Resp:  [18-20] 20  BP: (143-164)/(72-95) 144/72      Physical Exam:  Constitutional: No acute distress, awake, alert  HENT: NCAT, mucous membranes moist  Respiratory: Clear to auscultation bilaterally, respiratory effort normal room air   Cardiovascular: RRR, no murmurs, rubs, or gallops  Gastrointestinal: Positive bowel sounds, soft, nontender, nondistended  Musculoskeletal: No bilateral ankle edema  Psychiatric:  Appropriate affect, cooperative  Neurologic: Oriented x 3, strength symmetric in all extremities, Cranial Nerves grossly intact to confrontation, speech clear  Skin: No rashes, pale, right forearm and hand bruised       Pertinent  and/or Most Recent Results     LAB RESULTS:      Lab 01/18/22  0945 01/17/22  0511 01/16/22  0402 01/15/22  1802 01/15/22  1609 01/15/22  0957 01/13/22  0602   WBC  --  8.70 9.25 9.21  --  9.56 9.50   HEMOGLOBIN  --  12.9* 11.5* 13.2  --  14.4 13.6   HEMATOCRIT  --  39.4 35.2* 42.5  --  45.7 42.5   PLATELETS  --  180 158 173  --  199 152   NEUTROS ABS  --  6.96 7.49* 7.51*  --  8.11*  --    IMMATURE GRANS (ABS)  --  0.02 0.04 0.05  --  0.04  --    LYMPHS ABS  --  0.90 0.72 0.83  --  1.11  --    MONOS ABS  --  0.78 0.97* 0.81  --  0.28  --    EOS ABS  --  0.01 0.01 0.00  --  0.00  --    MCV  --  99.2* 99.2* 104.2*  --  103.4* 101.2*   LACTATE 1.2  --   --   --  1.5 2.6*  --    PROTIME  --   --   --   --   --  14.1  --    APTT  --   --   --   --   --  25.6  --          Lab 01/19/22  0457 01/18/22  0520 01/17/22  1523 01/17/22  0511 01/16/22  0402 01/16/22  0040 01/16/22  0040 01/15/22  2115 01/15/22  2115 01/15/22  1609 01/15/22  1609 01/15/22  0957 01/15/22  0957   SODIUM 137 136 137 139 146*   < > 143   < > 147*   < > 146*   < > 143   POTASSIUM 3.1* 3.8 3.6 3.1* 3.3*   < > 3.6   < > 4.5   < > 4.0   < > 3.8   CHLORIDE 99 100 101 100 113*   < > 112*   < > 113*   < > 111*   < > 102   CO2 25.0 19.0* 26.0 19.0* 19.0*   < > 19.0*   < > 14.0*   < > 12.0*   < > 11.0*   ANION GAP 13.0 17.0* 10.0 20.0* 14.0   < > 12.0   < > 20.0*   < > 23.0*   < > 30.0*   BUN 14 14 16 11 19   < > 23   < > 25*   < > 27*   < > 30*   CREATININE 0.45* 0.49* 0.66* 0.60* 0.68*   < > 0.73*   < > 0.87   < > 0.93   < > 0.93   GLUCOSE 192* 158* 208* 182* 139*   < > 306*   < > 123*   < > 199*   < > 184*   CALCIUM 8.4* 8.6 9.2 9.1 8.9   < > 8.4*   < > 9.0   < > 8.8   < > 9.8   MAGNESIUM  --   --   --   --  2.5*  --  2.1  --   2.2  --  2.2  --  2.6*   PHOSPHORUS  --   --   --   --  2.0*  --  2.3*  --  3.1  --  3.5  --  4.6*   HEMOGLOBIN A1C  --   --   --   --   --   --   --   --   --   --   --   --  7.80*    < > = values in this interval not displayed.         Lab 01/18/22  0520 01/15/22  1609 01/15/22  0957   TOTAL PROTEIN 5.9* 6.6 7.9   ALBUMIN 3.50 4.20 4.50   GLOBULIN  --  2.4 3.4   ALT (SGPT) 61* 100* 69*   AST (SGOT) 33 104* 86*   BILIRUBIN 1.1 0.7 1.1   INDIRECT BILIRUBIN 0.8  --   --    BILIRUBIN DIRECT 0.3  --   --    ALK PHOS 58 60 70         Lab 01/15/22  0957   TROPONIN T <0.010   PROTIME 14.1   INR 1.12                 Lab 01/15/22  1308   PH, ARTERIAL 7.164*   PCO2, ARTERIAL 22.2*   PO2 ART 83.4   FIO2 21   HCO3 ART 8.0*   BASE EXCESS ART -18.9*   CARBOXYHEMOGLOBIN 0.9     Brief Urine Lab Results  (Last result in the past 365 days)      Color   Clarity   Blood   Leuk Est   Nitrite   Protein   CREAT   Urine HCG        01/15/22 1044 Yellow   Clear   Negative   Negative   Negative   30 mg/dL (1+)               Microbiology Results (last 10 days)     Procedure Component Value - Date/Time    COVID PRE-OP / PRE-PROCEDURE SCREENING ORDER (NO ISOLATION) - Swab, Nasopharynx [[id number]]  (Normal) Collected: 01/18/22 1608    Lab Status: Final result Specimen: Swab from Nasopharynx Updated: 01/18/22 1636    Narrative:      The following orders were created for panel order COVID PRE-OP / PRE-PROCEDURE SCREENING ORDER (NO ISOLATION) - Swab, Nasopharynx.  Procedure                               Abnormality         Status                     ---------                               -----------         ------                     COVID-Kendal ABBOTT IN-HOUS...[[id number]]  Normal              Final result                 Please view results for these tests on the individual orders.    COVID-Kendal ABBOTT IN-HOUSE,NASAL Swab (NO TRANSPORT MEDIA) 2 HR TAT - Swab, Nasopharynx [[id number]]  (Normal) Collected: 01/18/22 8821    Lab Status: Final result  Specimen: Swab from Nasopharynx Updated: 01/18/22 1636     COVID19 Presumptive Negative    Narrative:      Fact sheet for providers: https://www.fda.gov/media/357953/download     Fact sheet for patients: https://www.fda.gov/media/123394/download    Test performed by PCR.  If inconsistent with clinical signs and symptoms patient should be tested with different authorized molecular test.    Blood Culture - Blood, Hand, Right [[id number]]  (Normal) Collected: 01/15/22 1808    Lab Status: Preliminary result Specimen: Blood from Hand, Right Updated: 01/18/22 1915     Blood Culture No growth at 3 days    Narrative:      Aerobic Only    Blood Culture - Blood, Arm, Right [[id number]]  (Normal) Collected: 01/15/22 1802    Lab Status: Preliminary result Specimen: Blood from Arm, Right Updated: 01/18/22 1915     Blood Culture No growth at 3 days    Respiratory Panel PCR w/COVID-19(SARS-CoV-2) ANA LILIA/TOPHER/MELANIA/PAD/COR/MAD/CYNDY In-House, NP Swab in UTM/VTM, 3-4 HR TAT - Swab, Nasopharynx [[id number]]  (Normal) Collected: 01/12/22 0618    Lab Status: Final result Specimen: Swab from Nasopharynx Updated: 01/12/22 0745     ADENOVIRUS, PCR Not Detected     Coronavirus 229E Not Detected     Coronavirus HKU1 Not Detected     Coronavirus NL63 Not Detected     Coronavirus OC43 Not Detected     COVID19 Not Detected     Human Metapneumovirus Not Detected     Human Rhinovirus/Enterovirus Not Detected     Influenza A PCR Not Detected     Influenza B PCR Not Detected     Parainfluenza Virus 1 Not Detected     Parainfluenza Virus 2 Not Detected     Parainfluenza Virus 3 Not Detected     Parainfluenza Virus 4 Not Detected     RSV, PCR Not Detected     Bordetella pertussis pcr Not Detected     Bordetella parapertussis PCR Not Detected     Chlamydophila pneumoniae PCR Not Detected     Mycoplasma pneumo by PCR Not Detected    Narrative:      In the setting of a positive respiratory panel with a viral infection PLUS a negative procalcitonin without other  underlying concern for bacterial infection, consider observing off antibiotics or discontinuation of antibiotics and continue supportive care. If the respiratory panel is positive for atypical bacterial infection (Bordetella pertussis, Chlamydophila pneumoniae, or Mycoplasma pneumoniae), consider antibiotic de-escalation to target atypical bacterial infection.          Adult Transthoracic Echo Complete W/ Cont if Necessary Per Protocol    Result Date: 1/12/2022  · Mild aortic valve stenosis is present. · Estimated right ventricular systolic pressure from tricuspid regurgitation is normal (<35 mmHg). · LVSF is normal · MAC      EEG    Result Date: 1/15/2022  Reason for referral: 82 y.o.male with altered mental status, consideration of seizure Technical Summary:  A 19 channel digital EEG was performed using the international 10-20 placement system, including eye leads and EKG leads. Duration: 22 minutes Video: Off Findings: The awake tracing shows diffuse medium amplitude 3 to 6 Hz intermixed delta and theta activity which are present symmetrically over both hemispheres.  At times, a sharp and slow wave is seen with phase reversal at T3 in the left temporal leads.  Light sleep is seen with mild slowing of the background.  No focal slowing is seen.  No electrographic seizures are present.  Photic stimulation and hyperventilation are not performed. Technical quality: Excellent EKG: Regular, 80 bpm SUMMARY: Mild-moderate generalized slow Left temporal sharp/slow wave discharges, rare     The discharges noted above may be seen in the setting of seizure disorders of the partial type.  Clinical correlation suggested This report is transcribed using the Dragon dictation system.      EEG    Result Date: 1/12/2022  Reason for referral: 82 y.o.male with syncope Technical Summary:  A 19 channel digital EEG was performed using the international 10-20 placement system, including eye leads and EKG leads. Duration: 27 minutes Video:  Off Findings: The awake tracing shows diffuse low to medium amplitude 5 to 7 Hz theta which is present symmetrically over both hemispheres.  A clear posterior rhythm is not seen.  As the study proceeds, brief bursts of medium amplitude 2 to 3 Hz generalized delta are seen.  Drowsiness is seen with mild slowing of the background but stage II sleep is not seen.  Photic stimulation does not change background.  Hyperventilation is not performed.  No focal features or epileptiform activity are seen Technical quality: Excellent EKG: Regular, 80 bpm SUMMARY: Mild-moderate generalized slow No epileptiform activity is seen     This study shows evidence for diffuse cerebral dysfunction of mild degree but is nonspecific as to cause No evidence for epilepsy is seen This report is transcribed using the Dragon dictation system.      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 The left hemidiaphragm is elevated, similar to prior. There is otherwise no new focal airspace opacity. No effusion or pneumothorax. Multilevel thoracic spondylosis changes are present.      Stable appearance of the chest without evidence of acute disease.  This report was finalized on 1/15/2022 1:36 PM by Tal Garza.      XR Hand 3+ View Right    Result Date: 1/12/2022  CR Hand Min 3 Vws RT INDICATION: Fell with swelling right hand COMPARISON: None available. FINDINGS: PA, lateral, and oblique views of the right hand.  No fracture or dislocation.  No bone erosion or destruction.  . There are vascular calcifications. There are mild degenerative arthritis changes. Please correlate with the site of tenderness  clinically. I see dorsal soft tissue swelling.     Dorsal soft tissue swelling without evidence for acute fracture dislocation or definite radiopaque foreign body. Small densities at the base of the thumb are probably on the surface of the skin. Please confirm clinically. Signer Name: Cris Loco MD  Signed: 1/12/2022 12:27 AM  Workstation Name: [email or web address]  [facility]    CT Head Without Contrast    Result Date: 1/18/2022  EXAMINATION: CT HEAD WO CONTRAST-  INDICATION: R41.82-Altered mental status, unspecified; E09.10-Drug or chemical induced diabetes mellitus with ketoacidosis without coma; T50.905A-Adverse effect of unspecified drugs, medicaments and biological substances, initial encounter; subarachnoid hemorrhage followup.  TECHNIQUE: Multiple axial CT imaging was obtained of the head from the skull base to the skull vertex without the administration of intravenous contrast.  The radiation dose reduction device was turned on for each scan per the ALARA (As Low as Reasonably Achievable) protocol.  COMPARISON: 01/15/2022.  FINDINGS: There is again extensive atrophy identified of the brain. There is redemonstration identified of the hemorrhage seen on the prior examination with subarachnoid hemorrhage again seen in the suprasellar cistern extending into the left sylvian fissure. There is a small amount of extraaxial hemorrhage seen along the falx in the frontal region. Findings are essentially unchanged and evolving when compared to the prior examination. Arachnoid hemorrhage is again seen at the vertex bilaterally. No signs of new hemorrhage. No evidence of midline shift. No evidence of hydrocephalus. Minimal intraventricular hemorrhage identified stable and unchanged.      Stable expected evolving changes of the previously seen areas of intracranial hemorrhage. No new findings in the interval.  D:  01/17/2022 E:  01/17/2022  This report was finalized on 1/18/2022 9:33 AM by Dr. Foy  MD Thong.      CT Head Without Contrast    Result Date: 1/15/2022  EXAMINATION: CT HEAD WO CONTRAST-  INDICATION: r/o stroke  TECHNIQUE: Axial noncontrast CT of the head with multiplanar reconstruction  The radiation dose reduction device was turned on for each scan per the ALARA (As Low as Reasonably Achievable) protocol.  COMPARISON: 1/12/2022  FINDINGS: Redemonstrated multicompartment intracranial hemorrhage including areas of subarachnoid hemorrhage within the prepontine cistern and suprasellar cistern extending to the sylvian fissure, with additional small areas of parafalcine extra-axial hemorrhage. Essentially resolved tiny amount of intraventricular hemorrhage. No new hemorrhage identified. No evidence of increased mass effect or definite new acute territorial ischemia. Unchanged ventricular caliber without evidence of progressive hydrocephalus. The orbits are unremarkable and the paranasal sinuses are grossly clear.      Expected evolutionary changes noted involving the previously described areas of multicompartment intracranial hemorrhage. Prominent subarachnoid hemorrhage is grossly unchanged, with essential resolution of previously noted dependent interventricular hemorrhage. Unchanged parafalcine hemorrhage. No new hemorrhage, mass effect or evidence of acute territorial ischemia.  Scan performed 9:37 AM 1/15/2022, results related to the stroke team via the CT technologist by Tal Garza at 9:47 AM same day     This report was finalized on 1/15/2022 9:51 AM by Tal Garza.      CT Head Without Contrast    Result Date: 1/12/2022  CT Head WO HISTORY: Altered mental status and clinical deterioration today. Known traumatic subarachnoid hemorrhage. TECHNIQUE: Routine noncontrast head CT. Coronal reformatted images. Radiation dose reduction techniques included automated exposure control or exposure modulation based on body size. Count of known CT and cardiac nuc med studies performed in previous  12 months: 5. COMPARISON: CT head 1/12/2022 and 1/11/2022 FINDINGS: Stable small subdural hemorrhages along the bilateral interhemispheric falx and left tentorium. Stable subarachnoid hemorrhage in the medial left frontal region, left sylvian fissure, and basilar cisterns. Improved small amount of subarachnoid blood at the right frontal convexity. Stable small amount of intraventricular hemorrhage in the bilateral occipital horns. No hydrocephalus or midline shift. Moderate generalized atrophy and chronic small vessel disease throughout the supratentorial white matter. No acute osseous abnormality. Bilateral maxillary sinus mucosal thickening. Visualized mastoid air cells are clear.     1. Stable subdural, subarachnoid, and intraventricular hemorrhage compared to the prior examination performed earlier the same date at 6:02 AM. 2. No hydrocephalus or midline shift. 3. Bilateral maxillary sinus mucosal thickening. Signer Name: Lucio Asher MD  Signed: 1/12/2022 7:38 PM  Workstation [facility]: [email or web address]  [facility]    CT Head Without Contrast    Result Date: 1/12/2022  CT Head WO HISTORY: Follow-up traumatic subarachnoid hemorrhage. TECHNIQUE: Routine noncontrast head CT. Coronal reformatted images. Radiation dose reduction techniques included automated exposure control or exposure modulation based on body size. Count of known CT and cardiac nuc med studies performed in previous 12 months: 4. COMPARISON: CT head and CTA head/neck 1/11/2022 FINDINGS: Stable small amount of subdural hemorrhage along the bilateral interhemispheric falx and along the left tentorium. Stable subarachnoid hemorrhage in the medial left frontal region, right frontal convexity, and left sylvian fissure. Stable subarachnoid hemorrhage in the basilar cisterns. Interval development of a small amount of intraventricular blood in the bilateral occipital horns of the lateral ventricles. No hydrocephalus. No midline shift or  focal mass effect. Moderate generalized atrophy and chronic small vessel disease throughout the supratentorial white matter. No acute osseous abnormality. Mucosal thickening bilateral maxillary sinuses with a small air-fluid level in the left maxillary sinus. Visualized mastoid air cells are clear.     1. Stable subdural and subarachnoid hemorrhage, detailed above. 2. Interval development of a small amount of intraventricular hemorrhage in the bilateral occipital horns of the lateral ventricles. No hydrocephalus or midline shift. 3. Bilateral maxillary sinus mucosal thickening with small left-sided air-fluid level. Signer Name: Lucio Asher MD  Signed: 1/12/2022 6:30 AM  Workstation Name: [employer]  [facility]    CT Head Without Contrast    Result Date: 1/11/2022  CT Head WO HISTORY: Fall. Head injury. Patient on blood thinners. TECHNIQUE: Axial unenhanced head CT with multiplanar reformats. Radiation dose reduction techniques included automated exposure control or exposure modulation based on body size. Count of known CT and cardiac nuc med studies performed in previous 12 months: 0. COMPARISON: None. FINDINGS: Abnormal examination. There is generalized atrophy with chronic small vessel ischemic disease in the white matter. Ventricular size is within normal limits. There is acute subdural hemorrhage along the interhemispheric falx measuring up to 5 mm in thickness. There is adjacent subarachnoid hemorrhage in the medial left frontal lobe and near the right vertex. Some of the hemorrhage near the vertex could also be of subdural origin. There is subarachnoid hemorrhage in the left sylvian fissure measuring up to 7 mm in thickness. There is also subarachnoid hemorrhage in the basilar cisterns left greater than right. There also appears to be some subdural hemorrhage along the left side of the tentorium. No intraventricular hemorrhage is identified. There is no evidence of acute infarct.  There is an occipital scalp hematoma. There is atherosclerotic disease in the vertebral arteries and carotid siphons. There is a distal left vertebral artery stent versus atherosclerotic calcification. Correlate with history. No skull fracture is identified. SAH detected                     YES Diffuse or vertical layer of SAH blood < 1 mm thick                 NO Localized clot and/or vertical layer of SAH blood  > 1 mm thick           YES ICH or IVH with diffuse or no SAH          NO     1. Acute subdural hemorrhage along the interhemispheric falx measuring up to 5 mm in thickness. There is also some subdural hemorrhage along the left tentorium. 2. Subarachnoid hemorrhage is noted at the right vertex and in the left frontal region and left sylvian fissure. There is also subarachnoid hemorrhage in the basilar cisterns worse to the left of midline. 3. No intraventricular hemorrhage. 4. Atrophy with chronic small vessel ischemic disease in the white matter. 5. Occipital scalp hematoma without skull fracture. NOTIFICATION: Critical Value/emergent results were called by telephone at the time of interpretation on 1/11/2022 10:25 PM to Larry Gonzalez MD who verbally acknowledged these results. Signer Name: Hari Simon MD  Signed: 1/11/2022 10:29 PM  Workstation Name: [facility]  [facility]    CT Angiogram Neck    Result Date: 1/12/2022  INDICATION: Acute intracranial hemorrhage after a fall. Patient on blood thinners TECHNIQUE: CT angiogram of the head and neck with IV contrast. Contrast dose recorded in the patient's chart. 3-D MIP reformatted images were acquired and reviewed. Evaluation for a significant carotid arterial stenosis is based on the NASCET criteria. Radiation dose reduction techniques included automated exposure control or exposure modulation based on body size. Radiation audit for number of CT and nuclear cardiology exams performed in the last year:  0. Precontrast  imaging also obtained. Rapid AI utilized for LVO COMPARISON: Earlier noncontrast head CT. FINDINGS: CTA neck:  There our atherosclerotic vascular calcifications at the aortic arch. There is involvement of great vessel origins without hemodynamically significant stenosis. There is probably mild stenosis at the origin of the right subclavian artery. There is mild narrowing at the origin the right common carotid artery due to vascular ectasia. There is calcified plaque at the right carotid bifurcation but by NASCET criteria there is essentially 0% diameter stenosis. There is moderate stenosis at the origin of the right external carotid artery. There is calcified plaque at the left carotid bifurcation and along the left common carotid artery. By NASCET criteria there is 0% diameter stenosis. There is mild narrowing at the origin of the left external carotid artery. There is calcified disease at both carotid siphons with mild to moderate stenosis likely. Both vertebral arteries are patent. The left is dominant. There is disease at the origin of the vertebral arteries partly obscured by venous vascular opacification. CTA head:  Study is performed with a stroke evaluation protocol and is not post processed for the purpose of evaluating for intracranial aneurysm. There are acute blood products present as identified on the earlier noncontrast head CT. There our atherosclerotic vascular calcifications involving both the 4 segments with likely hemodynamically significant stenosis of the hypoplastic distal right vertebral artery. This vessel mostly terminates in a PICA vessel. There is irregular ectasia of the basilar due to atherosclerotic disease with high-grade short segment proximal stenosis followed by some poststenotic dilatation. There is a moderate-sized anterior communicating artery present. The left A1 vessels mildly hypoplastic. No intracranial vascular cut off is appreciated. There is no gross intracranial aneurysm  or arteriovenous malformation appreciated on this study performed for stroke evaluation. There is a small to moderate sized right posterior communicating artery. There is a large left posterior communicator with largely fetal origin to the left posterior cerebral artery distribution. Left posterior cerebral artery is irregular is probably some intracranial atherosclerotic disease. There is venous contrast reflux intracranially from the injection into the dural venous sinuses. No intracranial vascular cut off is suspected.     1. By NASCET criteria, there is 0% stenosis at either carotid bifurcation. 2. Both vertebral arteries are patent with left being dominant. 3. There is considerable atherosclerotic disease involving the bilateral carotid siphons and intracranial vertebral arteries. There is severe disease of the basilar artery with calcified and noncalcified plaque. This includes short segment high-grade stenosis of the proximal basilar with some long segment irregularity. The distal right vertebral artery largely terminates in a PICA. 4. No obvious intracranial aneurysm or arteriovenous malformation. Please note the CT angiogram is protocol to evaluate for stroke and utilizes rapid AI for this purpose. It is limited for evaluation for intracranial aneurysm. 5. There is a large amount of venous contrast reflux including reflux into the dural venous sinuses. Signer Name: Cris Loco MD  Signed: 1/12/2022 12:05 AM  Workstation Name: [facility]  [facility]    CT Cervical Spine Without Contrast    Result Date: 1/11/2022  CT Spine Cervical WO INDICATION: Neck pain after fall. TECHNIQUE: CT of the cervical spine without IV contrast. Coronal and sagittal reconstructions were obtained.  Radiation dose reduction techniques included automated exposure control or exposure modulation based on body size. Count of known CT and cardiac nuc med studies performed in previous 12 months: 0. COMPARISON:  None available. FINDINGS: Note is made of extensive carotid atherosclerotic disease. There is mild grade 1 retrolisthesis of C5 on C6. Cervical alignment is otherwise normal. No acute cervical spine fracture is seen. There is multilevel degenerative disc disease and facet arthropathy, most severe at C5-6 where there is a disc osteophyte complex with mild narrowing of the central canal and moderate bilateral foraminal stenosis. Intracranial hemorrhage noted the skull base. Please see head CT report dictated separately.     No acute cervical spine fracture. Signer Name: Hari Simon MD  Signed: 1/11/2022 10:29 PM  Workstation [facility]: [facility]  [facility]    MRI Brain Without Contrast    Result Date: 1/17/2022  EXAMINATION: MRI BRAIN WO CONTRAST-  INDICATION: Stroke, followup; R41.82-Altered mental status, unspecified; E09.10-Drug or chemical induced diabetes mellitus with ketoacidosis without coma; T50.905A-Adverse effect of unspecified drugs, medicaments and biological substances, initial encounter.  TECHNIQUE: Multiplanar MRI of the brain without intravenous contrast administration.  COMPARISON: CT head 01/17/2021.  FINDINGS: No restricted diffusion to suggest acute ischemia with areas of T1 shortening noted of scattered subarachnoid hemorrhage including parafalcine involvement as seen on recent CT. No susceptibility artifact apart from these areas of hemosiderin deposition and acute/subacute blood products. Motion degraded examinations T2 and FLAIR axials, however, at least mild to moderate generalized atrophy and age-related volume loss along with moderate chronic small vessel ischemic disease. Globes and orbits are unremarkable. Paranasal sinuses and mastoid air cells are grossly clear and well pneumatized. Pituitary and sella within normal limits with noted prepontine and suprasellar cistern hemorrhage. Cervicomedullary junction widely patent.      Multifocal areas of parafalcine and  scattered subarachnoid hemorrhage including suprasellar and prepontine regions as seen on recent CT. No acute infarction or acute intracranial findings otherwise noted with moderate senescent changes of generalized atrophy and age-related volume loss as well as chronic small vessel ischemic disease.   D:  01/17/2022 E:  01/17/2022  This report was finalized on 1/17/2022 3:22 PM by Dr. Joon Clark.      XR Chest 1 View    Result Date: 1/15/2022  EXAMINATION: XR CHEST 1 VW-  INDICATION: Stroke Protocol (Onset > 12 Hrs)  COMPARISON: NONE  FINDINGS: There is mild elevation of the left hemidiaphragm. The lungs are clear. Left chest wall ICD projects in place. No effusion or pneumothorax. Tortuous mildly atherosclerotic thoracic aorta.      Mild chronic changes as above without evidence of acute disease in the chest.  This report was finalized on 1/15/2022 10:19 AM by Tal Garza.      CT Angiogram Head w AI Analysis of LVO    Result Date: 1/12/2022  INDICATION: Acute intracranial hemorrhage after a fall. Patient on blood thinners TECHNIQUE: CT angiogram of the head and neck with IV contrast. Contrast dose recorded in the patient's chart. 3-D MIP reformatted images were acquired and reviewed. Evaluation for a significant carotid arterial stenosis is based on the NASCET criteria. Radiation dose reduction techniques included automated exposure control or exposure modulation based on body size. Radiation audit for number of CT and nuclear cardiology exams performed in the last year:  0. Precontrast imaging also obtained. Rapid AI utilized for LVO COMPARISON: Earlier noncontrast head CT. FINDINGS: CTA neck:  There our atherosclerotic vascular calcifications at the aortic arch. There is involvement of great vessel origins without hemodynamically significant stenosis. There is probably mild stenosis at the origin of the right subclavian artery. There is mild narrowing at the origin the right common carotid artery due to  vascular ectasia. There is calcified plaque at the right carotid bifurcation but by NASCET criteria there is essentially 0% diameter stenosis. There is moderate stenosis at the origin of the right external carotid artery. There is calcified plaque at the left carotid bifurcation and along the left common carotid artery. By NASCET criteria there is 0% diameter stenosis. There is mild narrowing at the origin of the left external carotid artery. There is calcified disease at both carotid siphons with mild to moderate stenosis likely. Both vertebral arteries are patent. The left is dominant. There is disease at the origin of the vertebral arteries partly obscured by venous vascular opacification. CTA head:  Study is performed with a stroke evaluation protocol and is not post processed for the purpose of evaluating for intracranial aneurysm. There are acute blood products present as identified on the earlier noncontrast head CT. There our atherosclerotic vascular calcifications involving both the 4 segments with likely hemodynamically significant stenosis of the hypoplastic distal right vertebral artery. This vessel mostly terminates in a PICA vessel. There is irregular ectasia of the basilar due to atherosclerotic disease with high-grade short segment proximal stenosis followed by some poststenotic dilatation. There is a moderate-sized anterior communicating artery present. The left A1 vessels mildly hypoplastic. No intracranial vascular cut off is appreciated. There is no gross intracranial aneurysm or arteriovenous malformation appreciated on this study performed for stroke evaluation. There is a small to moderate sized right posterior communicating artery. There is a large left posterior communicator with largely fetal origin to the left posterior cerebral artery distribution. Left posterior cerebral artery is irregular is probably some intracranial atherosclerotic disease. There is venous contrast reflux  intracranially from the injection into the dural venous sinuses. No intracranial vascular cut off is suspected.     1. By NASCET criteria, there is 0% stenosis at either carotid bifurcation. 2. Both vertebral arteries are patent with left being dominant. 3. There is considerable atherosclerotic disease involving the bilateral carotid siphons and intracranial vertebral arteries. There is severe disease of the basilar artery with calcified and noncalcified plaque. This includes short segment high-grade stenosis of the proximal basilar with some long segment irregularity. The distal right vertebral artery largely terminates in a PICA. 4. No obvious intracranial aneurysm or arteriovenous malformation. Please note the CT angiogram is protocol to evaluate for stroke and utilizes rapid AI for this purpose. It is limited for evaluation for intracranial aneurysm. 5. There is a large amount of venous contrast reflux including reflux into the dural venous sinuses. Signer Name: Cris Loco MD  Signed: 1/12/2022 12:05 AM  Workstation Name: [email or web address]  [facility]              Results for orders placed during the hospital encounter of 01/11/22    Adult Transthoracic Echo Complete W/ Cont if Necessary Per Protocol    Interpretation Summary  · Mild aortic valve stenosis is present.  · Estimated right ventricular systolic pressure from tricuspid regurgitation is normal (<35 mmHg).  · LVSF is normal  · MAC      Plan for Follow-up of Pending Labs/Results:   Pending Labs     Order Current Status    Blood Culture - Blood, Arm, Right Preliminary result    Blood Culture - Blood, Hand, Right Preliminary result        Discharge Details        Discharge Medications      New Medications      Instructions Start Date   insulin detemir 100 UNIT/ML injection  Commonly known as: LEVEMIR   5 Units, Subcutaneous, Daily   Start Date: January 20, 2022     insulin lispro 100 UNIT/ML injection  Commonly known as: humaLOG   0-9  Units, Subcutaneous, 3 Times Daily Before Meals      levETIRAcetam 500 MG tablet  Commonly known as: KEPPRA   500 mg, Oral, 2 Times Daily         Changes to Medications      Instructions Start Date   atorvastatin 80 MG tablet  Commonly known as: LIPITOR  What changed:   · medication strength  · how much to take   80 mg, Oral, Nightly      doxazosin 2 MG tablet  Commonly known as: CARDURA  What changed: how much to take   1 mg, Oral, Nightly      fluticasone 50 MCG/ACT nasal spray  Commonly known as: FLONASE  What changed: See the new instructions.   USE 1 SPRAY NASALLY TWICE DAILY         Continue These Medications      Instructions Start Date   bisoprolol 10 MG tablet  Commonly known as: ZEBeta   10 mg, Oral, Daily      levocetirizine 5 MG tablet  Commonly known as: XYZAL   5 mg, Oral, Every Evening      metFORMIN 850 MG tablet  Commonly known as: GLUCOPHAGE   TAKE 1 TABLET TWICE DAILY WITH MEALS      PARoxetine 20 MG tablet  Commonly known as: PAXIL   TAKE 1 TABLET EVERY DAY         Stop These Medications    empagliflozin 25 MG tablet tablet  Commonly known as: JARDIANCE            No Known Allergies      Discharge Disposition:  Rehab Facility or Unit (DC - External)    Diet:  Hospital:  Diet Order   Procedures   • Diet Regular; Consistent Carbohydrate       Activity:  Activity Instructions     Activity as Tolerated      Driving Restrictions      Type of Restriction: Driving    Driving Restrictions: No Driving Until Next Appointment    Until cleared by neurology    Measure Blood Pressure            Restrictions or Other Recommendations:         CODE STATUS:    Code Status and Medical Interventions:   Ordered at: 01/15/22 1341     Level Of Support Discussed With:    Next of Kin (If No Surrogate)     Code Status (Patient has no pulse and is not breathing):    CPR (Attempt to Resuscitate)     Medical Interventions (Patient has pulse or is breathing):    Full Support       Future Appointments   Date Time Provider  Department Center   2/10/2022 10:45 AM TOP[facility] CT 1 BH TOPHER CT [facility]   2/10/2022  2:30 PM Miguel Ángel Scruggs PA-C [facility]       Additional Instructions for the Follow-ups that You Need to Schedule     Discharge Follow-up with PCP   As directed       Currently Documented PCP:    Diaz Lee MD    PCP Phone Number:    [phone]     Follow Up Details: follow up with pcp after dc from rehab         Discharge Follow-up with Specified Provider: Follow up with Neurology Dr. Jiang 6-8 weeks   As directed      To: Follow up with Neurology Dr. Jiang 6-8 weeks         Discharge Follow-up with Specified Provider: keep NS appt as [facility] on 2/10/22   As directed      To: keep NS appt as [facility] on 2/10/22                     SCARLETT Dubon  01/19/22      Time Spent on Discharge:  I spent  35  minutes on this discharge activity which included: face-to-face encounter with the patient, reviewing the data in the system, coordination of the care with the nursing staff as well as consultants, documentation, and entering orders.

## 2022-01-19 NOTE — CONSULTS
Reviewed chart for diabetes education consult.  Noted history of diabetes.  Noted a1c of 7.8%.  Noted on oral medication for diabetes at home.  Noted transfer to rehab facility later today, [place] on [address].  Attempted to speak with Mr. Joya today.  He was able to confirm that he is going to rehab facility today.  He stated that he has diabetes and was taking medication for it at home. He was not able to get his street address out verbally, but stated he lived in [place] with his wife before this hospitalization.  He was struggling to answer questions, so I told him that he could call [facility] and ask to be transferred to diabetes education if he had any questions about caring for diabetes once he gets back home after stay in rehab facility.  He is not a candidate for our stroke an diabetes follow up class, as his BMI is less than 25, and he is being transferred to rehab facility.  Thank you for this referral.

## 2022-01-20 LAB
BACTERIA SPEC AEROBE CULT: NORMAL
BACTERIA SPEC AEROBE CULT: NORMAL

## 2022-01-20 PROCEDURE — G0180 MD CERTIFICATION HHA PATIENT: HCPCS | Performed by: INTERNAL MEDICINE

## 2022-01-22 ENCOUNTER — HOSPITAL ENCOUNTER (INPATIENT)
Facility: HOSPITAL | Age: 82
LOS: 10 days | Discharge: NURSING, REHAB OR LONG-TERM CARE | End: 2022-02-05
Attending: EMERGENCY MEDICINE | Admitting: HOSPITALIST

## 2022-01-22 ENCOUNTER — APPOINTMENT (OUTPATIENT)
Dept: GENERAL RADIOLOGY | Facility: HOSPITAL | Age: 82
End: 2022-01-22

## 2022-01-22 ENCOUNTER — APPOINTMENT (OUTPATIENT)
Dept: CT IMAGING | Facility: HOSPITAL | Age: 82
End: 2022-01-22

## 2022-01-22 DIAGNOSIS — I62.03 ACUTE ON CHRONIC INTRACRANIAL SUBDURAL HEMATOMA: Primary | ICD-10-CM

## 2022-01-22 DIAGNOSIS — I62.01 ACUTE ON CHRONIC INTRACRANIAL SUBDURAL HEMATOMA: Primary | ICD-10-CM

## 2022-01-22 DIAGNOSIS — E11.65 UNCONTROLLED TYPE 2 DIABETES MELLITUS WITH HYPERGLYCEMIA: ICD-10-CM

## 2022-01-22 DIAGNOSIS — R41.841 COGNITIVE COMMUNICATION DEFICIT: ICD-10-CM

## 2022-01-22 DIAGNOSIS — R41.82 ALTERED MENTAL STATUS, UNSPECIFIED ALTERED MENTAL STATUS TYPE: ICD-10-CM

## 2022-01-22 LAB
ALBUMIN SERPL-MCNC: 3.5 G/DL (ref 3.5–5.2)
ALBUMIN/GLOB SERPL: 1.3 G/DL
ALP SERPL-CCNC: 118 U/L (ref 39–117)
ALT SERPL W P-5'-P-CCNC: 27 U/L (ref 1–41)
ANION GAP SERPL CALCULATED.3IONS-SCNC: 10 MMOL/L (ref 5–15)
AST SERPL-CCNC: 22 U/L (ref 1–40)
BACTERIA UR QL AUTO: ABNORMAL /HPF
BASOPHILS # BLD AUTO: 0.01 10*3/MM3 (ref 0–0.2)
BASOPHILS NFR BLD AUTO: 0.1 % (ref 0–1.5)
BILIRUB SERPL-MCNC: 1.1 MG/DL (ref 0–1.2)
BILIRUB UR QL STRIP: NEGATIVE
BUN SERPL-MCNC: 10 MG/DL (ref 8–23)
BUN/CREAT SERPL: 19.2 (ref 7–25)
CALCIUM SPEC-SCNC: 8.6 MG/DL (ref 8.6–10.5)
CHLORIDE SERPL-SCNC: 84 MMOL/L (ref 98–107)
CLARITY UR: CLEAR
CO2 SERPL-SCNC: 33 MMOL/L (ref 22–29)
COLOR UR: YELLOW
CREAT SERPL-MCNC: 0.52 MG/DL (ref 0.76–1.27)
DEPRECATED RDW RBC AUTO: 41.9 FL (ref 37–54)
EOSINOPHIL # BLD AUTO: 0.03 10*3/MM3 (ref 0–0.4)
EOSINOPHIL NFR BLD AUTO: 0.3 % (ref 0.3–6.2)
ERYTHROCYTE [DISTWIDTH] IN BLOOD BY AUTOMATED COUNT: 12.1 % (ref 12.3–15.4)
FLUAV RNA RESP QL NAA+PROBE: NOT DETECTED
FLUBV RNA RESP QL NAA+PROBE: NOT DETECTED
GFR SERPL CREATININE-BSD FRML MDRD: >150 ML/MIN/1.73
GLOBULIN UR ELPH-MCNC: 2.6 GM/DL
GLUCOSE SERPL-MCNC: 257 MG/DL (ref 65–99)
GLUCOSE UR STRIP-MCNC: ABNORMAL MG/DL
GRAN CASTS URNS QL MICRO: ABNORMAL /LPF
HCT VFR BLD AUTO: 37 % (ref 37.5–51)
HGB BLD-MCNC: 12.5 G/DL (ref 13–17.7)
HGB UR QL STRIP.AUTO: NEGATIVE
HYALINE CASTS UR QL AUTO: ABNORMAL /LPF
IMM GRANULOCYTES # BLD AUTO: 0.04 10*3/MM3 (ref 0–0.05)
IMM GRANULOCYTES NFR BLD AUTO: 0.4 % (ref 0–0.5)
KETONES UR QL STRIP: ABNORMAL
LEUKOCYTE ESTERASE UR QL STRIP.AUTO: ABNORMAL
LYMPHOCYTES # BLD AUTO: 1.07 10*3/MM3 (ref 0.7–3.1)
LYMPHOCYTES NFR BLD AUTO: 10.9 % (ref 19.6–45.3)
MCH RBC QN AUTO: 32.1 PG (ref 26.6–33)
MCHC RBC AUTO-ENTMCNC: 33.8 G/DL (ref 31.5–35.7)
MCV RBC AUTO: 94.9 FL (ref 79–97)
MONOCYTES # BLD AUTO: 1.13 10*3/MM3 (ref 0.1–0.9)
MONOCYTES NFR BLD AUTO: 11.5 % (ref 5–12)
NEUTROPHILS NFR BLD AUTO: 7.54 10*3/MM3 (ref 1.7–7)
NEUTROPHILS NFR BLD AUTO: 76.8 % (ref 42.7–76)
NITRITE UR QL STRIP: NEGATIVE
NRBC BLD AUTO-RTO: 0 /100 WBC (ref 0–0.2)
PH UR STRIP.AUTO: 6.5 [PH] (ref 5–8)
PLATELET # BLD AUTO: 300 10*3/MM3 (ref 140–450)
PMV BLD AUTO: 10 FL (ref 6–12)
POTASSIUM SERPL-SCNC: 3.4 MMOL/L (ref 3.5–5.2)
PROT SERPL-MCNC: 6.1 G/DL (ref 6–8.5)
PROT UR QL STRIP: ABNORMAL
RBC # BLD AUTO: 3.9 10*6/MM3 (ref 4.14–5.8)
RBC # UR STRIP: ABNORMAL /HPF
REF LAB TEST METHOD: ABNORMAL
RSV RNA NPH QL NAA+NON-PROBE: NOT DETECTED
SARS-COV-2 RNA RESP QL NAA+PROBE: NOT DETECTED
SODIUM SERPL-SCNC: 127 MMOL/L (ref 136–145)
SP GR UR STRIP: 1.02 (ref 1–1.03)
SQUAMOUS #/AREA URNS HPF: ABNORMAL /HPF
T4 FREE SERPL-MCNC: 1.32 NG/DL (ref 0.93–1.7)
TRANS CELLS #/AREA URNS HPF: ABNORMAL /HPF
TROPONIN T SERPL-MCNC: <0.01 NG/ML (ref 0–0.03)
TSH SERPL DL<=0.05 MIU/L-ACNC: 1.77 UIU/ML (ref 0.27–4.2)
UROBILINOGEN UR QL STRIP: ABNORMAL
WBC # UR STRIP: ABNORMAL /HPF
WBC NRBC COR # BLD: 9.82 10*3/MM3 (ref 3.4–10.8)

## 2022-01-22 PROCEDURE — 99285 EMERGENCY DEPT VISIT HI MDM: CPT

## 2022-01-22 PROCEDURE — 80053 COMPREHEN METABOLIC PANEL: CPT | Performed by: EMERGENCY MEDICINE

## 2022-01-22 PROCEDURE — 87637 SARSCOV2&INF A&B&RSV AMP PRB: CPT | Performed by: EMERGENCY MEDICINE

## 2022-01-22 PROCEDURE — 70450 CT HEAD/BRAIN W/O DYE: CPT

## 2022-01-22 PROCEDURE — P9612 CATHETERIZE FOR URINE SPEC: HCPCS

## 2022-01-22 PROCEDURE — 71045 X-RAY EXAM CHEST 1 VIEW: CPT

## 2022-01-22 PROCEDURE — 85025 COMPLETE CBC W/AUTO DIFF WBC: CPT | Performed by: EMERGENCY MEDICINE

## 2022-01-22 PROCEDURE — 84439 ASSAY OF FREE THYROXINE: CPT | Performed by: EMERGENCY MEDICINE

## 2022-01-22 PROCEDURE — 93005 ELECTROCARDIOGRAM TRACING: CPT | Performed by: EMERGENCY MEDICINE

## 2022-01-22 PROCEDURE — 84443 ASSAY THYROID STIM HORMONE: CPT | Performed by: EMERGENCY MEDICINE

## 2022-01-22 PROCEDURE — 81001 URINALYSIS AUTO W/SCOPE: CPT | Performed by: EMERGENCY MEDICINE

## 2022-01-22 PROCEDURE — 84484 ASSAY OF TROPONIN QUANT: CPT | Performed by: EMERGENCY MEDICINE

## 2022-01-23 ENCOUNTER — APPOINTMENT (OUTPATIENT)
Dept: CT IMAGING | Facility: HOSPITAL | Age: 82
End: 2022-01-23

## 2022-01-23 PROBLEM — I62.01 ACUTE ON CHRONIC INTRACRANIAL SUBDURAL HEMATOMA (HCC): Status: ACTIVE | Noted: 2022-01-23

## 2022-01-23 PROBLEM — I62.03 ACUTE ON CHRONIC INTRACRANIAL SUBDURAL HEMATOMA (HCC): Status: ACTIVE | Noted: 2022-01-23

## 2022-01-23 LAB
ALBUMIN SERPL-MCNC: 3.5 G/DL (ref 3.5–5.2)
ALBUMIN/GLOB SERPL: 1.5 G/DL
ALP SERPL-CCNC: 117 U/L (ref 39–117)
ALT SERPL W P-5'-P-CCNC: 27 U/L (ref 1–41)
ANION GAP SERPL CALCULATED.3IONS-SCNC: 10 MMOL/L (ref 5–15)
ANION GAP SERPL CALCULATED.3IONS-SCNC: 9 MMOL/L (ref 5–15)
ARTERIAL PATENCY WRIST A: ABNORMAL
AST SERPL-CCNC: 21 U/L (ref 1–40)
ATMOSPHERIC PRESS: ABNORMAL MM[HG]
BASE EXCESS BLDA CALC-SCNC: 15.3 MMOL/L (ref 0–2)
BDY SITE: ABNORMAL
BILIRUB SERPL-MCNC: 1.3 MG/DL (ref 0–1.2)
BODY TEMPERATURE: 37 C
BUN SERPL-MCNC: 10 MG/DL (ref 8–23)
BUN SERPL-MCNC: 9 MG/DL (ref 8–23)
BUN/CREAT SERPL: 17.9 (ref 7–25)
BUN/CREAT SERPL: 18.4 (ref 7–25)
CALCIUM SPEC-SCNC: 8.4 MG/DL (ref 8.6–10.5)
CALCIUM SPEC-SCNC: 8.6 MG/DL (ref 8.6–10.5)
CHLORIDE SERPL-SCNC: 84 MMOL/L (ref 98–107)
CHLORIDE SERPL-SCNC: 84 MMOL/L (ref 98–107)
CO2 BLDA-SCNC: 40.9 MMOL/L (ref 22–33)
CO2 SERPL-SCNC: 33 MMOL/L (ref 22–29)
CO2 SERPL-SCNC: 35 MMOL/L (ref 22–29)
COHGB MFR BLD: 1.2 % (ref 0–2)
CORTIS AM PEAK SERPL-MCNC: 23.63 MCG/DL
CREAT SERPL-MCNC: 0.49 MG/DL (ref 0.76–1.27)
CREAT SERPL-MCNC: 0.56 MG/DL (ref 0.76–1.27)
D DIMER PPP FEU-MCNC: 2.51 MCGFEU/ML (ref 0–0.56)
EPAP: 0
GFR SERPL CREATININE-BSD FRML MDRD: 139 ML/MIN/1.73
GFR SERPL CREATININE-BSD FRML MDRD: >150 ML/MIN/1.73
GLOBULIN UR ELPH-MCNC: 2.4 GM/DL
GLUCOSE BLDC GLUCOMTR-MCNC: 228 MG/DL (ref 70–130)
GLUCOSE BLDC GLUCOMTR-MCNC: 249 MG/DL (ref 70–130)
GLUCOSE BLDC GLUCOMTR-MCNC: 267 MG/DL (ref 70–130)
GLUCOSE BLDC GLUCOMTR-MCNC: 383 MG/DL (ref 70–130)
GLUCOSE SERPL-MCNC: 228 MG/DL (ref 65–99)
GLUCOSE SERPL-MCNC: 242 MG/DL (ref 65–99)
HBA1C MFR BLD: 7.9 % (ref 4.8–5.6)
HCO3 BLDA-SCNC: 39.5 MMOL/L (ref 20–26)
HCT VFR BLD CALC: 36.7 % (ref 38–51)
HGB BLDA-MCNC: 12 G/DL (ref 13.5–17.5)
INHALED O2 CONCENTRATION: 21 %
IPAP: 0
MAGNESIUM SERPL-MCNC: 1.7 MG/DL (ref 1.6–2.4)
METHGB BLD QL: 0.4 % (ref 0–1.5)
MODALITY: ABNORMAL
NOTE: ABNORMAL
NT-PROBNP SERPL-MCNC: 3130 PG/ML (ref 0–1800)
NT-PROBNP SERPL-MCNC: 4491 PG/ML (ref 0–1800)
OSMOLALITY SERPL: 275 MOSM/KG (ref 275–295)
OSMOLALITY UR: 407 MOSM/KG (ref 300–1100)
OXYHGB MFR BLDV: 90.7 % (ref 94–99)
PAW @ PEAK INSP FLOW SETTING VENT: 0 CMH2O
PCO2 BLDA: 45.6 MM HG (ref 35–45)
PCO2 TEMP ADJ BLD: 45.6 MM HG (ref 35–48)
PH BLDA: 7.55 PH UNITS (ref 7.35–7.45)
PH, TEMP CORRECTED: 7.55 PH UNITS
PHOSPHATE SERPL-MCNC: 3.4 MG/DL (ref 2.5–4.5)
PO2 BLDA: 57.5 MM HG (ref 83–108)
PO2 TEMP ADJ BLD: 57.5 MM HG (ref 83–108)
POTASSIUM SERPL-SCNC: 3.1 MMOL/L (ref 3.5–5.2)
POTASSIUM SERPL-SCNC: 3.5 MMOL/L (ref 3.5–5.2)
PROCALCITONIN SERPL-MCNC: 0.04 NG/ML (ref 0–0.25)
PROT SERPL-MCNC: 5.9 G/DL (ref 6–8.5)
SODIUM SERPL-SCNC: 127 MMOL/L (ref 136–145)
SODIUM SERPL-SCNC: 128 MMOL/L (ref 136–145)
SODIUM UR-SCNC: 128 MMOL/L
TOTAL RATE: 0 BREATHS/MINUTE
TROPONIN T SERPL-MCNC: <0.01 NG/ML (ref 0–0.03)
URATE SERPL-MCNC: 1.5 MG/DL (ref 3.4–7)

## 2022-01-23 PROCEDURE — 83880 ASSAY OF NATRIURETIC PEPTIDE: CPT | Performed by: INTERNAL MEDICINE

## 2022-01-23 PROCEDURE — 80053 COMPREHEN METABOLIC PANEL: CPT | Performed by: INTERNAL MEDICINE

## 2022-01-23 PROCEDURE — 85379 FIBRIN DEGRADATION QUANT: CPT | Performed by: INTERNAL MEDICINE

## 2022-01-23 PROCEDURE — 93005 ELECTROCARDIOGRAM TRACING: CPT | Performed by: INTERNAL MEDICINE

## 2022-01-23 PROCEDURE — 84100 ASSAY OF PHOSPHORUS: CPT | Performed by: INTERNAL MEDICINE

## 2022-01-23 PROCEDURE — 84484 ASSAY OF TROPONIN QUANT: CPT | Performed by: INTERNAL MEDICINE

## 2022-01-23 PROCEDURE — 25010000002 SODIUM CHLORIDE 0.9 % WITH KCL 20 MEQ 20-0.9 MEQ/L-% SOLUTION: Performed by: INTERNAL MEDICINE

## 2022-01-23 PROCEDURE — 0 IOPAMIDOL PER 1 ML: Performed by: INTERNAL MEDICINE

## 2022-01-23 PROCEDURE — 83036 HEMOGLOBIN GLYCOSYLATED A1C: CPT | Performed by: INTERNAL MEDICINE

## 2022-01-23 PROCEDURE — 63710000001 INSULIN DETEMIR PER 5 UNITS: Performed by: INTERNAL MEDICINE

## 2022-01-23 PROCEDURE — 99222 1ST HOSP IP/OBS MODERATE 55: CPT | Performed by: PSYCHIATRY & NEUROLOGY

## 2022-01-23 PROCEDURE — 83930 ASSAY OF BLOOD OSMOLALITY: CPT | Performed by: INTERNAL MEDICINE

## 2022-01-23 PROCEDURE — 99214 OFFICE O/P EST MOD 30 MIN: CPT | Performed by: PHYSICIAN ASSISTANT

## 2022-01-23 PROCEDURE — 4A03X5D MEASUREMENT OF ARTERIAL FLOW, INTRACRANIAL, EXTERNAL APPROACH: ICD-10-PCS | Performed by: RADIOLOGY

## 2022-01-23 PROCEDURE — 84145 PROCALCITONIN (PCT): CPT | Performed by: INTERNAL MEDICINE

## 2022-01-23 PROCEDURE — 0 MAGNESIUM SULFATE 4 GM/100ML SOLUTION: Performed by: NURSE PRACTITIONER

## 2022-01-23 PROCEDURE — 93010 ELECTROCARDIOGRAM REPORT: CPT | Performed by: INTERNAL MEDICINE

## 2022-01-23 PROCEDURE — 82962 GLUCOSE BLOOD TEST: CPT

## 2022-01-23 PROCEDURE — 83735 ASSAY OF MAGNESIUM: CPT | Performed by: INTERNAL MEDICINE

## 2022-01-23 PROCEDURE — 63710000001 INSULIN LISPRO (HUMAN) PER 5 UNITS: Performed by: INTERNAL MEDICINE

## 2022-01-23 PROCEDURE — 70496 CT ANGIOGRAPHY HEAD: CPT

## 2022-01-23 PROCEDURE — 0 POTASSIUM CHLORIDE 10 MEQ/100ML SOLUTION: Performed by: INTERNAL MEDICINE

## 2022-01-23 PROCEDURE — 84550 ASSAY OF BLOOD/URIC ACID: CPT | Performed by: INTERNAL MEDICINE

## 2022-01-23 PROCEDURE — 99223 1ST HOSP IP/OBS HIGH 75: CPT | Performed by: INTERNAL MEDICINE

## 2022-01-23 PROCEDURE — G0378 HOSPITAL OBSERVATION PER HR: HCPCS

## 2022-01-23 PROCEDURE — 84300 ASSAY OF URINE SODIUM: CPT | Performed by: INTERNAL MEDICINE

## 2022-01-23 PROCEDURE — 82805 BLOOD GASES W/O2 SATURATION: CPT

## 2022-01-23 PROCEDURE — 82375 ASSAY CARBOXYHB QUANT: CPT

## 2022-01-23 PROCEDURE — 36600 WITHDRAWAL OF ARTERIAL BLOOD: CPT

## 2022-01-23 PROCEDURE — 82533 TOTAL CORTISOL: CPT | Performed by: INTERNAL MEDICINE

## 2022-01-23 PROCEDURE — 83880 ASSAY OF NATRIURETIC PEPTIDE: CPT | Performed by: PHYSICIAN ASSISTANT

## 2022-01-23 PROCEDURE — 83935 ASSAY OF URINE OSMOLALITY: CPT | Performed by: INTERNAL MEDICINE

## 2022-01-23 PROCEDURE — 83050 HGB METHEMOGLOBIN QUAN: CPT

## 2022-01-23 RX ORDER — NICOTINE POLACRILEX 4 MG
15 LOZENGE BUCCAL
Status: DISCONTINUED | OUTPATIENT
Start: 2022-01-23 | End: 2022-02-05 | Stop reason: HOSPADM

## 2022-01-23 RX ORDER — SODIUM CHLORIDE 0.9 % (FLUSH) 0.9 %
1-10 SYRINGE (ML) INJECTION AS NEEDED
Status: DISCONTINUED | OUTPATIENT
Start: 2022-01-23 | End: 2022-02-05 | Stop reason: HOSPADM

## 2022-01-23 RX ORDER — ACETAMINOPHEN 160 MG/5ML
650 SOLUTION ORAL EVERY 4 HOURS PRN
Status: DISCONTINUED | OUTPATIENT
Start: 2022-01-23 | End: 2022-02-05 | Stop reason: HOSPADM

## 2022-01-23 RX ORDER — ATORVASTATIN CALCIUM 40 MG/1
80 TABLET, FILM COATED ORAL NIGHTLY
Status: DISCONTINUED | OUTPATIENT
Start: 2022-01-23 | End: 2022-02-05 | Stop reason: HOSPADM

## 2022-01-23 RX ORDER — LEVETIRACETAM 250 MG/1
250 TABLET ORAL 2 TIMES DAILY
Status: DISCONTINUED | OUTPATIENT
Start: 2022-01-24 | End: 2022-02-05 | Stop reason: HOSPADM

## 2022-01-23 RX ORDER — BISOPROLOL FUMARATE 5 MG/1
10 TABLET, FILM COATED ORAL DAILY
Status: DISCONTINUED | OUTPATIENT
Start: 2022-01-23 | End: 2022-02-05 | Stop reason: HOSPADM

## 2022-01-23 RX ORDER — POTASSIUM CHLORIDE 1.5 G/1.77G
40 POWDER, FOR SOLUTION ORAL AS NEEDED
Status: DISCONTINUED | OUTPATIENT
Start: 2022-01-23 | End: 2022-02-05 | Stop reason: HOSPADM

## 2022-01-23 RX ORDER — POTASSIUM CHLORIDE 750 MG/1
40 CAPSULE, EXTENDED RELEASE ORAL AS NEEDED
Status: DISCONTINUED | OUTPATIENT
Start: 2022-01-23 | End: 2022-02-05 | Stop reason: HOSPADM

## 2022-01-23 RX ORDER — CHOLECALCIFEROL (VITAMIN D3) 125 MCG
500 CAPSULE ORAL DAILY
COMMUNITY
End: 2022-06-15

## 2022-01-23 RX ORDER — SODIUM CHLORIDE AND POTASSIUM CHLORIDE 150; 900 MG/100ML; MG/100ML
50 INJECTION, SOLUTION INTRAVENOUS CONTINUOUS
Status: DISCONTINUED | OUTPATIENT
Start: 2022-01-23 | End: 2022-01-23

## 2022-01-23 RX ORDER — ACETAMINOPHEN 650 MG/1
650 SUPPOSITORY RECTAL EVERY 4 HOURS PRN
Status: DISCONTINUED | OUTPATIENT
Start: 2022-01-23 | End: 2022-02-05 | Stop reason: HOSPADM

## 2022-01-23 RX ORDER — MAGNESIUM SULFATE HEPTAHYDRATE 40 MG/ML
4 INJECTION, SOLUTION INTRAVENOUS AS NEEDED
Status: DISCONTINUED | OUTPATIENT
Start: 2022-01-23 | End: 2022-02-05 | Stop reason: HOSPADM

## 2022-01-23 RX ORDER — MAGNESIUM SULFATE HEPTAHYDRATE 40 MG/ML
2 INJECTION, SOLUTION INTRAVENOUS AS NEEDED
Status: DISCONTINUED | OUTPATIENT
Start: 2022-01-23 | End: 2022-02-05 | Stop reason: HOSPADM

## 2022-01-23 RX ORDER — TERAZOSIN 1 MG/1
1 CAPSULE ORAL NIGHTLY
Status: DISCONTINUED | OUTPATIENT
Start: 2022-01-23 | End: 2022-01-27

## 2022-01-23 RX ORDER — POTASSIUM CHLORIDE 7.45 MG/ML
10 INJECTION INTRAVENOUS
Status: DISCONTINUED | OUTPATIENT
Start: 2022-01-23 | End: 2022-02-05 | Stop reason: HOSPADM

## 2022-01-23 RX ORDER — FAMOTIDINE 20 MG/1
40 TABLET, FILM COATED ORAL DAILY
Status: DISCONTINUED | OUTPATIENT
Start: 2022-01-23 | End: 2022-02-05 | Stop reason: HOSPADM

## 2022-01-23 RX ORDER — SODIUM CHLORIDE 0.9 % (FLUSH) 0.9 %
10 SYRINGE (ML) INJECTION EVERY 12 HOURS SCHEDULED
Status: DISCONTINUED | OUTPATIENT
Start: 2022-01-23 | End: 2022-02-05 | Stop reason: HOSPADM

## 2022-01-23 RX ORDER — LEVETIRACETAM 500 MG/1
500 TABLET ORAL 2 TIMES DAILY
Status: DISCONTINUED | OUTPATIENT
Start: 2022-01-23 | End: 2022-01-23

## 2022-01-23 RX ORDER — ACETAMINOPHEN 325 MG/1
650 TABLET ORAL EVERY 4 HOURS PRN
Status: DISCONTINUED | OUTPATIENT
Start: 2022-01-23 | End: 2022-02-05 | Stop reason: HOSPADM

## 2022-01-23 RX ORDER — LEVETIRACETAM 250 MG/1
250 TABLET ORAL 2 TIMES DAILY
Status: DISCONTINUED | OUTPATIENT
Start: 2022-01-23 | End: 2022-01-23

## 2022-01-23 RX ORDER — DEXTROSE MONOHYDRATE 25 G/50ML
25 INJECTION, SOLUTION INTRAVENOUS
Status: DISCONTINUED | OUTPATIENT
Start: 2022-01-23 | End: 2022-02-05 | Stop reason: HOSPADM

## 2022-01-23 RX ORDER — POTASSIUM CHLORIDE 7.45 MG/ML
10 INJECTION INTRAVENOUS
Status: COMPLETED | OUTPATIENT
Start: 2022-01-23 | End: 2022-01-23

## 2022-01-23 RX ADMIN — IOPAMIDOL 75 ML: 755 INJECTION, SOLUTION INTRAVENOUS at 15:08

## 2022-01-23 RX ADMIN — SODIUM CHLORIDE, PRESERVATIVE FREE 10 ML: 5 INJECTION INTRAVENOUS at 09:47

## 2022-01-23 RX ADMIN — INSULIN LISPRO 4 UNITS: 100 INJECTION, SOLUTION INTRAVENOUS; SUBCUTANEOUS at 13:33

## 2022-01-23 RX ADMIN — ATORVASTATIN CALCIUM 80 MG: 40 TABLET, FILM COATED ORAL at 20:48

## 2022-01-23 RX ADMIN — MAGNESIUM SULFATE HEPTAHYDRATE 4 G: 40 INJECTION, SOLUTION INTRAVENOUS at 09:44

## 2022-01-23 RX ADMIN — INSULIN LISPRO 3 UNITS: 100 INJECTION, SOLUTION INTRAVENOUS; SUBCUTANEOUS at 09:46

## 2022-01-23 RX ADMIN — TERAZOSIN HYDROCHLORIDE 1 MG: 1 CAPSULE ORAL at 20:48

## 2022-01-23 RX ADMIN — LEVETIRACETAM 500 MG: 500 TABLET, FILM COATED ORAL at 03:53

## 2022-01-23 RX ADMIN — POTASSIUM CHLORIDE 10 MEQ: 7.46 INJECTION, SOLUTION INTRAVENOUS at 03:54

## 2022-01-23 RX ADMIN — BISOPROLOL FUMARATE 10 MG: 5 TABLET, FILM COATED ORAL at 09:46

## 2022-01-23 RX ADMIN — POTASSIUM CHLORIDE AND SODIUM CHLORIDE 50 ML/HR: 900; 150 INJECTION, SOLUTION INTRAVENOUS at 03:54

## 2022-01-23 RX ADMIN — INSULIN LISPRO 6 UNITS: 100 INJECTION, SOLUTION INTRAVENOUS; SUBCUTANEOUS at 18:03

## 2022-01-23 RX ADMIN — INSULIN DETEMIR 10 UNITS: 100 INJECTION, SOLUTION SUBCUTANEOUS at 20:48

## 2022-01-23 RX ADMIN — POTASSIUM CHLORIDE 40 MEQ: 750 CAPSULE, EXTENDED RELEASE ORAL at 20:48

## 2022-01-23 RX ADMIN — FAMOTIDINE 40 MG: 20 TABLET, FILM COATED ORAL at 09:47

## 2022-01-23 RX ADMIN — POTASSIUM CHLORIDE 10 MEQ: 7.46 INJECTION, SOLUTION INTRAVENOUS at 05:37

## 2022-01-23 RX ADMIN — NICARDIPINE HYDROCHLORIDE 5 MG/HR: 25 INJECTION, SOLUTION INTRAVENOUS at 03:24

## 2022-01-23 RX ADMIN — INSULIN DETEMIR 8 UNITS: 100 INJECTION, SOLUTION SUBCUTANEOUS at 09:45

## 2022-01-23 RX ADMIN — NICARDIPINE HYDROCHLORIDE 5 MG/HR: 25 INJECTION, SOLUTION INTRAVENOUS at 01:46

## 2022-01-24 ENCOUNTER — APPOINTMENT (OUTPATIENT)
Dept: NEUROLOGY | Facility: HOSPITAL | Age: 82
End: 2022-01-24

## 2022-01-24 LAB
ALBUMIN SERPL-MCNC: 3.6 G/DL (ref 3.5–5.2)
ANION GAP SERPL CALCULATED.3IONS-SCNC: 11 MMOL/L (ref 5–15)
ANION GAP SERPL CALCULATED.3IONS-SCNC: 7 MMOL/L (ref 5–15)
ANION GAP SERPL CALCULATED.3IONS-SCNC: 7 MMOL/L (ref 5–15)
BASOPHILS # BLD AUTO: 0.02 10*3/MM3 (ref 0–0.2)
BASOPHILS NFR BLD AUTO: 0.2 % (ref 0–1.5)
BUN SERPL-MCNC: 11 MG/DL (ref 8–23)
BUN/CREAT SERPL: 21.2 (ref 7–25)
BUN/CREAT SERPL: 21.2 (ref 7–25)
BUN/CREAT SERPL: 21.6 (ref 7–25)
CALCIUM SPEC-SCNC: 8.4 MG/DL (ref 8.6–10.5)
CALCIUM SPEC-SCNC: 8.7 MG/DL (ref 8.6–10.5)
CALCIUM SPEC-SCNC: 8.8 MG/DL (ref 8.6–10.5)
CHLORIDE SERPL-SCNC: 84 MMOL/L (ref 98–107)
CHLORIDE SERPL-SCNC: 87 MMOL/L (ref 98–107)
CHLORIDE SERPL-SCNC: 88 MMOL/L (ref 98–107)
CO2 SERPL-SCNC: 30 MMOL/L (ref 22–29)
CO2 SERPL-SCNC: 35 MMOL/L (ref 22–29)
CO2 SERPL-SCNC: 36 MMOL/L (ref 22–29)
CREAT SERPL-MCNC: 0.51 MG/DL (ref 0.76–1.27)
CREAT SERPL-MCNC: 0.52 MG/DL (ref 0.76–1.27)
CREAT SERPL-MCNC: 0.52 MG/DL (ref 0.76–1.27)
DEPRECATED RDW RBC AUTO: 41.6 FL (ref 37–54)
EOSINOPHIL # BLD AUTO: 0.05 10*3/MM3 (ref 0–0.4)
EOSINOPHIL NFR BLD AUTO: 0.4 % (ref 0.3–6.2)
ERYTHROCYTE [DISTWIDTH] IN BLOOD BY AUTOMATED COUNT: 12 % (ref 12.3–15.4)
GFR SERPL CREATININE-BSD FRML MDRD: >150 ML/MIN/1.73
GLUCOSE BLDC GLUCOMTR-MCNC: 105 MG/DL (ref 70–130)
GLUCOSE BLDC GLUCOMTR-MCNC: 113 MG/DL (ref 70–130)
GLUCOSE BLDC GLUCOMTR-MCNC: 212 MG/DL (ref 70–130)
GLUCOSE BLDC GLUCOMTR-MCNC: 242 MG/DL (ref 70–130)
GLUCOSE SERPL-MCNC: 133 MG/DL (ref 65–99)
GLUCOSE SERPL-MCNC: 78 MG/DL (ref 65–99)
GLUCOSE SERPL-MCNC: 89 MG/DL (ref 65–99)
HCT VFR BLD AUTO: 39.4 % (ref 37.5–51)
HGB BLD-MCNC: 13.1 G/DL (ref 13–17.7)
IMM GRANULOCYTES # BLD AUTO: 0.07 10*3/MM3 (ref 0–0.05)
IMM GRANULOCYTES NFR BLD AUTO: 0.5 % (ref 0–0.5)
LYMPHOCYTES # BLD AUTO: 1.13 10*3/MM3 (ref 0.7–3.1)
LYMPHOCYTES NFR BLD AUTO: 8.5 % (ref 19.6–45.3)
MAGNESIUM SERPL-MCNC: 2.4 MG/DL (ref 1.6–2.4)
MCH RBC QN AUTO: 32 PG (ref 26.6–33)
MCHC RBC AUTO-ENTMCNC: 33.2 G/DL (ref 31.5–35.7)
MCV RBC AUTO: 96.3 FL (ref 79–97)
MONOCYTES # BLD AUTO: 1.34 10*3/MM3 (ref 0.1–0.9)
MONOCYTES NFR BLD AUTO: 10.1 % (ref 5–12)
NEUTROPHILS NFR BLD AUTO: 10.64 10*3/MM3 (ref 1.7–7)
NEUTROPHILS NFR BLD AUTO: 80.3 % (ref 42.7–76)
NRBC BLD AUTO-RTO: 0 /100 WBC (ref 0–0.2)
PHOSPHATE SERPL-MCNC: 3.6 MG/DL (ref 2.5–4.5)
PLATELET # BLD AUTO: 373 10*3/MM3 (ref 140–450)
PMV BLD AUTO: 9.9 FL (ref 6–12)
POTASSIUM SERPL-SCNC: 3.5 MMOL/L (ref 3.5–5.2)
POTASSIUM SERPL-SCNC: 3.8 MMOL/L (ref 3.5–5.2)
POTASSIUM SERPL-SCNC: 3.8 MMOL/L (ref 3.5–5.2)
RBC # BLD AUTO: 4.09 10*6/MM3 (ref 4.14–5.8)
SODIUM SERPL-SCNC: 127 MMOL/L (ref 136–145)
SODIUM SERPL-SCNC: 128 MMOL/L (ref 136–145)
SODIUM SERPL-SCNC: 130 MMOL/L (ref 136–145)
WBC NRBC COR # BLD: 13.25 10*3/MM3 (ref 3.4–10.8)

## 2022-01-24 PROCEDURE — 99232 SBSQ HOSP IP/OBS MODERATE 35: CPT | Performed by: INTERNAL MEDICINE

## 2022-01-24 PROCEDURE — 63710000001 INSULIN LISPRO (HUMAN) PER 5 UNITS: Performed by: INTERNAL MEDICINE

## 2022-01-24 PROCEDURE — 99232 SBSQ HOSP IP/OBS MODERATE 35: CPT | Performed by: PSYCHIATRY & NEUROLOGY

## 2022-01-24 PROCEDURE — 92610 EVALUATE SWALLOWING FUNCTION: CPT

## 2022-01-24 PROCEDURE — 85025 COMPLETE CBC W/AUTO DIFF WBC: CPT | Performed by: INTERNAL MEDICINE

## 2022-01-24 PROCEDURE — G0378 HOSPITAL OBSERVATION PER HR: HCPCS

## 2022-01-24 PROCEDURE — 97165 OT EVAL LOW COMPLEX 30 MIN: CPT

## 2022-01-24 PROCEDURE — 83735 ASSAY OF MAGNESIUM: CPT | Performed by: INTERNAL MEDICINE

## 2022-01-24 PROCEDURE — 63710000001 INSULIN DETEMIR PER 5 UNITS: Performed by: INTERNAL MEDICINE

## 2022-01-24 PROCEDURE — 80069 RENAL FUNCTION PANEL: CPT | Performed by: INTERNAL MEDICINE

## 2022-01-24 PROCEDURE — 97162 PT EVAL MOD COMPLEX 30 MIN: CPT

## 2022-01-24 PROCEDURE — 95816 EEG AWAKE AND DROWSY: CPT

## 2022-01-24 PROCEDURE — 92523 SPEECH SOUND LANG COMPREHEN: CPT

## 2022-01-24 PROCEDURE — 82962 GLUCOSE BLOOD TEST: CPT

## 2022-01-24 PROCEDURE — 80048 BASIC METABOLIC PNL TOTAL CA: CPT | Performed by: INTERNAL MEDICINE

## 2022-01-24 PROCEDURE — 97535 SELF CARE MNGMENT TRAINING: CPT

## 2022-01-24 RX ADMIN — INSULIN DETEMIR 10 UNITS: 100 INJECTION, SOLUTION SUBCUTANEOUS at 09:00

## 2022-01-24 RX ADMIN — SODIUM CHLORIDE, PRESERVATIVE FREE 10 ML: 5 INJECTION INTRAVENOUS at 08:10

## 2022-01-24 RX ADMIN — ACETAMINOPHEN 650 MG: 325 TABLET, FILM COATED ORAL at 20:45

## 2022-01-24 RX ADMIN — LEVETIRACETAM 250 MG: 250 TABLET, FILM COATED ORAL at 03:27

## 2022-01-24 RX ADMIN — ATORVASTATIN CALCIUM 80 MG: 40 TABLET, FILM COATED ORAL at 20:33

## 2022-01-24 RX ADMIN — INSULIN LISPRO 3 UNITS: 100 INJECTION, SOLUTION INTRAVENOUS; SUBCUTANEOUS at 17:41

## 2022-01-24 RX ADMIN — POTASSIUM CHLORIDE 40 MEQ: 750 CAPSULE, EXTENDED RELEASE ORAL at 03:27

## 2022-01-24 RX ADMIN — FAMOTIDINE 40 MG: 20 TABLET, FILM COATED ORAL at 08:09

## 2022-01-24 RX ADMIN — LEVETIRACETAM 250 MG: 250 TABLET, FILM COATED ORAL at 16:02

## 2022-01-24 RX ADMIN — BISOPROLOL FUMARATE 10 MG: 5 TABLET, FILM COATED ORAL at 08:09

## 2022-01-24 RX ADMIN — TERAZOSIN HYDROCHLORIDE 1 MG: 1 CAPSULE ORAL at 20:33

## 2022-01-24 RX ADMIN — INSULIN DETEMIR 5 UNITS: 100 INJECTION, SOLUTION SUBCUTANEOUS at 20:38

## 2022-01-25 LAB
ANION GAP SERPL CALCULATED.3IONS-SCNC: 8 MMOL/L (ref 5–15)
BUN SERPL-MCNC: 11 MG/DL (ref 8–23)
BUN/CREAT SERPL: 28.9 (ref 7–25)
CALCIUM SPEC-SCNC: 8.6 MG/DL (ref 8.6–10.5)
CHLORIDE SERPL-SCNC: 91 MMOL/L (ref 98–107)
CO2 SERPL-SCNC: 31 MMOL/L (ref 22–29)
CREAT SERPL-MCNC: 0.38 MG/DL (ref 0.76–1.27)
DEPRECATED RDW RBC AUTO: 41.4 FL (ref 37–54)
ERYTHROCYTE [DISTWIDTH] IN BLOOD BY AUTOMATED COUNT: 12 % (ref 12.3–15.4)
GFR SERPL CREATININE-BSD FRML MDRD: >150 ML/MIN/1.73
GLUCOSE BLDC GLUCOMTR-MCNC: 143 MG/DL (ref 70–130)
GLUCOSE BLDC GLUCOMTR-MCNC: 238 MG/DL (ref 70–130)
GLUCOSE BLDC GLUCOMTR-MCNC: 263 MG/DL (ref 70–130)
GLUCOSE BLDC GLUCOMTR-MCNC: 303 MG/DL (ref 70–130)
GLUCOSE BLDC GLUCOMTR-MCNC: 325 MG/DL (ref 70–130)
GLUCOSE SERPL-MCNC: 113 MG/DL (ref 65–99)
HCT VFR BLD AUTO: 36 % (ref 37.5–51)
HGB BLD-MCNC: 12.1 G/DL (ref 13–17.7)
MCH RBC QN AUTO: 32.3 PG (ref 26.6–33)
MCHC RBC AUTO-ENTMCNC: 33.6 G/DL (ref 31.5–35.7)
MCV RBC AUTO: 96 FL (ref 79–97)
PLATELET # BLD AUTO: 334 10*3/MM3 (ref 140–450)
PMV BLD AUTO: 10.1 FL (ref 6–12)
POTASSIUM SERPL-SCNC: 3.6 MMOL/L (ref 3.5–5.2)
RBC # BLD AUTO: 3.75 10*6/MM3 (ref 4.14–5.8)
SODIUM SERPL-SCNC: 130 MMOL/L (ref 136–145)
WBC NRBC COR # BLD: 9.72 10*3/MM3 (ref 3.4–10.8)

## 2022-01-25 PROCEDURE — 92507 TX SP LANG VOICE COMM INDIV: CPT

## 2022-01-25 PROCEDURE — 63710000001 INSULIN LISPRO (HUMAN) PER 5 UNITS: Performed by: INTERNAL MEDICINE

## 2022-01-25 PROCEDURE — 97110 THERAPEUTIC EXERCISES: CPT

## 2022-01-25 PROCEDURE — 82962 GLUCOSE BLOOD TEST: CPT

## 2022-01-25 PROCEDURE — G0378 HOSPITAL OBSERVATION PER HR: HCPCS

## 2022-01-25 PROCEDURE — 97116 GAIT TRAINING THERAPY: CPT

## 2022-01-25 PROCEDURE — 99232 SBSQ HOSP IP/OBS MODERATE 35: CPT | Performed by: INTERNAL MEDICINE

## 2022-01-25 PROCEDURE — 99231 SBSQ HOSP IP/OBS SF/LOW 25: CPT | Performed by: PSYCHIATRY & NEUROLOGY

## 2022-01-25 PROCEDURE — 85027 COMPLETE CBC AUTOMATED: CPT | Performed by: INTERNAL MEDICINE

## 2022-01-25 PROCEDURE — 92610 EVALUATE SWALLOWING FUNCTION: CPT

## 2022-01-25 PROCEDURE — 80048 BASIC METABOLIC PNL TOTAL CA: CPT | Performed by: INTERNAL MEDICINE

## 2022-01-25 PROCEDURE — 63710000001 INSULIN DETEMIR PER 5 UNITS: Performed by: INTERNAL MEDICINE

## 2022-01-25 PROCEDURE — 99213 OFFICE O/P EST LOW 20 MIN: CPT | Performed by: PHYSICIAN ASSISTANT

## 2022-01-25 PROCEDURE — 97535 SELF CARE MNGMENT TRAINING: CPT

## 2022-01-25 RX ADMIN — BISOPROLOL FUMARATE 10 MG: 5 TABLET, FILM COATED ORAL at 08:15

## 2022-01-25 RX ADMIN — POTASSIUM CHLORIDE 40 MEQ: 750 CAPSULE, EXTENDED RELEASE ORAL at 20:13

## 2022-01-25 RX ADMIN — ATORVASTATIN CALCIUM 80 MG: 40 TABLET, FILM COATED ORAL at 20:13

## 2022-01-25 RX ADMIN — TERAZOSIN HYDROCHLORIDE 1 MG: 1 CAPSULE ORAL at 20:13

## 2022-01-25 RX ADMIN — ACETAMINOPHEN ORAL SOLUTION 649.6 MG: 650 SOLUTION ORAL at 12:48

## 2022-01-25 RX ADMIN — INSULIN DETEMIR 5 UNITS: 100 INJECTION, SOLUTION SUBCUTANEOUS at 20:24

## 2022-01-25 RX ADMIN — LEVETIRACETAM 250 MG: 250 TABLET, FILM COATED ORAL at 04:45

## 2022-01-25 RX ADMIN — INSULIN DETEMIR 5 UNITS: 100 INJECTION, SOLUTION SUBCUTANEOUS at 08:20

## 2022-01-25 RX ADMIN — SODIUM CHLORIDE, PRESERVATIVE FREE 10 ML: 5 INJECTION INTRAVENOUS at 08:16

## 2022-01-25 RX ADMIN — INSULIN LISPRO 5 UNITS: 100 INJECTION, SOLUTION INTRAVENOUS; SUBCUTANEOUS at 12:12

## 2022-01-25 RX ADMIN — LEVETIRACETAM 250 MG: 250 TABLET, FILM COATED ORAL at 16:32

## 2022-01-25 RX ADMIN — FAMOTIDINE 40 MG: 20 TABLET, FILM COATED ORAL at 08:15

## 2022-01-25 RX ADMIN — INSULIN LISPRO 5 UNITS: 100 INJECTION, SOLUTION INTRAVENOUS; SUBCUTANEOUS at 16:31

## 2022-01-26 ENCOUNTER — APPOINTMENT (OUTPATIENT)
Dept: GENERAL RADIOLOGY | Facility: HOSPITAL | Age: 82
End: 2022-01-26

## 2022-01-26 LAB
ANION GAP SERPL CALCULATED.3IONS-SCNC: 7 MMOL/L (ref 5–15)
BUN SERPL-MCNC: 14 MG/DL (ref 8–23)
BUN/CREAT SERPL: 28 (ref 7–25)
CALCIUM SPEC-SCNC: 9.1 MG/DL (ref 8.6–10.5)
CHLORIDE SERPL-SCNC: 92 MMOL/L (ref 98–107)
CO2 SERPL-SCNC: 30 MMOL/L (ref 22–29)
CREAT SERPL-MCNC: 0.5 MG/DL (ref 0.76–1.27)
DEPRECATED RDW RBC AUTO: 41.9 FL (ref 37–54)
ERYTHROCYTE [DISTWIDTH] IN BLOOD BY AUTOMATED COUNT: 12.1 % (ref 12.3–15.4)
GFR SERPL CREATININE-BSD FRML MDRD: >150 ML/MIN/1.73
GLUCOSE BLDC GLUCOMTR-MCNC: 143 MG/DL (ref 70–130)
GLUCOSE BLDC GLUCOMTR-MCNC: 153 MG/DL (ref 70–130)
GLUCOSE BLDC GLUCOMTR-MCNC: 183 MG/DL (ref 70–130)
GLUCOSE BLDC GLUCOMTR-MCNC: 266 MG/DL (ref 70–130)
GLUCOSE SERPL-MCNC: 151 MG/DL (ref 65–99)
HCT VFR BLD AUTO: 39.5 % (ref 37.5–51)
HGB BLD-MCNC: 13.1 G/DL (ref 13–17.7)
MCH RBC QN AUTO: 31.8 PG (ref 26.6–33)
MCHC RBC AUTO-ENTMCNC: 33.2 G/DL (ref 31.5–35.7)
MCV RBC AUTO: 95.9 FL (ref 79–97)
PLATELET # BLD AUTO: 356 10*3/MM3 (ref 140–450)
PMV BLD AUTO: 9.4 FL (ref 6–12)
POTASSIUM SERPL-SCNC: 4.6 MMOL/L (ref 3.5–5.2)
RBC # BLD AUTO: 4.12 10*6/MM3 (ref 4.14–5.8)
SODIUM SERPL-SCNC: 129 MMOL/L (ref 136–145)
WBC NRBC COR # BLD: 11.23 10*3/MM3 (ref 3.4–10.8)

## 2022-01-26 PROCEDURE — 63710000001 INSULIN LISPRO (HUMAN) PER 5 UNITS: Performed by: INTERNAL MEDICINE

## 2022-01-26 PROCEDURE — 99231 SBSQ HOSP IP/OBS SF/LOW 25: CPT | Performed by: PHYSICIAN ASSISTANT

## 2022-01-26 PROCEDURE — 63710000001 INSULIN DETEMIR PER 5 UNITS: Performed by: INTERNAL MEDICINE

## 2022-01-26 PROCEDURE — 97116 GAIT TRAINING THERAPY: CPT

## 2022-01-26 PROCEDURE — 99233 SBSQ HOSP IP/OBS HIGH 50: CPT | Performed by: INTERNAL MEDICINE

## 2022-01-26 PROCEDURE — 85027 COMPLETE CBC AUTOMATED: CPT | Performed by: INTERNAL MEDICINE

## 2022-01-26 PROCEDURE — 82962 GLUCOSE BLOOD TEST: CPT

## 2022-01-26 PROCEDURE — 80048 BASIC METABOLIC PNL TOTAL CA: CPT | Performed by: INTERNAL MEDICINE

## 2022-01-26 PROCEDURE — 74230 X-RAY XM SWLNG FUNCJ C+: CPT

## 2022-01-26 PROCEDURE — 92611 MOTION FLUOROSCOPY/SWALLOW: CPT

## 2022-01-26 RX ORDER — SODIUM CHLORIDE 1000 MG
2 TABLET, SOLUBLE MISCELLANEOUS 2 TIMES DAILY WITH MEALS
Status: DISCONTINUED | OUTPATIENT
Start: 2022-01-26 | End: 2022-02-05 | Stop reason: HOSPADM

## 2022-01-26 RX ADMIN — INSULIN LISPRO 3 UNITS: 100 INJECTION, SOLUTION INTRAVENOUS; SUBCUTANEOUS at 07:35

## 2022-01-26 RX ADMIN — ACETAMINOPHEN ORAL SOLUTION 650 MG: 650 SOLUTION ORAL at 13:10

## 2022-01-26 RX ADMIN — LEVETIRACETAM 250 MG: 250 TABLET, FILM COATED ORAL at 03:52

## 2022-01-26 RX ADMIN — FAMOTIDINE 40 MG: 20 TABLET, FILM COATED ORAL at 08:54

## 2022-01-26 RX ADMIN — INSULIN DETEMIR 5 UNITS: 100 INJECTION, SOLUTION SUBCUTANEOUS at 20:11

## 2022-01-26 RX ADMIN — SODIUM CHLORIDE, PRESERVATIVE FREE 10 ML: 5 INJECTION INTRAVENOUS at 08:54

## 2022-01-26 RX ADMIN — INSULIN DETEMIR 5 UNITS: 100 INJECTION, SOLUTION SUBCUTANEOUS at 08:54

## 2022-01-26 RX ADMIN — TERAZOSIN HYDROCHLORIDE 1 MG: 1 CAPSULE ORAL at 20:12

## 2022-01-26 RX ADMIN — SODIUM CHLORIDE 2 G: 1 TABLET ORAL at 17:06

## 2022-01-26 RX ADMIN — BISOPROLOL FUMARATE 10 MG: 5 TABLET, FILM COATED ORAL at 08:53

## 2022-01-26 RX ADMIN — LEVETIRACETAM 250 MG: 250 TABLET, FILM COATED ORAL at 17:06

## 2022-01-26 RX ADMIN — POTASSIUM CHLORIDE 40 MEQ: 750 CAPSULE, EXTENDED RELEASE ORAL at 03:52

## 2022-01-26 RX ADMIN — ATORVASTATIN CALCIUM 80 MG: 40 TABLET, FILM COATED ORAL at 20:11

## 2022-01-26 RX ADMIN — INSULIN LISPRO 2 UNITS: 100 INJECTION, SOLUTION INTRAVENOUS; SUBCUTANEOUS at 13:11

## 2022-01-26 RX ADMIN — SODIUM CHLORIDE, PRESERVATIVE FREE 10 ML: 5 INJECTION INTRAVENOUS at 20:12

## 2022-01-26 RX ADMIN — INSULIN LISPRO 4 UNITS: 100 INJECTION, SOLUTION INTRAVENOUS; SUBCUTANEOUS at 17:41

## 2022-01-26 RX ADMIN — BARIUM SULFATE 20 ML: 400 PASTE ORAL at 11:38

## 2022-01-26 RX ADMIN — BARIUM SULFATE 100 ML: 0.81 POWDER, FOR SUSPENSION ORAL at 11:38

## 2022-01-27 LAB
ANION GAP SERPL CALCULATED.3IONS-SCNC: 11 MMOL/L (ref 5–15)
BUN SERPL-MCNC: 13 MG/DL (ref 8–23)
BUN/CREAT SERPL: 30.2 (ref 7–25)
CALCIUM SPEC-SCNC: 8.7 MG/DL (ref 8.6–10.5)
CHLORIDE SERPL-SCNC: 91 MMOL/L (ref 98–107)
CO2 SERPL-SCNC: 26 MMOL/L (ref 22–29)
CREAT SERPL-MCNC: 0.43 MG/DL (ref 0.76–1.27)
GFR SERPL CREATININE-BSD FRML MDRD: >150 ML/MIN/1.73
GLUCOSE BLDC GLUCOMTR-MCNC: 163 MG/DL (ref 70–130)
GLUCOSE BLDC GLUCOMTR-MCNC: 164 MG/DL (ref 70–130)
GLUCOSE BLDC GLUCOMTR-MCNC: 208 MG/DL (ref 70–130)
GLUCOSE BLDC GLUCOMTR-MCNC: 248 MG/DL (ref 70–130)
GLUCOSE SERPL-MCNC: 178 MG/DL (ref 65–99)
OSMOLALITY UR: 524 MOSM/KG (ref 300–1100)
POTASSIUM SERPL-SCNC: 4.3 MMOL/L (ref 3.5–5.2)
POTASSIUM UR-SCNC: 46.3 MMOL/L
SODIUM SERPL-SCNC: 128 MMOL/L (ref 136–145)
SODIUM UR-SCNC: 88 MMOL/L

## 2022-01-27 PROCEDURE — 97530 THERAPEUTIC ACTIVITIES: CPT

## 2022-01-27 PROCEDURE — 63710000001 INSULIN DETEMIR PER 5 UNITS: Performed by: INTERNAL MEDICINE

## 2022-01-27 PROCEDURE — 97535 SELF CARE MNGMENT TRAINING: CPT

## 2022-01-27 PROCEDURE — 82962 GLUCOSE BLOOD TEST: CPT

## 2022-01-27 PROCEDURE — 83935 ASSAY OF URINE OSMOLALITY: CPT | Performed by: INTERNAL MEDICINE

## 2022-01-27 PROCEDURE — 97110 THERAPEUTIC EXERCISES: CPT

## 2022-01-27 PROCEDURE — 84133 ASSAY OF URINE POTASSIUM: CPT | Performed by: INTERNAL MEDICINE

## 2022-01-27 PROCEDURE — 80048 BASIC METABOLIC PNL TOTAL CA: CPT | Performed by: INTERNAL MEDICINE

## 2022-01-27 PROCEDURE — 99232 SBSQ HOSP IP/OBS MODERATE 35: CPT | Performed by: INTERNAL MEDICINE

## 2022-01-27 PROCEDURE — 84300 ASSAY OF URINE SODIUM: CPT | Performed by: INTERNAL MEDICINE

## 2022-01-27 PROCEDURE — 63710000001 INSULIN LISPRO (HUMAN) PER 5 UNITS: Performed by: INTERNAL MEDICINE

## 2022-01-27 RX ORDER — TERAZOSIN 2 MG/1
2 CAPSULE ORAL NIGHTLY
Status: DISCONTINUED | OUTPATIENT
Start: 2022-01-27 | End: 2022-02-05 | Stop reason: HOSPADM

## 2022-01-27 RX ORDER — FUROSEMIDE 20 MG/1
20 TABLET ORAL ONCE
Status: COMPLETED | OUTPATIENT
Start: 2022-01-27 | End: 2022-01-27

## 2022-01-27 RX ADMIN — INSULIN LISPRO 2 UNITS: 100 INJECTION, SOLUTION INTRAVENOUS; SUBCUTANEOUS at 08:04

## 2022-01-27 RX ADMIN — INSULIN DETEMIR 5 UNITS: 100 INJECTION, SOLUTION SUBCUTANEOUS at 08:04

## 2022-01-27 RX ADMIN — INSULIN DETEMIR 5 UNITS: 100 INJECTION, SOLUTION SUBCUTANEOUS at 22:11

## 2022-01-27 RX ADMIN — LEVETIRACETAM 250 MG: 250 TABLET, FILM COATED ORAL at 04:16

## 2022-01-27 RX ADMIN — FAMOTIDINE 40 MG: 20 TABLET, FILM COATED ORAL at 08:03

## 2022-01-27 RX ADMIN — INSULIN LISPRO 3 UNITS: 100 INJECTION, SOLUTION INTRAVENOUS; SUBCUTANEOUS at 13:25

## 2022-01-27 RX ADMIN — INSULIN LISPRO 3 UNITS: 100 INJECTION, SOLUTION INTRAVENOUS; SUBCUTANEOUS at 18:01

## 2022-01-27 RX ADMIN — FUROSEMIDE 20 MG: 20 TABLET ORAL at 13:31

## 2022-01-27 RX ADMIN — SODIUM CHLORIDE, PRESERVATIVE FREE 10 ML: 5 INJECTION INTRAVENOUS at 22:11

## 2022-01-27 RX ADMIN — ATORVASTATIN CALCIUM 80 MG: 40 TABLET, FILM COATED ORAL at 22:11

## 2022-01-27 RX ADMIN — SODIUM CHLORIDE 2 G: 1 TABLET ORAL at 08:03

## 2022-01-27 RX ADMIN — BISOPROLOL FUMARATE 10 MG: 5 TABLET, FILM COATED ORAL at 08:03

## 2022-01-27 RX ADMIN — LEVETIRACETAM 250 MG: 250 TABLET, FILM COATED ORAL at 15:55

## 2022-01-27 RX ADMIN — SODIUM CHLORIDE, PRESERVATIVE FREE 10 ML: 5 INJECTION INTRAVENOUS at 08:12

## 2022-01-27 RX ADMIN — SODIUM CHLORIDE 2 G: 1 TABLET ORAL at 18:01

## 2022-01-27 RX ADMIN — TERAZOSIN HYDROCHLORIDE 2 MG: 2 CAPSULE ORAL at 22:11

## 2022-01-28 LAB
ANION GAP SERPL CALCULATED.3IONS-SCNC: 10 MMOL/L (ref 5–15)
BUN SERPL-MCNC: 18 MG/DL (ref 8–23)
BUN/CREAT SERPL: 36 (ref 7–25)
CALCIUM SPEC-SCNC: 8.7 MG/DL (ref 8.6–10.5)
CHLORIDE SERPL-SCNC: 91 MMOL/L (ref 98–107)
CO2 SERPL-SCNC: 27 MMOL/L (ref 22–29)
CREAT SERPL-MCNC: 0.5 MG/DL (ref 0.76–1.27)
GFR SERPL CREATININE-BSD FRML MDRD: >150 ML/MIN/1.73
GLUCOSE BLDC GLUCOMTR-MCNC: 131 MG/DL (ref 70–130)
GLUCOSE BLDC GLUCOMTR-MCNC: 205 MG/DL (ref 70–130)
GLUCOSE BLDC GLUCOMTR-MCNC: 309 MG/DL (ref 70–130)
GLUCOSE BLDC GLUCOMTR-MCNC: 344 MG/DL (ref 70–130)
GLUCOSE BLDC GLUCOMTR-MCNC: 412 MG/DL (ref 70–130)
GLUCOSE SERPL-MCNC: 288 MG/DL (ref 65–99)
POTASSIUM SERPL-SCNC: 3.7 MMOL/L (ref 3.5–5.2)
SODIUM SERPL-SCNC: 128 MMOL/L (ref 136–145)

## 2022-01-28 PROCEDURE — 82962 GLUCOSE BLOOD TEST: CPT

## 2022-01-28 PROCEDURE — 63710000001 INSULIN DETEMIR PER 5 UNITS: Performed by: INTERNAL MEDICINE

## 2022-01-28 PROCEDURE — 97110 THERAPEUTIC EXERCISES: CPT

## 2022-01-28 PROCEDURE — 80048 BASIC METABOLIC PNL TOTAL CA: CPT | Performed by: INTERNAL MEDICINE

## 2022-01-28 PROCEDURE — 63710000001 INSULIN LISPRO (HUMAN) PER 5 UNITS: Performed by: INTERNAL MEDICINE

## 2022-01-28 PROCEDURE — 99232 SBSQ HOSP IP/OBS MODERATE 35: CPT | Performed by: INTERNAL MEDICINE

## 2022-01-28 PROCEDURE — 97116 GAIT TRAINING THERAPY: CPT

## 2022-01-28 RX ORDER — POLYETHYLENE GLYCOL 3350 17 G/17G
17 POWDER, FOR SOLUTION ORAL DAILY PRN
Status: DISCONTINUED | OUTPATIENT
Start: 2022-01-28 | End: 2022-02-05 | Stop reason: HOSPADM

## 2022-01-28 RX ORDER — AMOXICILLIN 250 MG
2 CAPSULE ORAL 2 TIMES DAILY
Status: DISCONTINUED | OUTPATIENT
Start: 2022-01-28 | End: 2022-02-05 | Stop reason: HOSPADM

## 2022-01-28 RX ORDER — BISACODYL 10 MG
10 SUPPOSITORY, RECTAL RECTAL DAILY PRN
Status: DISCONTINUED | OUTPATIENT
Start: 2022-01-28 | End: 2022-02-05 | Stop reason: HOSPADM

## 2022-01-28 RX ORDER — POTASSIUM CHLORIDE 750 MG/1
40 CAPSULE, EXTENDED RELEASE ORAL ONCE
Status: DISCONTINUED | OUTPATIENT
Start: 2022-01-28 | End: 2022-01-28

## 2022-01-28 RX ORDER — BISACODYL 5 MG/1
5 TABLET, DELAYED RELEASE ORAL DAILY PRN
Status: DISCONTINUED | OUTPATIENT
Start: 2022-01-28 | End: 2022-02-05 | Stop reason: HOSPADM

## 2022-01-28 RX ORDER — POTASSIUM CHLORIDE 1.5 G/1.77G
40 POWDER, FOR SOLUTION ORAL ONCE
Status: COMPLETED | OUTPATIENT
Start: 2022-01-28 | End: 2022-01-28

## 2022-01-28 RX ADMIN — FAMOTIDINE 40 MG: 20 TABLET, FILM COATED ORAL at 08:51

## 2022-01-28 RX ADMIN — ACETAMINOPHEN 650 MG: 325 TABLET, FILM COATED ORAL at 08:51

## 2022-01-28 RX ADMIN — SODIUM CHLORIDE, PRESERVATIVE FREE 10 ML: 5 INJECTION INTRAVENOUS at 08:51

## 2022-01-28 RX ADMIN — INSULIN DETEMIR 5 UNITS: 100 INJECTION, SOLUTION SUBCUTANEOUS at 21:24

## 2022-01-28 RX ADMIN — INSULIN LISPRO 5 UNITS: 100 INJECTION, SOLUTION INTRAVENOUS; SUBCUTANEOUS at 12:30

## 2022-01-28 RX ADMIN — LEVETIRACETAM 250 MG: 250 TABLET, FILM COATED ORAL at 04:06

## 2022-01-28 RX ADMIN — SODIUM CHLORIDE 2 G: 1 TABLET ORAL at 08:51

## 2022-01-28 RX ADMIN — SODIUM CHLORIDE 2 G: 1 TABLET ORAL at 17:34

## 2022-01-28 RX ADMIN — SENNOSIDES AND DOCUSATE SODIUM 2 TABLET: 50; 8.6 TABLET ORAL at 13:11

## 2022-01-28 RX ADMIN — TERAZOSIN HYDROCHLORIDE 2 MG: 2 CAPSULE ORAL at 21:22

## 2022-01-28 RX ADMIN — INSULIN DETEMIR 5 UNITS: 100 INJECTION, SOLUTION SUBCUTANEOUS at 08:51

## 2022-01-28 RX ADMIN — SODIUM CHLORIDE, PRESERVATIVE FREE 10 ML: 5 INJECTION INTRAVENOUS at 21:22

## 2022-01-28 RX ADMIN — BISOPROLOL FUMARATE 10 MG: 5 TABLET, FILM COATED ORAL at 08:51

## 2022-01-28 RX ADMIN — ATORVASTATIN CALCIUM 80 MG: 40 TABLET, FILM COATED ORAL at 21:21

## 2022-01-28 RX ADMIN — INSULIN LISPRO 5 UNITS: 100 INJECTION, SOLUTION INTRAVENOUS; SUBCUTANEOUS at 17:34

## 2022-01-28 RX ADMIN — POTASSIUM CHLORIDE 40 MEQ: 1.5 POWDER, FOR SOLUTION ORAL at 13:11

## 2022-01-28 RX ADMIN — ACETAMINOPHEN 650 MG: 325 TABLET, FILM COATED ORAL at 13:11

## 2022-01-28 RX ADMIN — LEVETIRACETAM 250 MG: 250 TABLET, FILM COATED ORAL at 17:34

## 2022-01-29 LAB
ANION GAP SERPL CALCULATED.3IONS-SCNC: 7 MMOL/L (ref 5–15)
BUN SERPL-MCNC: 19 MG/DL (ref 8–23)
BUN/CREAT SERPL: 37.3 (ref 7–25)
CALCIUM SPEC-SCNC: 8.9 MG/DL (ref 8.6–10.5)
CHLORIDE SERPL-SCNC: 96 MMOL/L (ref 98–107)
CO2 SERPL-SCNC: 29 MMOL/L (ref 22–29)
CREAT SERPL-MCNC: 0.51 MG/DL (ref 0.76–1.27)
GFR SERPL CREATININE-BSD FRML MDRD: >150 ML/MIN/1.73
GLUCOSE BLDC GLUCOMTR-MCNC: 124 MG/DL (ref 70–130)
GLUCOSE BLDC GLUCOMTR-MCNC: 125 MG/DL (ref 70–130)
GLUCOSE BLDC GLUCOMTR-MCNC: 218 MG/DL (ref 70–130)
GLUCOSE BLDC GLUCOMTR-MCNC: 306 MG/DL (ref 70–130)
GLUCOSE SERPL-MCNC: 138 MG/DL (ref 65–99)
POTASSIUM SERPL-SCNC: 4.6 MMOL/L (ref 3.5–5.2)
SODIUM SERPL-SCNC: 132 MMOL/L (ref 136–145)

## 2022-01-29 PROCEDURE — 82962 GLUCOSE BLOOD TEST: CPT

## 2022-01-29 PROCEDURE — 99233 SBSQ HOSP IP/OBS HIGH 50: CPT | Performed by: INTERNAL MEDICINE

## 2022-01-29 PROCEDURE — 80048 BASIC METABOLIC PNL TOTAL CA: CPT | Performed by: INTERNAL MEDICINE

## 2022-01-29 PROCEDURE — 63710000001 INSULIN DETEMIR PER 5 UNITS: Performed by: INTERNAL MEDICINE

## 2022-01-29 PROCEDURE — 63710000001 INSULIN LISPRO (HUMAN) PER 5 UNITS: Performed by: INTERNAL MEDICINE

## 2022-01-29 RX ADMIN — FAMOTIDINE 40 MG: 20 TABLET, FILM COATED ORAL at 09:31

## 2022-01-29 RX ADMIN — INSULIN DETEMIR 5 UNITS: 100 INJECTION, SOLUTION SUBCUTANEOUS at 21:43

## 2022-01-29 RX ADMIN — TERAZOSIN HYDROCHLORIDE 2 MG: 2 CAPSULE ORAL at 21:44

## 2022-01-29 RX ADMIN — SODIUM CHLORIDE 2 G: 1 TABLET ORAL at 08:00

## 2022-01-29 RX ADMIN — SODIUM CHLORIDE, PRESERVATIVE FREE 10 ML: 5 INJECTION INTRAVENOUS at 09:32

## 2022-01-29 RX ADMIN — SODIUM CHLORIDE, PRESERVATIVE FREE 10 ML: 5 INJECTION INTRAVENOUS at 21:44

## 2022-01-29 RX ADMIN — INSULIN DETEMIR 5 UNITS: 100 INJECTION, SOLUTION SUBCUTANEOUS at 09:31

## 2022-01-29 RX ADMIN — ATORVASTATIN CALCIUM 80 MG: 40 TABLET, FILM COATED ORAL at 21:43

## 2022-01-29 RX ADMIN — ACETAMINOPHEN 650 MG: 325 TABLET, FILM COATED ORAL at 09:31

## 2022-01-29 RX ADMIN — LEVETIRACETAM 250 MG: 250 TABLET, FILM COATED ORAL at 16:28

## 2022-01-29 RX ADMIN — INSULIN LISPRO 3 UNITS: 100 INJECTION, SOLUTION INTRAVENOUS; SUBCUTANEOUS at 13:09

## 2022-01-29 RX ADMIN — SODIUM CHLORIDE 2 G: 1 TABLET ORAL at 17:08

## 2022-01-29 RX ADMIN — INSULIN LISPRO 3 UNITS: 100 INJECTION, SOLUTION INTRAVENOUS; SUBCUTANEOUS at 17:08

## 2022-01-29 RX ADMIN — LEVETIRACETAM 250 MG: 250 TABLET, FILM COATED ORAL at 04:54

## 2022-01-29 RX ADMIN — BISOPROLOL FUMARATE 10 MG: 5 TABLET, FILM COATED ORAL at 09:31

## 2022-01-30 LAB
ANION GAP SERPL CALCULATED.3IONS-SCNC: 7 MMOL/L (ref 5–15)
BUN SERPL-MCNC: 20 MG/DL (ref 8–23)
BUN/CREAT SERPL: 43.5 (ref 7–25)
CALCIUM SPEC-SCNC: 8.8 MG/DL (ref 8.6–10.5)
CHLORIDE SERPL-SCNC: 100 MMOL/L (ref 98–107)
CO2 SERPL-SCNC: 28 MMOL/L (ref 22–29)
CREAT SERPL-MCNC: 0.46 MG/DL (ref 0.76–1.27)
GFR SERPL CREATININE-BSD FRML MDRD: >150 ML/MIN/1.73
GLUCOSE BLDC GLUCOMTR-MCNC: 126 MG/DL (ref 70–130)
GLUCOSE BLDC GLUCOMTR-MCNC: 165 MG/DL (ref 70–130)
GLUCOSE BLDC GLUCOMTR-MCNC: 173 MG/DL (ref 70–130)
GLUCOSE BLDC GLUCOMTR-MCNC: 245 MG/DL (ref 70–130)
GLUCOSE SERPL-MCNC: 121 MG/DL (ref 65–99)
POTASSIUM SERPL-SCNC: 4.2 MMOL/L (ref 3.5–5.2)
SODIUM SERPL-SCNC: 135 MMOL/L (ref 136–145)

## 2022-01-30 PROCEDURE — 63710000001 INSULIN LISPRO (HUMAN) PER 5 UNITS: Performed by: INTERNAL MEDICINE

## 2022-01-30 PROCEDURE — 99232 SBSQ HOSP IP/OBS MODERATE 35: CPT | Performed by: INTERNAL MEDICINE

## 2022-01-30 PROCEDURE — 80048 BASIC METABOLIC PNL TOTAL CA: CPT | Performed by: INTERNAL MEDICINE

## 2022-01-30 PROCEDURE — 82962 GLUCOSE BLOOD TEST: CPT

## 2022-01-30 PROCEDURE — 63710000001 INSULIN DETEMIR PER 5 UNITS: Performed by: INTERNAL MEDICINE

## 2022-01-30 RX ADMIN — INSULIN LISPRO 2 UNITS: 100 INJECTION, SOLUTION INTRAVENOUS; SUBCUTANEOUS at 08:42

## 2022-01-30 RX ADMIN — LEVETIRACETAM 250 MG: 250 TABLET, FILM COATED ORAL at 04:21

## 2022-01-30 RX ADMIN — SODIUM CHLORIDE, PRESERVATIVE FREE 10 ML: 5 INJECTION INTRAVENOUS at 08:43

## 2022-01-30 RX ADMIN — SODIUM CHLORIDE, PRESERVATIVE FREE 10 ML: 5 INJECTION INTRAVENOUS at 22:04

## 2022-01-30 RX ADMIN — INSULIN DETEMIR 5 UNITS: 100 INJECTION, SOLUTION SUBCUTANEOUS at 22:03

## 2022-01-30 RX ADMIN — BISOPROLOL FUMARATE 10 MG: 5 TABLET, FILM COATED ORAL at 08:43

## 2022-01-30 RX ADMIN — SENNOSIDES AND DOCUSATE SODIUM 2 TABLET: 50; 8.6 TABLET ORAL at 22:04

## 2022-01-30 RX ADMIN — INSULIN DETEMIR 5 UNITS: 100 INJECTION, SOLUTION SUBCUTANEOUS at 08:44

## 2022-01-30 RX ADMIN — TERAZOSIN HYDROCHLORIDE 2 MG: 2 CAPSULE ORAL at 22:04

## 2022-01-30 RX ADMIN — SODIUM CHLORIDE 2 G: 1 TABLET ORAL at 18:21

## 2022-01-30 RX ADMIN — ATORVASTATIN CALCIUM 80 MG: 40 TABLET, FILM COATED ORAL at 22:04

## 2022-01-30 RX ADMIN — SODIUM CHLORIDE 2 G: 1 TABLET ORAL at 08:43

## 2022-01-30 RX ADMIN — LEVETIRACETAM 250 MG: 250 TABLET, FILM COATED ORAL at 16:29

## 2022-01-30 RX ADMIN — INSULIN LISPRO 2 UNITS: 100 INJECTION, SOLUTION INTRAVENOUS; SUBCUTANEOUS at 11:54

## 2022-01-30 RX ADMIN — FAMOTIDINE 40 MG: 20 TABLET, FILM COATED ORAL at 08:43

## 2022-01-31 LAB
ANION GAP SERPL CALCULATED.3IONS-SCNC: 9 MMOL/L (ref 5–15)
BUN SERPL-MCNC: 14 MG/DL (ref 8–23)
BUN/CREAT SERPL: 28 (ref 7–25)
CALCIUM SPEC-SCNC: 9.4 MG/DL (ref 8.6–10.5)
CHLORIDE SERPL-SCNC: 100 MMOL/L (ref 98–107)
CO2 SERPL-SCNC: 28 MMOL/L (ref 22–29)
CREAT SERPL-MCNC: 0.5 MG/DL (ref 0.76–1.27)
DEPRECATED RDW RBC AUTO: 43.8 FL (ref 37–54)
ERYTHROCYTE [DISTWIDTH] IN BLOOD BY AUTOMATED COUNT: 12.2 % (ref 12.3–15.4)
GFR SERPL CREATININE-BSD FRML MDRD: >150 ML/MIN/1.73
GLUCOSE BLDC GLUCOMTR-MCNC: 103 MG/DL (ref 70–130)
GLUCOSE BLDC GLUCOMTR-MCNC: 162 MG/DL (ref 70–130)
GLUCOSE BLDC GLUCOMTR-MCNC: 200 MG/DL (ref 70–130)
GLUCOSE BLDC GLUCOMTR-MCNC: 266 MG/DL (ref 70–130)
GLUCOSE SERPL-MCNC: 117 MG/DL (ref 65–99)
HCT VFR BLD AUTO: 39.9 % (ref 37.5–51)
HGB BLD-MCNC: 13.3 G/DL (ref 13–17.7)
MCH RBC QN AUTO: 32.4 PG (ref 26.6–33)
MCHC RBC AUTO-ENTMCNC: 33.3 G/DL (ref 31.5–35.7)
MCV RBC AUTO: 97.1 FL (ref 79–97)
PLATELET # BLD AUTO: 294 10*3/MM3 (ref 140–450)
PMV BLD AUTO: 9.5 FL (ref 6–12)
POTASSIUM SERPL-SCNC: 4 MMOL/L (ref 3.5–5.2)
QT INTERVAL: 418 MS
QTC INTERVAL: 476 MS
RBC # BLD AUTO: 4.11 10*6/MM3 (ref 4.14–5.8)
SODIUM SERPL-SCNC: 137 MMOL/L (ref 136–145)
WBC NRBC COR # BLD: 9.25 10*3/MM3 (ref 3.4–10.8)

## 2022-01-31 PROCEDURE — 97530 THERAPEUTIC ACTIVITIES: CPT

## 2022-01-31 PROCEDURE — 99232 SBSQ HOSP IP/OBS MODERATE 35: CPT | Performed by: INTERNAL MEDICINE

## 2022-01-31 PROCEDURE — 82962 GLUCOSE BLOOD TEST: CPT

## 2022-01-31 PROCEDURE — 97116 GAIT TRAINING THERAPY: CPT

## 2022-01-31 PROCEDURE — 63710000001 INSULIN LISPRO (HUMAN) PER 5 UNITS: Performed by: INTERNAL MEDICINE

## 2022-01-31 PROCEDURE — 85027 COMPLETE CBC AUTOMATED: CPT | Performed by: INTERNAL MEDICINE

## 2022-01-31 PROCEDURE — 97110 THERAPEUTIC EXERCISES: CPT

## 2022-01-31 PROCEDURE — 63710000001 INSULIN DETEMIR PER 5 UNITS: Performed by: INTERNAL MEDICINE

## 2022-01-31 PROCEDURE — 80048 BASIC METABOLIC PNL TOTAL CA: CPT | Performed by: INTERNAL MEDICINE

## 2022-01-31 PROCEDURE — 92526 ORAL FUNCTION THERAPY: CPT

## 2022-01-31 RX ADMIN — LEVETIRACETAM 250 MG: 250 TABLET, FILM COATED ORAL at 05:22

## 2022-01-31 RX ADMIN — ACETAMINOPHEN 650 MG: 325 TABLET, FILM COATED ORAL at 17:05

## 2022-01-31 RX ADMIN — INSULIN LISPRO 3 UNITS: 100 INJECTION, SOLUTION INTRAVENOUS; SUBCUTANEOUS at 17:05

## 2022-01-31 RX ADMIN — FAMOTIDINE 40 MG: 20 TABLET, FILM COATED ORAL at 08:41

## 2022-01-31 RX ADMIN — INSULIN DETEMIR 5 UNITS: 100 INJECTION, SOLUTION SUBCUTANEOUS at 08:41

## 2022-01-31 RX ADMIN — SENNOSIDES AND DOCUSATE SODIUM 2 TABLET: 50; 8.6 TABLET ORAL at 08:40

## 2022-01-31 RX ADMIN — SODIUM CHLORIDE 2 G: 1 TABLET ORAL at 17:05

## 2022-01-31 RX ADMIN — SODIUM CHLORIDE, PRESERVATIVE FREE 10 ML: 5 INJECTION INTRAVENOUS at 20:24

## 2022-01-31 RX ADMIN — ATORVASTATIN CALCIUM 80 MG: 40 TABLET, FILM COATED ORAL at 20:24

## 2022-01-31 RX ADMIN — INSULIN LISPRO 2 UNITS: 100 INJECTION, SOLUTION INTRAVENOUS; SUBCUTANEOUS at 12:27

## 2022-01-31 RX ADMIN — SODIUM CHLORIDE 2 G: 1 TABLET ORAL at 08:41

## 2022-01-31 RX ADMIN — SENNOSIDES AND DOCUSATE SODIUM 2 TABLET: 50; 8.6 TABLET ORAL at 20:24

## 2022-01-31 RX ADMIN — TERAZOSIN HYDROCHLORIDE 2 MG: 2 CAPSULE ORAL at 20:24

## 2022-01-31 RX ADMIN — SODIUM CHLORIDE, PRESERVATIVE FREE 10 ML: 5 INJECTION INTRAVENOUS at 08:41

## 2022-01-31 RX ADMIN — LEVETIRACETAM 250 MG: 250 TABLET, FILM COATED ORAL at 17:05

## 2022-01-31 RX ADMIN — ACETAMINOPHEN 650 MG: 325 TABLET, FILM COATED ORAL at 08:41

## 2022-01-31 RX ADMIN — BISOPROLOL FUMARATE 10 MG: 5 TABLET, FILM COATED ORAL at 08:41

## 2022-01-31 RX ADMIN — INSULIN DETEMIR 5 UNITS: 100 INJECTION, SOLUTION SUBCUTANEOUS at 20:53

## 2022-02-01 LAB
ANION GAP SERPL CALCULATED.3IONS-SCNC: 9 MMOL/L (ref 5–15)
BUN SERPL-MCNC: 20 MG/DL (ref 8–23)
BUN/CREAT SERPL: 43.5 (ref 7–25)
CALCIUM SPEC-SCNC: 8.8 MG/DL (ref 8.6–10.5)
CHLORIDE SERPL-SCNC: 102 MMOL/L (ref 98–107)
CO2 SERPL-SCNC: 27 MMOL/L (ref 22–29)
CREAT SERPL-MCNC: 0.46 MG/DL (ref 0.76–1.27)
GFR SERPL CREATININE-BSD FRML MDRD: >150 ML/MIN/1.73
GLUCOSE BLDC GLUCOMTR-MCNC: 126 MG/DL (ref 70–130)
GLUCOSE BLDC GLUCOMTR-MCNC: 155 MG/DL (ref 70–130)
GLUCOSE BLDC GLUCOMTR-MCNC: 214 MG/DL (ref 70–130)
GLUCOSE BLDC GLUCOMTR-MCNC: 283 MG/DL (ref 70–130)
GLUCOSE SERPL-MCNC: 116 MG/DL (ref 65–99)
POTASSIUM SERPL-SCNC: 3.8 MMOL/L (ref 3.5–5.2)
SODIUM SERPL-SCNC: 138 MMOL/L (ref 136–145)

## 2022-02-01 PROCEDURE — 63710000001 INSULIN DETEMIR PER 5 UNITS: Performed by: INTERNAL MEDICINE

## 2022-02-01 PROCEDURE — 97110 THERAPEUTIC EXERCISES: CPT

## 2022-02-01 PROCEDURE — 63710000001 INSULIN LISPRO (HUMAN) PER 5 UNITS: Performed by: INTERNAL MEDICINE

## 2022-02-01 PROCEDURE — 99232 SBSQ HOSP IP/OBS MODERATE 35: CPT | Performed by: INTERNAL MEDICINE

## 2022-02-01 PROCEDURE — 82962 GLUCOSE BLOOD TEST: CPT

## 2022-02-01 PROCEDURE — 97530 THERAPEUTIC ACTIVITIES: CPT

## 2022-02-01 PROCEDURE — 80048 BASIC METABOLIC PNL TOTAL CA: CPT | Performed by: INTERNAL MEDICINE

## 2022-02-01 RX ORDER — TEMAZEPAM 15 MG/1
15 CAPSULE ORAL NIGHTLY
Status: DISCONTINUED | OUTPATIENT
Start: 2022-02-01 | End: 2022-02-05 | Stop reason: HOSPADM

## 2022-02-01 RX ADMIN — INSULIN LISPRO 2 UNITS: 100 INJECTION, SOLUTION INTRAVENOUS; SUBCUTANEOUS at 17:41

## 2022-02-01 RX ADMIN — SENNOSIDES AND DOCUSATE SODIUM 2 TABLET: 50; 8.6 TABLET ORAL at 09:07

## 2022-02-01 RX ADMIN — SENNOSIDES AND DOCUSATE SODIUM 2 TABLET: 50; 8.6 TABLET ORAL at 21:33

## 2022-02-01 RX ADMIN — LEVETIRACETAM 250 MG: 250 TABLET, FILM COATED ORAL at 04:11

## 2022-02-01 RX ADMIN — TEMAZEPAM 15 MG: 15 CAPSULE ORAL at 21:33

## 2022-02-01 RX ADMIN — INSULIN DETEMIR 5 UNITS: 100 INJECTION, SOLUTION SUBCUTANEOUS at 21:39

## 2022-02-01 RX ADMIN — LEVETIRACETAM 250 MG: 250 TABLET, FILM COATED ORAL at 17:07

## 2022-02-01 RX ADMIN — INSULIN LISPRO 3 UNITS: 100 INJECTION, SOLUTION INTRAVENOUS; SUBCUTANEOUS at 21:39

## 2022-02-01 RX ADMIN — SODIUM CHLORIDE 2 G: 1 TABLET ORAL at 09:07

## 2022-02-01 RX ADMIN — INSULIN DETEMIR 5 UNITS: 100 INJECTION, SOLUTION SUBCUTANEOUS at 09:06

## 2022-02-01 RX ADMIN — FAMOTIDINE 40 MG: 20 TABLET, FILM COATED ORAL at 09:08

## 2022-02-01 RX ADMIN — SODIUM CHLORIDE, PRESERVATIVE FREE 10 ML: 5 INJECTION INTRAVENOUS at 21:33

## 2022-02-01 RX ADMIN — BISOPROLOL FUMARATE 10 MG: 5 TABLET, FILM COATED ORAL at 09:07

## 2022-02-01 RX ADMIN — TERAZOSIN HYDROCHLORIDE 2 MG: 2 CAPSULE ORAL at 21:33

## 2022-02-01 RX ADMIN — SODIUM CHLORIDE, PRESERVATIVE FREE 10 ML: 5 INJECTION INTRAVENOUS at 09:08

## 2022-02-01 RX ADMIN — ACETAMINOPHEN 650 MG: 325 TABLET, FILM COATED ORAL at 09:12

## 2022-02-01 RX ADMIN — SODIUM CHLORIDE 2 G: 1 TABLET ORAL at 17:07

## 2022-02-01 RX ADMIN — INSULIN LISPRO 4 UNITS: 100 INJECTION, SOLUTION INTRAVENOUS; SUBCUTANEOUS at 12:22

## 2022-02-01 RX ADMIN — ATORVASTATIN CALCIUM 80 MG: 40 TABLET, FILM COATED ORAL at 21:33

## 2022-02-01 NOTE — PROGRESS NOTES
[facility] Medicine Services  PROGRESS NOTE    Patient Name: Derian Joya  : 1940  MRN: [id number]    Date of Admission: 2022  Primary Care Physician: Diaz Lee MD    Subjective   Subjective     CC:  Lethargy     HPI:  Asleep currently.  Per report he was up all night, was worse this morning than a day or two before.  Discussed with RN.  Did not wake him this morning.     ROS:  Gen- No fevers, chills  CV- No chest pain, palpitations  Resp- No cough, dyspnea  GI- No N/V/D, abd pain      Objective   Objective     Vital Signs:   Temp:  [97.5 °F (36.4 °C)-98.6 °F (37 °C)] 98.6 °F (37 °C)  Heart Rate:  [65-83] 83  Resp:  [16-17] 17  BP: (132-143)/(76-85) 143/82     Physical Exam:  Constitutional - no acute distress, frail, in bed asleep, did not wake to gentle exam.   HEENT-NCAT, mucous membranes moist  CV-RRR with holosystolic murmur RUSB, blowing   Resp-CTAB  Abd-soft, nontender, nondistended, normoactive bowel sounds  Ext- No lower extremity cyanosis, clubbing or edema bilaterally  Neuro-asleep currently, resps regular  Skin- No rash on exposed UE or LE bilaterally      Results Reviewed:  LAB RESULTS:      Lab 22  0601 22  0759   WBC 9.25 11.23*   HEMOGLOBIN 13.3 13.1   HEMATOCRIT 39.9 39.5   PLATELETS 294 356   MCV 97.1* 95.9         Lab 22  0542 22  0601 22  0712 22  0746 22  1049   SODIUM 138 137 135* 132* 128*   POTASSIUM 3.8 4.0 4.2 4.6 3.7   CHLORIDE 102 100 100 96* 91*   CO2 27.0 28.0 28.0 29.0 27.0   ANION GAP 9.0 9.0 7.0 7.0 10.0   BUN 20 14 20 19 18   CREATININE 0.46* 0.50* 0.46* 0.51* 0.50*   GLUCOSE 116* 117* 121* 138* 288*   CALCIUM 8.8 9.4 8.8 8.9 8.7                         Brief Urine Lab Results  (Last result in the past 365 days)      Color   Clarity   Blood   Leuk Est   Nitrite   Protein   CREAT   Urine HCG        22 2155 Yellow   Clear   Negative   Trace   Negative   Trace                 Microbiology  Results Abnormal     Procedure Component Value - Date/Time    COVID PRE-OP / PRE-PROCEDURE SCREENING ORDER (NO ISOLATION) - Swab, Nasopharynx [[id number]]  (Normal) Collected: 01/22/22 2144    Lab Status: Final result Specimen: Swab from Nasopharynx Updated: 01/22/22 2315    Narrative:      The following orders were created for panel order COVID PRE-OP / PRE-PROCEDURE SCREENING ORDER (NO ISOLATION) - Swab, Nasopharynx.  Procedure                               Abnormality         Status                     ---------                               -----------         ------                     COVID-19, FLU A/B, RSV P...[[id number]]  Normal              Final result                 Please view results for these tests on the individual orders.    COVID-19, FLU A/B, RSV PCR - Swab, Nasopharynx [[id number]]  (Normal) Collected: 01/22/22 2144    Lab Status: Final result Specimen: Swab from Nasopharynx Updated: 01/22/22 2315     COVID19 Not Detected     Influenza A PCR Not Detected     Influenza B PCR Not Detected     RSV, PCR Not Detected    Narrative:      Fact sheet for providers: https://www.fda.gov/media/845520/download    Fact sheet for patients: https://www.fda.gov/media/174193/download    Test performed by PCR.          No radiology results from the last 24 hrs    Results for orders placed during the hospital encounter of 01/11/22    Adult Transthoracic Echo Complete W/ Cont if Necessary Per Protocol    Interpretation Summary  · Mild aortic valve stenosis is present.  · Estimated right ventricular systolic pressure from tricuspid regurgitation is normal (<35 mmHg).  · LVSF is normal  · MAC      I have reviewed the medications:  Scheduled Meds:atorvastatin, 80 mg, Oral, Nightly  bisoprolol, 10 mg, Oral, Daily  famotidine, 40 mg, Oral, Daily  insulin detemir, 5 Units, Subcutaneous, Q12H  insulin lispro, 0-7 Units, Subcutaneous, TID AC  levETIRAcetam, 250 mg, Oral, BID  senna-docusate sodium, 2 tablet, Oral, BID  sodium  chloride, 10 mL, Intravenous, Q12H  sodium chloride, 2 g, Oral, BID With Meals  terazosin, 2 mg, Oral, Nightly      Continuous Infusions:   PRN Meds:.•  acetaminophen **OR** acetaminophen **OR** acetaminophen  •  senna-docusate sodium **AND** polyethylene glycol **AND** bisacodyl **AND** bisacodyl  •  dextrose  •  dextrose  •  glucagon (human recombinant)  •  magnesium sulfate **OR** magnesium sulfate **OR** magnesium sulfate  •  potassium chloride **OR** potassium chloride **OR** potassium chloride  •  sodium chloride    Assessment/Plan   Assessment & Plan     Active Hospital Problems    Diagnosis  POA   • Acute on chronic intracranial subdural hematoma (HCC) [I62.01, I62.03]  Yes   • Altered mental status [R41.82]  Yes   • Subarachnoid hemorrhage (HCC) [I60.9]  Yes   • Cardiomyopathy, nonischemic (HCC) [I42.8]  Yes   • Aortic stenosis [I35.0]  Yes   • Essential hypertension [I10]  Yes   • Uncontrolled type 2 diabetes mellitus (HCC) [E11.65]  Yes      Resolved Hospital Problems   No resolved problems to display.        Brief Hospital Course to date:  Derian Joya is a 82 y.o. male with history of hypertension, hyperlipidemia, diabetes and recent traumatic subarachnoid hemorrhage after a fall while vacuuming.  Was discharged to [place] for SNF.  Represented due to increased lethargy and altered mental status.    - Patient was evaluated by neurosurgery and neurology.  Repeat CT head with stable findings compared to last admission.  His Keppra dose was decreased to 250 mg twice daily and his Paxil was discontinued.    - Nephrology was consulted for hyponatremia which is gradually improved with a fluid restriction.  Overall patient more alert and improving with therapy.    All problems are new to me.  Chart reviewed and updated.      AMS   Metabolic encephalopathy   Lethargy   Recent traumatic subarachnoid hemorrhage   2/1 - day-night confusion  - Neurology and neurosurgery evaluated  - Repeat CT head with stable  findings of subarachnoid hemorrhage, extensive atherosclerotic changes of the basilar artery with high-grade focal stenosis unchanged from 1/11  - No surgical intervention, follow up CT with Neurosurgery in 10 days (2/10/22)  - EEG with no seizures  - altered mentation likely metabolic due to hyponatremia as well as possible medications  - Keppra resumed at a lower dose 250 mg twice daily  - Continue PT/OT     Hyponatremia  - resolved   - SIADH vs secondary to ICH  - holding paxil  - liberalize fluid restriction to 1500 ml/day  - salt tabs BID meals  - sodium improving, 137   - follow up nephrology clinic in 2-4 weeks after discharge     CAD   Chronic systolic heart failure   - 1/22 ECHO with EF 50%   - s/p ICD   - Continue statin --on no antiplatelets due to recent hemorrhage     HLD   -continue statin     HTN   -Continue bisoprolol; BP with fair control     DM   - Continue Levemir to 5 units twice daily and continue sliding scale insulin   - Variable PO intake with some labile blood sugar  - adjust PRN, consider adding prandial humalog     Seizure   -Decrease Keppra dose 250 mg BID   - follow up with Neurology as scheduled 3/7/22     Mood   -Discontinue Paxil; monitor      DVT prophylaxis:  Mechanical DVT prophylaxis orders are present.       AM-PAC 6 Clicks Score (PT): 11 (01/31/22 4046)    Disposition: I expect the patient to be discharged after appeal process is resolved.  Peer to peer review completed 1/27/22; patient's family now appealing in hopes of getting him into acute rehab.       CODE STATUS:   Code Status and Medical Interventions:   Ordered at: 01/23/22 0321     Level Of Support Discussed With:    Patient     Code Status (Patient has no pulse and is not breathing):    CPR (Attempt to Resuscitate)     Medical Interventions (Patient has pulse or is breathing):    Full Support       Isela Soler MD  02/01/22

## 2022-02-01 NOTE — PLAN OF CARE
Goal Outcome Evaluation:  Plan of Care Reviewed With: (P) patient, spouse        Progress: (P) no change  Outcome Summary: (P) Pt UIC upon arrival. BUE ther-ex and dynamic weight shifting activity completed w/ frequent cueing and encouragement. Max A for LB dressing d/t poor dynamic postural control. Mod A for STS w/ RW and VCs for sequencing and hand placement. Pt became lethargic towards end of session today, will continue IPOT per POC, rec DC to IR[facility].

## 2022-02-01 NOTE — THERAPY TREATMENT NOTE
Patient Name: Derian Joya  : 1940    MRN: [id number]                              Today's Date: 2022       Admit Date: 2022    Visit Dx:     ICD-10-CM ICD-9-CM   1. Oral phase dysphagia  R13.11 787.21   2. Acute on chronic intracranial subdural hematoma (HCC)  I62.01 432.1    I62.03    3. Altered mental status, unspecified altered mental status type  R41.82 780.97   4. Cognitive communication deficit  R41.841 799.52     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)     Past Medical History:   Diagnosis Date   • Allergic rhinitis    • Aortic stenosis    • Atopic rhinitis 2016   • Benign non-nodular prostatic hyperplasia with lower urinary tract symptoms 9/15/2017   • CAD (coronary artery disease)    • Cardiomyopathy, ischemic    • Colon polyps     tubular adenoma    • Community acquired pneumonia    • Depression 2016   • Diabetes (HCC)    • Diverticulosis of intestine 2016    Description: Left and sigmoid   • Dyslipidemia    • LBBB (left bundle branch block)    • PAD (peripheral artery disease) (HCC)      Past Surgical History:   Procedure Laterality Date   • CARDIAC CATHETERIZATION      100% cx   • CATARACT EXTRACTION     • COLONOSCOPY W/ BIOPSIES AND POLYPECTOMY  2021    1 benign polyp   • HIP SURGERY Left 2020    Dr Mandy BOUDREAUX   • LEG SURGERY Left 2016    Popliteal Artery stenting by Dr Hdz   • LEG SURGERY Left 2016    skin graft by Dr Nance   • LIPOMA EXCISION      s/p excision on chest   • PACEMAKER  IMPLANTATION  01/2019    and defibrillator      General Information     Row Name 02/01/22 1445          OT Time and Intention    Document Type therapy note (daily note) (P)   -AF     Mode of Treatment occupational therapy (P)   -AF     Row Name 02/01/22 1445          General Information    Patient Profile Reviewed yes (P)   -AF     Existing Precautions/Restrictions fall (P)   -AF     Barriers to Rehab medically complex; previous functional deficit; cognitive status (P)   -AF     Row Name 02/01/22 1445          Cognition    Orientation Status (Cognition) oriented to; person (P)   -AF     Row Name 02/01/22 1445          Safety Issues, Functional Mobility    Safety Issues Affecting Function (Mobility) awareness of need for assistance; impulsivity; insight into deficits/self-awareness; judgment; problem-solving; safety precaution awareness; safety precautions follow-through/compliance; sequencing abilities (P)   -AF     Impairments Affecting Function (Mobility) balance; cognition; coordination; endurance/activity tolerance; motor control; strength (P)   -AF     Cognitive Impairments, Mobility Safety/Performance awareness, need for assistance; insight into deficits/self-awareness; problem-solving/reasoning; safety precaution awareness; safety precaution follow-through; sequencing abilities (P)   -AF     Comment, Safety Issues/Impairments (Mobility) Requires several VCs for sequencing and hand placement during functional mobility. (P)   -AF           User Key  (r) = Recorded By, (t) = Taken By, (c) = Cosigned By    Initials Name Provider Type    AF Yomi Cota, OT Student OT Student                 Mobility/ADL's     Row Name 02/01/22 1450          Bed Mobility    Comment (Bed Mobility) [facility] arrival, back in chair post therapy session. (P)   -AF     Row Name 02/01/22 1450          Transfers    Transfers sit-stand transfer (P)   -AF     Comment (Transfers) Frequent cueing for sequencing and hand placement, STS w/  RW. (P)   -AF     Sit-Stand [place] (Transfers) moderate assist (50% patient effort); verbal cues (P)   -AF     Row Name 02/01/22 1450          Sit-Stand Transfer    Assistive Device (Sit-Stand Transfers) walker, front-wheeled (P)   -AF     Row Name 02/01/22 1450          Functional Mobility    Functional Mobility- Device rolling walker (P)   -AF     Functional Mobility- Comment Min A w/ RW for short functional distance. (P)   -AF     Row Name 02/01/22 1450          Activities of Daily Living    BADL Assessment/Intervention lower body dressing (P)   -AF     Row Name 02/01/22 1450          Lower Body Dressing Assessment/Training    [place] Level (Lower Body Dressing) don; pants/bottoms; maximum assist (25% patient effort); verbal cues (P)   -AF     Position (Lower Body Dressing) supported sitting (P)   -AF           User Key  (r) = Recorded By, (t) = Taken By, (c) = Cosigned By    Initials Name Provider Type    AF Yomi Cota, OT Student OT Student               Obj/Interventions     Row Name 02/01/22 1501          Shoulder (Therapeutic Exercise)    Shoulder (Therapeutic Exercise) strengthening exercise; AROM (active range of motion) (P)   -AF     Shoulder AROM (Therapeutic Exercise) bilateral; flexion; extension; 5 repetitions (P)   -AF     Shoulder Strengthening (Therapeutic Exercise) bilateral; flexion; extension; 2 sets; 5 repetitions (P)   Lat pull-downs, chest presses, & chair push-ups  -AF     Row Name 02/01/22 1501          Elbow/Forearm (Therapeutic Exercise)    Elbow/Forearm (Therapeutic Exercise) strengthening exercise (P)   -AF     Elbow/Forearm Strengthening (Therapeutic Exercise) bilateral; flexion; extension; 2 sets; 10 repetitions (P)   -AF     Row Name 02/01/22 1501          Motor Skills    Motor Skills coordination; functional endurance; posture; therapeutic exercise (P)   -AF     Coordination gross motor deficit; bilateral; upper extremity; minimal impairment (P)   -     Row Name  02/01/22 1501          Balance    Balance Assessment sitting static balance; sitting dynamic balance; sit to stand dynamic balance; standing static balance; standing dynamic balance (P)   -AF     Static Sitting Balance WFL; sitting in chair; unsupported (P)   -AF     Dynamic Sitting Balance WFL; sitting in chair; unsupported (P)   -AF     Sit to Stand Dynamic Balance mild impairment; supported (P)   w/ RW  -AF     Static Standing Balance mild impairment; supported; standing (P)   -AF     Dynamic Standing Balance mild impairment; supported; standing (P)   -AF     Balance Interventions sitting; standing; sit to stand; supported; static; dynamic; core stability exercise; occupation based/functional task; weight shifting activity (P)   -AF     Comment, Balance SBA for dynamic reaching, CGA for chair push-ups (P)   -AF     Row Name 02/01/22 1501          Therapeutic Exercise    Therapeutic Exercise shoulder; elbow/forearm (P)   -AF           User Key  (r) = Recorded By, (t) = Taken By, (c) = Cosigned By    Initials Name Provider Type    Yomi Jerez, OT Student OT Student               Goals/Plan    No documentation.                Clinical Impression     Row Name 02/01/22 1517          Pain Assessment    Additional Documentation Pain Scale: FACES Pre/Post-Treatment (Group) (P)   -AF     Row Name 02/01/22 1517          Pain Scale: FACES Pre/Post-Treatment    Pain: FACES Scale, Pretreatment 0-->no hurt (P)   -AF     Posttreatment Pain Rating 0-->no hurt (P)   -AF     Row Name 02/01/22 1517          Plan of Care Review    Plan of Care Reviewed With patient; spouse (P)   -AF     Progress no change (P)   -AF     Outcome Summary Pt [place] [facility] arrival. BUE ther-ex and dynamic weight shifting activity completed w/ frequent cueing and encouragement. Max A for LB dressing d/t poor dynamic postural control. Mod A for STS w/ RW and VCs for sequencing and hand placement. Pt became lethargic towards end of session today, will  continue IPOT per POC, rec DC to IRF. (P)   -AF     Row Name 02/01/22 1517          Therapy Assessment/Plan (OT)    Rehab Potential (OT) good, to achieve stated therapy goals (P)   -AF     Criteria for Skilled Therapeutic Interventions Met (OT) yes; skilled treatment is necessary (P)   -AF     Row Name 02/01/22 1517          Therapy Plan Review/Discharge Plan (OT)    Anticipated Discharge Disposition (OT) inpatient rehabilitation facility (P)   -AF     Row Name 02/01/22 1517          Vital Signs    Pre Systolic BP Rehab 136 (P)   -AF     Pre Treatment Diastolic BP 79 (P)   -AF     Pretreatment Heart Rate (beats/min) 79 (P)   -AF     Pre SpO2 (%) 96 (P)   -AF     O2 Delivery Pre Treatment room air (P)   -AF     O2 Delivery Intra Treatment room air (P)   -AF     O2 Delivery Post Treatment room air (P)   -AF     Pre Patient Position Sitting (P)   -AF     Intra Patient Position Standing (P)   -AF     Post Patient Position Sitting (P)   -AF     Row Name 02/01/22 1517          Positioning and Restraints    Pre-Treatment Position sitting in chair/recliner (P)   -AF     Post Treatment Position chair (P)   -AF     In Chair notified nsg; reclined; sitting; call light within reach; encouraged to call for assist; exit alarm on; waffle cushion; legs elevated (P)   -AF           User Key  (r) = Recorded By, (t) = Taken By, (c) = Cosigned By    Initials Name Provider Type    Yomi Jerez, OT Student OT Student               Outcome Measures     Row Name 02/01/22 1533          How much help from another is currently needed...    Putting on and taking off regular lower body clothing? 1 (P)   -AF     Bathing (including washing, rinsing, and drying) 2 (P)   -AF     Toileting (which includes using toilet bed pan or urinal) 2 (P)   -AF     Putting on and taking off regular upper body clothing 3 (P)   -AF     Taking care of personal grooming (such as brushing teeth) 3 (P)   -AF     Eating meals 3 (P)   -AF     AM-PAC 6 Clicks Score  (OT) 14 (P)   -AF     Row Name 02/01/22 0830          How much help from another person do you currently need...    Turning from your back to your side while in flat bed without using bedrails? 3  -LC     Moving from lying on back to sitting on the side of a flat bed without bedrails? 2  -LC     Moving to and from a bed to a chair (including a wheelchair)? 2  -LC     Standing up from a chair using your arms (e.g., wheelchair, bedside chair)? 2  -LC     Climbing 3-5 steps with a railing? 1  -LC     To walk in hospital room? 2  -LC     AM-PAC 6 Clicks Score (PT) 12  -LC           User Key  (r) = Recorded By, (t) = Taken By, (c) = Cosigned By    Initials Name Provider Type    Lupe Gay RN Registered Nurse    Yomi Jerez, OT Student OT Student                Occupational Therapy Education                 Title: PT OT SLP Therapies (In Progress)     Topic: Occupational Therapy (In Progress)     Point: ADL training (In Progress)     Description:   Instruct learner(s) on proper safety adaptation and remediation techniques during self care or transfers.   Instruct in proper use of assistive devices.              Learning Progress Summary           Patient Acceptance, E,D, NR by CS at 1/31/2022 1524    Acceptance, E,D, NR by CS at 1/27/2022 1417    Acceptance, E,TB,D, VU,NR by  at 1/25/2022 1540    Acceptance, E,TB,D, VU,NR by HK at 1/24/2022 1516                   Point: Home exercise program (In Progress)     Description:   Instruct learner(s) on appropriate technique for monitoring, assisting and/or progressing therapeutic exercises/activities.              Learning Progress Summary           Patient Acceptance, E,D, NR by CS at 1/31/2022 1524    Acceptance, E,D, NR by CS at 1/27/2022 1417                   Point: Precautions (In Progress)     Description:   Instruct learner(s) on prescribed precautions during self-care and functional transfers.              Learning Progress Summary           Patient  Acceptance, E,D, NR by  at 1/27/2022 1417    Acceptance, E,TB,D, VU,NR by  at 1/25/2022 1540    Acceptance, E,TB,D, VU,NR by  at 1/24/2022 1516                   Point: Body mechanics (In Progress)     Description:   Instruct learner(s) on proper positioning and spine alignment during self-care, functional mobility activities and/or exercises.              Learning Progress Summary           Patient Acceptance, E,D, NR by CS at 1/31/2022 1524    Acceptance, E,D, NR by  at 1/27/2022 1417    Acceptance, E,TB,D, VU,NR by  at 1/25/2022 1540    Acceptance, E,TB,D, VU,NR by  at 1/24/2022 1516                               User Key     Initials Effective Dates Name Provider Type Discipline     06/16/21 -  Erika Youngblood, OT Occupational Therapist OT     06/16/21 -  Timothy Pimentel, OT Occupational Therapist OT              OT Recommendation and Plan     Plan of Care Review  Plan of Care Reviewed With: (P) patient, spouse  Progress: (P) no change  Outcome Summary: (P) Pt [facility] upon arrival. BUE ther-ex and dynamic weight shifting activity completed w/ frequent cueing and encouragement. Max A for LB dressing d/t poor dynamic postural control. Mod A for STS w/ RW and VCs for sequencing and hand placement. Pt became lethargic towards end of session today, will continue IPOT per POC, rec DC to IRF.     Time Calculation:    Time Calculation- OT     Row Name 02/01/22 1535             Time Calculation- OT    OT Start Time 1348 (P)   -AF      OT Received On 02/01/22 (P)   -AF      OT Goal Re-Cert Due Date 02/03/22 (P)   -AF              Timed Charges    89489 - OT Therapeutic Exercise Minutes 10 (P)   -AF      19114 - OT Therapeutic Activity Minutes 16 (P)   -AF      20604 - OT Self Care/Mgmt Minutes 4 (P)   -AF              Total Minutes    Timed Charges Total Minutes 30 (P)   -AF       Total Minutes 30 (P)   -AF            User Key  (r) = Recorded By, (t) = Taken By, (c) = Cosigned By    Initials Name Provider Type     AF Yomi Cota, OT Student OT Student              Therapy Charges for Today     Code Description Service Date Service Provider Modifiers Qty    00546765121 HC OT THER PROC EA 15 MIN 2/1/2022 Yomi Cota, OT Student GO 1    61511972480 HC OT THERAPEUTIC ACT EA 15 MIN 2/1/2022 Yomi Cota OT Student GO 1    32038852067 HC OT THER SUPP EA 15 MIN 2/1/2022 Yomi Cota, OT Student GO 2               ANTOINE Borjas  2/1/2022

## 2022-02-02 LAB
GLUCOSE BLDC GLUCOMTR-MCNC: 187 MG/DL (ref 70–130)
GLUCOSE BLDC GLUCOMTR-MCNC: 253 MG/DL (ref 70–130)
GLUCOSE BLDC GLUCOMTR-MCNC: 272 MG/DL (ref 70–130)
GLUCOSE BLDC GLUCOMTR-MCNC: 97 MG/DL (ref 70–130)
QT INTERVAL: 444 MS
QTC INTERVAL: 515 MS

## 2022-02-02 PROCEDURE — 99232 SBSQ HOSP IP/OBS MODERATE 35: CPT | Performed by: INTERNAL MEDICINE

## 2022-02-02 PROCEDURE — 92507 TX SP LANG VOICE COMM INDIV: CPT

## 2022-02-02 PROCEDURE — 97530 THERAPEUTIC ACTIVITIES: CPT

## 2022-02-02 PROCEDURE — 63710000001 INSULIN DETEMIR PER 5 UNITS: Performed by: INTERNAL MEDICINE

## 2022-02-02 PROCEDURE — 97116 GAIT TRAINING THERAPY: CPT

## 2022-02-02 PROCEDURE — 63710000001 INSULIN LISPRO (HUMAN) PER 5 UNITS: Performed by: INTERNAL MEDICINE

## 2022-02-02 PROCEDURE — 82962 GLUCOSE BLOOD TEST: CPT

## 2022-02-02 PROCEDURE — 92526 ORAL FUNCTION THERAPY: CPT

## 2022-02-02 RX ADMIN — FAMOTIDINE 40 MG: 20 TABLET, FILM COATED ORAL at 08:14

## 2022-02-02 RX ADMIN — INSULIN DETEMIR 5 UNITS: 100 INJECTION, SOLUTION SUBCUTANEOUS at 23:10

## 2022-02-02 RX ADMIN — INSULIN DETEMIR 5 UNITS: 100 INJECTION, SOLUTION SUBCUTANEOUS at 08:15

## 2022-02-02 RX ADMIN — SODIUM CHLORIDE 2 G: 1 TABLET ORAL at 23:10

## 2022-02-02 RX ADMIN — TEMAZEPAM 15 MG: 15 CAPSULE ORAL at 23:10

## 2022-02-02 RX ADMIN — SENNOSIDES AND DOCUSATE SODIUM 2 TABLET: 50; 8.6 TABLET ORAL at 08:14

## 2022-02-02 RX ADMIN — LEVETIRACETAM 250 MG: 250 TABLET, FILM COATED ORAL at 04:37

## 2022-02-02 RX ADMIN — BISOPROLOL FUMARATE 10 MG: 5 TABLET, FILM COATED ORAL at 08:14

## 2022-02-02 RX ADMIN — INSULIN LISPRO 2 UNITS: 100 INJECTION, SOLUTION INTRAVENOUS; SUBCUTANEOUS at 18:32

## 2022-02-02 RX ADMIN — SODIUM CHLORIDE, PRESERVATIVE FREE 10 ML: 5 INJECTION INTRAVENOUS at 08:15

## 2022-02-02 RX ADMIN — ATORVASTATIN CALCIUM 80 MG: 40 TABLET, FILM COATED ORAL at 23:10

## 2022-02-02 RX ADMIN — LEVETIRACETAM 250 MG: 250 TABLET, FILM COATED ORAL at 16:16

## 2022-02-02 RX ADMIN — TERAZOSIN HYDROCHLORIDE 2 MG: 2 CAPSULE ORAL at 23:11

## 2022-02-02 RX ADMIN — SODIUM CHLORIDE, PRESERVATIVE FREE 10 ML: 5 INJECTION INTRAVENOUS at 23:11

## 2022-02-02 RX ADMIN — SODIUM CHLORIDE 2 G: 1 TABLET ORAL at 08:14

## 2022-02-02 RX ADMIN — INSULIN LISPRO 4 UNITS: 100 INJECTION, SOLUTION INTRAVENOUS; SUBCUTANEOUS at 12:14

## 2022-02-02 NOTE — PAYOR COMM NOTE
"Christian Joya (82 y.o. Male)   Auth : [id number]  Request documentation for Appeal. Please contact Arelis LORENZ with question regarding skilled [phone]            Date of Birth Social Security Number Address Home Phone MRN    1940  [id number] [facility] [address] [phone] [id number]    [employer] Mar[employer] Status             Protestant        Admission Date Admission Type Admitting Provider Attending Provider Department, Room/Bed    22 Emergency Isela Soler MD Mini, Jocelyn, MD [facility] 3F, S325/1    Discharge Date Discharge Disposition Discharge Destination                         Attending Provider: Isela Soler MD    Allergies: No Known Allergies    Isolation: None   Infection: None   Code Status: CPR   Advance Care Planning Activity    Ht: 165.1 cm (65\")   Wt: 60.9 kg (134 lb 3.2 oz)    Admission Cmt: None   Principal Problem: None                Active Insurance as of 2022     Primary Coverage     Payor Plan Insurance Group Employer/Plan Group    HUMANA MEDICARE REPLACEMENT HUMANA MEDICARE REPLACEMENT [id number]     Payor Plan Address Payor Plan Phone Number Payor Plan Fax Number Effective Dates    [address] [address] [phone]  2018 - None Entered    [facility] [id number]       Subscriber Name Subscriber Birth Date Member ID       CHRISTIAN JOYA 1940 [id number]                 Emergency Contacts      (Rel.) Home Phone Work Phone Mobile Phone    DEMARCOTIMA SELLERS (Daughter) -- -- [phone]    RIYA JOYA (Spouse) [phone] -- [phone]    ToanZaheer (Son) -- -- [phone]               History & Physical      Albertina Yanez MD at 22 0209              [facility] Medicine Services  HISTORY AND PHYSICAL    Patient Name: Christian Joya  : 1940  MRN: [id number]  Primary Care Physician: Diaz Lee MD  Date of admission: 2022      Subjective   Subjective     Chief " Discussed presence of mild ERM. Recommended observation for now. Can consider surgical intervention in the future if the ERM progresses and the patient becomes more symptomatic. Complaint:  AMS-poor historian, hx from ed staff.    HPI:  Derian Joya is a 82 y.o. male  to ED brought in to ED by family secondary to concern of  Patient being more somnolent in last 24 hours, slurred speech, confused.  Pt is awake in ed, not oriented.  On asking no co of headache, no chest pain or ABD pain.  Stated he did have few loose BM over last 24 hrs.    On wup was found to have hyponatremia, appears dehydrated, and possible new bleed at the site of old bleed. sbp in 150s, and low grade fever, pt denies cough, cold congestion, sx.    ROS- limited since pt cannot contribute much to HX.    In ED patient was started on Cardene drip.      COVID symptoms    [] Fever []  Cough [] Shortness of breath [] Fatigue [] Change in taste or smell    [] Exposure to COVID positive patient  [] High risk facility   []  NONE  COVID VACCINE status    The patient has started, but not completed, their COVID-19 vaccination series.      Review of Systems   Complete ROS done, which is negative except as above and HPI.  Old records reviewed and summarized in PM hx      Personal History     Past Medical History:   Diagnosis Date   • Allergic rhinitis    • Aortic stenosis    • Atopic rhinitis 7/6/2016   • Benign non-nodular prostatic hyperplasia with lower urinary tract symptoms 9/15/2017   • CAD (coronary artery disease)    • Cardiomyopathy, ischemic    • Colon polyps     tubular adenoma 2016   • Community acquired pneumonia    • Depression 7/6/2016   • Diabetes (HCC)    • Diverticulosis of intestine 7/6/2016    Description: Left and sigmoid   • Dyslipidemia    • LBBB (left bundle branch block)    • PAD (peripheral artery disease) (HCC)      Hyperlipidemia-Lipitor 80 mg daily  Hypertension-bisoprolol 10 mg daily, Cardura 1 mg daily  DM2- Levemir 5 units nightly, metformin 850 mg every 12  Hx-LBB B  Seizure-Keppra 500 mg every 12  Mood disorder-Paxil 20 mg daily  Patient was admitted 1/15-1/15 - 1/19/2022-secondary to altered mental  status-likely secondary to seizure versus DKA.  Hx traumatic subarachnoid hemorrhage- 1/11/2022  CAD- aspirin 81 mg  Cardiomyopathy echo 1/2022-EF of 50%, mild AS,- status post AICD    Past Surgical History:   Procedure Laterality Date   • CARDIAC CATHETERIZATION  2018    100% cx   • CATARACT EXTRACTION     • COLONOSCOPY W/ BIOPSIES AND POLYPECTOMY  05/2021    1 benign polyp   • HIP SURGERY Left 08/2020    Dr Mandy BOUDREAUX   • LEG SURGERY Left 07/2016    Popliteal Artery stenting by Dr Hdz   • LEG SURGERY Left 07/2016    skin graft by Dr Nance   • LIPOMA EXCISION      s/p excision on chest   • PACEMAKER IMPLANTATION  01/2019    and defibrillator       Family History: family history includes Coronary artery disease in an other family member; Diabetes in an other family member; Hypertension in an other family member; Stroke in an other family member. Otherwise pertinent FHx was reviewed and unremarkable.     Social History:  reports that he quit smoking about 25 years ago. His smoking use included cigarettes. He has never used smokeless tobacco. He reports that he does not use drugs.      Medications:  Available home medication information reviewed.  Medications Prior to Admission   Medication Sig Dispense Refill Last Dose   • atorvastatin (LIPITOR) 80 MG tablet Take 1 tablet by mouth Every Night.   Unknown at Unknown time   • bisoprolol (ZEBeta) 10 MG tablet Take 10 mg by mouth Daily.   Unknown at Unknown time   • doxazosin (CARDURA) 2 MG tablet Take 0.5 tablets by mouth Every Night.   Unknown at Unknown time   • fluticasone (FLONASE) 50 MCG/ACT nasal spray USE 1 SPRAY NASALLY TWICE DAILY (Patient taking differently: 2 sprays into the nostril(s) as directed by provider Daily As Needed for Rhinitis or Allergies. OTC) 48 g 3 Unknown at Unknown time   • insulin detemir (LEVEMIR) 100 UNIT/ML injection Inject 5 Units under the skin into the appropriate area as directed Daily.  12 Unknown at Unknown time   • insulin  lispro (humaLOG) 100 UNIT/ML injection Inject 0-9 Units under the skin into the appropriate area as directed 3 (Three) Times a Day Before Meals.  12 Unknown at Unknown time   • levETIRAcetam (KEPPRA) 500 MG tablet Take 1 tablet by mouth 2 (Two) Times a Day.   Unknown at Unknown time   • levocetirizine (XYZAL) 5 MG tablet Take 5 mg by mouth Every Evening.   Unknown at Unknown time   • metFORMIN (GLUCOPHAGE) 850 MG tablet TAKE 1 TABLET TWICE DAILY WITH MEALS 180 tablet 3 Unknown at Unknown time   • PARoxetine (PAXIL) 20 MG tablet TAKE 1 TABLET EVERY DAY 90 tablet 2 Unknown at Unknown time       No Known Allergies    Objective   Objective     Vital Signs:   Temp:  [99.1 °F (37.3 °C)] 99.1 °F (37.3 °C)  Heart Rate:  [71-74] 71  Resp:  [14-16] 14-94% on room air.  BP: (142-156)/(77-83) 153/77        Physical Exam     GENERAL- Not distressed, mal nourished.  RS- CTA-BL, ,  No wheezing , no crackles, good effort.  CVS- s1s2 Regular,loud murmur in aortic area.  ABD- soft, nontender, not distended,cachectic, no organomegaly.  EXT- no edema.  NEURO- AA, not orented, speech difficult to understand, [place], moving all 4 ext against gravituy,   EYES- Conjunctivae are normal. Pupils are equal, round, and reactive to light. No scleral icterus.   ENT- no external ear nose lesions, mucosa dry.  NECK- No JVD present. No tracheal deviation present. No thyromegaly present,No cervical lymphadenopathy.  JOINTS/MSK- no deformity, no swelling.  SKIN-bruising in upper ext , warm to touch.  PSYCHIATRIC- flat affect  Results Reviewed:  I have personally reviewed current lab and radiology data.    Results from last 7 days   Lab Units 01/22/22  2140   WBC 10*3/mm3 9.82   HEMOGLOBIN g/dL 12.5*   HEMATOCRIT % 37.0*   PLATELETS 10*3/mm3 300     Results from last 7 days   Lab Units 01/22/22  2140 01/19/22  0457 01/18/22  0945   SODIUM mmol/L 127*   < >  --    POTASSIUM mmol/L 3.4*   < >  --    CHLORIDE mmol/L 84*   < >  --    CO2 mmol/L 33.0*   < >   --    BUN mg/dL 10   < >  --    CREATININE mg/dL 0.52*   < >  --    GLUCOSE mg/dL 257*   < >  --    CALCIUM mg/dL 8.6   < >  --    ALT (SGPT) U/L 27  --   --    AST (SGOT) U/L 22  --   --    TROPONIN T ng/mL <0.010  --   --    LACTATE mmol/L  --   --  1.2    < > = values in this interval not displayed.     Estimated Creatinine Clearance: 67.3 mL/min (A) (by C-G formula based on SCr of 0.52 mg/dL (L)).  Brief Urine Lab Results  (Last result in the past 365 days)      Color   Clarity   Blood   Leuk Est   Nitrite   Protein   CREAT   Urine HCG        01/22/22 2155 Yellow   Clear   Negative   Trace   Negative   Trace               Imaging Results (Last 24 Hours)     Procedure Component Value Units Date/Time    CT Head Without Contrast [[id number]] Collected: 01/22/22 2321     Updated: 01/22/22 2323    Narrative:      CT Head WO    HISTORY:   Metal status changes. Recent intracranial hemorrhage.    TECHNIQUE:   Axial unenhanced head CT with multiplanar reformats. Radiation dose reduction techniques included automated exposure control or exposure modulation based on body size. Count of known CT and cardiac nuc med studies performed in previous 12 months: 8.     COMPARISON:   1/17/2022    FINDINGS:   Generalized atrophy is again seen. Ventricular size and configuration remain normal. Advanced chronic small vessel ischemic changes are present in the white matter.  No evidence of acute infarct. No mass. Intracranial atherosclerotic disease is again  seen. There is a small amount of residual intraventricular hemorrhage in the occipital horn of the right lateral ventricle. This appears improved. There is subarachnoid hemorrhage in the prepontine region, predominantly to the left of midline. The volume  is improved. Left frontal parafalcine subdural hemorrhage is less dense than on the prior study. There is some persistent left frontal subdural hemorrhage measuring 3 mm in thickness. Although the volume is improved, this is  slightly more dense  suggesting that some of it may be acute. Scattered areas of subarachnoid hemorrhage in both cerebral hemispheres overall is improved, particularly in the left sylvian fissure. No skull fracture.      Impression:      Persistent intracranial hemorrhage, the majority of which is improved since 1/17/2022. There is some dense left frontal subdural hemorrhage measuring about 3 mm in thickness. Overall volume is improved but the density has increased suggesting that some  of this is more acute.    Signer Name: Hari Simon MD   Signed: 1/22/2022 11:21 PM   Workstation [facility]: [facility]    [facility]    XR Chest 1 View [[id number]] Collected: 01/22/22 2154     Updated: 01/22/22 2156    Narrative:      CR Chest 1 Vw    INDICATION:   Altered mental status for a few days.     COMPARISON:    1/15/2022    FINDINGS:  Portable AP view(s) of the chest.  Stable cardiac silhouette. Left-sided multilead pacemaker/defibrillator unchanged. The aorta is tortuous ectatic and atherosclerotic. Unremarkable vascularity. The right lung is clear. There is chronic elevation of the  left hemidiaphragm with some probable atelectasis in the left lower lung zone. No new effusion or pneumothorax.      Impression:        1. No significant change.    Signer Name: Guy Guaman MD   Signed: 1/22/2022 9:54 PM   Workstation [facility]: [email or web address]    [facility]        Results for orders placed during the hospital encounter of 01/11/22    Adult Transthoracic Echo Complete W/ Cont if Necessary Per Protocol    Interpretation Summary  · Mild aortic valve stenosis is present.  · Estimated right ventricular systolic pressure from tricuspid regurgitation is normal (<35 mmHg).  · LVSF is normal  · MAC    EKG January 15, 2022- paced rhythm at 98 bpm, RBB pattern.  QTc 515 on that  COVID-19-negative      Assessment/Plan   Active Hospital Problems    Diagnosis  POA   • Acute on chronic intracranial  subdural hematoma (HCC) [I62.01, I62.03]  Yes   • Altered mental status [R41.82]  Yes   • Subarachnoid hemorrhage (HCC) [I60.9]  Yes   • Cardiomyopathy, nonischemic (HCC) [I42.8]  Yes   • Aortic stenosis [I35.0]  Yes     moderate     • Essential hypertension [I10]  Yes   • Uncontrolled type 2 diabetes mellitus (HCC) [E11.65]  Yes       Assessment & Plan   Altered mental status/confusion/lethargic-multifactorial    CT head- some dense left frontal subdural hemorrhage measuring about 3 mm in thickness suggesting some increased density but improved volume.  Low-grade fever in ED-continue to monitor, UA-negative, chest x-ray no acute changes  -neuro checks, neurosx consult.    Hyponatremia/hypokalemia- send hyponatremia labs, TSH 1.7, replace potassium, check magnesium level.  Clinically appears dehydrated-hyponatremia labs added. Ns 75.    Arrhythmia on monitor-ekg ordered, replace electrolytes, ANTOINETTE -neg, at some point Cardiology was considering ischemia study.    Hyperlipidemia-Lipitor 80 mg daily    Hypertension-bisoprolol 10 mg daily, Cardura 1 mg daily- taper Cardene drip , to ct home med's.    DM2- Levemir 5 units nightly, metformin 850 mg every 12- blood glucose 257, bicarb of 33, will continue fingerstick coverage      Seizure-Keppra 500 mg every 12    Mood disorder-Paxil 20 mg daily      Hx traumatic subarachnoid hemorrhage- 1/11/2022    CAD- aspirin 81 mg-will hold for now    Cardiomyopathy echo 1/2022-EF of 50%, mild AS,Hx-LBB B- status post AICD      DVT prophylaxis:    -TEDs/SCDs  -Lovenox-none due to intracranial bleed    CODE STATUS:    Code Status and Medical Interventions:   Ordered at: 01/23/22 0321     Level Of Support Discussed With:    Patient     Code Status (Patient has no pulse and is not breathing):    CPR (Attempt to Resuscitate)     Medical Interventions (Patient has pulse or is breathing):    Full Support         Admission Status:  I believe this patient meets inpt status, due to intracranial  bleed needing further monitoring.        Electronically signed by Albertina Yanez MD at 01/23/22 0327       Vital Signs (last 3 days)     Date/Time Temp Temp src Pulse Resp BP Patient Position SpO2    02/02/22 0700 98.2 (36.8) Oral 78 18 167/86 Lying --    02/02/22 0306 98.2 (36.8) Oral 80 18 141/88 Lying 97    02/01/22 2244 97.7 (36.5) Oral 81 16 138/80 Lying 95    02/01/22 1907 98.1 (36.7) Oral 84 18 122/71 Lying 96    02/01/22 1800 -- -- 79 -- -- -- 98    02/01/22 1608 97.2 (36.2) Oral 70 17 136/79 Lying 96    02/01/22 1600 -- -- 80 -- -- -- 94    02/01/22 1400 -- -- 85 -- -- -- --    02/01/22 1200 -- -- 78 -- -- -- --    02/01/22 1110 98.1 (36.7) Oral 72 18 136/79 Sitting 98    02/01/22 1000 -- -- 85 -- -- -- --    02/01/22 0758 98.6 (37) Oral 83 17 143/82 Lying --    02/01/22 0345 97.9 (36.6) Axillary 81 16 132/76 Lying --    01/31/22 2305 97.7 (36.5) Axillary 82 16 141/85 Lying --    01/31/22 1905 97.5 (36.4) Oral 81 16 135/77 Lying --    01/31/22 1800 -- -- 75 -- -- -- 95    01/31/22 1600 -- -- 65 -- -- -- 96    01/31/22 1453 98.1 (36.7) Oral 74 16 141/76 Sitting 97    01/31/22 1400 -- -- 73 -- -- -- 96    01/31/22 1041 98.3 (36.8) Oral 74 16 133/76 Lying 92    01/31/22 0705 98.2 (36.8) Oral 72 16 159/86 Lying 96    01/31/22 0350 98.3 (36.8) Oral -- 18 149/82 Lying --    01/30/22 2330 98.1 (36.7) Oral -- 18 163/89 Lying --    01/30/22 1846 97.9 (36.6) Oral 70 18 147/79 Lying 95    01/30/22 1500 98 (36.7) Axillary 72 16 134/74 Lying 95    01/30/22 1042 98.3 (36.8) Oral 74 16 133/76 Lying 96    01/30/22 0725 98.1 (36.7) Oral 74 16 157/80 Lying 95    01/30/22 0622 -- -- 73 -- -- -- 97    01/30/22 0429 -- -- 76 -- -- -- 95    01/30/22 0300 98.3 (36.8) Axillary 78 16 149/87 Lying 96    01/30/22 0217 -- -- 76 -- -- -- 94    01/30/22 0100 -- -- 77 -- -- -- 94    01/30/22 0002 -- -- 78 -- -- -- 94            Facility-Administered Medications as of 2/2/2022   Medication Dose Route Frequency Provider Last Rate Last  Admin   • acetaminophen (TYLENOL) tablet 650 mg  650 mg Oral Q4H PRN Albertina Yanez MD   650 mg at 02/01/22 0912    Or   • acetaminophen (TYLENOL) 160 MG/5ML solution 650 mg  650 mg Oral Q4H PRN Albertina Yanez MD   650 mg at 01/26/22 1310    Or   • acetaminophen (TYLENOL) suppository 650 mg  650 mg Rectal Q4H PRN Albertina Yanez MD       • atorvastatin (LIPITOR) tablet 80 mg  80 mg Oral Nightly Albertina Yanez MD   80 mg at 02/01/22 2133   • [COMPLETED] barium sulfate (VARIBAR PUDDING) oral paste 20 mL  20 mL Oral Once in imaging Ondina Elizabeth, DO   20 mL at 01/26/22 1138   • [COMPLETED] barium sulfate (VARIBAR THIN LIQUID) oral suspension 100 mL  100 mL Oral Once in imaging OndinaNaviey, DO   100 mL at 01/26/22 1138   • sennosides-docusate (PERICOLACE) 8.6-50 MG per tablet 2 tablet  2 tablet Oral BID Ethan Gilman MD   2 tablet at 02/02/22 0814    And   • polyethylene glycol (MIRALAX) packet 17 g  17 g Oral Daily PRN Ethan Gilman MD        And   • bisacodyl (DULCOLAX) EC tablet 5 mg  5 mg Oral Daily PRN Ethan Gilman MD        And   • bisacodyl (DULCOLAX) suppository 10 mg  10 mg Rectal Daily PRN Ethan Gilman MD       • bisoprolol (ZEBeta) tablet 10 mg  10 mg Oral Daily Albertina Yanez MD   10 mg at 02/02/22 0814   • dextrose (D50W) (25 g/50 mL) IV injection 25 g  25 g Intravenous Q15 Min PRN Albertina Yanez MD       • dextrose (GLUTOSE) oral gel 15 g  15 g Oral Q15 Min PRN Albertina Yanez MD       • famotidine (PEPCID) tablet 40 mg  40 mg Oral Daily Albertina Yanez MD   40 mg at 02/02/22 0814   • [COMPLETED] furosemide (LASIX) tablet 20 mg  20 mg Oral Once Randy Castañeda MD   20 mg at 01/27/22 1331   • glucagon (human recombinant) (GLUCAGEN DIAGNOSTIC) injection 1 mg  1 mg Subcutaneous Q15 Min Albertina Adams MD       • insulin detemir (LEVEMIR) injection 5 Units  5 Units Subcutaneous Q12H Elizabeth Nj DO   5 Units at 02/01/22 2130   • insulin lispro (humaLOG) injection 0-7  Units  0-7 Units Subcutaneous TID AC Albertina Yanez MD   3 Units at 02/01/22 2139   • [COMPLETED] iopamidol (ISOVUE-370) 76 % injection 100 mL  100 mL Intravenous Once in imaging Ana Maria Robles, DO   75 mL at 01/23/22 1508   • levETIRAcetam (KEPPRA) tablet 250 mg  250 mg Oral BID Amy Mckeon MD   250 mg at 02/02/22 0437   • Magnesium Sulfate 2 gram Bolus, followed by 8 gram infusion (total Mg dose 10 grams)- Mg less than or equal to 1mg/dL  2 g Intravenous PRN Yasmin Greene, APRN        Or   • Magnesium Sulfate 2 gram / 50mL Infusion (GIVE X 3 BAGS TO EQUAL 6GM TOTAL DOSE) - Mg 1.1 - 1.5 mg/dl  2 g Intravenous PRN Yasmin Greene, APRN        Or   • Magnesium Sulfate 4 gram infusion- Mg 1.6-1.9 mg/dL  4 g Intravenous PRN Yasmin Greene APRN   Stopped at 01/23/22 1857   • potassium chloride (MICRO-K) CR capsule 40 mEq  40 mEq Oral PRN Ana Maria Robles, DO   40 mEq at 01/26/22 0352    Or   • potassium chloride (KLOR-CON) packet 40 mEq  40 mEq Oral PRN Ana Maria Robles, DO        Or   • potassium chloride 10 mEq in 100 mL IVPB  10 mEq Intravenous Q1H PRN Ana Maria Robles, DO       • [COMPLETED] potassium chloride (KLOR-CON) packet 40 mEq  40 mEq Oral Once Randy Castañeda MD   40 mEq at 01/28/22 1311   • [COMPLETED] potassium chloride 10 mEq in 100 mL IVPB  10 mEq Intravenous Q1H Albertina Yanez MD   Stopped at 01/23/22 0948   • sodium chloride 0.9 % flush 1-10 mL  1-10 mL Intravenous PRN Albertina Yanez MD       • sodium chloride 0.9 % flush 10 mL  10 mL Intravenous Q12H Albertina Yanez MD   10 mL at 02/02/22 0815   • sodium chloride tablet 2 g  2 g Oral BID With Meals Randy Castañeda MD   2 g at 02/02/22 0814   • temazepam (RESTORIL) capsule 15 mg  15 mg Oral Nightly Isela Soler MD   15 mg at 02/01/22 2133   • terazosin (HYTRIN) capsule 2 mg  2 mg Oral Nightly Randy Castañeda MD   2 mg at 02/01/22 2133     Lab Results (last 48 hours)     Procedure Component Value Units  Date/Time    POC Glucose Once [[id number]]  (Normal) Collected: 02/02/22 0700    Specimen: Blood Updated: 02/02/22 0701     Glucose 97 mg/dL      Comment: Meter: [id number] : [id number] Etelvina Santana       POC Glucose Once [[id number]]  (Abnormal) Collected: 02/1940    Specimen: Blood Updated: 02/01/22 1954     Glucose 214 mg/dL      Comment: Meter: [id number] : [id number] Amado Fidelia       POC Glucose Once [[id number]]  (Abnormal) Collected: 02/01/22 1651    Specimen: Blood Updated: 02/01/22 1653     Glucose 155 mg/dL      Comment: Meter: [id number] : [id number] Teena Brown       POC Glucose Once [[id number]]  (Abnormal) Collected: 02/01/22 1107    Specimen: Blood Updated: 02/01/22 1108     Glucose 283 mg/dL      Comment: Meter: [id number] : [id number] Teena Brown       POC Glucose Once [[id number]]  (Normal) Collected: 02/01/22 0757    Specimen: Blood Updated: 02/01/22 0758     Glucose 126 mg/dL      Comment: Meter: [id number] : [id number] Teena Brown       Basic Metabolic Panel [[id number]]  (Abnormal) Collected: 02/01/22 0542    Specimen: Blood Updated: 02/01/22 0730     Glucose 116 mg/dL      BUN 20 mg/dL      Creatinine 0.46 mg/dL      Sodium 138 mmol/L      Potassium 3.8 mmol/L      Chloride 102 mmol/L      CO2 27.0 mmol/L      Calcium 8.8 mg/dL      eGFR Non African Amer >150 mL/min/1.73      BUN/Creatinine Ratio 43.5     Anion Gap 9.0 mmol/L     Narrative:      GFR Normal >60  Chronic Kidney Disease <60  Kidney Failure <15      POC Glucose Once [[id number]]  (Abnormal) Collected: 01/31/22 1917    Specimen: Blood Updated: 01/31/22 1918     Glucose 266 mg/dL      Comment: Meter: [id number] : [id number] Eve Lewis       POC Glucose Once [[id number]]  (Abnormal) Collected: 01/31/22 1618    Specimen: Blood Updated: 01/31/22 1620     Glucose 200 mg/dL      Comment: Meter: [id number] : [id number] Enedina Sylvester       POC Glucose Once [[id number]]  (Abnormal)  Collected: 22 1124    Specimen: Blood Updated: 22 1127     Glucose 162 mg/dL      Comment: Meter: [id number] : [id number] Enedina Sylvester             Imaging Results (Last 48 Hours)     ** No results found for the last 48 hours. **        Operative/Procedure Notes (last 48 hours)  Notes from 22 0904 through 22 0904   No notes of this type exist for this encounter.          Physician Progress Notes (last 48 hours)      Isela Soler MD at 22 0836              [facility] Medicine Services  PROGRESS NOTE    Patient Name: Derian Joya  : 1940  MRN: [id number]    Date of Admission: 2022  Primary Care Physician: Diaz Lee MD    Subjective   Subjective     CC:  Lethargy     HPI:  Asleep currently.  Per report he was up all night, was worse this morning than a day or two before.  Discussed with RN.  Did not wake him this morning.     ROS:  Gen- No fevers, chills  CV- No chest pain, palpitations  Resp- No cough, dyspnea  GI- No N/V/D, abd pain      Objective   Objective     Vital Signs:   Temp:  [97.5 °F (36.4 °C)-98.6 °F (37 °C)] 98.6 °F (37 °C)  Heart Rate:  [65-83] 83  Resp:  [16-17] 17  BP: (132-143)/(76-85) 143/82     Physical Exam:  Constitutional - no acute distress, frail, in bed asleep, did not wake to gentle exam.   HEENT-NCAT, mucous membranes moist  CV-RRR with holosystolic murmur RUSB, blowing   Resp-CTAB  Abd-soft, nontender, nondistended, normoactive bowel sounds  Ext- No lower extremity cyanosis, clubbing or edema bilaterally  Neuro-asleep currently, resps regular  Skin- No rash on exposed UE or LE bilaterally      Results Reviewed:  LAB RESULTS:      Lab 22  0601 22  0759   WBC 9.25 11.23*   HEMOGLOBIN 13.3 13.1   HEMATOCRIT 39.9 39.5   PLATELETS 294 356   MCV 97.1* 95.9         Lab 22  0542 22  0601 22  0712 22  0746 22  1049   SODIUM 138 137 135* 132* 128*   POTASSIUM 3.8 4.0 4.2 4.6  3.7   CHLORIDE 102 100 100 96* 91*   CO2 27.0 28.0 28.0 29.0 27.0   ANION GAP 9.0 9.0 7.0 7.0 10.0   BUN 20 14 20 19 18   CREATININE 0.46* 0.50* 0.46* 0.51* 0.50*   GLUCOSE 116* 117* 121* 138* 288*   CALCIUM 8.8 9.4 8.8 8.9 8.7                         Brief Urine Lab Results  (Last result in the past 365 days)      Color   Clarity   Blood   Leuk Est   Nitrite   Protein   CREAT   Urine HCG        01/22/22 2155 Yellow   Clear   Negative   Trace   Negative   Trace                 Microbiology Results Abnormal     Procedure Component Value - Date/Time    COVID PRE-OP / PRE-PROCEDURE SCREENING ORDER (NO ISOLATION) - Swab, Nasopharynx [[id number]]  (Normal) Collected: 01/22/22 2144    Lab Status: Final result Specimen: Swab from Nasopharynx Updated: 01/22/22 2315    Narrative:      The following orders were created for panel order COVID PRE-OP / PRE-PROCEDURE SCREENING ORDER (NO ISOLATION) - Swab, Nasopharynx.  Procedure                               Abnormality         Status                     ---------                               -----------         ------                     COVID-19, FLU A/B, RSV P...[[id number]]  Normal              Final result                 Please view results for these tests on the individual orders.    COVID-19, FLU A/B, RSV PCR - Swab, Nasopharynx [[id number]]  (Normal) Collected: 01/22/22 2144    Lab Status: Final result Specimen: Swab from Nasopharynx Updated: 01/22/22 2315     COVID19 Not Detected     Influenza A PCR Not Detected     Influenza B PCR Not Detected     RSV, PCR Not Detected    Narrative:      Fact sheet for providers: https://www.fda.gov/media/234146/download    Fact sheet for patients: https://www.fda.gov/media/324965/download    Test performed by PCR.          No radiology results from the last 24 hrs    Results for orders placed during the hospital encounter of 01/11/22    Adult Transthoracic Echo Complete W/ Cont if Necessary Per Protocol    Interpretation Summary  · Mild  aortic valve stenosis is present.  · Estimated right ventricular systolic pressure from tricuspid regurgitation is normal (<35 mmHg).  · LVSF is normal  · MAC      I have reviewed the medications:  Scheduled Meds:atorvastatin, 80 mg, Oral, Nightly  bisoprolol, 10 mg, Oral, Daily  famotidine, 40 mg, Oral, Daily  insulin detemir, 5 Units, Subcutaneous, Q12H  insulin lispro, 0-7 Units, Subcutaneous, TID AC  levETIRAcetam, 250 mg, Oral, BID  senna-docusate sodium, 2 tablet, Oral, BID  sodium chloride, 10 mL, Intravenous, Q12H  sodium chloride, 2 g, Oral, BID With Meals  terazosin, 2 mg, Oral, Nightly      Continuous Infusions:   PRN Meds:.•  acetaminophen **OR** acetaminophen **OR** acetaminophen  •  senna-docusate sodium **AND** polyethylene glycol **AND** bisacodyl **AND** bisacodyl  •  dextrose  •  dextrose  •  glucagon (human recombinant)  •  magnesium sulfate **OR** magnesium sulfate **OR** magnesium sulfate  •  potassium chloride **OR** potassium chloride **OR** potassium chloride  •  sodium chloride    Assessment/Plan   Assessment & Plan     Active Hospital Problems    Diagnosis  POA   • Acute on chronic intracranial subdural hematoma (HCC) [I62.01, I62.03]  Yes   • Altered mental status [R41.82]  Yes   • Subarachnoid hemorrhage (HCC) [I60.9]  Yes   • Cardiomyopathy, nonischemic (HCC) [I42.8]  Yes   • Aortic stenosis [I35.0]  Yes   • Essential hypertension [I10]  Yes   • Uncontrolled type 2 diabetes mellitus (HCC) [E11.65]  Yes      Resolved Hospital Problems   No resolved problems to display.        Brief Hospital Course to date:  Derian Joya is a 82 y.o. male with history of hypertension, hyperlipidemia, diabetes and recent traumatic subarachnoid hemorrhage after a fall while vacuuming.  Was discharged to [place] for SNF.  Represented due to increased lethargy and altered mental status.    - Patient was evaluated by neurosurgery and neurology.  Repeat CT head with stable findings compared to last  admission.  His Keppra dose was decreased to 250 mg twice daily and his Paxil was discontinued.    - Nephrology was consulted for hyponatremia which is gradually improved with a fluid restriction.  Overall patient more alert and improving with therapy.    All problems are new to me.  Chart reviewed and updated.      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 unchanged from 1/11  - No surgical intervention, follow up CT with Neurosurgery in 10 days (2/10/22)  - EEG with no seizures  - altered mentation likely metabolic due to hyponatremia as well as possible medications  - Keppra resumed at a lower dose 250 mg twice daily  - Continue PT/OT     Hyponatremia  - resolved   - SIADH vs secondary to ICH  - holding paxil  - liberalize fluid restriction to 1500 ml/day  - salt tabs BID meals  - sodium improving, 137   - follow up nephrology clinic in 2-4 weeks after discharge     CAD   Chronic systolic heart failure   - 1/22 ECHO with EF 50%   - s/p ICD   - Continue statin --on no antiplatelets due to recent hemorrhage     HLD   -continue statin     HTN   -Continue bisoprolol; BP with fair control     DM   - Continue Levemir to 5 units twice daily and continue sliding scale insulin   - Variable PO intake with some labile blood sugar  - adjust PRN, consider adding prandial humalog     Seizure   -Decrease Keppra dose 250 mg BID   - follow up with Neurology as scheduled 3/7/22     Mood   -Discontinue Paxil; monitor      DVT prophylaxis:  Mechanical DVT prophylaxis orders are present.       AM-PAC 6 Clicks Score (PT): 11 (01/31/22 8899)    Disposition: I expect the patient to be discharged after appeal process is resolved.  Peer to peer review completed 1/27/22;  patient's family now appealing in hopes of getting him into acute rehab.       CODE STATUS:   Code Status and Medical Interventions:   Ordered at: 22 0321     Level Of Support Discussed With:    Patient     Code Status (Patient has no pulse and is not breathing):    CPR (Attempt to Resuscitate)     Medical Interventions (Patient has pulse or is breathing):    Full Support       Isela Soler MD  22                Electronically signed by Isela Soler MD at 22 1520     Ethan Gilman MD at 22 1410              [facility] Medicine Services  PROGRESS NOTE    Patient Name: Derian Joya  : 1940  MRN: [id number]    Date of Admission: 2022  Primary Care Physician: Diaz Lee MD    Subjective   Subjective     CC:  Lethargy     HPI:  No complaints. Would like to shave.    ROS:  Gen- No fevers, chills  CV- No chest pain, palpitations  Resp- No cough, dyspnea  GI- No N/V/D, abd pain      Objective   Objective     Vital Signs:   Temp:  [97.9 °F (36.6 °C)-98.3 °F (36.8 °C)] 98.3 °F (36.8 °C)  Heart Rate:  [70-74] 74  Resp:  [16-18] 16  BP: (133-163)/(74-89) 133/76     Physical Exam:  Constitutional - no acute distress, frail, in bed  HEENT-NCAT, mucous membranes moist  CV-RRR  Resp-CTAB  Abd-soft, nontender, nondistended, normoactive bowel sounds  Ext-No lower extremity cyanosis, clubbing or edema bilaterally  Neuro-alert with mild confusion, moves all extremities   Psych-flat affect   Skin- No rash on exposed UE or LE bilaterally      Results Reviewed:  LAB RESULTS:      Lab 22  0601 22  0759 22  0506   WBC 9.25 11.23* 9.72   HEMOGLOBIN 13.3 13.1 12.1*   HEMATOCRIT 39.9 39.5 36.0*   PLATELETS 294 356 334   MCV 97.1* 95.9 96.0         Lab 22  0601 22  0712 22  0746 22  1049 22  0524   SODIUM 137 135* 132* 128* 128*   POTASSIUM 4.0 4.2 4.6 3.7 4.3   CHLORIDE 100 100 96* 91* 91*   CO2 28.0 28.0 29.0 27.0 26.0    ANION GAP 9.0 7.0 7.0 10.0 11.0   BUN 14 20 19 18 13   CREATININE 0.50* 0.46* 0.51* 0.50* 0.43*   GLUCOSE 117* 121* 138* 288* 178*   CALCIUM 9.4 8.8 8.9 8.7 8.7                         Brief Urine Lab Results  (Last result in the past 365 days)      Color   Clarity   Blood   Leuk Est   Nitrite   Protein   CREAT   Urine HCG        01/22/22 2155 Yellow   Clear   Negative   Trace   Negative   Trace                 Microbiology Results Abnormal     Procedure Component Value - Date/Time    COVID PRE-OP / PRE-PROCEDURE SCREENING ORDER (NO ISOLATION) - Swab, Nasopharynx [[id number]]  (Normal) Collected: 01/22/22 2144    Lab Status: Final result Specimen: Swab from Nasopharynx Updated: 01/22/22 2315    Narrative:      The following orders were created for panel order COVID PRE-OP / PRE-PROCEDURE SCREENING ORDER (NO ISOLATION) - Swab, Nasopharynx.  Procedure                               Abnormality         Status                     ---------                               -----------         ------                     COVID-19, FLU A/B, RSV P...[[id number]]  Normal              Final result                 Please view results for these tests on the individual orders.    COVID-19, FLU A/B, RSV PCR - Swab, Nasopharynx [[id number]]  (Normal) Collected: 01/22/22 2144    Lab Status: Final result Specimen: Swab from Nasopharynx Updated: 01/22/22 2315     COVID19 Not Detected     Influenza A PCR Not Detected     Influenza B PCR Not Detected     RSV, PCR Not Detected    Narrative:      Fact sheet for providers: https://www.fda.gov/media/680546/download    Fact sheet for patients: https://www.fda.gov/media/600493/download    Test performed by PCR.          No radiology results from the last 24 hrs    Results for orders placed during the hospital encounter of 01/11/22    Adult Transthoracic Echo Complete W/ Cont if Necessary Per Protocol    Interpretation Summary  · Mild aortic valve stenosis is present.  · Estimated right ventricular  systolic pressure from tricuspid regurgitation is normal (<35 mmHg).  · LVSF is normal  · MAC      I have reviewed the medications:  Scheduled Meds:atorvastatin, 80 mg, Oral, Nightly  bisoprolol, 10 mg, Oral, Daily  famotidine, 40 mg, Oral, Daily  insulin detemir, 5 Units, Subcutaneous, Q12H  insulin lispro, 0-7 Units, Subcutaneous, TID AC  levETIRAcetam, 250 mg, Oral, BID  senna-docusate sodium, 2 tablet, Oral, BID  sodium chloride, 10 mL, Intravenous, Q12H  sodium chloride, 2 g, Oral, BID With Meals  terazosin, 2 mg, Oral, Nightly      Continuous Infusions:   PRN Meds:.•  acetaminophen **OR** acetaminophen **OR** acetaminophen  •  senna-docusate sodium **AND** polyethylene glycol **AND** bisacodyl **AND** bisacodyl  •  dextrose  •  dextrose  •  glucagon (human recombinant)  •  magnesium sulfate **OR** magnesium sulfate **OR** magnesium sulfate  •  potassium chloride **OR** potassium chloride **OR** potassium chloride  •  sodium chloride    Assessment/Plan   Assessment & Plan     Active Hospital Problems    Diagnosis  POA   • Acute on chronic intracranial subdural hematoma (HCC) [I62.01, I62.03]  Yes   • Altered mental status [R41.82]  Yes   • Subarachnoid hemorrhage (HCC) [I60.9]  Yes   • Cardiomyopathy, nonischemic (HCC) [I42.8]  Yes   • Aortic stenosis [I35.0]  Yes   • Essential hypertension [I10]  Yes   • Uncontrolled type 2 diabetes mellitus (HCC) [E11.65]  Yes      Resolved Hospital Problems   No resolved problems to display.        Brief Hospital Course to date:  Derian Joya is a 82 y.o. male with history of hypertension, hyperlipidemia, diabetes and recent traumatic subarachnoid hemorrhage after a fall while vacuuming presented due to increase lethargy and altered mental status.  Patient was evaluated by neurosurgery and neurology.  Repeat CT head with stable findings compared to last admission.  His Keppra dose was decreased to 250 mg twice daily and his Paxil was discontinued.  Nephrology was consulted  for hyponatremia which is gradually improved with a fluid restriction.  Overall patient more alert and improving with therapy.     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 unchanged from 1/11  - No surgical intervention, follow up CT with Neurosurgery in 10 days (2/10/22)  - EEG with no seizures  - altered mentation likely metabolic due to hyponatremia as well as possible medications  - Keppra resumed at a lower dose 250 mg twice daily  - Continue PT/OT     Hyponatremia  - resolved   - SIADH vs secondary to ICH  - holding paxil  - liberalize fluid restriction to 1500 ml/day  - salt tabs BID meals  - sodium improving, 137   - follow up nephrology clinic in 2-4 weeks after discharge     CAD   Chronic systolic heart failure   - 1/22 ECHO with EF 50%   - s/p ICD   - Continue statin --on no antiplatelets due to recent hemorrhage     HLD   -continue statin     HTN   -Continue bisoprolol; BP with fair control     DM   - Continue Levemir to 5 units twice daily and continue sliding scale insulin   - Variable PO intake with some labile blood sugar  - adjust PRN, consider adding prandial humalog     Seizure   -Decrease Keppra dose 250 mg BID   - follow up with Neurology as scheduled 3/7/22     Mood   -Discontinue Paxil; monitor      DVT prophylaxis:  Mechanical DVT prophylaxis orders are present.       AM-PAC 6 Clicks Score (PT): 14 (01/31/22 0800)    Disposition: I expect the patient to be discharged soon. Peer to peer review completed 1/27/22      CODE STATUS:   Code Status and Medical Interventions:   Ordered at: 01/23/22 0321     Level Of Support Discussed With:    Patient     Code Status (Patient has no pulse and is not breathing):    CPR (Attempt to Resuscitate)     Medical  "Interventions (Patient has pulse or is breathing):    Full Support       Etahn Gilman MD  01/31/22                Electronically signed by Ethan Gilman MD at 01/31/22 1423     Randy Castañeda MD at 01/31/22 1232             LOS: 5 days    Patient Care Team:  Diaz Lee MD as PCP - General    Reason For Visit:  F/U HYPONATREMIA  Subjective     Hyponatremia stable. Clinical status unchanged.     Review of Systems:    Pulm: No soa   CV:  No CP      Objective     atorvastatin, 80 mg, Oral, Nightly  bisoprolol, 10 mg, Oral, Daily  famotidine, 40 mg, Oral, Daily  insulin detemir, 5 Units, Subcutaneous, Q12H  insulin lispro, 0-7 Units, Subcutaneous, TID AC  levETIRAcetam, 250 mg, Oral, BID  senna-docusate sodium, 2 tablet, Oral, BID  sodium chloride, 10 mL, Intravenous, Q12H  sodium chloride, 2 g, Oral, BID With Meals  terazosin, 2 mg, Oral, Nightly             Vital Signs:  Blood pressure 133/76, pulse 72, temperature 98.3 °F (36.8 °C), temperature source Oral, resp. rate 16, height 165.1 cm (65\"), weight 60.9 kg (134 lb 3.2 oz), SpO2 96 %.    Flowsheet Rows      First Filed Value   Admission Height 165.1 cm (65\") Documented at 01/22/2022 2007   Admission Weight 68 kg (150 lb) Documented at 01/22/2022 2007 01/30 0701 - 01/31 0700  In: 831 [P.O.:831]  Out: 800 [Urine:800]    Physical Exam:    General Appearance: NAD, alert and cooperative, A LITTLE CONFUSED.  Eyes: PER, conjunctivae and sclerae normal, no icterus  Lungs: respirations regular and unlabored, no crepitus, clear to auscultation  Heart/CV: regular rhythm & normal rate, no murmur, no gallop, no rub and no edema  Abdomen: not distended, soft, non-tender, no masses,  bowel sounds present  Skin: No rash, Warm and dry    Radiology:            Labs:  Results from last 7 days   Lab Units 01/31/22  0601 01/26/22  0759 01/25/22  0506   WBC 10*3/mm3 9.25 11.23* 9.72   HEMOGLOBIN g/dL 13.3 13.1 12.1*   HEMATOCRIT % 39.9 39.5 36.0*   PLATELETS " 10*3/mm3 294 356 334     Results from last 7 days   Lab Units 01/31/22  0601 01/30/22  0712 01/29/22  0746 01/28/22  1049   SODIUM mmol/L 137 135* 132* 128*   POTASSIUM mmol/L 4.0 4.2 4.6 3.7   CHLORIDE mmol/L 100 100 96* 91*   CO2 mmol/L 28.0 28.0 29.0 27.0   BUN mg/dL 14 20 19 18   CREATININE mg/dL 0.50* 0.46* 0.51* 0.50*   CALCIUM mg/dL 9.4 8.8 8.9 8.7     Results from last 7 days   Lab Units 01/31/22  0601   GLUCOSE mg/dL 117*                       Estimated Creatinine Clearance: 60.3 mL/min (A) (by C-G formula based on SCr of 0.5 mg/dL (L)).      Assessment       Uncontrolled type 2 diabetes mellitus (HCC)    Essential hypertension    Aortic stenosis    Cardiomyopathy, nonischemic (HCC)    Altered mental status    Subarachnoid hemorrhage (HCC)    Acute on chronic intracranial subdural hematoma (HCC)            Impression: Hyponatremia: Euvolemic hyperosmolar hyponatremia:  Na 129 on mos recent testing. Urine studies Urine Na 128 Urine osm 480. Etiology thought to be medication related and possible SIADH in the setting of subarachnoid hemorrhage   Repeat Urine na 80 Urine osm 524        Recommendations:  Continue salt tabs at current dose on discharge. Ok to add PRN diuretics if develop any edema while on salt tabs.   Will need outpatient f/u in renal clinic in 2-4 weeks after discharge.     We will sign off at this point.       Randy Castañeda MD  01/31/22  12:32 EST          Electronically signed by Randy Castañeda MD at 01/31/22 1237       Consult Notes (last 48 hours)  Notes from 01/31/22 0904 through 02/02/22 0904   No notes of this type exist for this encounter.

## 2022-02-02 NOTE — PLAN OF CARE
Goal Outcome Evaluation:  Plan of Care Reviewed With: patient        Progress: improving  Outcome Summary: Patient demonstrated improvement in overal quality of ambulation. Less R side leaning noted. He ambulated 100 feet min assist, with rolling walker. However required max assist to turn walker due to lack of motor planning.

## 2022-02-02 NOTE — THERAPY TREATMENT NOTE
Patient Name: Derian Joya  : 1940    MRN: [id number]                              Today's Date: 2022       Admit Date: 2022    Visit Dx:     ICD-10-CM ICD-9-CM   1. Oral phase dysphagia  R13.11 787.21   2. Acute on chronic intracranial subdural hematoma (HCC)  I62.01 432.1    I62.03    3. Altered mental status, unspecified altered mental status type  R41.82 780.97   4. Cognitive communication deficit  R41.841 799.52     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)     Past Medical History:   Diagnosis Date   • Allergic rhinitis    • Aortic stenosis    • Atopic rhinitis 2016   • Benign non-nodular prostatic hyperplasia with lower urinary tract symptoms 9/15/2017   • CAD (coronary artery disease)    • Cardiomyopathy, ischemic    • Colon polyps     tubular adenoma    • Community acquired pneumonia    • Depression 2016   • Diabetes (HCC)    • Diverticulosis of intestine 2016    Description: Left and sigmoid   • Dyslipidemia    • LBBB (left bundle branch block)    • PAD (peripheral artery disease) (HCC)      Past Surgical History:   Procedure Laterality Date   • CARDIAC CATHETERIZATION      100% cx   • CATARACT EXTRACTION     • COLONOSCOPY W/ BIOPSIES AND POLYPECTOMY  2021    1 benign polyp   • HIP SURGERY Left 2020    Dr Mandy BOUDREAUX   • LEG SURGERY Left 2016    Popliteal Artery stenting by Dr Hdz   • LEG SURGERY Left 2016    skin graft by Dr Nance   • LIPOMA EXCISION      s/p excision on chest   • PACEMAKER  IMPLANTATION  01/2019    and defibrillator      General Information     Row Name 02/02/22 1542          OT Time and Intention    Document Type therapy note (daily note)  -CS     Mode of Treatment occupational therapy  -CS     Row Name 02/02/22 1542          General Information    Patient Profile Reviewed yes  -CS     Existing Precautions/Restrictions fall; other (see comments)  confusion w/ delayed processing  -CS     Barriers to Rehab medically complex; previous functional deficit; cognitive status  -CS     Row Name 02/02/22 1542          Cognition    Orientation Status (Cognition) oriented to; person; place  -CS     Row Name 02/02/22 1542          Safety Issues, Functional Mobility    Safety Issues Affecting Function (Mobility) awareness of need for assistance; insight into deficits/self-awareness; problem-solving; safety precaution awareness; safety precautions follow-through/compliance; sequencing abilities  -CS     Impairments Affecting Function (Mobility) balance; cognition; coordination; endurance/activity tolerance; strength; postural/trunk control  -CS           User Key  (r) = Recorded By, (t) = Taken By, (c) = Cosigned By    Initials Name Provider Type    CS Timothy Pimentel OT Occupational Therapist                 Mobility/ADL's     Row Name 02/02/22 1542          Bed Mobility    Bed Mobility supine-sit; scooting/bridging  -CS     Scooting/Bridging [place] (Bed Mobility) verbal cues; nonverbal cues (demo/gesture); minimum assist (75% patient effort)  -CS     Supine-Sit [place] (Bed Mobility) verbal cues; nonverbal cues (demo/gesture); minimum assist (75% patient effort)  -CS     Bed Mobility, Safety Issues cognitive deficits limit understanding; impaired trunk control for bed mobility  -CS     Assistive Device (Bed Mobility) bed rails; head of bed elevated; draw sheet  -CS     Comment (Bed Mobility) improved sequencing, continues require sequencing cues (L>R), mild dizziness upon reaching EOB  - resolved quickly, improved sitting balance w/ postural cues  -CS     Row Name 02/02/22 1542          Transfers    Transfers sit-stand transfer  -CS     Comment (Transfers) cues for sequencing safe hand placement w/ RW  -CS     Sit-Stand [place] (Transfers) minimum assist (75% patient effort); nonverbal cues (demo/gesture); verbal cues  -CS     Row Name 02/02/22 1542          Sit-Stand Transfer    Assistive Device (Sit-Stand Transfers) walker, front-wheeled  -CS     Row Name 02/02/22 1542          Functional Mobility    Functional Mobility- Comment CGA w/ increased assist as fatigued, tactile cues for RW mngt, postural cues throughout  -CS     Row Name 02/02/22 1542          Activities of Daily Living    BADL Assessment/Intervention lower body dressing  -     Row Name 02/02/22 1542          Lower Body Dressing Assessment/Training    [place] Level (Lower Body Dressing) don; pants/bottoms; maximum assist (25% patient effort); verbal cues  -CS     Position (Lower Body Dressing) supported sitting  -           User Key  (r) = Recorded By, (t) = Taken By, (c) = Cosigned By    Initials Name Provider Type    Timothy Dubon L, OT Occupational Therapist               Obj/Interventions     Row Name 02/02/22 1551          Balance    Balance Assessment sitting static balance; sitting dynamic balance; standing static balance; standing dynamic balance  -     Static Sitting Balance WFL; unsupported; sitting in chair  -     Dynamic Sitting Balance mild impairment; unsupported; sitting in chair  -CS     Static Standing Balance mild impairment; supported; standing  -     Dynamic Standing Balance mild impairment; asymmetrical weight shifting; supported; standing  -CS     Balance Interventions sitting; sit to stand; standing; core stability exercise; UE activity with balance activity  -           User Key  (r) = Recorded By, (t) = Taken By, (c) = Cosigned By    Initials Name Provider Type    Timothy Dubon  L, OT Occupational Therapist               Goals/Plan    No documentation.                Clinical Impression     Row Name 02/02/22 1553          Pain Assessment    Additional Documentation Pain Scale: Numbers Pre/Post-Treatment (Group)  -CS     Row Name 02/02/22 1553          Pain Scale: Numbers Pre/Post-Treatment    Pretreatment Pain Rating 0/10 - no pain  -CS     Posttreatment Pain Rating 0/10 - no pain  -CS     Pain Intervention(s) Repositioned; Ambulation/increased activity  -CS     Row Name 02/02/22 3153          Plan of Care Review    Plan of Care Reviewed With patient  -CS     Progress improving  -CS     Outcome Summary Pt agreeable to OOB activities, only oriented to self. Progressed to Matthew for transfers, ModA for bed mobility, and CGA/Matthew for short distance w/ RW. Will cont IPOT per POC as tolerated. Rec d/c to SNF.  -CS     Row Name 02/02/22 7683          Therapy Plan Review/Discharge Plan (OT)    Anticipated Discharge Disposition (OT) skilled nursing facility; other (see comments)  rehab  -CS     Row Name 02/02/22 1328          Vital Signs    Pre Systolic BP Rehab --  RN cleared for tx  -CS     O2 Delivery Pre Treatment room air  -CS     O2 Delivery Intra Treatment room air  -CS     O2 Delivery Post Treatment room air  -CS     Pre Patient Position Supine  -CS     Intra Patient Position Standing  -CS     Post Patient Position Sitting  -CS     Row Name 02/02/22 2737          Positioning and Restraints    Post Treatment Position chair  -CS     In Chair reclined; notified nsg; sitting; call light within reach; encouraged to call for assist; exit alarm on; waffle cushion; legs elevated  -CS           User Key  (r) = Recorded By, (t) = Taken By, (c) = Cosigned By    Initials Name Provider Type    Timothy Dubon L, OT Occupational Therapist               Outcome Measures     Row Name 02/02/22 6346          How much help from another is currently needed...    Putting on and taking off regular lower body  clothing? 2  -CS     Bathing (including washing, rinsing, and drying) 2  -CS     Toileting (which includes using toilet bed pan or urinal) 2  -CS     Putting on and taking off regular upper body clothing 3  -CS     Taking care of personal grooming (such as brushing teeth) 3  -CS     Eating meals 3  -CS     AM-PAC 6 Clicks Score (OT) 15  -CS     Row Name 02/02/22 1553          How much help from another person do you currently need...    Turning from your back to your side while in flat bed without using bedrails? 3  -SC     Moving from lying on back to sitting on the side of a flat bed without bedrails? 2  -SC     Moving to and from a bed to a chair (including a wheelchair)? 2  -SC     Standing up from a chair using your arms (e.g., wheelchair, bedside chair)? 3  -SC     Climbing 3-5 steps with a railing? 1  -SC     To walk in hospital room? 2  -SC     AM-PAC 6 Clicks Score (PT) 13  -SC     Row Name 02/02/22 1555 02/02/22 1553       Functional Assessment    Outcome Measure Options AM-PAC 6 Clicks Daily Activity (OT)  -CS AM-PAC 6 Clicks Basic Mobility (PT)  -SC          User Key  (r) = Recorded By, (t) = Taken By, (c) = Cosigned By    Initials Name Provider Type    Armani Gilmore, PT Physical Therapist    CS Timothy Pimentel, OT Occupational Therapist                Occupational Therapy Education                 Title: PT OT SLP Therapies (In Progress)     Topic: Occupational Therapy (In Progress)     Point: ADL training (In Progress)     Description:   Instruct learner(s) on proper safety adaptation and remediation techniques during self care or transfers.   Instruct in proper use of assistive devices.              Learning Progress Summary           Patient Acceptance, E,D, NR by  at 2/2/2022 1556    Acceptance, E,D, NR by  at 1/31/2022 1524    Acceptance, E,D, NR by CS at 1/27/2022 1417    Acceptance, E,TB,D, VU,NR by  at 1/25/2022 1540    Acceptance, E,TB,D, VU,NR by  at 1/24/2022 1516                    Point: Home exercise program (In Progress)     Description:   Instruct learner(s) on appropriate technique for monitoring, assisting and/or progressing therapeutic exercises/activities.              Learning Progress Summary           Patient Acceptance, E,D, NR by  at 2/2/2022 1556    Acceptance, E,D, NR by CS at 1/31/2022 1524    Acceptance, E,D, NR by CS at 1/27/2022 1417                   Point: Precautions (In Progress)     Description:   Instruct learner(s) on prescribed precautions during self-care and functional transfers.              Learning Progress Summary           Patient Acceptance, E,D, NR by CS at 2/2/2022 1556    Acceptance, E,D, NR by CS at 1/27/2022 1417    Acceptance, E,TB,D, VU,NR by  at 1/25/2022 1540    Acceptance, E,TB,D, VU,NR by  at 1/24/2022 1516                   Point: Body mechanics (In Progress)     Description:   Instruct learner(s) on proper positioning and spine alignment during self-care, functional mobility activities and/or exercises.              Learning Progress Summary           Patient Acceptance, E,D, NR by  at 2/2/2022 1556    Acceptance, E,D, NR by  at 1/31/2022 1524    Acceptance, E,D, NR by  at 1/27/2022 1417    Acceptance, E,TB,D, VU,NR by  at 1/25/2022 1540    Acceptance, E,TB,D, VU,NR by  at 1/24/2022 1516                               User Key     Initials Effective Dates Name Provider Type Discipline     06/16/21 -  Erika Youngblood, OT Occupational Therapist OT     06/16/21 -  Timothy Pimentel OT Occupational Therapist OT              OT Recommendation and Plan     Plan of Care Review  Plan of Care Reviewed With: patient  Progress: improving  Outcome Summary: Pt agreeable to OOB activities, only oriented to self. Progressed to Matthew for transfers, ModA for bed mobility, and CGA/Matthew for short distance w/ RW. Will cont IPOT per POC as tolerated. Rec d/c to SNF.     Time Calculation:    Time Calculation- OT     Row Name 02/02/22 1556 02/02/22  1521          Time Calculation- OT    OT Start Time 1504  -CS --     OT Received On 02/02/22  -CS --     OT Goal Re-Cert Due Date 02/03/22  -CS --            Timed Charges    63208 - Gait Training Minutes  -- 20  -SC     22038 - OT Therapeutic Activity Minutes 12  -CS --            Total Minutes    Timed Charges Total Minutes 12  -CS 20  -SC      Total Minutes 12  -CS 20  -SC           User Key  (r) = Recorded By, (t) = Taken By, (c) = Cosigned By    Initials Name Provider Type    Armani Gilmore, PT Physical Therapist    CS Timothy Pimentel, OT Occupational Therapist              Therapy Charges for Today     Code Description Service Date Service Provider Modifiers Qty    [id number] HC OT THERAPEUTIC ACT EA 15 MIN 2/2/2022 Timothy Pimentel OT GO 1               Timothy Pimentel OT  2/2/2022

## 2022-02-02 NOTE — PLAN OF CARE
Goal Outcome Evaluation:  Plan of Care Reviewed With: patient        Progress: no change  Outcome Summary: Patient has had a decent shift, sleeping on and off tonight. VSS, and patient has been alert and oriented to self only. No complaints or signs of pain tonight. Nursing staff will continue to monitor and assess the patient.

## 2022-02-02 NOTE — PROGRESS NOTES
[facility] Medicine Services  PROGRESS NOTE    Patient Name: Derian Joya  : 1940  MRN: [id number]    Date of Admission: 2022  Primary Care Physician: Diaz [facility] MD    Subjective   Subjective     CC:  Lethargy     HPI:  Awake in chair, eating supper, says he feels fine and doesn't need anything    ROS:  Gen- No fevers, chills  CV- No chest pain, palpitations  Resp- No cough, dyspnea  GI- No N/V/D, abd pain      Objective   Objective     Vital Signs:   Temp:  [97.2 °F (36.2 °C)-98.2 °F (36.8 °C)] 98.2 °F (36.8 °C)  Heart Rate:  [70-85] 78  Resp:  [16-18] 18  BP: (122-167)/(71-88) 167/86     Physical Exam:  Constitutional awake and alert, NAD,   HEENT-NCAT, mucous membranes moist  CV- RRR with holosystolic murmur RUSB, blowing - no change    Resp- nonlabored at rest, anterior exam is clear   Abd-soft, nontender, nondistended, normoactive bowel sounds  Ext- No lower extremity cyanosis, clubbing or edema bilaterally  Neuro-alert and has clear speech and is feeding himself   Skin- No rash on exposed UE or LE bilaterally      Results Reviewed:  LAB RESULTS:      Lab 22  0601   WBC 9.25   HEMOGLOBIN 13.3   HEMATOCRIT 39.9   PLATELETS 294   MCV 97.1*         Lab 22  0542 22  0601 22  0712 22  0746 22  1049   SODIUM 138 137 135* 132* 128*   POTASSIUM 3.8 4.0 4.2 4.6 3.7   CHLORIDE 102 100 100 96* 91*   CO2 27.0 28.0 28.0 29.0 27.0   ANION GAP 9.0 9.0 7.0 7.0 10.0   BUN 20 14 20 19 18   CREATININE 0.46* 0.50* 0.46* 0.51* 0.50*   GLUCOSE 116* 117* 121* 138* 288*   CALCIUM 8.8 9.4 8.8 8.9 8.7                         Brief Urine Lab Results  (Last result in the past 365 days)      Color   Clarity   Blood   Leuk Est   Nitrite   Protein   CREAT   Urine HCG        22 2155 Yellow   Clear   Negative   Trace   Negative   Trace                 Microbiology Results Abnormal     Procedure Component Value - Date/Time    COVID PRE-OP / PRE-PROCEDURE  SCREENING ORDER (NO ISOLATION) - Swab, Nasopharynx [[id number]]  (Normal) Collected: 01/22/22 2144    Lab Status: Final result Specimen: Swab from Nasopharynx Updated: 01/22/22 2315    Narrative:      The following orders were created for panel order COVID PRE-OP / PRE-PROCEDURE SCREENING ORDER (NO ISOLATION) - Swab, Nasopharynx.  Procedure                               Abnormality         Status                     ---------                               -----------         ------                     COVID-19, FLU A/B, RSV P...[[id number]]  Normal              Final result                 Please view results for these tests on the individual orders.    COVID-19, FLU A/B, RSV PCR - Swab, Nasopharynx [[id number]]  (Normal) Collected: 01/22/22 2144    Lab Status: Final result Specimen: Swab from Nasopharynx Updated: 01/22/22 2315     COVID19 Not Detected     Influenza A PCR Not Detected     Influenza B PCR Not Detected     RSV, PCR Not Detected    Narrative:      Fact sheet for providers: https://www.fda.gov/media/194341/download    Fact sheet for patients: https://www.fda.gov/media/556816/download    Test performed by PCR.          No radiology results from the last 24 hrs    Results for orders placed during the hospital encounter of 01/11/22    Adult Transthoracic Echo Complete W/ Cont if Necessary Per Protocol    Interpretation Summary  · Mild aortic valve stenosis is present.  · Estimated right ventricular systolic pressure from tricuspid regurgitation is normal (<35 mmHg).  · LVSF is normal  · MAC      I have reviewed the medications:  Scheduled Meds:atorvastatin, 80 mg, Oral, Nightly  bisoprolol, 10 mg, Oral, Daily  famotidine, 40 mg, Oral, Daily  insulin detemir, 5 Units, Subcutaneous, Q12H  insulin lispro, 0-7 Units, Subcutaneous, TID AC  levETIRAcetam, 250 mg, Oral, BID  senna-docusate sodium, 2 tablet, Oral, BID  sodium chloride, 10 mL, Intravenous, Q12H  sodium chloride, 2 g, Oral, BID With Meals  temazepam,  15 mg, Oral, Nightly  terazosin, 2 mg, Oral, Nightly      Continuous Infusions:   PRN Meds:.•  acetaminophen **OR** acetaminophen **OR** acetaminophen  •  senna-docusate sodium **AND** polyethylene glycol **AND** bisacodyl **AND** bisacodyl  •  dextrose  •  dextrose  •  glucagon (human recombinant)  •  magnesium sulfate **OR** magnesium sulfate **OR** magnesium sulfate  •  potassium chloride **OR** potassium chloride **OR** potassium chloride  •  sodium chloride    Assessment/Plan   Assessment & Plan     Active Hospital Problems    Diagnosis  POA   • Acute on chronic intracranial subdural hematoma (HCC) [I62.01, I62.03]  Yes   • Altered mental status [R41.82]  Yes   • Subarachnoid hemorrhage (HCC) [I60.9]  Yes   • Cardiomyopathy, nonischemic (HCC) [I42.8]  Yes   • Aortic stenosis [I35.0]  Yes   • Essential hypertension [I10]  Yes   • Uncontrolled type 2 diabetes mellitus (HCC) [E11.65]  Yes      Resolved Hospital Problems   No resolved problems to display.        Brief Hospital Course to date:  Derian Joya is a 82 y.o. male with history of hypertension, hyperlipidemia, diabetes and recent traumatic subarachnoid hemorrhage after a fall while vacuuming.  Was discharged to [facility] for SNF.  Represented due to increased lethargy and altered mental status.    - Patient was evaluated by neurosurgery and neurology.  Repeat CT head with stable findings compared to last admission.  His Keppra dose was decreased to 250 mg twice daily and his Paxil was discontinued.    - Nephrology was consulted for hyponatremia which is gradually improved with a fluid restriction.  Overall patient more alert and improving with therapy.    All problems are new to me.  Chart reviewed and updated.      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 unchanged from 1/11  - No surgical intervention, follow up CT with Neurosurgery in 10 days (2/10/22)  - EEG with no seizures  - altered mentation likely metabolic due to hyponatremia as well as possible medications  - Keppra resumed at a lower dose 250 mg twice daily  - Continue PT/OT     Hyponatremia  - resolved   - SIADH vs secondary to ICH  - holding paxil; liberalize fluid restriction to 1500 ml/day  - salt tabs BID meals  - follow up nephrology clinic in 2-4 weeks after discharge     CAD   Chronic systolic heart failure   - 1/22 ECHO with EF 50%   - s/p ICD   - Continue statin --on no antiplatelets due to recent hemorrhage     HLD   -continue statin     HTN   -Continue bisoprolol; BP with fair control     DM   - Continue Levemir to 5 units twice daily and continue sliding scale insulin   - Variable PO intake with some labile blood sugar  - adjust PRN, consider adding prandial humalog     Seizure   -Decrease Keppra dose 250 mg BID   - follow up with Neurology as scheduled 3/7/22     Mood   -Discontinue Paxil; monitor      DVT prophylaxis:  Mechanical DVT prophylaxis orders are present.       AM-PAC 6 Clicks Score (PT): 12 (02/01/22 2000)    Disposition: I expect the patient to be discharged after appeal process is resolved.  Peer to peer review completed 1/27/22; patient's family now appealing in hopes of getting him into acute rehab.       CODE STATUS:   Code Status and Medical Interventions:   Ordered at: 01/23/22 0321     Level Of Support Discussed With:    Patient     Code Status (Patient has no pulse and is not breathing):    CPR (Attempt to Resuscitate)     Medical Interventions (Patient has pulse or is breathing):    Full Support       Isela Soler MD  02/02/22

## 2022-02-02 NOTE — PLAN OF CARE
Alert.  Orientation fluctuated.  Slept often.  Respirations unlabored.  Monitor AV paced.  PRN Tylenol once for generalized aches.  Up to recliner for lunch and dinner with 1 assist/walker.  Spouse bedside for visit.  Still awaiting placement.

## 2022-02-02 NOTE — THERAPY TREATMENT NOTE
Patient Name: Derian Joya  : 1940    MRN: [id number]                              Today's Date: 2022       Admit Date: 2022    Visit Dx:     ICD-10-CM ICD-9-CM   1. Oral phase dysphagia  R13.11 787.21   2. Acute on chronic intracranial subdural hematoma (HCC)  I62.01 432.1    I62.03    3. Altered mental status, unspecified altered mental status type  R41.82 780.97   4. Cognitive communication deficit  R41.841 799.52     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)     Past Medical History:   Diagnosis Date   • Allergic rhinitis    • Aortic stenosis    • Atopic rhinitis 2016   • Benign non-nodular prostatic hyperplasia with lower urinary tract symptoms 9/15/2017   • CAD (coronary artery disease)    • Cardiomyopathy, ischemic    • Colon polyps     tubular adenoma    • Community acquired pneumonia    • Depression 2016   • Diabetes (HCC)    • Diverticulosis of intestine 2016    Description: Left and sigmoid   • Dyslipidemia    • LBBB (left bundle branch block)    • PAD (peripheral artery disease) (HCC)      Past Surgical History:   Procedure Laterality Date   • CARDIAC CATHETERIZATION      100% cx   • CATARACT EXTRACTION     • COLONOSCOPY W/ BIOPSIES AND POLYPECTOMY  2021    1 benign polyp   • HIP SURGERY Left 2020    Dr Mandy BOUDREAUX   • LEG SURGERY Left 2016    Popliteal Artery stenting by Dr Hdz   • LEG SURGERY Left 2016    skin graft by Dr Nance   • LIPOMA EXCISION      s/p excision on chest   • PACEMAKER  IMPLANTATION  01/2019    and defibrillator      General Information     [facility] Name 02/02/22 1544          Physical Therapy Time and Intention    Document Type therapy note (daily note)  -SC     Mode of Treatment physical therapy  -[facility] Name 02/02/22 1544          General Information    Patient Profile Reviewed yes  -SC     Existing Precautions/Restrictions fall; other (see comments)  delays processing  -SC     Row Name 02/02/22 1544          Safety Issues, Functional Mobility    Comment, Safety Issues/Impairments (Mobility) alert, very slow to follow commands,  -SC           User Key  (r) = Recorded By, (t) = Taken By, (c) = Cosigned By    Initials Name Provider Type    SC Armani Raya, PT Physical Therapist               Mobility     [facility] Name 02/02/22 1545          Bed Mobility    Comment (Bed Mobility) [facility]  -[facility] Name 02/02/22 1548          Transfers    Comment (Transfers) STS from reciner with cues for sequencing and hand placement. Demonstrated slow unsteady standing  -[facility] Name 02/02/22 1545          Sit-Stand Transfer    Sit-Stand [place] (Transfers) 2 person assist; minimum assist (75% patient effort)  -SC     Assistive Device (Sit-Stand Transfers) walker, front-wheeled  -[facility] Name 02/02/22 1545          Gait/Stairs (Locomotion)    [place] Level (Gait) 2 person assist; verbal cues; moderate assist (50% patient effort)  -SC     Assistive Device (Gait) walker, front-wheeled  -SC     Distance in Feet (Gait) 100  -SC     Deviations/Abnormal Patterns (Gait) right sided deviations; base of support, narrow; sheela decreased; weight shifting decreased; gait speed decreased  -SC     Bilateral Gait Deviations forward flexed posture; heel strike decreased  -SC     Left Sided Gait Deviations weight shift ability decreased  -SC     Comment (Gait/Stairs) GT training focused on controling walker . Cues for upright posture and for bigger step length on right. Required manual facillitation  to advance to step through gait pattern. Worked on turning walker verbal and visual cues- Required max assistance to turn walker  -SC           User Key  (r) = Recorded By, (t) = Taken By, (c) = Cosigned By    Initials Name Provider Type    SC Armani Raya PT Physical Therapist               Obj/Interventions     Row Name 02/02/22 1548          Balance    Static Sitting Balance unsupported; mild impairment  -SC     Dynamic Standing Balance moderate impairment; supported  -SC     Comment, Balance worked on dynamic sitting balance with B UE reaching  -SC           User Key  (r) = Recorded By, (t) = Taken By, (c) = Cosigned By    Initials Name Provider Type    SC Armani Raya, PT Physical Therapist               Goals/Plan    No documentation.                Clinical Impression     Row Name 02/02/22 1549          Pain    Additional Documentation Pain Scale: Numbers Pre/Post-Treatment (Group)  -SC     Row Name 02/02/22 1549          Pain Scale: Numbers Pre/Post-Treatment    Pretreatment Pain Rating 0/10 - no pain  -SC     Posttreatment Pain Rating 0/10 - no pain  -SC     Row Name 02/02/22 1549          Plan of Care Review    Plan of Care Reviewed With patient  -SC     Progress improving  -SC     Outcome Summary Patient demonstrated improvement in overal quality of ambulation. Less R side leaning noted. He ambulated 100 feet min assist, with rolling walker. However required max assist to turn walker due to lack of motor planning.  -SC     Row Name 02/02/22 1546          Therapy Assessment/Plan (PT)    Patient/Family Therapy Goals Statement (PT) go to rehab  -SC     Rehab Potential (PT) good, to achieve stated therapy goals  -SC     Criteria for Skilled Interventions Met (PT) yes  -SC     Row Name 02/02/22 1548          Vital Signs    Post Systolic BP Rehab 137  -SC     Post Treatment Diastolic BP 80  -SC     Posttreatment Heart Rate (beats/min) 79  -SC     Row Name 02/02/22 1544          Positioning and Restraints     Pre-Treatment Position sitting in chair/recliner  -SC     Post Treatment Position chair  -SC     In Chair notified nsg; call light within reach; sitting; reclined; encouraged to call for assist; exit alarm on  -SC           User Key  (r) = Recorded By, (t) = Taken By, (c) = Cosigned By    Initials Name Provider Type    Armani Gilmore, PT Physical Therapist               Outcome Measures     Row Name 02/02/22 1553          How much help from another person do you currently need...    Turning from your back to your side while in flat bed without using bedrails? 3  -SC     Moving from lying on back to sitting on the side of a flat bed without bedrails? 2  -SC     Moving to and from a bed to a chair (including a wheelchair)? 2  -SC     Standing up from a chair using your arms (e.g., wheelchair, bedside chair)? 3  -SC     Climbing 3-5 steps with a railing? 1  -SC     To walk in hospital room? 2  -SC     AM-PAC 6 Clicks Score (PT) 13  -SC     Row Name 02/02/22 1555 02/02/22 1553       Functional Assessment    Outcome Measure Options AM-PAC 6 Clicks Daily Activity (OT)  - AM-PAC 6 Clicks Basic Mobility (PT)  -SC          User Key  (r) = Recorded By, (t) = Taken By, (c) = Cosigned By    Initials Name Provider Type    Armani Gilmore, PT Physical Therapist    CS Timothy Pimentel OT Occupational Therapist                             Physical Therapy Education                 Title: PT OT SLP Therapies (In Progress)     Topic: Physical Therapy (Done)     Point: Mobility training (Done)     Learning Progress Summary           Patient Eager, E, VU by SC at 2/2/2022 1553    Comment: reviewed benefits of activity    Acceptance, E, VU,NR by CD at 1/31/2022 1550    Comment: SEE FLOWSHEET.    Acceptance, E, VU,NR by CD at 1/28/2022 1651    Comment: SEE FLOWSHEET.    Acceptance, E, VU,NR by CD at 1/26/2022 1559    Comment: SEE FLOWSHEET    Acceptance, E, NR by CD at 1/25/2022 1643    Comment: SEE FLOWSHEET    Acceptance,  E, NR by BA at 1/24/2022 1537   Significant Other Acceptance, E, VU,NR by CD at 1/28/2022 1651    Comment: SEE FLOWSHEET.                   Point: Home exercise program (Done)     Learning Progress Summary           Patient Eager, E, VU by SC at 2/2/2022 1553    Comment: reviewed benefits of activity    Acceptance, E, VU,NR by CD at 1/31/2022 1550    Comment: SEE FLOWSHEET.    Acceptance, E, VU,NR by CD at 1/28/2022 1651    Comment: SEE FLOWSHEET.    Acceptance, E, VU,NR by CD at 1/26/2022 1559    Comment: SEE FLOWSHEET    Acceptance, E, NR by CD at 1/25/2022 1643    Comment: SEE FLOWSHEET   Significant Other Acceptance, E, VU,NR by CD at 1/28/2022 1651    Comment: SEE FLOWSHEET.                   Point: Body mechanics (Done)     Learning Progress Summary           Patient Eager, E, VU by SC at 2/2/2022 1553    Comment: reviewed benefits of activity    Acceptance, E, VU,NR by CD at 1/31/2022 1550    Comment: SEE FLOWSHEET.    Acceptance, E, VU,NR by CD at 1/28/2022 1651    Comment: SEE FLOWSHEET.    Acceptance, E, VU,NR by CD at 1/26/2022 1559    Comment: SEE FLOWSHEET    Acceptance, E, NR by CD at 1/25/2022 1643    Comment: SEE FLOWSHEET    Acceptance, E, NR by BA at 1/24/2022 1537   Significant Other Acceptance, E, VU,NR by CD at 1/28/2022 1651    Comment: SEE FLOWSHEET.                   Point: Precautions (Done)     Learning Progress Summary           Patient Eager, E, VU by SC at 2/2/2022 1553    Comment: reviewed benefits of activity    Acceptance, E, VU,NR by CD at 1/31/2022 1550    Comment: SEE FLOWSHEET.    Acceptance, E, VU,NR by CD at 1/28/2022 1651    Comment: SEE FLOWSHEET.    Acceptance, E, VU,NR by CD at 1/26/2022 1559    Comment: SEE FLOWSHEET    Acceptance, E, NR by CD at 1/25/2022 1643    Comment: SEE FLOWSHEET    Acceptance, E, NR by BA at 1/24/2022 1537   Significant Other Acceptance, E, VU,NR by CD at 1/28/2022 1657    Comment: SEE FLOWSHEET.                               User Key     Initials  Effective Dates Name Provider Type Discipline    SC 06/16/21 -  Armani Raya PT Physical Therapist PT    CD 06/16/21 -  Marybeth Edwards PT Physical Therapist PT    BA 09/21/21 -  Shannan Gillette PT Physical Therapist PT              PT Recommendation and Plan     Plan of Care Reviewed With: patient  Progress: improving  Outcome Summary: Patient demonstrated improvement in overal quality of ambulation. Less R side leaning noted. He ambulated 100 feet min assist, with rolling walker. However required max assist to turn walker due to lack of motor planning.     Time Calculation:    PT Charges     Row Name 02/02/22 1521             Time Calculation    Start Time 1521  -SC      PT Received On 02/02/22  -SC      PT Goal Re-Cert Due Date 02/03/22  -SC              Time Calculation- PT    Total Timed Code Minutes- PT 25 minute(s)  -SC              Timed Charges    37910 - Gait Training Minutes  20  -SC      63594 - PT Therapeutic Activity Minutes 15  -SC              Total Minutes    Timed Charges Total Minutes 35  -SC       Total Minutes 35  -SC            User Key  (r) = Recorded By, (t) = Taken By, (c) = Cosigned By    Initials Name Provider Type    SC Armani Raya, PT Physical Therapist              Therapy Charges for Today     Code Description Service Date Service Provider Modifiers Qty    23847821192 HC GAIT TRAINING EA 15 MIN 2/2/2022 Armani Raya, PT GP 1    13114954307 HC PT THERAPEUTIC ACT EA 15 MIN 2/2/2022 Armani Raya PT GP 1          PT G-Codes  Outcome Measure Options: AM-PAC 6 Clicks Daily Activity (OT)  AM-PAC 6 Clicks Score (PT): 13  AM-PAC 6 Clicks Score (OT): 15  Modified Kenton Scale: 4 - Moderately severe disability.  Unable to walk without assistance, and unable to attend to own bodily needs without assistance.    Armani Raya PT  2/2/2022

## 2022-02-02 NOTE — CASE MANAGEMENT/SOCIAL WORK
Continued Stay Note  [facility]     Patient Name: Derian Joya  MRN: [id number]  Today's Date: 2/2/2022    Admit Date: 1/22/2022     Discharge Plan     Row Name 02/02/22 1447       Plan    Plan [facility] acute rehab    Plan Comments   Case management continues to await the [facility] outcome for acute rehab at [facility]. Family will be available to transport by private vehicle if safe to do so at the time of discharge. Case management will follow.        Final Discharge Disposition Code 62 - inpatient rehab facility             Expected Discharge Date and Time     Expected Discharge Date Expected Discharge Time    Feb 5, 2022             Arelis Nguyen RN

## 2022-02-02 NOTE — PLAN OF CARE
Problem: Adult Inpatient Plan of Care  Goal: Plan of Care Review  Recent Flowsheet Documentation  Taken 2/2/2022 1553 by Timothy Pimentel, OT  Progress: improving  Plan of Care Reviewed With: patient  Outcome Summary: Pt agreeable to OOB activities, only oriented to self. Progressed to Matthew for transfers, ModA for bed mobility, and CGA/Matthew for short distance w/ RW. Will cont IPOT per POC as tolerated. Rec d/c to SNF.

## 2022-02-02 NOTE — PLAN OF CARE
Goal Outcome Evaluation:  Plan of Care Reviewed With: patient, spouse    SLP treatment completed. Will continue to address dysphagia and cognitive communication in tx. Please see note for further details and recommendations.

## 2022-02-02 NOTE — THERAPY TREATMENT NOTE
Acute Care - Speech Language Pathology Treatment Note  [facility]     Patient Name: Derian Joya  : 1940  MRN: [id number]  Today's Date: 2022               Admit Date: 2022     Visit Dx:    ICD-10-CM ICD-9-CM   1. Oral phase dysphagia  R13.11 787.21   2. Acute on chronic intracranial subdural hematoma (HCC)  I62.01 432.1    I62.03    3. Altered mental status, unspecified altered mental status type  R41.82 780.97   4. Cognitive communication deficit  R41.841 799.52     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)     Past Medical History:   Diagnosis Date   • Allergic rhinitis    • Aortic stenosis    • Atopic rhinitis 2016   • Benign non-nodular prostatic hyperplasia with lower urinary tract symptoms 9/15/2017   • CAD (coronary artery disease)    • Cardiomyopathy, ischemic    • Colon polyps     tubular adenoma    • Community acquired pneumonia    • Depression 2016   • Diabetes (HCC)    • Diverticulosis of intestine 2016    Description: Left and sigmoid   • Dyslipidemia    • LBBB (left bundle branch block)    • PAD (peripheral artery disease) (HCC)      Past Surgical History:   Procedure Laterality Date   • CARDIAC CATHETERIZATION      100% cx   • CATARACT EXTRACTION     • COLONOSCOPY W/ BIOPSIES AND POLYPECTOMY  2021    1 benign polyp   • HIP SURGERY Left 2020    Dr Mandy BOUDREAUX   • LEG SURGERY Left 2016    Popliteal Artery stenting by Dr Hdz   • LEG SURGERY Left 2016    skin graft by Dr Nance   • LIPOMA EXCISION       s/p excision on chest   • PACEMAKER IMPLANTATION  01/2019    and defibrillator       SLP Recommendation and Plan  SLP Diagnosis: moderate cognitive linguistic impiarment (02/02/22 0900)        Swallow Criteria for Skilled Therapeutic Interventions Met: demonstrates skilled criteria (02/02/22 0900)  SLC Criteria for Skilled Therapy Interventions Met: yes (02/02/22 0900)  Anticipated Discharge Disposition (SLP): anticipate therapy at next level of care (02/02/22 0900)     Therapy Frequency (Swallow): 5 days per week (02/02/22 0900)  Predicted Duration Therapy Intervention (Days): until discharge (02/02/22 0900)  Daily Summary of Progress (SLP): progress toward functional goals is good (02/02/22 0900)        Communication Strategy Suggestions: eliminate distractions when speaking with patient, avoid open-ended questions, avoid multi-step instructions (02/02/22 0900)  Treatment Assessment (SLP): Patient participated with dysphagia and cognitive comunication tx this am. Mild response latency, but improving language and cognition. No s/s of aspiration or difficulty with trial sof thin liquids via straw or with soft or regular solid trials. Diet consistency upgraded to regular and thin liquids. SLP to continue to follow to address diet tolerance and cognitive communication in tx. (02/02/22 0900)  Plan for Continued Treatment (SLP): continue treatment per plan of care (02/02/22 0900)         SLP EVALUATION (last 72 hours)     SLP SLC Evaluation     Row Name 02/02/22 0900                   Communication Assessment/Intervention    Document Type therapy note (daily note)  -CH        Subjective Information no complaints  -CH        Patient Observations alert; cooperative; agree to therapy  -CH        Patient/Family/Caregiver Comments/Observations No family present  -CH        Patient Effort good  -CH        Symptoms Noted During/After Treatment none  -CH                  General Information    Patient Profile Reviewed yes   -                  Pain    Additional Documentation Pain Scale: FACES Pre/Post-Treatment (Group); Pain Scale: Numbers Pre/Post-Treatment (Group)  -                  Pain Scale: Numbers Pre/Post-Treatment    Pretreatment Pain Rating 0/10 - no pain  -        Posttreatment Pain Rating 0/10 - no pain  -                  Pain Scale: FACES Pre/Post-Treatment    Pain: FACES Scale, Pretreatment 0-->no hurt  -CH        Posttreatment Pain Rating 0-->no hurt  -                  SLP Evaluation Clinical Impressions    SLP Diagnosis moderate cognitive linguistic impiarment  -        Rehab Potential/Prognosis good  -        SLC Criteria for Skilled Therapy Interventions Met yes  -        Functional Impact functional impact in ADLs; difficulty communicating wants, needs  -                  SLP Treatment Clinical Impressions    Treatment Assessment (SLP) Patient participated with dysphagia and cognitive comunication tx this am. Mild response latency, but improving language and cognition. No s/s of aspiration or difficulty with trial sof thin liquids via straw or with soft or regular solid trials. Diet consistency upgraded to regular and thin liquids. SLP to continue to follow to address diet tolerance and cognitive communication in tx.  -        Daily Summary of Progress (SLP) progress toward functional goals is good  -        Barriers to Overall Progress (SLP) Cognitive status  -        Plan for Continued Treatment (SLP) continue treatment per plan of care  -        Care Plan Review care plan/treatment goals reviewed  -                  Recommendations    Therapy Frequency (SLP SLC) 5 days per week  -        Predicted Duration Therapy Intervention (Days) until discharge  -        Anticipated Discharge Disposition (SLP) anticipate therapy at next level of care  -        Communication Strategy Suggestions eliminate distractions when speaking with patient; avoid open-ended questions; avoid multi-step  instructions  -CH              User Key  (r) = Recorded By, (t) = Taken By, (c) = Cosigned By    Initials Name Effective Dates    CH Marilu Cheema, MS CCC-SLP 06/16/21 -                    EDUCATION  The patient has been educated in the following areas:     Cognitive Impairment Communication Impairment.           SLP GOALS     Row Name 02/02/22 0900 01/31/22 8955          Oral Nutrition/Hydration Goal 1 (SLP)    Oral Nutrition/Hydration Goal 1, SLP LTG: Pt will return to least restrictive diet w/o s/s of aspiration.  -CH LTG: Pt will return to least restrictive diet w/o s/s of aspiration.  -HG     Time Frame (Oral Nutrition/Hydration Goal 1, SLP) by discharge  -CH by discharge  -HG     Barriers (Oral Nutrition/Hydration Goal 1, SLP) diet upgraded to regular and thin liquids. SLP to f/u for diet tolerance, adjustments as appropriate.  -CH --     Progress/Outcomes (Oral Nutrition/Hydration Goal 1, SLP) continuing progress toward goal  -CH continuing progress toward goal  -HG            Oral Nutrition/Hydration Goal 2 (SLP)    Oral Nutrition/Hydration Goal 2, SLP Pt will tolerate trials of thin liquid and soft/ground solids w/o s/sxs aspiration w/ 100% acc and 1:1 assist.  -CH Pt will tolerate trials of thin liquid and soft/ground solids w/o s/sxs aspiration w/ 100% acc and 1:1 assist.  -HG     Time Frame (Oral Nutrition/Hydration Goal 2, SLP) short term goal (STG)  -CH short term goal (STG)  -HG     Barriers (Oral Nutrition/Hydration Goal 2, SLP) No s/s of aspiration with regular,soft solids or thin liquids. Diet upgraded to regular consistency. SLP to f/u for diet tolerance, adjustments as appropriate.  -CH Pt tolerating mixed fruit and myron cracker trial with water wash with no s/s of oral dysphagia or aspiration.  -HG     Progress/Outcomes (Oral Nutrition/Hydration Goal 2, SLP) good progress toward goal  -CH good progress toward goal  -HG            Oral Nutrition/Hydration Goal (SLP)    Oral  Nutrition/Hydration Goal, SLP Pt will tolerate therapeutic trials of soft solids w/ adequate oral prep/transit and no s/sxs aspiration w/ 100% acc and 1:1 assist in order to upgrade diet.  -CH Pt will tolerate therapeutic trials of soft solids w/ adequate oral prep/transit and no s/sxs aspiration w/ 100% acc and 1:1 assist in order to upgrade diet.  -HG     Time Frame (Oral Nutrition/Hydration Goal, SLP) short term goal (STG)  -CH short term goal (STG)  -HG     Barriers (Oral Nutrition/Hydration Goal, SLP) No s/s of aspiration with regular,soft solids or thin liquids. Diet upgraded to regular consistency. SLP to f/u for diet tolerance, adjustments as appropriate.  - Pt tolerating trials of soft and myron cracker with no oral issues.  -HG     Progress/Outcomes (Oral Nutrition/Hydration Goal, SLP) good progress toward goal  -CH good progress toward goal  -HG            Words/Phrases/Sentences Goal 1 (SLP)    Improve Ability to Comprehend Words/Phrases/Sentences Through: Goal 1 (SLP) identify objects, field of; 70%; with maximum cues (25-49%); other (comment)  -CH --     Time Frame (Identify Objects and Pictures Goal 1, SLP) short term goal (STG)  -CH --     Progress (Ability to Contruct Words/Phrases/Sentences Goal 1, SLP) 80%; with minimal cues (75-90%)  -CH --     Progress/Outcomes (Identify Objects and Pictures Goal 1, SLP) good progress toward goal  -CH --            Follow Directions Goal 2 (SLP)    Improve Ability to Follow Directions Goal 1 (SLP) 2 step commands; 3 step commands; 80%; with minimal cues (75-90%)  -CH --     Time Frame (Follow Directions Goal 1, SLP) short term goal (STG)  -CH --     Progress (Ability to Follow Directions Goal 1, SLP) 90%; with minimal cues (75-90%)  - --     Progress/Outcomes (Follow Directions Goal 1, SLP) goal revised this date  - --     Comment (Follow Directions Goal 1, SLP) met goal for 1 step  -CH --            Word Retrieval Skills Goal 1 (SLP)    Improve Word  Retrieval Skills By Goal 1 (SLP) low frequency; responsive naming task; completing a divergent task; completing a convergent task; 80%; with minimal cues (75-90%)  -CH --     Time Frame (Word Retrieval Goal 1, SLP) by discharge  -CH --     Progress (Word Retrieval Skills Goal 1, SLP) 60%; with minimal cues (75-90%)  -CH --     Progress/Outcomes (Word Retrieval Goal 1, SLP) continuing progress toward goal  -CH --     Comment (Word Retrieval Goal 1, SLP) responsive naming and divergent task  -CH --            Connected Speech to Express Thoughts Goal 1 (SLP)    Improve Narrative Discourse to Express Thoughts By Goal 1 (SLP) explaining a proverb or idiom; conversational task on a given topic; 80%; with minimal cues (75-90%)  -CH --     Time Frame (Connected Speech Goal 1, [facility]) by discharge  -CH --     Progress (Connected Speech Goal 1, SLP) 70%; with minimal cues (75-90%)  -CH --     Progress/Outcomes (Connected Speech Goal 1, SLP) continuing progress toward goal  -CH --            Awareness of Basic Personal Information Goal 1 (SLP)    Improve Awareness of Basic Personal Information Goal 1 (SLP) answering yes/no questions regarding personal/biographical information; naming self, family members; 70%; with maximum cues (25-49%)  -CH --     Time Frame (Awareness of Basic Personal Information Goal 1, SLP) short term goal (STG)  -CH --     Progress (Awareness of Basic Personal Information Goal 1, SLP) 90%; independently (over 90% accuracy)  -CH --     Progress/Outcomes (Awareness of Basic Personal Information Goal 1, SLP) goal met  -CH --            Attention Goal 1 (SLP)    Improve Attention by Goal 1 (SLP) looking at speaker; attending to task; 70%; with maximum cues (25-49%)  -CH --     Time Frame (Attention Goal 1, SLP) short term goal (STG)  -CH --     Progress (Attention Goal 1, SLP) 70%; with minimal cues (75-90%)  -CH --     Progress/Outcomes (Attention Goal 1, SLP) good progress toward goal  -CH --             Additional Goal 1 (SLP)    Additional Goal 1, SLP LTG: Pt will improve cognitive-linguistic skills in order to participate in care in hospital setting 100% of the time w/ mod cues.  -CH --     Time Frame (Additional Goal 1, SLP) by discharge  -CH --     Progress/Outcomes (Additional Goal 1, SLP) good progress toward goal  -CH --           User Key  (r) = Recorded By, (t) = Taken By, (c) = Cosigned By    Initials Name Provider Type    Jenny Sherwood MS CCC-SLP Speech and Language Pathologist    Marilu Monahan MS CCC-SLP Speech and Language Pathologist                        Time Calculation:      Time Calculation- SLP     Row Name 22 0957             Time Calculation- SLP    SLP Start Time 0900  -CH      SLP Received On 22  -              Untimed Charges    20584-HN Treatment/ST Modification Prosth Aug Alter  47  -CH      05882-VG Treatment Swallow Minutes 38  -CH              Total Minutes    Untimed Charges Total Minutes 85  -CH       Total Minutes 85  -CH            User Key  (r) = Recorded By, (t) = Taken By, (c) = Cosigned By    Initials Name Provider Type    Marilu Monahan MS CCC-SLP Speech and Language Pathologist                Therapy Charges for Today     Code Description Service Date Service Provider Modifiers Qty    34406595560 HC ST TREATMENT SPEECH 3 2022 Marilu Cheema MS CCC-SLP GN 1    [id number] HC ST TREATMENT SWALLOW 3 2022 Marilu Cheema MS CCC-SLP GN 1                     Marilu Cheema MS CCC-SLP  2022 and Acute Care - Speech Language Pathology   Swallow Treatment Note  [place]     Patient Name: Derian Jyoa  : 1940  MRN: [id number]  Today's Date: 2022               Admit Date: 2022    Visit Dx:     ICD-10-CM ICD-9-CM   1. Oral phase dysphagia  R13.11 787.21   2. Acute on chronic intracranial subdural hematoma (HCC)  I62.01 432.1    I62.03    3. Altered mental status, unspecified altered mental status type  R41.82  780.97   4. Cognitive communication deficit  R41.841 799.52     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)     Past Medical History:   Diagnosis Date   • Allergic rhinitis    • Aortic stenosis    • Atopic rhinitis 7/6/2016   • Benign non-nodular prostatic hyperplasia with lower urinary tract symptoms 9/15/2017   • CAD (coronary artery disease)    • Cardiomyopathy, ischemic    • Colon polyps     tubular adenoma 2016   • Community acquired pneumonia    • Depression 7/6/2016   • Diabetes (HCC)    • Diverticulosis of intestine 7/6/2016    Description: Left and sigmoid   • Dyslipidemia    • LBBB (left bundle branch block)    • PAD (peripheral artery disease) (HCC)      Past Surgical History:   Procedure Laterality Date   • CARDIAC CATHETERIZATION  2018    100% cx   • CATARACT EXTRACTION     • COLONOSCOPY W/ BIOPSIES AND POLYPECTOMY  05/2021    1 benign polyp   • HIP SURGERY Left 08/2020    Dr Mandy BOUDREAUX   • LEG SURGERY Left 07/2016    Popliteal Artery stenting by Dr Hdz   • LEG SURGERY Left 07/2016    skin graft by Dr Nance   • LIPOMA EXCISION      s/p excision on chest   • PACEMAKER IMPLANTATION  01/2019    and defibrillator       SLP Recommendation and Plan  SLP Swallowing Diagnosis: mild, oral dysphagia, functional pharyngeal phase (02/02/22 0900)  SLP Diet Recommendation: regular textures, thin liquids (02/02/22 0900)  Recommended Precautions and Strategies: upright posture during/after eating, small bites of food and sips of liquid, general aspiration  precautions (02/02/22 0900)  SLP Rec. for Method of Medication Administration: meds whole, with pudding or applesauce, as tolerated (02/02/22 0900)     Monitor for Signs of Aspiration: yes, notify SLP if any concerns (02/02/22 0900)     Swallow Criteria for Skilled Therapeutic Interventions Met: demonstrates skilled criteria (02/02/22 0900)  Anticipated Discharge Disposition (SLP): anticipate therapy at next level of care (02/02/22 0900)  Rehab Potential/Prognosis, Swallowing: good, to achieve stated therapy goals (02/02/22 0900)  Therapy Frequency (Swallow): 5 days per week (02/02/22 0900)  Predicted Duration Therapy Intervention (Days): until discharge (02/02/22 0900)     Daily Summary of Progress (SLP): progress toward functional goals is good (02/02/22 0900)            Communication Strategy Suggestions: eliminate distractions when speaking with patient, avoid open-ended questions, avoid multi-step instructions (02/02/22 0900)  Treatment Assessment (SLP): Patient participated with dysphagia and cognitive comunication tx this am. Mild response latency, but improving language and cognition. No s/s of aspiration or difficulty with trial sof thin liquids via straw or with soft or regular solid trials. Diet consistency upgraded to regular and thin liquids. SLP to continue to follow to address diet tolerance and cognitive communication in tx. (02/02/22 0900)  Plan for Continued Treatment (SLP): continue treatment per plan of care (02/02/22 0900)         Plan of Care Reviewed With: patient, spouse      SWALLOW EVALUATION (last 72 hours)     SLP Adult Swallow Evaluation     Row Name 02/02/22 0900 01/31/22 0090                Rehab Evaluation    Document Type -- therapy note (daily note)  -HG       Subjective Information -- no complaints  -HG       Patient Observations -- alert; cooperative; agree to therapy  -HG       Patient/Family/Caregiver Comments/Observations -- Spouse present at end of session. States that he  looks better this date. Pt requesting dog be snuck in.  -       Patient Effort -- good  -       Symptoms Noted During/After Treatment -- none  -                Pain Scale: Numbers Pre/Post-Treatment    Pretreatment Pain Rating -- 0/10 - no pain  -                SLP Evaluation Clinical Impression    SLP Swallowing Diagnosis mild; oral dysphagia; functional pharyngeal phase  - --       Functional Impact risk of aspiration/pneumonia; risk of malnutrition; risk of dehydration  - --       Rehab Potential/Prognosis, Swallowing good, to achieve stated therapy goals  - --       Swallow Criteria for Skilled Therapeutic Interventions Met demonstrates skilled criteria  -CH --                SLP Treatment Clinical Impressions    Treatment Assessment (SLP) -- Trial of solids and pt tolerated with no s/s of aspiration or dysphagia  -       Daily Summary of Progress (SLP) -- progress toward functional goals as expected  -       Plan for Continued Treatment (SLP) -- continue treatment per plan of care  -       Care Plan Review -- care plan/treatment goals reviewed  -       Care Plan Review, Other Participant(s) -- spouse  -                Recommendations    Therapy Frequency (Swallow) 5 days per week  - --       SLP Diet Recommendation regular textures; thin liquids  - --       Recommended Precautions and Strategies upright posture during/after eating; small bites of food and sips of liquid; general aspiration precautions  - --       Oral Care Recommendations Oral Care BID/PRN  - --       SLP Rec. for Method of Medication Administration meds whole; with pudding or applesauce; as tolerated  - --       Monitor for Signs of Aspiration yes; notify SLP if any concerns  - --             User Key  (r) = Recorded By, (t) = Taken By, (c) = Cosigned By    Initials Name Effective Dates     Jenny Reeves MS CCC-SLP 06/16/21 -      Marilu Cheema MS CCC-SLP 06/16/21 -                  EDUCATION  The patient has been educated in the following areas:   Dysphagia (Swallowing Impairment) Oral Care/Hydration.        SLP GOALS     Row Name 02/02/22 0900 01/31/22 1515          Oral Nutrition/Hydration Goal 1 (SLP)    Oral Nutrition/Hydration Goal 1, SLP LTG: Pt will return to least restrictive diet w/o s/s of aspiration.  -CH LTG: Pt will return to least restrictive diet w/o s/s of aspiration.  -HG     Time Frame (Oral Nutrition/Hydration Goal 1, SLP) by discharge  -CH by discharge  -HG     Barriers (Oral Nutrition/Hydration Goal 1, SLP) diet upgraded to regular and thin liquids. SLP to f/u for diet tolerance, adjustments as appropriate.  -CH --     Progress/Outcomes (Oral Nutrition/Hydration Goal 1, SLP) continuing progress toward goal  -CH continuing progress toward goal  -HG            Oral Nutrition/Hydration Goal 2 (SLP)    Oral Nutrition/Hydration Goal 2, SLP Pt will tolerate trials of thin liquid and soft/ground solids w/o s/sxs aspiration w/ 100% acc and 1:1 assist.  -CH Pt will tolerate trials of thin liquid and soft/ground solids w/o s/sxs aspiration w/ 100% acc and 1:1 assist.  -HG     Time Frame (Oral Nutrition/Hydration Goal 2, SLP) short term goal (STG)  -CH short term goal (STG)  -HG     Barriers (Oral Nutrition/Hydration Goal 2, SLP) No s/s of aspiration with regular,soft solids or thin liquids. Diet upgraded to regular consistency. SLP to f/u for diet tolerance, adjustments as appropriate.  -CH Pt tolerating mixed fruit and myron cracker trial with water wash with no s/s of oral dysphagia or aspiration.  -HG     Progress/Outcomes (Oral Nutrition/Hydration Goal 2, SLP) good progress toward goal  -CH good progress toward goal  -HG            Oral Nutrition/Hydration Goal (SLP)    Oral Nutrition/Hydration Goal, SLP Pt will tolerate therapeutic trials of soft solids w/ adequate oral prep/transit and no s/sxs aspiration w/ 100% acc and 1:1 assist in order to upgrade diet.  -CH Pt will  tolerate therapeutic trials of soft solids w/ adequate oral prep/transit and no s/sxs aspiration w/ 100% acc and 1:1 assist in order to upgrade diet.  -HG     Time Frame (Oral Nutrition/Hydration Goal, SLP) short term goal (STG)  -CH short term goal (STG)  -HG     Barriers (Oral Nutrition/Hydration Goal, SLP) No s/s of aspiration with regular,soft solids or thin liquids. Diet upgraded to regular consistency. SLP to f/u for diet tolerance, adjustments as appropriate.  -CH Pt tolerating trials of soft and myron cracker with no oral issues.  -HG     Progress/Outcomes (Oral Nutrition/Hydration Goal, SLP) good progress toward goal  -CH good progress toward goal  -HG            Words/Phrases/Sentences Goal 1 (SLP)    Improve Ability to Comprehend Words/Phrases/Sentences Through: Goal 1 (SLP) identify objects, field of; 70%; with maximum cues (25-49%); other (comment)  -CH --     Time Frame (Identify Objects and Pictures Goal 1, SLP) short term goal (STG)  -CH --     Progress (Ability to Contruct Words/Phrases/Sentences Goal 1, SLP) 80%; with minimal cues (75-90%)  -CH --     Progress/Outcomes (Identify Objects and Pictures Goal 1, SLP) good progress toward goal  -CH --            Follow Directions Goal 2 (SLP)    Improve Ability to Follow Directions Goal 1 (SLP) 2 step commands; 3 step commands; 80%; with minimal cues (75-90%)  -CH --     Time Frame (Follow Directions Goal 1, SLP) short term goal (STG)  -CH --     Progress (Ability to Follow Directions Goal 1, SLP) 90%; with minimal cues (75-90%)  -CH --     Progress/Outcomes (Follow Directions Goal 1, SLP) goal revised this date  -CH --     Comment (Follow Directions Goal 1, SLP) met goal for 1 step  -CH --            Word Retrieval Skills Goal 1 (SLP)    Improve Word Retrieval Skills By Goal 1 (SLP) low frequency; responsive naming task; completing a divergent task; completing a convergent task; 80%; with minimal cues (75-90%)  -CH --     Time Frame (Word Retrieval  Goal 1, SLP) by discharge  -CH --     Progress (Word Retrieval Skills Goal 1, SLP) 60%; with minimal cues (75-90%)  -CH --     Progress/Outcomes (Word Retrieval Goal 1, SLP) continuing progress toward goal  -CH --     Comment (Word Retrieval Goal 1, SLP) responsive naming and divergent task  -CH --            Connected Speech to Express Thoughts Goal 1 (SLP)    Improve Narrative Discourse to Express Thoughts By Goal 1 (SLP) explaining a proverb or idiom; conversational task on a given topic; 80%; with minimal cues (75-90%)  -CH --     Time Frame (Connected Speech Goal 1, SLP) by discharge  -CH --     Progress (Connected Speech Goal 1, SLP) 70%; with minimal cues (75-90%)  -CH --     Progress/Outcomes (Connected Speech Goal 1, SLP) continuing progress toward goal  -CH --            Awareness of Basic Personal Information Goal 1 (SLP)    Improve Awareness of Basic Personal Information Goal 1 (SLP) answering yes/no questions regarding personal/biographical information; naming self, family members; 70%; with maximum cues (25-49%)  -CH --     Time Frame (Awareness of Basic Personal Information Goal 1, SLP) short term goal (STG)  -CH --     Progress (Awareness of Basic Personal Information Goal 1, SLP) 90%; independently (over 90% accuracy)  -CH --     Progress/Outcomes (Awareness of Basic Personal Information Goal 1, SLP) goal met  -CH --            Attention Goal 1 (SLP)    Improve Attention by Goal 1 (SLP) looking at speaker; attending to task; 70%; with maximum cues (25-49%)  -CH --     Time Frame (Attention Goal 1, SLP) short term goal (STG)  -CH --     Progress (Attention Goal 1, SLP) 70%; with minimal cues (75-90%)  -CH --     Progress/Outcomes (Attention Goal 1, SLP) good progress toward goal  -CH --            Additional Goal 1 (SLP)    Additional Goal 1, SLP LTG: Pt will improve cognitive-linguistic skills in order to participate in care in hospital setting 100% of the time w/ mod cues.  -CH --     Time Frame  (Additional Goal 1, SLP) by discharge  - --     Progress/Outcomes (Additional Goal 1, SLP) good progress toward goal  -CH --           User Key  (r) = Recorded By, (t) = Taken By, (c) = Cosigned By    Initials Name Provider Type    CASPER Leandro Jenny BURCH, MS CCC-SLP Speech and Language Pathologist    Marilu Monahan MS CCC-SLP Speech and Language Pathologist                   Time Calculation:    Time Calculation- SLP     Row Name 02/02/22 0957             Time Calculation- SLP    SLP Start Time 0900  -      SLP Received On 02/02/22  -              Untimed Charges    56273-KR Treatment/ST Modification Prosth Aug Alter  47  -CH      01955-IM Treatment Swallow Minutes 38  -CH              Total Minutes    Untimed Charges Total Minutes 85  -CH       Total Minutes 85  -CH            User Key  (r) = Recorded By, (t) = Taken By, (c) = Cosigned By    Initials Name Provider Type    Marilu Monahan MS CCC-SLP Speech and Language Pathologist                Therapy Charges for Today     Code Description Service Date Service Provider Modifiers Qty    81195309581 HC ST TREATMENT SPEECH 3 2/2/2022 Marilu Cheema MS CCC-SLP GN 1    30523529328 HC ST TREATMENT SWALLOW 3 2/2/2022 Marilu Cheema MS CCC-SLP GN 1               Marilu Cheema MS CCC-SLP  2/2/2022     Patient was not wearing a face mask and did not exhibit coughing during this therapy encounter.  Procedure performed was aerosolizing, involved close contact (within 6 feet for at least 15 minutes or longer), and did not involve contact with infectious secretions or specimens.  Therapist used appropriate personal protective equipment including gloves, standard procedure mask and eye protection.  Appropriate PPE was worn during the entire therapy session.  Hand hygiene was completed before and after therapy session.

## 2022-02-03 LAB
FLUAV RNA RESP QL NAA+PROBE: NOT DETECTED
FLUBV RNA RESP QL NAA+PROBE: NOT DETECTED
GLUCOSE BLDC GLUCOMTR-MCNC: 200 MG/DL (ref 70–130)
GLUCOSE BLDC GLUCOMTR-MCNC: 227 MG/DL (ref 70–130)
GLUCOSE BLDC GLUCOMTR-MCNC: 319 MG/DL (ref 70–130)
GLUCOSE BLDC GLUCOMTR-MCNC: 394 MG/DL (ref 70–130)
GLUCOSE BLDC GLUCOMTR-MCNC: 86 MG/DL (ref 70–130)
SARS-COV-2 RNA RESP QL NAA+PROBE: NOT DETECTED

## 2022-02-03 PROCEDURE — 82962 GLUCOSE BLOOD TEST: CPT

## 2022-02-03 PROCEDURE — 63710000001 INSULIN LISPRO (HUMAN) PER 5 UNITS: Performed by: INTERNAL MEDICINE

## 2022-02-03 PROCEDURE — 63710000001 INSULIN DETEMIR PER 5 UNITS: Performed by: INTERNAL MEDICINE

## 2022-02-03 PROCEDURE — 87636 SARSCOV2 & INF A&B AMP PRB: CPT | Performed by: INTERNAL MEDICINE

## 2022-02-03 PROCEDURE — 99232 SBSQ HOSP IP/OBS MODERATE 35: CPT | Performed by: INTERNAL MEDICINE

## 2022-02-03 RX ORDER — TEMAZEPAM 15 MG/1
15 CAPSULE ORAL NIGHTLY
Qty: 5 CAPSULE | Refills: 0 | Status: CANCELLED | OUTPATIENT
Start: 2022-02-03 | End: 2022-02-08

## 2022-02-03 RX ORDER — LEVETIRACETAM 500 MG/1
250 TABLET ORAL 2 TIMES DAILY
Status: CANCELLED
Start: 2022-02-03

## 2022-02-03 RX ADMIN — INSULIN LISPRO 3 UNITS: 100 INJECTION, SOLUTION INTRAVENOUS; SUBCUTANEOUS at 16:35

## 2022-02-03 RX ADMIN — LEVETIRACETAM 250 MG: 250 TABLET, FILM COATED ORAL at 15:54

## 2022-02-03 RX ADMIN — BISOPROLOL FUMARATE 10 MG: 5 TABLET, FILM COATED ORAL at 08:09

## 2022-02-03 RX ADMIN — ATORVASTATIN CALCIUM 80 MG: 40 TABLET, FILM COATED ORAL at 22:08

## 2022-02-03 RX ADMIN — TERAZOSIN HYDROCHLORIDE 2 MG: 2 CAPSULE ORAL at 22:08

## 2022-02-03 RX ADMIN — INSULIN DETEMIR 5 UNITS: 100 INJECTION, SOLUTION SUBCUTANEOUS at 08:10

## 2022-02-03 RX ADMIN — SODIUM CHLORIDE, PRESERVATIVE FREE 10 ML: 5 INJECTION INTRAVENOUS at 08:09

## 2022-02-03 RX ADMIN — METFORMIN HYDROCHLORIDE 850 MG: 850 TABLET, FILM COATED ORAL at 18:12

## 2022-02-03 RX ADMIN — FAMOTIDINE 40 MG: 20 TABLET, FILM COATED ORAL at 08:09

## 2022-02-03 RX ADMIN — SODIUM CHLORIDE, PRESERVATIVE FREE 10 ML: 5 INJECTION INTRAVENOUS at 22:09

## 2022-02-03 RX ADMIN — INSULIN LISPRO 3 UNITS: 100 INJECTION, SOLUTION INTRAVENOUS; SUBCUTANEOUS at 12:10

## 2022-02-03 RX ADMIN — SENNOSIDES AND DOCUSATE SODIUM 2 TABLET: 50; 8.6 TABLET ORAL at 08:09

## 2022-02-03 RX ADMIN — LEVETIRACETAM 250 MG: 250 TABLET, FILM COATED ORAL at 04:59

## 2022-02-03 RX ADMIN — TEMAZEPAM 15 MG: 15 CAPSULE ORAL at 22:08

## 2022-02-03 RX ADMIN — SODIUM CHLORIDE 2 G: 1 TABLET ORAL at 18:12

## 2022-02-03 RX ADMIN — INSULIN DETEMIR 5 UNITS: 100 INJECTION, SOLUTION SUBCUTANEOUS at 22:06

## 2022-02-03 RX ADMIN — SODIUM CHLORIDE 2 G: 1 TABLET ORAL at 08:09

## 2022-02-03 NOTE — PROGRESS NOTES
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   Noted SLP eval 2/2 with recs for regular texture foods with thin liquids.  Reported/Observed/Food/Nutrition Related History:   2/2) RD spoke with pt via room intercom and pt states he is drinking 2 Boost glucose control supplements/d.   Noted SLP eval 2/2 with recs for regular texture foods with thin liquids.  Per previous documentation:  1/24)Patient sleeping/lethargic at time of visit, did not respond to any questions RD asked. Patient daughter at bedside and provides all the information.  Reports her dad was in good state of health and active up until he fell earlier this month.  This is his 3rd hospital admission since then.  Patient was admitted from 1/12- 15 and then came back 1/15 and discharged 1/19 to a rehab facility. Reports patient ate well during first hospital admission, not so much during the second and she believes he has not been eating well at the rehab facility. Reports intake decline due to patient current mental status and lethargy.  She was able to get the patient to eat some breakfast this morning, however he required a lot of encouragement. She is not sure if patient is having swallowing issues, she believes the patient was  "evaluated by SLP on admission, however per documentation SLP was not able to evaluate patient due to current mental status.      Daughter it not sure about patient weight changes.  Reports patient was losing weight prior to fall earlier this month as her mom had to buy him smaller pants. She is not sure of amount of weight loss. She feels patient probably has lost more weight since early this month.      Anthropometrics   Height: Height: 165.1 cm (65\")  Jt=256ai per bed wt on 1/29  BMI: 22.3  BMI classification: Normal: 18.5-24.9kg/m2   IBW: 136lb ; pt is 99% IBW  Per previous documentation:  UBW: ~ 140# (daughter feels it might be more than that) 160# in 2018   Weight change: no significant weight changes in the past year per documented weight in EMR.  Note with documented weight of 137# Jan /20/2021.      Labs reviewed   Results from last 7 days   Lab Units 02/01/22  0542   SODIUM mmol/L 138   POTASSIUM mmol/L 3.8   CHLORIDE mmol/L 102   CO2 mmol/L 27.0   BUN mg/dL 20   CREATININE mg/dL 0.46*   CALCIUM mg/dL 8.8   GLUCOSE mg/dL 116*       Results from last 7 days   Lab Units 02/02/22  1650 02/02/22  1112 02/02/22  0700 02/01/22  1940/22  1651 02/01/22  1107   GLUCOSE mg/dL 187* 253* 97 214* 155* 283*     Lab Results   Lab Value Date/Time    HGBA1C 7.90 (H) 01/23/2022 0420    HGBA1C 7.80 (H) 01/15/2022 0957       Medications reviewed   Pertinent:              Current Nutrition Prescription     PO: Diet Regular; Thin; Consistent Carbohydrate, Daily Fluid Restriction, Cardiac; 1500 mL Fluid Per Day  Oral Nutrition Supplement: Boost glucose control 3x/d  Avg po intake: 45% @ 5 meals.      Nutrition Diagnosis   Date: 1/24   Problem Inadequate oral intake   Etiology SAD earlier this month    Signs/Symptoms 45% @ 5 meals    Status: ongoing 2/2    Nutrition Intervention   Will change ONS orders to 2 Boost glucose control and 1 magic cup/d since pt now on fluid restriction.    Goal:   General: Nutrition support " treatment  PO: increase po intake    Additional goals:    Monitoring/Evaluation:   Per protocol, PO intake, Supplement intake, Pertinent labs        Brittany Hightower, MS,RD,LD  Time Spent: 20min
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
## 2022-02-03 NOTE — PLAN OF CARE
Goal Outcome Evaluation:   Covid swab sent for discharge placement, results negative. Possible dc to [facility] tomorrow if approved and daughter agrees to transfer. No issues this shift.

## 2022-02-03 NOTE — PLAN OF CARE
Goal Outcome Evaluation:  Plan of Care Reviewed With: patient           Outcome Summary: No events this shift. Pt rested well. VSS, RA, V paced on monitor. Pt awaiting placement. No further complaints at this time.

## 2022-02-04 LAB
GLUCOSE BLDC GLUCOMTR-MCNC: 167 MG/DL (ref 70–130)
GLUCOSE BLDC GLUCOMTR-MCNC: 187 MG/DL (ref 70–130)
GLUCOSE BLDC GLUCOMTR-MCNC: 194 MG/DL (ref 70–130)
GLUCOSE BLDC GLUCOMTR-MCNC: 96 MG/DL (ref 70–130)

## 2022-02-04 PROCEDURE — 97530 THERAPEUTIC ACTIVITIES: CPT

## 2022-02-04 PROCEDURE — 92507 TX SP LANG VOICE COMM INDIV: CPT

## 2022-02-04 PROCEDURE — 97116 GAIT TRAINING THERAPY: CPT

## 2022-02-04 PROCEDURE — 97535 SELF CARE MNGMENT TRAINING: CPT

## 2022-02-04 PROCEDURE — 97110 THERAPEUTIC EXERCISES: CPT

## 2022-02-04 PROCEDURE — 92526 ORAL FUNCTION THERAPY: CPT

## 2022-02-04 PROCEDURE — 63710000001 INSULIN LISPRO (HUMAN) PER 5 UNITS: Performed by: INTERNAL MEDICINE

## 2022-02-04 PROCEDURE — 99233 SBSQ HOSP IP/OBS HIGH 50: CPT | Performed by: HOSPITALIST

## 2022-02-04 PROCEDURE — 82962 GLUCOSE BLOOD TEST: CPT

## 2022-02-04 PROCEDURE — 63710000001 INSULIN DETEMIR PER 5 UNITS: Performed by: INTERNAL MEDICINE

## 2022-02-04 RX ADMIN — TEMAZEPAM 15 MG: 15 CAPSULE ORAL at 21:09

## 2022-02-04 RX ADMIN — LEVETIRACETAM 250 MG: 250 TABLET, FILM COATED ORAL at 05:25

## 2022-02-04 RX ADMIN — INSULIN DETEMIR 5 UNITS: 100 INJECTION, SOLUTION SUBCUTANEOUS at 21:10

## 2022-02-04 RX ADMIN — SODIUM CHLORIDE, PRESERVATIVE FREE 10 ML: 5 INJECTION INTRAVENOUS at 08:28

## 2022-02-04 RX ADMIN — METFORMIN HYDROCHLORIDE 850 MG: 850 TABLET, FILM COATED ORAL at 08:27

## 2022-02-04 RX ADMIN — INSULIN LISPRO 2 UNITS: 100 INJECTION, SOLUTION INTRAVENOUS; SUBCUTANEOUS at 11:58

## 2022-02-04 RX ADMIN — METFORMIN HYDROCHLORIDE 850 MG: 850 TABLET, FILM COATED ORAL at 17:25

## 2022-02-04 RX ADMIN — SODIUM CHLORIDE 2 G: 1 TABLET ORAL at 17:25

## 2022-02-04 RX ADMIN — SENNOSIDES AND DOCUSATE SODIUM 2 TABLET: 50; 8.6 TABLET ORAL at 21:09

## 2022-02-04 RX ADMIN — SENNOSIDES AND DOCUSATE SODIUM 2 TABLET: 50; 8.6 TABLET ORAL at 08:27

## 2022-02-04 RX ADMIN — INSULIN DETEMIR 5 UNITS: 100 INJECTION, SOLUTION SUBCUTANEOUS at 08:28

## 2022-02-04 RX ADMIN — LEVETIRACETAM 250 MG: 250 TABLET, FILM COATED ORAL at 15:28

## 2022-02-04 RX ADMIN — SODIUM CHLORIDE 2 G: 1 TABLET ORAL at 08:27

## 2022-02-04 RX ADMIN — ATORVASTATIN CALCIUM 80 MG: 40 TABLET, FILM COATED ORAL at 21:52

## 2022-02-04 RX ADMIN — TERAZOSIN HYDROCHLORIDE 2 MG: 2 CAPSULE ORAL at 21:09

## 2022-02-04 RX ADMIN — BISOPROLOL FUMARATE 10 MG: 5 TABLET, FILM COATED ORAL at 08:27

## 2022-02-04 RX ADMIN — SODIUM CHLORIDE, PRESERVATIVE FREE 10 ML: 5 INJECTION INTRAVENOUS at 21:10

## 2022-02-04 RX ADMIN — INSULIN LISPRO 2 UNITS: 100 INJECTION, SOLUTION INTRAVENOUS; SUBCUTANEOUS at 17:04

## 2022-02-04 RX ADMIN — FAMOTIDINE 40 MG: 20 TABLET, FILM COATED ORAL at 08:26

## 2022-02-04 NOTE — PLAN OF CARE
Goal Outcome Evaluation:  Plan of Care Reviewed With: patient           Outcome Summary: No events this shift. VSS. Pt rested well. Plan to DC to [facility] when transportation arranged.

## 2022-02-04 NOTE — PLAN OF CARE
Problem: Adult Inpatient Plan of Care  Goal: Plan of Care Review  Recent Flowsheet Documentation  Taken 2/4/2022 1501 by Timothy Pimentel, OT  Progress: improving  Plan of Care Reviewed With: patient  Outcome Summary: Pt has progressed to [facility] for bed mobility, CGA/[facility] for transfers and short distances w/ RW, Max[facility] for LB dressing, and SBA/JAY for seated grooming. Progress remains limited by strength, balance, cognitive, and endurance deficits warranting continued IP OT services to promote independence w/ ADLs. [place] reviewed/revised this date, Rec d/c to SNF rehab.

## 2022-02-04 NOTE — THERAPY TREATMENT NOTE
Acute Care - Speech Language Pathology Treatment Note  Psychiatric     Patient Name: Derian Joya  : 1940  MRN: [id number]  Today's Date: 2022               Admit Date: 2022     Visit Dx:    ICD-10-CM ICD-9-CM   1. 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 Altered mental status, unspecified altered mental status type  R41.82 780.97   3. Cognitive communication deficit  R41.841 799.52     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)     Past Medical History:   Diagnosis Date   • Allergic rhinitis    • Aortic stenosis    • Atopic rhinitis 2016   • Benign non-nodular prostatic hyperplasia with lower urinary tract symptoms 9/15/2017   • CAD (coronary artery disease)    • Cardiomyopathy, ischemic    • Colon polyps     tubular adenoma    • Community acquired pneumonia    • Depression 2016   • Diabetes (HCC)    • Diverticulosis of intestine 2016    Description: Left and sigmoid   • Dyslipidemia    • LBBB (left bundle branch block)    • PAD (peripheral artery disease) (HCC)      Past Surgical History:   Procedure Laterality Date   • CARDIAC CATHETERIZATION      100% cx   • CATARACT EXTRACTION     • COLONOSCOPY W/ BIOPSIES AND POLYPECTOMY  2021    1 benign polyp   • HIP SURGERY Left 2020    Dr Mandy BOUDREAUX   • LEG SURGERY Left 2016    Popliteal Artery stenting by Dr Hdz   • LEG SURGERY Left 2016    skin graft by Dr Nance   • LIPOMA EXCISION      s/p excision on chest   • PACEMAKER  IMPLANTATION  01/2019    and defibrillator       SLP Recommendation and Plan              Anticipated Discharge Disposition (SLP): anticipate therapy at next level of care (02/04/22 1400)           Daily Summary of Progress (SLP): progress toward functional goals as expected (02/04/22 1400)        Communication Strategy Suggestions: eliminate distractions when speaking with patient (02/04/22 1400)  Treatment Assessment (SLP): Pt cont to present w/ moderate cognitive-linguistic deficits, but making improvements. Tolerating regular solids and thin liquids w/o s/sxs aspiration. No further swallow treatment/intervention indicated. (02/04/22 1400)  Plan for Continued Treatment (SLP): goals adjusted to reflect functional improvements demonstrated, other (see comments) (needs cont'd cog-linguistic tx; no further swallow intervention indicated) (02/04/22 1400)  Plan of Care Reviewed With: patient (02/04/22 1516)  Progress: improving (02/04/22 1516)      SLP EVALUATION (last 72 hours)     SLP SLC Evaluation     Row Name 02/04/22 1400 02/02/22 0900                Communication Assessment/Intervention    Document Type therapy note (daily note)  -AC therapy note (daily note)  -CH       Subjective Information no complaints  -AC no complaints  -CH       Patient Observations alert; cooperative  -AC alert; cooperative; agree to therapy  -CH       Patient/Family/Caregiver Comments/Observations No family present.  -AC No family present  -CH       Patient Effort adequate  -AC good  -CH       Symptoms Noted During/After Treatment -- none  -CH                General Information    Patient Profile Reviewed -- yes  -CH                Pain    Additional Documentation -- Pain Scale: FACES Pre/Post-Treatment (Group); Pain Scale: Numbers Pre/Post-Treatment (Group)  -                Pain Scale: Numbers Pre/Post-Treatment    Pretreatment Pain Rating -- 0/10 - no pain  -CH       Posttreatment Pain Rating -- 0/10 - no pain  -CH                 Pain Scale: FACES Pre/Post-Treatment    Pain: FACES Scale, Pretreatment 0-->no hurt  -AC 0-->no hurt  -       Posttreatment Pain Rating 0-->no hurt  -AC 0-->no hurt  -CH                SLP Evaluation Clinical Impressions    SLP Diagnosis -- moderate cognitive linguistic impiarment  -       Rehab Potential/Prognosis -- good  -[facility] Criteria for Skilled Therapy Interventions Met -- yes  -       Functional Impact -- functional impact in ADLs; difficulty communicating wants, needs  -                SLP Treatment Clinical Impressions    Treatment Assessment (SLP) Pt cont to present w/ moderate cognitive-linguistic deficits, but making improvements. Tolerating regular solids and thin liquids w/o s/sxs aspiration. No further swallow treatment/intervention indicated.  - Patient participated with dysphagia and cognitive comunication tx this am. Mild response latency, but improving language and cognition. No s/s of aspiration or difficulty with trial sof thin liquids via straw or with soft or regular solid trials. Diet consistency upgraded to regular and thin liquids. SLP to continue to follow to address diet tolerance and cognitive communication in tx.  -       Daily Summary of Progress (SLP) progress toward functional goals as expected  - progress toward functional goals is good  -       Barriers to Overall Progress (SLP) -- Cognitive status  -       Plan for Continued Treatment (SLP) goals adjusted to reflect functional improvements demonstrated; other (see comments)  needs cont'd cog-linguistic tx; no further swallow intervention indicated  - continue treatment per plan of care  -       Care Plan Review evaluation/treatment results reviewed; care plan/treatment goals reviewed; risks/benefits reviewed; current/potential barriers reviewed; patient/other agree to care plan  - care plan/treatment goals reviewed  -                Recommendations    Therapy Frequency (SLP SLC) -- 5 days per week   -       Predicted Duration Therapy Intervention (Days) -- until discharge  -CH       Anticipated Discharge Disposition (SLP) anticipate therapy at next level of care  -AC anticipate therapy at next level of care  -       Communication Strategy Suggestions eliminate distractions when speaking with patient  -AC eliminate distractions when speaking with patient; avoid open-ended questions; avoid multi-step instructions  -             User Key  (r) = Recorded By, (t) = Taken By, (c) = Cosigned By    Initials Name Effective Dates    AC Alyssa Agarwal, MS CCC-SLP 06/16/21 -     CH Marilu Cheema, MS CCC-SLP 06/16/21 -                    EDUCATION  The patient has been educated in the following areas:     Cognitive Impairment Communication Impairment Dysphagia (Swallowing Impairment).           SLP GOALS     Row Name 02/04/22 1400 02/02/22 0900          Oral Nutrition/Hydration Goal 1 (SLP)    Oral Nutrition/Hydration Goal 1, SLP LTG: Pt will return to least restrictive diet w/o s/s of aspiration.  -AC LTG: Pt will return to least restrictive diet w/o s/s of aspiration.  -CH     Time Frame (Oral Nutrition/Hydration Goal 1, SLP) by discharge  -AC by discharge  -CH     Barriers (Oral Nutrition/Hydration Goal 1, SLP) -- diet upgraded to regular and thin liquids. SLP to f/u for diet tolerance, adjustments as appropriate.  -CH     Progress/Outcomes (Oral Nutrition/Hydration Goal 1, SLP) goal met  -AC continuing progress toward goal  -CH            Oral Nutrition/Hydration Goal 2 (SLP)    Oral Nutrition/Hydration Goal 2, SLP Pt will tolerate trials of thin liquid and soft/ground solids w/o s/sxs aspiration w/ 100% acc and 1:1 assist.  -AC Pt will tolerate trials of thin liquid and soft/ground solids w/o s/sxs aspiration w/ 100% acc and 1:1 assist.  -CH     Time Frame (Oral Nutrition/Hydration Goal 2, SLP) short term goal (STG)  -AC short term goal (STG)  -CH     Barriers (Oral Nutrition/Hydration Goal 2, SLP) No overt  clinical s/sxs aspiration w/ thin liquid and regular solids.  -AC No s/s of aspiration with regular,soft solids or thin liquids. Diet upgraded to regular consistency. SLP to f/u for diet tolerance, adjustments as appropriate.  -CH     Progress/Outcomes (Oral Nutrition/Hydration Goal 2, SLP) goal met  -AC good progress toward goal  -CH            Oral Nutrition/Hydration Goal (SLP)    Oral Nutrition/Hydration Goal, SLP Pt will tolerate therapeutic trials of soft solids w/ adequate oral prep/transit and no s/sxs aspiration w/ 100% acc and 1:1 assist in order to upgrade diet.  -AC Pt will tolerate therapeutic trials of soft solids w/ adequate oral prep/transit and no s/sxs aspiration w/ 100% acc and 1:1 assist in order to upgrade diet.  -CH     Time Frame (Oral Nutrition/Hydration Goal, SLP) short term goal (STG)  -AC short term goal (STG)  -CH     Barriers (Oral Nutrition/Hydration Goal, SLP) Adequate mastication, no oral residue, and no s/sxs aspiration w/ regular solids.  -AC No s/s of aspiration with regular,soft solids or thin liquids. Diet upgraded to regular consistency. SLP to f/u for diet tolerance, adjustments as appropriate.  -CH     Progress/Outcomes (Oral Nutrition/Hydration Goal, SLP) goal met  -AC good progress toward goal  -CH            Words/Phrases/Sentences Goal 1 (SLP)    Improve Ability to Comprehend Words/Phrases/Sentences Through: Goal 1 (SLP) identify objects, field of; 70%; with maximum cues (25-49%); other (comment)  -AC identify objects, field of; 70%; with maximum cues (25-49%); other (comment)  -CH     Time Frame (Identify Objects and Pictures Goal 1, SLP) short term goal (STG)  -AC short term goal (STG)  -CH     Progress (Ability to Contruct Words/Phrases/Sentences Goal 1, SLP) 100%; independently (over 90% accuracy)  -AC 80%; with minimal cues (75-90%)  -CH     Progress/Outcomes (Identify Objects and Pictures Goal 1, SLP) goal met  -AC good progress toward goal  -CH            Follow  Directions Goal 2 (SLP)    Improve Ability to Follow Directions Goal 1 (SLP) 3 step commands; 80%; with minimal cues (75-90%)  -AC 2 step commands; 3 step commands; 80%; with minimal cues (75-90%)  -CH     Time Frame (Follow Directions Goal 1, SLP) short term goal (STG)  -AC short term goal (STG)  -CH     Progress (Ability to Follow Directions Goal 1, SLP) -- 90%; with minimal cues (75-90%)  -CH     Progress/Outcomes (Follow Directions Goal 1, SLP) goal revised this date  -AC goal revised this date  -CH     Comment (Follow Directions Goal 1, SLP) Met goal for 2 step - 100% acc min cues.  -AC met goal for 1 step  -CH            Word Retrieval Skills Goal 1 (SLP)    Improve Word Retrieval Skills By Goal 1 (SLP) completing a divergent task; completing a convergent task; 80%; with minimal cues (75-90%)  -AC low frequency; responsive naming task; completing a divergent task; completing a convergent task; 80%; with minimal cues (75-90%)  -CH     Time Frame (Word Retrieval Goal 1, SLP) by discharge  -AC by discharge  -CH     Progress (Word Retrieval Skills Goal 1, SLP) -- 60%; with minimal cues (75-90%)  -CH     Progress/Outcomes (Word Retrieval Goal 1, SLP) goal revised this date  -AC continuing progress toward goal  -CH     Comment (Word Retrieval Goal 1, SLP) Met goal for responsive naming w/ 100% acc no cues.  -AC responsive naming and divergent task  -CH            Connected Speech to Express Thoughts Goal 1 (SLP)    Improve Narrative Discourse to Express Thoughts By Goal 1 (SLP) explaining a proverb or idiom; conversational task on a given topic; 80%; with minimal cues (75-90%)  -AC explaining a proverb or idiom; conversational task on a given topic; 80%; with minimal cues (75-90%)  -CH     Time Frame (Connected Speech Goal 1, SLP) by discharge  -AC by discharge  -CH     Progress (Connected Speech Goal 1, SLP) 60%; with minimal cues (75-90%)  -AC 70%; with minimal cues (75-90%)  -CH     Progress/Outcomes (Connected  Speech Goal 1, SLP) continuing progress toward goal  -AC continuing progress toward goal  -CH            Awareness of Basic Personal Information Goal 1 (SLP)    Improve Awareness of Basic Personal Information Goal 1 (SLP) -- answering yes/no questions regarding personal/biographical information; naming self, family members; 70%; with maximum cues (25-49%)  -CH     Time Frame (Awareness of Basic Personal Information Goal 1, SLP) -- short term goal (STG)  -CH     Progress (Awareness of Basic Personal Information Goal 1, SLP) -- 90%; independently (over 90% accuracy)  -CH     Progress/Outcomes (Awareness of Basic Personal Information Goal 1, SLP) -- goal met  -CH            Attention Goal 1 (SLP)    Improve Attention by Goal 1 (SLP) looking at speaker; attending to task; 90%; with minimal cues (75-90%)  -AC looking at speaker; attending to task; 70%; with maximum cues (25-49%)  -CH     Time Frame (Attention Goal 1, SLP) short term goal (STG)  -AC short term goal (STG)  -CH     Progress (Attention Goal 1, SLP) 70%; with minimal cues (75-90%)  -AC 70%; with minimal cues (75-90%)  -CH     Progress/Outcomes (Attention Goal 1, SLP) goal revised this date  -AC good progress toward goal  -CH            Additional Goal 1 (SLP)    Additional Goal 1, SLP LTG: Pt will improve cognitive-linguistic skills in order to participate in care in hospital setting 100% of the time w/ mod cues.  -AC LTG: Pt will improve cognitive-linguistic skills in order to participate in care in hospital setting 100% of the time w/ mod cues.  -CH     Time Frame (Additional Goal 1, SLP) by discharge  -AC by discharge  -CH     Progress/Outcomes (Additional Goal 1, SLP) good progress toward goal  -AC good progress toward goal  -CH           User Key  (r) = Recorded By, (t) = Taken By, (c) = Cosigned By    Initials Name Provider Type    AC Alyssa Agarwal MS CCC-SLP Speech and Language Pathologist    Marilu Monahan MS CCC-SLP Speech and Language  Pathologist                        Time Calculation:      Time Calculation- SLP     Row Name 02/04/22 1516             Time Calculation- SLP    SLP Start Time 1400  -AC      SLP Received On 02/04/22  -AC              Untimed Charges    95622-XY Treatment/ST Modification Prosth Aug Alter  30  -AC      46970-EL Treatment Swallow Minutes 18  -AC              Total Minutes    Untimed Charges Total Minutes 48  -AC       Total Minutes 48  -AC            User Key  (r) = Recorded By, (t) = Taken By, (c) = Cosigned By    Initials Name Provider Type    AC Alyssa Agarwal MS CCC-SLP Speech and Language Pathologist                Therapy Charges for Today     Code Description Service Date Service Provider Modifiers Qty    24100608286 HC ST TREATMENT SWALLOW 1 2/4/2022 Alyssa Agarwal MS CCC-SLP GN 1    [id number] HC ST TREATMENT SPEECH 2 2/4/2022 Alyssa Agarwal MS CCC-SLP GN 1        Patient was not wearing a face mask and did not exhibit coughing during this therapy encounter.  Procedure performed was aerosolizing, involved close contact (within 6 feet for at least 15 minutes or longer), and did not involve contact with infectious secretions or specimens.  Therapist used appropriate personal protective equipment including gloves, standard procedure mask, eye protection and N95 mask.  Appropriate PPE was worn during the entire therapy session.  Hand hygiene was completed before and after therapy session.                MS [place] Elizondo  2/4/2022

## 2022-02-04 NOTE — THERAPY PROGRESS REPORT/RE-CERT
Patient Name: Derian Joya  : 1940    MRN: [id number]                              Today's Date: 2022       Admit Date: 2022    Visit Dx:     ICD-10-CM ICD-9-CM   1. 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 Altered mental status, unspecified altered mental status type  R41.82 780.97   3. Cognitive communication deficit  R41.841 799.52     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)     Past Medical History:   Diagnosis Date   • Allergic rhinitis    • Aortic stenosis    • Atopic rhinitis 2016   • Benign non-nodular prostatic hyperplasia with lower urinary tract symptoms 9/15/2017   • CAD (coronary artery disease)    • Cardiomyopathy, ischemic    • Colon polyps     tubular adenoma    • Community acquired pneumonia    • Depression 2016   • Diabetes (HCC)    • Diverticulosis of intestine 2016    Description: Left and sigmoid   • Dyslipidemia    • LBBB (left bundle branch block)    • PAD (peripheral artery disease) (HCC)      Past Surgical History:   Procedure Laterality Date   • CARDIAC CATHETERIZATION      100% cx   • CATARACT EXTRACTION     • COLONOSCOPY W/ BIOPSIES AND POLYPECTOMY  2021    1 benign polyp   • HIP SURGERY Left 2020    Dr Mandy BOUDREAUX   • LEG SURGERY Left 2016    Popliteal Artery stenting by Dr Hdz   • LEG SURGERY Left 2016    skin graft by Dr Nance   • LIPOMA EXCISION      s/p excision on chest   • PACEMAKER IMPLANTATION  2019    and defibrillator       General Information     Row Name 02/04/22 1519          Physical Therapy Time and Intention    Document Type progress note/recertification  -     Mode of Treatment physical therapy  -     Row Name 02/04/22 1519          General Information    Patient Profile Reviewed yes  -     Existing Precautions/Restrictions fall; other (see comments)  decreased processing with slowed responses  -     Barriers to Rehab medically complex; previous functional deficit; cognitive status  -     Row Name 02/04/22 1519          Cognition    Orientation Status (Cognition) oriented to; person; unable/difficult to assess; disoriented to; place; time; situation; other (see comments)  Pt would not answer any orientation questions relating to place or time, despite increased time allotted for responses.  -     Row Name 02/04/22 1519          Safety Issues, Functional Mobility    Safety Issues Affecting Function (Mobility) awareness of need for assistance; insight into deficits/self-awareness; safety precaution awareness; safety precautions follow-through/compliance; sequencing abilities; ability to follow commands; judgment  -     Impairments Affecting Function (Mobility) balance; cognition; coordination; endurance/activity tolerance; motor planning; postural/trunk control; strength  -     Cognitive Impairments, Mobility Safety/Performance attention; awareness, need for assistance; insight into deficits/self-awareness; judgment; safety precaution awareness; safety precaution follow-through; sequencing abilities  -           User Key  (r) = Recorded By, (t) = Taken By, (c) = Cosigned By    Initials Name Provider Type     Shannan Gillette, PT Physical Therapist               Mobility     Row Name 02/04/22 1523          Bed Mobility    Comment (Bed Mobility) Received [facility] with OT present.  -     Row Name 02/04/22 1523          Transfers    Comment (Transfers) STS x 4 reps and stand-step pivot to BSC and back to chair.   VCs/TCs for task initiation, sequencing, safe hand placement, and walker management.  Required extended time d/t slow movements and processing.  -BA     Row Name 02/04/22 1523          Bed-Chair Transfer    Bed-Chair [place] (Transfers) moderate assist (50% patient effort); 1 person assist; verbal cues  -BA     Assistive Device (Bed-Chair Transfers) walker, front-wheeled  -BA     Row Name 02/04/22 1523          Sit-Stand Transfer    Sit-Stand [place] (Transfers) minimum assist (75% patient effort); 1 person assist; verbal cues  -BA     Assistive Device (Sit-Stand Transfers) walker, front-wheeled  -BA     Row Name 02/04/22 1523          Gait/Stairs (Locomotion)    [place] Level (Gait) moderate assist (50% patient effort); 1 person assist; verbal cues  -BA     Assistive Device (Gait) walker, front-wheeled  -BA     Distance in Feet (Gait) 45  -BA     Deviations/Abnormal Patterns (Gait) bilateral deviations; sheela decreased; gait speed decreased; stride length decreased; festinating/shuffling; base of support, narrow; weight shifting decreased  -BA     Bilateral Gait Deviations forward flexed posture; heel strike decreased  -BA     Comment (Gait/Stairs) Pt demonstrated step through gait pattern to almost step-to gait pattern with decreased step length on RLE compared to LLE.  Significantly slow pace with forward flexed posture and tends to look at ground.  Mild instability.  Increased VCs/TCs for task initiation, walker management, upright posture/forward gaze, and improved stride length.  Gait distance limited by episode of bowel incontinence and fatigue.  -           User Key  (r) = Recorded By, (t) = Taken By, (c) = Cosigned By    Initials Name Provider Type    Shannan Parada, PT Physical Therapist               Obj/Interventions     Row Name 02/04/22 1530          Motor Skills    Motor Skills therapeutic exercise  -     Therapeutic Exercise hip; knee; ankle  -     Row Name 02/04/22 1533           Hip (Therapeutic Exercise)    Hip (Therapeutic Exercise) strengthening exercise  -     Hip Strengthening (Therapeutic Exercise) bilateral; aBduction; aDduction; sitting; 5 repetitions  -     Row Name 02/04/22 1531          Knee (Therapeutic Exercise)    Knee (Therapeutic Exercise) strengthening exercise  -     Knee Strengthening (Therapeutic Exercise) bilateral; heel slides; sitting; 5 repetitions  -     Row Name 02/04/22 1531          Ankle (Therapeutic Exercise)    Ankle (Therapeutic Exercise) AAROM (active assistive range of motion)  -     Ankle AAROM (Therapeutic Exercise) bilateral; dorsiflexion; plantarflexion; sitting; 5 repetitions  -     Row Name 02/04/22 1531          Balance    Balance Assessment sitting static balance; sitting dynamic balance; standing static balance; standing dynamic balance  -     Static Sitting Balance WFL; unsupported; sitting in chair  -     Dynamic Sitting Balance mild impairment; unsupported; sitting in chair  -     Static Standing Balance mild impairment; supported; standing  -     Dynamic Standing Balance moderate impairment; supported; standing  -     Balance Interventions sit to stand; standing; supported; static; minimal challenge; dynamic; moderate challenge; occupation based/functional task  -     Comment, Balance Increased static standing with Manny/CGA and FWW for hygiene activity.  ModAx1 with FWW for ambulation with mild/mod instability.  -           User Key  (r) = Recorded By, (t) = Taken By, (c) = Cosigned By    Initials Name Provider Type     Shannan Gillette, PT Physical Therapist               Goals/Plan     Row Name 02/04/22 1540          Bed Mobility Goal 1 (PT)    Activity/Assistive Device (Bed Mobility Goal 1, PT) sit to supine/supine to sit  -     [place] Level/Cues Needed (Bed Mobility Goal 1, PT) minimum assist (75% or more patient effort)  -     Time Frame (Bed Mobility Goal 1, PT) long term goal (LTG); 2  weeks  -BA     Progress/Outcomes (Bed Mobility Goal 1, PT) progress slower than expected; goal ongoing  -BA     Row Name 02/04/22 1540          Transfer Goal 1 (PT)    Activity/Assistive Device (Transfer Goal 1, PT) sit-to-stand/stand-to-sit; bed-to-chair/chair-to-bed; walker, rolling  -BA     [place] Level/Cues Needed (Transfer Goal 1, PT) contact guard assist  -BA     Time Frame (Transfer Goal 1, PT) long term goal (LTG); 2 weeks  -BA     Progress/Outcome (Transfer Goal 1, PT) progress slower than expected; goal ongoing  -BA     Row Name 02/04/22 1540          Gait Training Goal 1 (PT)    Activity/Assistive Device (Gait Training Goal 1, PT) gait (walking locomotion); assistive device use; walker, rolling  -BA     [place] Level (Gait Training Goal 1, PT) contact guard assist  -BA     Distance (Gait Training Goal 1, PT) 150  -BA     Time Frame (Gait Training Goal 1, PT) long term goal (LTG); 2 weeks  -BA     Progress/Outcome (Gait Training Goal 1, PT) progress slower than expected; goal ongoing  -           User Key  (r) = Recorded By, (t) = Taken By, (c) = Cosigned By    Initials Name Provider Type    Shannan Parada, PT Physical Therapist               Clinical Impression     Row Name 02/04/22 1532          Pain    Additional Documentation Pain Scale: FACES Pre/Post-Treatment (Group)  -     Row Name 02/04/22 153          Pain Scale: FACES Pre/Post-Treatment    Pain: FACES Scale, Pretreatment 0-->no hurt  -     Posttreatment Pain Rating 0-->no hurt  -     Row Name 02/04/22 1532          Plan of Care Review    Plan of Care Reviewed With patient  -BA     Progress improving  -BA     Outcome Summary PT progress note completed.  Pt with slow progress toward therapy goals.  Ambulation distance limited today d/t episode of bowel incontinence.  Pt ambulated 45ft in hallway with modAx1 and FWW and chair follow for added safety.  Required increased VCs/TCs primarily for task initiation and walker  management.  Extended time needed for activity d/t decreased processing and slowed responses.  Goals updated to reflect current functional status.  Con to progress pt as able per PT POC.  Rec SNF upon d/c.  -BA     Row Name 02/04/22 1534          Therapy Assessment/Plan (PT)    Rehab Potential (PT) good, to achieve stated therapy goals  -BA     Criteria for Skilled Interventions Met (PT) yes; meets criteria; skilled treatment is necessary  -BA     Row Name 02/04/22 1534          Vital Signs    Pre Systolic BP Rehab --  VSS; RN cleared for activity.  -BA     O2 Delivery Pre Treatment room air  -BA     O2 Delivery Intra Treatment room air  -BA     O2 Delivery Post Treatment room air  -BA     Pre Patient Position Sitting  -BA     Intra Patient Position Standing  -BA     Post Patient Position Sitting  -BA     Row Name 02/04/22 1534          Positioning and Restraints    Pre-Treatment Position sitting in chair/recliner  with OT present  -BA     Post Treatment Position chair  -BA     In Chair notified nsg; reclined; sitting; call light within reach; encouraged to call for assist; exit alarm on; with other staff; waffle cushion; legs elevated  -BA           User Key  (r) = Recorded By, (t) = Taken By, (c) = Cosigned By    Initials Name Provider Type    BA Shannan Gillette, PT Physical Therapist               Outcome Measures     Row Name 02/04/22 1545 02/04/22 1233       How much help from another person do you currently need...    Turning from your back to your side while in flat bed without using bedrails? 3  -BA 3  -AT    Moving from lying on back to sitting on the side of a flat bed without bedrails? 2  -BA 2  -AT    Moving to and from a bed to a chair (including a wheelchair)? 2  -BA 2  -AT    Standing up from a chair using your arms (e.g., wheelchair, bedside chair)? 3  -BA 3  -AT    Climbing 3-5 steps with a railing? 2  -BA 1  -AT    To walk in hospital room? 2  -BA 2  -AT    AM-PAC 6 Clicks Score (PT) 14  -BA 13   -AT    Row Name 02/04/22 1020 02/04/22 0826       How much help from another person do you currently need...    Turning from your back to your side while in flat bed without using bedrails? 3  -AT 3  -AT    Moving from lying on back to sitting on the side of a flat bed without bedrails? 2  -AT 2  -AT    Moving to and from a bed to a chair (including a wheelchair)? 2  -AT 2  -AT    Standing up from a chair using your arms (e.g., wheelchair, bedside chair)? 3  -AT 3  -AT    Climbing 3-5 steps with a railing? 1  -AT 1  -AT    To walk in hospital room? 2  -AT 2  -AT    AM-PAC 6 Clicks Score (PT) 13  -AT 13  -AT    Row Name 02/04/22 1504          Modified [place] Scale    Modified [place] Scale 4 - Moderately severe disability.  Unable to walk without assistance, and unable to attend to own bodily needs without assistance.  -     Row Name 02/04/22 1545 02/04/22 1504       Functional Assessment    Outcome Measure Options AM-PAC 6 Clicks Basic Mobility (PT)  -BA AM-PAC 6 Clicks Daily Activity (OT)  -          User Key  (r) = Recorded By, (t) = Taken By, (c) = Cosigned By    Initials Name Provider Type    AT Trigg, April, RN Registered Nurse    Timothy Dubon, OT Occupational Therapist    Shannan Parada, PT Physical Therapist                             Physical Therapy Education                 Title: PT OT SLP Therapies (In Progress)     Topic: Physical Therapy (In Progress)     Point: Mobility training (In Progress)     Learning Progress Summary           Patient Acceptance, E, NR by BA at 2/4/2022 1546    Eager, E, VU by SC at 2/2/2022 1553    Comment: reviewed benefits of activity    Acceptance, E, VU,NR by CD at 1/31/2022 1550    Comment: SEE FLOWSHEET.    Acceptance, E, VU,NR by CD at 1/28/2022 1651    Comment: SEE FLOWSHEET.    Acceptance, E, VU,NR by CD at 1/26/2022 1559    Comment: SEE FLOWSHEET    Acceptance, E, NR by CD at 1/25/2022 1643    Comment: SEE FLOWSHEET    Acceptance, E, NR by BA at  1/24/2022 1537   Significant Other Acceptance, E, VU,NR by CD at 1/28/2022 1651    Comment: SEE FLOWSHEET.                   Point: Home exercise program (In Progress)     Learning Progress Summary           Patient Acceptance, E, NR by BA at 2/4/2022 1546    Eager, E, VU by SC at 2/2/2022 1553    Comment: reviewed benefits of activity    Acceptance, E, VU,NR by CD at 1/31/2022 1550    Comment: SEE FLOWSHEET.    Acceptance, E, VU,NR by CD at 1/28/2022 1651    Comment: SEE FLOWSHEET.    Acceptance, E, VU,NR by CD at 1/26/2022 1559    Comment: SEE FLOWSHEET    Acceptance, E, NR by CD at 1/25/2022 1643    Comment: SEE FLOWSHEET   Significant Other Acceptance, E, VU,NR by CD at 1/28/2022 1651    Comment: SEE FLOWSHEET.                   Point: Body mechanics (In Progress)     Learning Progress Summary           Patient Acceptance, E, NR by BA at 2/4/2022 1546    Eager, E, VU by SC at 2/2/2022 1553    Comment: reviewed benefits of activity    Acceptance, E, VU,NR by CD at 1/31/2022 1550    Comment: SEE FLOWSHEET.    Acceptance, E, VU,NR by CD at 1/28/2022 1651    Comment: SEE FLOWSHEET.    Acceptance, E, VU,NR by CD at 1/26/2022 1559    Comment: SEE FLOWSHEET    Acceptance, E, NR by CD at 1/25/2022 1643    Comment: SEE FLOWSHEET    Acceptance, E, NR by BA at 1/24/2022 1537   Significant Other Acceptance, E, VU,NR by CD at 1/28/2022 1651    Comment: SEE FLOWSHEET.                   Point: Precautions (In Progress)     Learning Progress Summary           Patient Acceptance, E, NR by BA at 2/4/2022 1546    Eager, E, VU by SC at 2/2/2022 1553    Comment: reviewed benefits of activity    Acceptance, E, VU,NR by CD at 1/31/2022 1550    Comment: SEE FLOWSHEET.    Acceptance, E, VU,NR by CD at 1/28/2022 1651    Comment: SEE FLOWSHEET.    Acceptance, E, VU,NR by CD at 1/26/2022 1559    Comment: SEE FLOWSHEET    Acceptance, E, NR by CD at 1/25/2022 1643    Comment: SEE FLOWSHEET    Acceptance, E, NR by BA at 1/24/2022 7849    Significant Other Acceptance, E, VU,NR by CD at 1/28/2022 1655    Comment: SEE FLOWSHEET.                               User Key     Initials Effective Dates Name Provider Type Discipline    SC 06/16/21 -  Armani Raya, PT Physical Therapist PT    CD 06/16/21 -  Marybeth Edwards, PT Physical Therapist PT    BA 09/21/21 -  Shannan Gillette, PT Physical Therapist PT              PT Recommendation and Plan  Planned Therapy Interventions (PT): balance training, bed mobility training, gait training, home exercise program, motor coordination training, neuromuscular re-education, patient/family education, postural re-education, strengthening, transfer training  Plan of Care Reviewed With: patient  Progress: improving  Outcome Summary: PT progress note completed.  Pt with slow progress toward therapy goals.  Ambulation distance limited today d/t episode of bowel incontinence.  Pt ambulated 45ft in hallway with modAx1 and FWW and chair follow for added safety.  Required increased VCs/TCs primarily for task initiation and walker management.  Extended time needed for activity d/t decreased processing and slowed responses.  Goals updated to reflect current functional status.  Con to progress pt as able per PT POC.  Rec SNF upon d/c.     Time Calculation:    PT Charges     Row Name 02/04/22 1547             Time Calculation    Start Time 1426  -BA      PT Received On 02/04/22  -BA      PT Goal Re-Cert Due Date 02/14/22  -BA              Time Calculation- PT    Total Timed Code Minutes- PT 46 minute(s)  -BA              Timed Charges    02348 - PT Therapeutic Exercise Minutes 12  -BA      37661 - Gait Training Minutes  16  -BA      13590 - PT Therapeutic Activity Minutes 18  -BA              Total Minutes    Timed Charges Total Minutes 46  -BA       Total Minutes 46  -BA            User Key  (r) = Recorded By, (t) = Taken By, (c) = Cosigned By    Initials Name Provider Type    BA Shannan Gillette, PT Physical Therapist               Therapy Charges for Today     Code Description Service Date Service Provider Modifiers Qty    64210501260 HC GAIT TRAINING EA 15 MIN 2/4/2022 Shannan Gillette, PT GP 1    01202050820 HC PT THERAPEUTIC ACT EA 15 MIN 2/4/2022 Shannan Gillette, PT GP 1    [id number]  PT THER PROC EA 15 MIN 2/4/2022 Shannan Gillette, PT GP 1          PT G-Codes  Outcome Measure Options: AM-PAC 6 Clicks Basic Mobility (PT)  AM-PAC 6 Clicks Score (PT): 14  AM-PAC 6 Clicks Score (OT): 15  Modified [place] Scale: 4 - Moderately severe disability.  Unable to walk without assistance, and unable to attend to own bodily needs without assistance.    Shannan Gillette, PT  2/4/2022

## 2022-02-04 NOTE — PLAN OF CARE
Goal Outcome Evaluation:  Plan of Care Reviewed With: patient        Progress: improving  Outcome Summary: PT progress note completed.  Pt with slow progress toward therapy goals.  Ambulation distance limited today d/t episode of bowel incontinence.  Pt ambulated 45ft in hallway with modAx1 and FWW and chair follow for added safety.  Required increased VCs/TCs primarily for task initiation and walker management.  Extended time needed for activity d/t decreased processing and slowed responses.  Goals updated to reflect current functional status.  Con to progress pt as able per PT POC.  Rec SNF upon d/c.

## 2022-02-04 NOTE — THERAPY PROGRESS REPORT/RE-CERT
Patient Name: Derian Joya  : 1940    MRN: [id number]                              Today's Date: 2022       Admit Date: 2022    Visit Dx:     ICD-10-CM ICD-9-CM   1. Oral phase dysphagia  R13.11 787.21   2. Acute on chronic intracranial subdural hematoma (HCC)  I62.01 432.1    I62.03    3. Altered mental status, unspecified altered mental status type  R41.82 780.97   4. Cognitive communication deficit  R41.841 799.52     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)     Past Medical History:   Diagnosis Date   • Allergic rhinitis    • Aortic stenosis    • Atopic rhinitis 2016   • Benign non-nodular prostatic hyperplasia with lower urinary tract symptoms 9/15/2017   • CAD (coronary artery disease)    • Cardiomyopathy, ischemic    • Colon polyps     tubular adenoma    • Community acquired pneumonia    • Depression 2016   • Diabetes (HCC)    • Diverticulosis of intestine 2016    Description: Left and sigmoid   • Dyslipidemia    • LBBB (left bundle branch block)    • PAD (peripheral artery disease) (HCC)      Past Surgical History:   Procedure Laterality Date   • CARDIAC CATHETERIZATION      100% cx   • CATARACT EXTRACTION     • COLONOSCOPY W/ BIOPSIES AND POLYPECTOMY  2021    1 benign polyp   • HIP SURGERY Left 2020    Dr Mandy BOUDREAUX   • LEG SURGERY Left 2016    Popliteal Artery stenting by Dr Hdz   • LEG SURGERY Left 2016    skin graft by Dr Nance   • LIPOMA EXCISION      s/p excision on chest   • PACEMAKER  IMPLANTATION  01/2019    and defibrillator      General Information     Row Name 02/04/22 1455          OT Time and Intention    Document Type progress note/recertification  -CS     Mode of Treatment occupational therapy  -CS     Row Name 02/04/22 1455          General Information    Patient Profile Reviewed yes  -CS     Existing Precautions/Restrictions fall; other (see comments)  delayed processing  -CS     Barriers to Rehab medically complex; previous functional deficit; cognitive status  -CS     Row Name 02/04/22 1455          Cognition    Orientation Status (Cognition) oriented to; person; verbal cues/prompts needed for orientation; place; disoriented to; situation; time  -CS     Row Name 02/04/22 1455          Safety Issues, Functional Mobility    Impairments Affecting Function (Mobility) endurance/activity tolerance; balance; postural/trunk control; strength  -CS     Cognitive Impairments, Mobility Safety/Performance insight into deficits/self-awareness; safety precaution awareness; safety precaution follow-through; problem-solving/reasoning; awareness, need for assistance  -CS           User Key  (r) = Recorded By, (t) = Taken By, (c) = Cosigned By    Initials Name Provider Type    CS Timothy Pimentel OT Occupational Therapist                 Mobility/ADL's     Row Name 02/04/22 1456          Bed Mobility    Bed Mobility supine-sit; scooting/bridging  -CS     Scooting/Bridging [place] (Bed Mobility) minimum assist (75% patient effort); verbal cues; nonverbal cues (demo/gesture)  -CS     Supine-Sit [place] (Bed Mobility) minimum assist (75% patient effort); verbal cues; nonverbal cues (demo/gesture)  -CS     Bed Mobility, Safety Issues decreased use of arms for pushing/pulling; decreased use of legs for bridging/pushing; impaired trunk control for bed mobility  -CS     Assistive Device (Bed Mobility) bed rails; head of bed elevated; draw sheet  -CS     Comment (Bed Mobility) slow and effortful,  tactile cues for initiation  -CS     Row Name 02/04/22 1456          Transfers    Transfers bed-chair transfer  -CS     Comment (Transfers) cues for safe sequencing, postural cues improve balance, 6 steps to recliner  -CS     Sit-Stand [place] (Transfers) minimum assist (75% patient effort); verbal cues; nonverbal cues (demo/gesture)  -CS     Row Name 02/04/22 1456          Sit-Stand Transfer    Assistive Device (Sit-Stand Transfers) walker, front-wheeled  -     Row Name 02/04/22 1456          Activities of Daily Living    BADL Assessment/Intervention lower body dressing; upper body dressing; grooming  -CS     Row Name 02/04/22 1456          Lower Body Dressing Assessment/Training    [place] Level (Lower Body Dressing) don; pants/bottoms; moderate assist (50% patient effort)  -CS     Position (Lower Body Dressing) supported sitting; supported standing  -CS     Comment (Lower Body Dressing) sequencing cues, improved balance for pull-up  -     Row Name 02/04/22 1456          Self-Feeding Assessment/Training    [place] Level (Feeding) liquids to mouth; modified independence  -     Assistive Devices (Feeding) adapted cup  -     Row Name 02/04/22 1456          Grooming Assessment/Training    [place] Level (Grooming) wash face, hands; hair care, combing/brushing; standby assist; verbal cues; set up  -CS     Position (Grooming) edge of bed sitting  -           User Key  (r) = Recorded By, (t) = Taken By, (c) = Cosigned By    Initials Name Provider Type     Timothy Pimentel, OT Occupational Therapist               Obj/Interventions     Row Name 02/04/22 1500          Balance    Balance Assessment sitting static balance; sitting dynamic balance; standing static balance; standing dynamic balance  -     Static Sitting Balance WFL; unsupported; sitting, edge of bed  -CS     Dynamic Sitting Balance mild impairment; unsupported; sitting, edge of bed  -CS     Static Standing Balance mild  impairment; supported; standing  -CS     Dynamic Standing Balance moderate impairment; supported; standing  -CS     Balance Interventions sitting; sit to stand; standing; occupation based/functional task; UE activity with balance activity  -CS     Comment, Balance ADLs sitting EOB  -CS           User Key  (r) = Recorded By, (t) = Taken By, (c) = Cosigned By    Initials Name Provider Type    CS Timothy Pimentel, OT Occupational Therapist               Goals/Plan     Row Name 02/04/22 1503          Bed Mobility Goal 1 (OT)    Activity/Assistive Device (Bed Mobility Goal 1, OT) sit to supine/supine to sit; scooting  -CS     [place] Level/Cues Needed (Bed Mobility Goal 1, OT) contact guard assist  -CS     Time Frame (Bed Mobility Goal 1, OT) by discharge; long term goal (LTG)  -CS     Progress/Outcomes (Bed Mobility Goal 1, OT) goal revised this date; goal ongoing  -CS     Row Name 02/04/22 1503          Transfer Goal 1 (OT)    Activity/Assistive Device (Transfer Goal 1, OT) sit-to-stand/stand-to-sit; toilet  -CS     [place] Level/Cues Needed (Transfer Goal 1, OT) contact guard assist; verbal cues required  -CS     Time Frame (Transfer Goal 1, OT) by discharge; long term goal (LTG)  -CS     Progress/Outcome (Transfer Goal 1, OT) continuing progress toward goal; goal ongoing  -CS     Row Name 02/04/22 1503          Dressing Goal 1 (OT)    Activity/Device (Dressing Goal 1, OT) lower body dressing  -CS     [place]/Cues Needed (Dressing Goal 1, OT) moderate assist (50-74% patient effort); verbal cues required  -CS     Time Frame (Dressing Goal 1, OT) by discharge; long term goal (LTG)  -CS     Progress/Outcome (Dressing Goal 1, OT) progress slower than expected; goal ongoing  -CS     Row Name 02/04/22 1503          Toileting Goal 1 (OT)    Activity/Device (Toileting Goal 1, OT) adjust/manage clothing; perform perineal hygiene  -CS     [place] Level/Cues Needed (Toileting Goal 1, OT) minimum assist (75%  or more patient effort)  -CS     Time Frame (Toileting Goal 1, OT) long term goal (LTG); 10 days  -CS     Progress/Outcome (Toileting Goal 1, OT) goal revised this date; goal ongoing  -CS     Row Name 02/04/22 1503          Grooming Goal 1 (OT)    Activity/Device (Grooming Goal 1, OT) hair care; oral care; wash face, hands  -CS     [place] (Grooming Goal 1, OT) supervision required  -CS     Time Frame (Grooming Goal 1, OT) by discharge; long term goal (LTG)  -CS     Progress/Outcome (Grooming Goal 1, OT) goal ongoing; goal revised this date  -CS           User Key  (r) = Recorded By, (t) = Taken By, (c) = Cosigned By    Initials Name Provider Type    CS Timothy Pimentel, OT Occupational Therapist               Clinical Impression     Row Name 02/04/22 1501          Pain Assessment    Additional Documentation Pain Scale: Numbers Pre/Post-Treatment (Group)  -CS     Row Name 02/04/22 1501          Pain Scale: Numbers Pre/Post-Treatment    Pretreatment Pain Rating 0/10 - no pain  -CS     Posttreatment Pain Rating 0/10 - no pain  -CS     Pre/Posttreatment Pain Comment tolerated  -CS     Pain Intervention(s) Repositioned; Ambulation/increased activity  -CS     Row Name 02/04/22 1501          Plan of Care Review    Plan of Care Reviewed With patient  -CS     Progress improving  -CS     Outcome Summary Pt has progressed to Matthew for bed mobility, CGA/Matthew for transfers and short distances w/ RW, MaxA for LB dressing, and SBA/JAY for seated grooming. Progress remains limited by strength, balance, cognitive, and endurance deficits warranting continued IP OT services to promote independence w/ ADLs. [place] reviewed/revised this date, Rec d/c to SNF rehab.  -CS     Row Name 02/04/22 1501          Therapy Assessment/Plan (OT)    Rehab Potential (OT) good, to achieve stated therapy goals  -CS     Criteria for Skilled Therapeutic Interventions Met (OT) yes; skilled treatment is necessary  -CS     Therapy Frequency (OT) daily   -CS     Row Name 02/04/22 1501          Therapy Plan Review/Discharge Plan (OT)    Anticipated Discharge Disposition (OT) skilled nursing facility; other (see comments)  rehab  -CS     Row Name 02/04/22 1501          Vital Signs    Pre Systolic BP Rehab --  RN cleared for tx, VSS on RA  -CS     O2 Delivery Pre Treatment room air  -CS     O2 Delivery Intra Treatment room air  -CS     O2 Delivery Post Treatment room air  -CS     Pre Patient Position Supine  -CS     Intra Patient Position Standing  -CS     Post Patient Position Sitting  -CS     Row Name 02/04/22 1501          Positioning and Restraints    Pre-Treatment Position in bed  -CS     Post Treatment Position chair  -CS     In Chair sitting; with PT  -CS           User Key  (r) = Recorded By, (t) = Taken By, (c) = Cosigned By    Initials Name Provider Type    CS Timothy Pimentel, OT Occupational Therapist               Outcome Measures     Row Name 02/04/22 1504          How much help from another is currently needed...    Putting on and taking off regular lower body clothing? 2  -CS     Bathing (including washing, rinsing, and drying) 2  -CS     Toileting (which includes using toilet bed pan or urinal) 2  -CS     Putting on and taking off regular upper body clothing 3  -CS     Taking care of personal grooming (such as brushing teeth) 3  -CS     Eating meals 3  -CS     AM-PAC 6 Clicks Score (OT) 15  -CS     Row Name 02/04/22 1233 02/04/22 1020       How much help from another person do you currently need...    Turning from your back to your side while in flat bed without using bedrails? 3  -AT 3  -AT    Moving from lying on back to sitting on the side of a flat bed without bedrails? 2  -AT 2  -AT    Moving to and from a bed to a chair (including a wheelchair)? 2  -AT 2  -AT    Standing up from a chair using your arms (e.g., wheelchair, bedside chair)? 3  -AT 3  -AT    Climbing 3-5 steps with a railing? 1  -AT 1  -AT    To walk in hospital room? 2  -AT 2  -AT     AM-PAC 6 Clicks Score (PT) 13  -AT 13  -AT    Row Name 02/04/22 0826          How much help from another person do you currently need...    Turning from your back to your side while in flat bed without using bedrails? 3  -AT     Moving from lying on back to sitting on the side of a flat bed without bedrails? 2  -AT     Moving to and from a bed to a chair (including a wheelchair)? 2  -AT     Standing up from a chair using your arms (e.g., wheelchair, bedside chair)? 3  -AT     Climbing 3-5 steps with a railing? 1  -AT     To walk in hospital room? 2  -AT     AM-PAC 6 Clicks Score (PT) 13  -AT     Row Name 02/04/22 1504          Modified Adams Scale    Modified Adams Scale 4 - Moderately severe disability.  Unable to walk without assistance, and unable to attend to own bodily needs without assistance.  -     Row Name 02/04/22 1504          Functional Assessment    Outcome Measure Options AM-PAC 6 Clicks Daily Activity (OT)  -           User Key  (r) = Recorded By, (t) = Taken By, (c) = Cosigned By    Initials Name Provider Type    AT [place], April, RN Registered Nurse    Timothy Dubon OT Occupational Therapist                Occupational Therapy Education                 Title: PT OT SLP Therapies (In Progress)     Topic: Occupational Therapy (In Progress)     Point: ADL training (In Progress)     Description:   Instruct learner(s) on proper safety adaptation and remediation techniques during self care or transfers.   Instruct in proper use of assistive devices.              Learning Progress Summary           Patient Acceptance, E,D, NR by  at 2/4/2022 1505    Acceptance, E,D, NR by  at 2/2/2022 1556    Acceptance, E,D, NR by  at 1/31/2022 1524    Acceptance, E,D, NR by  at 1/27/2022 1417    Acceptance, E,TB,D, VU,NR by  at 1/25/2022 1540    Acceptance, E,TB,D, VU,NR by  at 1/24/2022 1516                   Point: Home exercise program (In Progress)     Description:   Instruct learner(s) on  appropriate technique for monitoring, assisting and/or progressing therapeutic exercises/activities.              Learning Progress Summary           Patient Acceptance, E,D, NR by  at 2/2/2022 1556    Acceptance, E,D, NR by  at 1/31/2022 1524    Acceptance, E,D, NR by  at 1/27/2022 1417                   Point: Precautions (In Progress)     Description:   Instruct learner(s) on prescribed precautions during self-care and functional transfers.              Learning Progress Summary           Patient Acceptance, E,D, NR by CS at 2/4/2022 1505    Acceptance, E,D, NR by CS at 2/2/2022 1556    Acceptance, E,D, NR by  at 1/27/2022 1417    Acceptance, E,TB,D, VU,NR by  at 1/25/2022 1540    Acceptance, E,TB,D, VU,NR by  at 1/24/2022 1516                   Point: Body mechanics (In Progress)     Description:   Instruct learner(s) on proper positioning and spine alignment during self-care, functional mobility activities and/or exercises.              Learning Progress Summary           Patient Acceptance, E,D, NR by  at 2/4/2022 1505    Acceptance, E,D, NR by  at 2/2/2022 1556    Acceptance, E,D, NR by  at 1/31/2022 1524    Acceptance, E,D, NR by  at 1/27/2022 1417    Acceptance, E,TB,D, VU,NR by  at 1/25/2022 1540    Acceptance, E,TB,D, VU,NR by  at 1/24/2022 1516                               User Key     Initials Effective Dates Name Provider Type Discipline     06/16/21 -  Erika Youngblood, OT Occupational Therapist OT     06/16/21 -  Timothy Pimentel OT Occupational Therapist OT              OT Recommendation and Plan  Therapy Frequency (OT): daily  Plan of Care Review  Plan of Care Reviewed With: patient  Progress: improving  Outcome Summary: Pt has progressed to Matthew for bed mobility, CGA/Matthew for transfers and short distances w/ RW, MaxA for LB dressing, and SBA/JAY for seated grooming. Progress remains limited by strength, balance, cognitive, and endurance deficits warranting continued IP OT  services to promote independence w/ ADLs. [place] reviewed/revised this date, Rec d/c to SNF rehab.     Time Calculation:    Time Calculation- OT     Row Name 02/04/22 1454             Time Calculation- OT    OT Start Time 1412  -CS      OT Received On 02/04/22  -CS      OT Goal Re-Cert Due Date 02/14/22  -CS              Timed Charges    75859 - OT Therapeutic Activity Minutes 4  -CS      98663 - OT Self Care/Mgmt Minutes 8  -CS              Total Minutes    Timed Charges Total Minutes 12  -CS       Total Minutes 12  -CS            User Key  (r) = Recorded By, (t) = Taken By, (c) = Cosigned By    Initials Name Provider Type    CS Timothy Pimentel, OT Occupational Therapist              Therapy Charges for Today     Code Description Service Date Service Provider Modifiers Qty    24706895002 HC OT SELF CARE/MGMT/TRAIN EA 15 MIN 2/4/2022 Timothy Pimentel OT GO 1               Timothy Pimentel OT  2/4/2022

## 2022-02-04 NOTE — NURSING NOTE
Patient alert, cooperative, and calm.  Appetite good for all meals.  Up in chair this afternoon.  Family to transport patient to [place] tomorrow with unknown time at this time.  No issues or concerns at this time and will continue to monitor and assess.

## 2022-02-04 NOTE — PROGRESS NOTES
[facility] Medicine Services  PROGRESS NOTE    Patient Name: Derian Joya  : 1940  MRN: [id number]    Date of Admission: 2022  Primary Care Physician: Diaz Lee MD    Subjective   Subjective     CC:  Lethargy     HPI:  No acute complaints, resting well.    ROS:  Gen- No fevers, chills  CV- No chest pain, palpitations  Resp- No cough, dyspnea  GI- No N/V/D, abd pain      Objective   Objective     Vital Signs:   Temp:  [97.6 °F (36.4 °C)-98.2 °F (36.8 °C)] 97.8 °F (36.6 °C)  Heart Rate:  [74-94] 79  Resp:  [16-18] 18  BP: (138-162)/(76-84) 138/77  Flow (L/min):  [2] 2     Physical Exam:  Constitutional  NAD in bed.  Alert and pleasant   HEENT-NCAT, mucous membranes moist  CV- RRR with holosystolic murmur RUSB    Resp- nonlabored at rest, anterior exam is clear   Abd-soft, nontender, nondistended, normoactive bowel sounds  Ext- No lower extremity cyanosis, clubbing or edema bilaterally  Neuro-alert and has clear speech and RANDOLPH.   Skin- No rash on exposed UE or LE bilaterally      Results Reviewed:  LAB RESULTS:      Lab 22  0601   WBC 9.25   HEMOGLOBIN 13.3   HEMATOCRIT 39.9   PLATELETS 294   MCV 97.1*         Lab 22  0542 22  0601 22  0712 22  0746   SODIUM 138 137 135* 132*   POTASSIUM 3.8 4.0 4.2 4.6   CHLORIDE 102 100 100 96*   CO2 27.0 28.0 28.0 29.0   ANION GAP 9.0 9.0 7.0 7.0   BUN 20 14 20 19   CREATININE 0.46* 0.50* 0.46* 0.51*   GLUCOSE 116* 117* 121* 138*   CALCIUM 8.8 9.4 8.8 8.9                         Brief Urine Lab Results  (Last result in the past 365 days)      Color   Clarity   Blood   Leuk Est   Nitrite   Protein   CREAT   Urine HCG        22 2155 Yellow   Clear   Negative   Trace   Negative   Trace                 Microbiology Results Abnormal     Procedure Component Value - Date/Time    COVID PRE-OP / PRE-PROCEDURE SCREENING ORDER (NO ISOLATION) - Swab, Nasopharynx [[id number]]  (Normal) Collected: 22 9998     Lab Status: Final result Specimen: Swab from Nasopharynx Updated: 02/03/22 1400    Narrative:      The following orders were created for panel order COVID PRE-OP / PRE-PROCEDURE SCREENING ORDER (NO ISOLATION) - Swab, Nasopharynx.  Procedure                               Abnormality         Status                     ---------                               -----------         ------                     COVID-19 and FLU A/B PCR...[[id number]]  Normal              Final result                 Please view results for these tests on the individual orders.    COVID-19 and FLU A/B PCR - Swab, Nasopharynx [[id number]]  (Normal) Collected: 02/03/22 1258    Lab Status: Final result Specimen: Swab from Nasopharynx Updated: 02/03/22 1400     COVID19 Not Detected     Influenza A PCR Not Detected     Influenza B PCR Not Detected    Narrative:      Fact sheet for providers: https://www.fda.gov/media/224838/download    Fact sheet for patients: https://www.fda.gov/media/578006/download    Test performed by PCR.    COVID PRE-OP / PRE-PROCEDURE SCREENING ORDER (NO ISOLATION) - Swab, Nasopharynx [[id number]]  (Normal) Collected: 01/22/22 2144    Lab Status: Final result Specimen: Swab from Nasopharynx Updated: 01/22/22 2315    Narrative:      The following orders were created for panel order COVID PRE-OP / PRE-PROCEDURE SCREENING ORDER (NO ISOLATION) - Swab, Nasopharynx.  Procedure                               Abnormality         Status                     ---------                               -----------         ------                     COVID-19, FLU A/B, RSV P...[[id number]]  Normal              Final result                 Please view results for these tests on the individual orders.    COVID-19, FLU A/B, RSV PCR - Swab, Nasopharynx [[id number]]  (Normal) Collected: 01/22/22 2144    Lab Status: Final result Specimen: Swab from Nasopharynx Updated: 01/22/22 2315     COVID19 Not Detected     Influenza A PCR Not Detected      Influenza B PCR Not Detected     RSV, PCR Not Detected    Narrative:      Fact sheet for providers: https://www.fda.gov/media/417308/download    Fact sheet for patients: https://www.fda.gov/media/593702/download    Test performed by PCR.          No radiology results from the last 24 hrs    Results for orders placed during the hospital encounter of 01/11/22    Adult Transthoracic Echo Complete W/ Cont if Necessary Per Protocol    Interpretation Summary  · Mild aortic valve stenosis is present.  · Estimated right ventricular systolic pressure from tricuspid regurgitation is normal (<35 mmHg).  · LVSF is normal  · MAC      I have reviewed the medications:  Scheduled Meds:atorvastatin, 80 mg, Oral, Nightly  bisoprolol, 10 mg, Oral, Daily  famotidine, 40 mg, Oral, Daily  insulin detemir, 5 Units, Subcutaneous, Q12H  insulin lispro, 0-7 Units, Subcutaneous, TID AC  levETIRAcetam, 250 mg, Oral, BID  metFORMIN, 850 mg, Oral, BID With Meals  senna-docusate sodium, 2 tablet, Oral, BID  sodium chloride, 10 mL, Intravenous, Q12H  sodium chloride, 2 g, Oral, BID With Meals  temazepam, 15 mg, Oral, Nightly  terazosin, 2 mg, Oral, Nightly      Continuous Infusions:   PRN Meds:.•  acetaminophen **OR** acetaminophen **OR** acetaminophen  •  senna-docusate sodium **AND** polyethylene glycol **AND** bisacodyl **AND** bisacodyl  •  dextrose  •  dextrose  •  glucagon (human recombinant)  •  magnesium sulfate **OR** magnesium sulfate **OR** magnesium sulfate  •  potassium chloride **OR** potassium chloride **OR** potassium chloride  •  sodium chloride    Assessment/Plan   Assessment & Plan     Active Hospital Problems    Diagnosis  POA   • Acute on chronic intracranial subdural hematoma (HCC) [I62.01, I62.03]  Yes   • Altered mental status [R41.82]  Yes   • Subarachnoid hemorrhage (HCC) [I60.9]  Yes   • Cardiomyopathy, nonischemic (HCC) [I42.8]  Yes   • Aortic stenosis [I35.0]  Yes   • Essential hypertension [I10]  Yes   • Uncontrolled  type 2 diabetes mellitus (HCC) [E11.65]  Yes      Resolved Hospital Problems   No resolved problems to display.        Brief Hospital Course to date:  Derian Joya is a 82 y.o. male with history of hypertension, hyperlipidemia, diabetes and recent traumatic subarachnoid hemorrhage after a fall while vacuuming.  Was discharged to [facility] for SNF.  Represented due to increased lethargy and altered mental status.    - Patient was evaluated by neurosurgery and neurology.  Repeat CT head with stable findings compared to last admission.  His Keppra dose was decreased to 250 mg twice daily and his Paxil was discontinued.    - Nephrology was consulted for hyponatremia which  gradually improved with a fluid restriction.  Overall patient more alert and improving with therapy.    This patient's problems and plans were partially entered by my partner and updated as appropriate by me 02/04/22.    All problems are new to me today.     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 unchanged from 1/11  - No surgical intervention, follow up CT with Neurosurgery in 10 days (2/10/22)  - EEG with no seizures  - altered mentation was  likely metabolic due to hyponatremia as well as possible medications  - Keppra resumed at a lower dose 250 mg twice daily  - Continue PT/OT     Hyponatremia  - resolved   - SIADH vs secondary to ICH  - holding paxil; liberalize fluid restriction to 1500 ml/day  - salt tabs BID meals  - follow up nephrology clinic in 2-4 weeks after discharge     CAD   Chronic systolic heart failure   - 1/22 ECHO with EF 50%   - s/p ICD   - Continue statin --on no antiplatelets due to recent hemorrhage     HLD   -continue statin     HTN   -Continue bisoprolol; BP with fair control     DM2  -  Continue Levemir to 5 units twice daily and continue sliding scale insulin   - Variable PO intake with some labile blood sugar  - adjust PRN, consider adding prandial humalog     Seizure   -Decrease Keppra dose 250 mg BID   - follow up with Neurology as scheduled 3/7/22     Mood   -Discontinue Paxil; monitor    DVT prophylaxis:  Mechanical DVT prophylaxis orders are present.       AM-PAC 6 Clicks Score (PT): 13 (02/04/22 0846)    Disposition: I expect the patient to be discharged to the [facility] tomorrow per family transport.      CODE STATUS:   Code Status and Medical Interventions:   Ordered at: 01/23/22 0321     Level Of Support Discussed With:    Patient     Code Status (Patient has no pulse and is not breathing):    CPR (Attempt to Resuscitate)     Medical Interventions (Patient has pulse or is breathing):    Full Support       Susanna Ugrate DO  02/04/22

## 2022-02-04 NOTE — PLAN OF CARE
Baby Care    SIDS Prevention: Healthy infants without medical conditions should be placed on their backs for sleeping, without extra pillows and blankets.  Feedings/Breast: Feed your baby 8-10 times in 24 hours.  Some babies nurse more often. Allow the baby to feed for as long as desired.  Many babies feed from only one breast at a time during the first few days. Avoid pacifiers and artificial nipples for at least 3-4 weeks.  Feeding/Bottle: Feed your baby an iron-fortified formula 8-12 times in 24 hours. The baby may take one to three ounces at each feeding.  Hold your baby close and never prop bottles in the mouth.  Burp your baby after each feeding.  Cord Care: The cord will fall off in one to four weeks.  Clean the base of the cord with alcohol at least once a day or with diaper changes if there is drainage.  Do not submerge the baby in tub water until cord falls off.  Circumcision Care: A piece of vaseline gauze may be wrapped around the end of the penis for about 24 hours.  It will heal in 10-14 days.  Wash the area with warm water.  As the site heals, you may see a small amount of yellowish drainage.  This will resolve in a week.  Diaper Changes:  Always wipe from the front to the back.  Girls may have a vaginal discharge (either mucous or bloody).  Baby will have at least one wet diaper for each day old he/she is until the sixth day when he/she will have about 6-8 wet diapers a day.  As your baby begins to feed, the stools will change from greenish black stools to brown-green and then to a yellow.  Stools/:  babies should have 3 or more transitional to yellow, seedy stools and 6 or more wet diapers by day 4 to 5.  Stools/Formula-fed: Formula-fed babies may have stools that look seedy and change to a more pasty yellow.  Bathing: Bathe your baby in a clean area free of draft.  Use a mild soap.  Use lotions and creams sparingly.  Avoid powder and oils.  Safety: The use of car seats and seat  Goal Outcome Evaluation:  Plan of Care Reviewed With: patient        Progress: improving   SLP treatment completed. Will continue to address cognitive-linguistic deficits in tx. Please see note for further details and recommendations.     restraints is mandatory in the [facility].  Follow infant abduction prevention guidelines.  PKU/Hearing Screen: These are tests required by law that will be done prior to discharge and will identify potential hearing loss and disorders in the  which, if not found and treated early, could lead to mental retardation and serious illness.    CALL YOUR PEDIATRICIAN IF YOUR BABY HAS:     *Temperature less than 97.0 or greater than 100.0 degrees F     *Redness, swelling, foul odor or drainage from cord or circumcision     *Vomiting or Diarrhea     *No stool within 48 hour of feeding     *Refuses to eat more than one feeding     *(If Breastfeeding) less than 2 wet diapers and 2 stools/day after 3 days old     *Skin looks yellow, grey or blue     *Any behavior that worries you

## 2022-02-05 VITALS
WEIGHT: 142.5 LBS | OXYGEN SATURATION: 96 % | BODY MASS INDEX: 23.74 KG/M2 | SYSTOLIC BLOOD PRESSURE: 141 MMHG | TEMPERATURE: 98.4 F | HEART RATE: 76 BPM | DIASTOLIC BLOOD PRESSURE: 76 MMHG | RESPIRATION RATE: 18 BRPM | HEIGHT: 65 IN

## 2022-02-05 LAB
ALBUMIN SERPL-MCNC: 3 G/DL (ref 3.5–5.2)
ALBUMIN/GLOB SERPL: 1.2 G/DL
ALP SERPL-CCNC: 151 U/L (ref 39–117)
ALT SERPL W P-5'-P-CCNC: 25 U/L (ref 1–41)
ANION GAP SERPL CALCULATED.3IONS-SCNC: 9 MMOL/L (ref 5–15)
AST SERPL-CCNC: 28 U/L (ref 1–40)
BILIRUB SERPL-MCNC: 0.5 MG/DL (ref 0–1.2)
BUN SERPL-MCNC: 19 MG/DL (ref 8–23)
BUN/CREAT SERPL: 45.2 (ref 7–25)
CALCIUM SPEC-SCNC: 8.7 MG/DL (ref 8.6–10.5)
CHLORIDE SERPL-SCNC: 104 MMOL/L (ref 98–107)
CO2 SERPL-SCNC: 24 MMOL/L (ref 22–29)
CREAT SERPL-MCNC: 0.42 MG/DL (ref 0.76–1.27)
DEPRECATED RDW RBC AUTO: 45.6 FL (ref 37–54)
ERYTHROCYTE [DISTWIDTH] IN BLOOD BY AUTOMATED COUNT: 12.5 % (ref 12.3–15.4)
GFR SERPL CREATININE-BSD FRML MDRD: >150 ML/MIN/1.73
GLOBULIN UR ELPH-MCNC: 2.5 GM/DL
GLUCOSE BLDC GLUCOMTR-MCNC: 117 MG/DL (ref 70–130)
GLUCOSE BLDC GLUCOMTR-MCNC: 96 MG/DL (ref 70–130)
GLUCOSE SERPL-MCNC: 148 MG/DL (ref 65–99)
HCT VFR BLD AUTO: 35.1 % (ref 37.5–51)
HGB BLD-MCNC: 11.3 G/DL (ref 13–17.7)
MCH RBC QN AUTO: 31.8 PG (ref 26.6–33)
MCHC RBC AUTO-ENTMCNC: 32.2 G/DL (ref 31.5–35.7)
MCV RBC AUTO: 98.9 FL (ref 79–97)
PLATELET # BLD AUTO: 169 10*3/MM3 (ref 140–450)
PMV BLD AUTO: 9.7 FL (ref 6–12)
POTASSIUM SERPL-SCNC: 3.4 MMOL/L (ref 3.5–5.2)
PROT SERPL-MCNC: 5.5 G/DL (ref 6–8.5)
RBC # BLD AUTO: 3.55 10*6/MM3 (ref 4.14–5.8)
SODIUM SERPL-SCNC: 137 MMOL/L (ref 136–145)
WBC NRBC COR # BLD: 6.97 10*3/MM3 (ref 3.4–10.8)

## 2022-02-05 PROCEDURE — 99239 HOSP IP/OBS DSCHRG MGMT >30: CPT | Performed by: NURSE PRACTITIONER

## 2022-02-05 PROCEDURE — 80053 COMPREHEN METABOLIC PANEL: CPT | Performed by: HOSPITALIST

## 2022-02-05 PROCEDURE — 85027 COMPLETE CBC AUTOMATED: CPT | Performed by: HOSPITALIST

## 2022-02-05 PROCEDURE — 63710000001 INSULIN DETEMIR PER 5 UNITS: Performed by: INTERNAL MEDICINE

## 2022-02-05 PROCEDURE — 82962 GLUCOSE BLOOD TEST: CPT

## 2022-02-05 RX ORDER — SODIUM CHLORIDE 1000 MG
2 TABLET, SOLUBLE MISCELLANEOUS 2 TIMES DAILY WITH MEALS
Start: 2022-02-05 | End: 2022-02-20 | Stop reason: HOSPADM

## 2022-02-05 RX ORDER — LEVETIRACETAM 250 MG/1
250 TABLET ORAL 2 TIMES DAILY
Start: 2022-02-05 | End: 2022-03-25 | Stop reason: SDUPTHER

## 2022-02-05 RX ORDER — POLYETHYLENE GLYCOL 3350 17 G/17G
17 POWDER, FOR SOLUTION ORAL DAILY PRN
Start: 2022-02-05

## 2022-02-05 RX ORDER — FAMOTIDINE 40 MG/1
40 TABLET, FILM COATED ORAL DAILY
Start: 2022-02-06 | End: 2022-06-15

## 2022-02-05 RX ORDER — AMOXICILLIN 250 MG
1 CAPSULE ORAL 2 TIMES DAILY
Start: 2022-02-05

## 2022-02-05 RX ADMIN — SENNOSIDES AND DOCUSATE SODIUM 2 TABLET: 50; 8.6 TABLET ORAL at 08:07

## 2022-02-05 RX ADMIN — BISOPROLOL FUMARATE 10 MG: 5 TABLET, FILM COATED ORAL at 08:07

## 2022-02-05 RX ADMIN — INSULIN DETEMIR 5 UNITS: 100 INJECTION, SOLUTION SUBCUTANEOUS at 08:03

## 2022-02-05 RX ADMIN — FAMOTIDINE 40 MG: 20 TABLET, FILM COATED ORAL at 08:07

## 2022-02-05 RX ADMIN — SODIUM CHLORIDE, PRESERVATIVE FREE 10 ML: 5 INJECTION INTRAVENOUS at 08:08

## 2022-02-05 RX ADMIN — LEVETIRACETAM 250 MG: 250 TABLET, FILM COATED ORAL at 04:31

## 2022-02-05 RX ADMIN — SODIUM CHLORIDE 2 G: 1 TABLET ORAL at 08:07

## 2022-02-05 RX ADMIN — METFORMIN HYDROCHLORIDE 850 MG: 850 TABLET, FILM COATED ORAL at 08:07

## 2022-02-05 NOTE — NURSING NOTE
Patient dressed and ready to be transported to [facility].  Patients son is here and is transporting.  This nurse spoke to Aleshia at [facility] and passed report along with faxed info to her.  Patients son has packet for facility as well.  Patient aware of going to [facility] and is content, calm, and cooperative.  No concerns or questions from son or patient.

## 2022-02-05 NOTE — DISCHARGE SUMMARY
[facility] Medicine Services  TRANSFER SUMMARY    Patient Name: Derian Joya  : 1940  MRN: [id number]    Date of Admission: 2022  Date of Discharge:  2022  Length of Stay: 10  Primary Care Physician: Diaz Lee MD    Consults     Date and Time Order Name Status Description    2022 11:03 AM Inpatient Nephrology Consult Completed     2022 12:46 AM Inpatient Neurosurgery Consult      2022 12:35 AM Inpatient Cardiology Consult Completed           Hospital Course     Presenting Problem:   Acute on chronic intracranial subdural hematoma (HCC) [I62.01, I62.03]    Active Hospital Problems    Diagnosis  POA   • Acute on chronic intracranial subdural hematoma (HCC) [I62.01, I62.03]  Yes   • Altered mental status [R41.82]  Yes   • Subarachnoid hemorrhage (HCC) [I60.9]  Yes   • Cardiomyopathy, nonischemic (HCC) [I42.8]  Yes   • Aortic stenosis [I35.0]  Yes   • Essential hypertension [I10]  Yes   • Uncontrolled type 2 diabetes mellitus (HCC) [E11.65]  Yes      Resolved Hospital Problems   No resolved problems to display.          Hospital Course:  Derian Joya is a 82 y.o. male with history of hypertension, hyperlipidemia, diabetes and recent traumatic subarachnoid hemorrhage after a fall while vacuuming.  Was discharged to [place] for SNF.  Represented due to increased lethargy and altered mental status.    - Patient was evaluated by neurosurgery and neurology.  Repeat CT head with stable findings compared to last admission.  His Keppra dose was decreased to 250 mg twice daily and his Paxil was discontinued.    - Nephrology was consulted for hyponatremia which  gradually improved with a fluid restriction.  Overall patient more alert and improving with therapy.     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  - Keppra resumed at a lower dose 250 mg twice daily.  We will continue current regimen on transfer.  --Follow-up with  with neurology as per schedule 3/7/2022      Hyponatremia  - resolved   - SIADH vs secondary to ICH  - holding paxil; liberalize fluid restriction to 1500 ml/day.  We will continue to hold Paxil and continue 1500 cc/day fluid restriction on transfer.  - salt tabs BID meals  --Sodium normal at 138 and last checked 2/1.  Recommend recheck periodically at rehab facility versus PCP  - follow up [facility] in 2-4 weeks after discharge     CAD   Chronic systolic heart failure   - 1/22 ECHO with EF 50%   - s/p ICD   - Continue statin --on no antiplatelets due to recent hemorrhage     HLD   -continue statin     HTN   -Continue bisoprolol  --BP stable on current regimen.     DM2  - Continue Levemir to 5 units twice daily and continue sliding scale insulin while here.  - Variable PO intake with some labile blood sugar  - adjust PRN, consider adding prandial humalog  --Continue insulin and resume Metformin on discharge (creatinine normal at 0.46).  Monitor glucoses ACHS.  --Eating more consistently glucoses stabilizing.     Seizure   -Decreased Keppra dose 250 mg BID.  Continue current regimen discharge.  - follow up with Neurology as scheduled 3/7/22     Mood   -Discontinued Paxil as above due to hyponatremia; monitor  --Stable     Patient was seen this morning resting up in bed awake and alert.  No acute distress.  Conversant.  Speech clear and appropriate.  Aware of being in a medical facility, knows the year, his name and general  events.  Cannot recall what rehab facility he was in before, but he is eager to get back into rehab today.  Denies any pain headache, nausea or vomiting.  Currently hemodynamically stable and afebrile.  Will transfer to the [facility] today with follow-ups as noted below.        Discharge Follow Up Recommendations for outpatient labs/diagnostics:  Pt is clear for transfer to the [facility] today as planned by all services.  Going on medications as below.    --To be seen by provider at rehab facility within 24 hours, PCP 1 week of discharge  --Follow-up [facility] neurology as prior scheduled on 3/7/2022  --Follow-up neurosurgery as prior scheduled on 2/10/2022 with CT of the head planned  --Follow-up with [facility] in 2-4 weeks    Day of Discharge     HPI:   Pt is seen resting up in bed in NAD.  Oriented to person, hospital, and general events.  Denies pain, headache, nausea, vomiting.  Tolerating diet.  No issues per patient or staff.  Plans for transfer to rehab today.    Review of Systems  Gen- No fevers, chills  CV- No chest pain, palpitations  Resp- No cough, dyspnea  GI- No N/V/D, abd pain    Vital Signs:   Temp:  [97.6 °F (36.4 °C)-98.5 °F (36.9 °C)] 98.4 °F (36.9 °C)  Heart Rate:  [70-89] 76  Resp:  [16-18] 18  BP: (109-141)/(66-81) 141/76     Physical Exam:  Constitutional: No acute distress, awake, alert. Up in bed. Frail appearing elderly male.    HENT: NCAT, mucous membranes moist  Respiratory: Clear to auscultation bilaterally, respiratory effort normal   Cardiovascular: RRR, + holosystolic murmur, rubs, or gallops  Gastrointestinal: Positive bowel sounds, soft, nontender, nondistended  Musculoskeletal: No bilateral ankle edema. RANDOLPH spont   Psychiatric: Appropriate affect, cooperative  Neurologic: alert.  Oriented to person, year, medical facility and general events--stable, strength symmetric in all extremities, Cranial Nerves grossly intact to confrontation, speech clear and apporopriate.   Skin: No  rashes      Pertinent Results     Results from last 7 days   Lab Units 02/01/22  0542 01/31/22  0601 01/30/22  0712   WBC 10*3/mm3  --  9.25  --    HEMOGLOBIN g/dL  --  13.3  --    HEMATOCRIT %  --  39.9  --    PLATELETS 10*3/mm3  --  294  --    SODIUM mmol/L 138 137 135*   POTASSIUM mmol/L 3.8 4.0 4.2   CHLORIDE mmol/L 102 100 100   CO2 mmol/L 27.0 28.0 28.0   BUN mg/dL 20 14 20   CREATININE mg/dL 0.46* 0.50* 0.46*   GLUCOSE mg/dL 116* 117* 121*   CALCIUM mg/dL 8.8 9.4 8.8           Invalid input(s): PROT, LABALBU        Invalid input(s): TG, LDLCALC, LDLREALC      Brief Urine Lab Results  (Last result in the past 365 days)      Color   Clarity   Blood   Leuk Est   Nitrite   Protein   CREAT   Urine HCG        01/22/22 2155 Yellow   Clear   Negative   Trace   Negative   Trace                 Microbiology Results Abnormal     Procedure Component Value - Date/Time    COVID PRE-OP / PRE-PROCEDURE SCREENING ORDER (NO ISOLATION) - Swab, Nasopharynx [[id number]]  (Normal) Collected: 02/03/22 1258    Lab Status: Final result Specimen: Swab from Nasopharynx Updated: 02/03/22 1400    Narrative:      The following orders were created for panel order COVID PRE-OP / PRE-PROCEDURE SCREENING ORDER (NO ISOLATION) - Swab, Nasopharynx.  Procedure                               Abnormality         Status                     ---------                               -----------         ------                     COVID-19 and FLU A/B PCR...[[id number]]  Normal              Final result                 Please view results for these tests on the individual orders.    COVID-19 and FLU A/B PCR - Swab, Nasopharynx [[id number]]  (Normal) Collected: 02/03/22 1258    Lab Status: Final result Specimen: Swab from Nasopharynx Updated: 02/03/22 1400     COVID19 Not Detected     Influenza A PCR Not Detected     Influenza B PCR Not Detected    Narrative:      Fact sheet for providers: https://www.fda.gov/media/724394/download    Fact sheet for patients:  https://www.fda.gov/media/078602/download    Test performed by PCR.    COVID PRE-OP / PRE-PROCEDURE SCREENING ORDER (NO ISOLATION) - Swab, Nasopharynx [[id number]]  (Normal) Collected: 01/22/22 2144    Lab Status: Final result Specimen: Swab from Nasopharynx Updated: 01/22/22 2315    Narrative:      The following orders were created for panel order COVID PRE-OP / PRE-PROCEDURE SCREENING ORDER (NO ISOLATION) - Swab, Nasopharynx.  Procedure                               Abnormality         Status                     ---------                               -----------         ------                     COVID-19, FLU A/B, RSV P...[[id number]]  Normal              Final result                 Please view results for these tests on the individual orders.    COVID-19, FLU A/B, RSV PCR - Swab, Nasopharynx [[id number]]  (Normal) Collected: 01/22/22 2144    Lab Status: Final result Specimen: Swab from Nasopharynx Updated: 01/22/22 2315     COVID19 Not Detected     Influenza A PCR Not Detected     Influenza B PCR Not Detected     RSV, PCR Not Detected    Narrative:      Fact sheet for providers: https://www.fda.gov/media/828539/download    Fact sheet for patients: https://www.fda.gov/media/430255/download    Test performed by PCR.          Imaging Results (All)     Procedure Component Value Units Date/Time    FL Video Swallow With Speech Single Contrast [[id number]] Collected: 01/26/22 1457     Updated: 01/26/22 1643    Narrative:      EXAMINATION: FL VIDEO SWALLOW W SPEECH SINGLE-CONTRAST-     INDICATION: dysphagia; I62.01-Nontraumatic acute subdural hemorrhage;  I62.03-Nontraumatic chronic subdural hemorrhage; R41.82-Altered mental  status, unspecified; R13.11-Dysphagia, oral phase; R41.841-Cognitive  communication deficit     TECHNIQUE: 42 seconds of fluoroscopic time was used for this exam. 6  associated fluoroscopic loops were saved. The patient was evaluated in  the seated lateral position while taking a variety of  consistencies of  barium by mouth under the direction of speech pathology.     COMPARISON: NONE     FINDINGS: 42 seconds of fluoroscopy provided for a modified barium  swallow. Please see speech therapy report for full details and  recommendations.          Impression:      Fluoroscopy provided for a modified barium swallow. Please  see speech therapy report for full details and recommendations.         This report was finalized on 1/26/2022 4:40 PM by Tal Garza.       CT Angiogram Head w AI Analysis of LVO [[id number]] Collected: 01/23/22 2028     Updated: 01/23/22 2033    Narrative:      EXAMINATION: CT ANGIOGRAM HEAD W AI ANALYSIS OF LVO-      INDICATION: Stroke, follow up; I62.01-Nontraumatic acute subdural  hemorrhage; I62.03-Nontraumatic chronic subdural hemorrhage;  R41.82-Altered mental status, unspecified     TECHNIQUE: Pre and post contrast 3 mm and 0.75 mm axial images of the  brain with sagittal and coronal 2-D reconstructions and 3-D VRT and MIP  angiographic     The radiation dose reduction device was turned on for each scan per the  ALARA (As Low as Reasonably Achievable) protocol.     AI analysis of LVO was utilized.     COMPARISON: Unenhanced head CT scan 1/22/2022. CTA 1/11/2022     FINDINGS: As noted on prior CTA, there is heavy calcification of the  intracranial vertebral arteries, relatively large caliber left vertebral  artery, and residual subarachnoid prepontine hemorrhage. There is only  trace remaining intraventricular hemorrhage. There is a faint suggestion  of a left frontal subdural hemorrhage unchanged.     Angiographic images show stable high-grade mid-distal basilar artery  stenosis, perhaps best appreciated on sagittal reconstruction MIP image  23 series 902, axial thin section reconstruction image 198 series 901.     Intracranial portions the anterior and middle cerebral arteries appear  normal. Extensive calcification of the cavernous carotid arteries  appears peripheral  without evidence of hemodynamically significant  stenosis. Posterior cerebral arteries appear symmetric and normal.             Impression:      1. 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  2. Diffuse cavernous carotid calcification without evidence of  high-grade stenosis.  3. No evidence of hemodynamically significant intracranial vascular  stenosis elsewhere.  4. Stable small focus of prepontine subarachnoid hemorrhage, trace  intraventricular hemorrhage and trace left subdural hemorrhage.        This report was finalized on 1/23/2022 8:30 PM by Dr. Kendrick Jane MD.       CT Head Without Contrast [[id number]] Collected: 01/22/22 2321     Updated: 01/22/22 2323    Narrative:      CT Head WO    HISTORY:   Metal status changes. Recent intracranial hemorrhage.    TECHNIQUE:   Axial unenhanced head CT with multiplanar reformats. Radiation dose reduction techniques included automated exposure control or exposure modulation based on body size. Count of known CT and cardiac nuc med studies performed in previous 12 months: 8.     COMPARISON:   1/17/2022    FINDINGS:   Generalized atrophy is again seen. Ventricular size and configuration remain normal. Advanced chronic small vessel ischemic changes are present in the white matter.  No evidence of acute infarct. No mass. Intracranial atherosclerotic disease is again  seen. There is a small amount of residual intraventricular hemorrhage in the occipital horn of the right lateral ventricle. This appears improved. There is subarachnoid hemorrhage in the prepontine region, predominantly to the left of midline. The volume  is improved. Left frontal parafalcine subdural hemorrhage is less dense than on the prior study. There is some persistent left frontal subdural hemorrhage measuring 3 mm in thickness. Although the volume is improved, this is slightly more dense  suggesting that some of it may be acute.  Scattered areas of subarachnoid hemorrhage in both cerebral hemispheres overall is improved, particularly in the left sylvian fissure. No skull fracture.      Impression:      Persistent intracranial hemorrhage, the majority of which is improved since 1/17/2022. There is some dense left frontal subdural hemorrhage measuring about 3 mm in thickness. Overall volume is improved but the density has increased suggesting that some  of this is more acute.    Signer Name: Hari Simon MD   Signed: 1/22/2022 11:21 PM   Workstation [facility]: [email or web address]    [facility]    XR Chest 1 View [[id number]] Collected: 01/22/22 2154     Updated: 01/22/22 2156    Narrative:      CR Chest 1 Vw    INDICATION:   Altered mental status for a few days.     COMPARISON:    1/15/2022    FINDINGS:  Portable AP view(s) of the chest.  Stable cardiac silhouette. Left-sided multilead pacemaker/defibrillator unchanged. The aorta is tortuous ectatic and atherosclerotic. Unremarkable vascularity. The right lung is clear. There is chronic elevation of the  left hemidiaphragm with some probable atelectasis in the left lower lung zone. No new effusion or pneumothorax.      Impression:        1. No significant change.    Signer Name: Guy Guaman MD   Signed: 1/22/2022 9:54 PM   Workstation [facility]: [email or web address]    [facility]          Results for orders placed during the hospital encounter of 01/11/22    Adult Transthoracic Echo Complete W/ Cont if Necessary Per Protocol    Interpretation Summary  · Mild aortic valve stenosis is present.  · Estimated right ventricular systolic pressure from tricuspid regurgitation is normal (<35 mmHg).  · LVSF is normal  · MAC          Discharge Details        Discharge Medications      New Medications      Instructions Start Date   famotidine 40 MG tablet  Commonly known as: PEPCID   40 mg, Oral, Daily   Start Date: February 6, 2022     polyethylene glycol 17 g packet  Commonly  known as: MIRALAX   17 g, Oral, Daily PRN      sennosides-docusate 8.6-50 MG per tablet  Commonly known as: PERICOLACE   1 tablet, Oral, 2 Times Daily, Hold for loose stools      sodium chloride 1 g tablet   2 g, Oral, 2 Times Daily With Meals         Changes to Medications      Instructions Start Date   insulin detemir 100 UNIT/ML injection  Commonly known as: LEVEMIR  What changed: when to take this   5 Units, Subcutaneous, Every 12 Hours Scheduled      levETIRAcetam 250 MG tablet  Commonly known as: KEPPRA  What changed:   · medication strength  · how much to take   250 mg, Oral, 2 Times Daily         Continue These Medications      Instructions Start Date   atorvastatin 80 MG tablet  Commonly known as: LIPITOR   80 mg, Oral, Nightly      bisoprolol 10 MG tablet  Commonly known as: ZEBeta   10 mg, Oral, Daily      doxazosin 2 MG tablet  Commonly known as: CARDURA   1 mg, Oral, Nightly      insulin lispro 100 UNIT/ML injection  Commonly known as: humaLOG   0-9 Units, Subcutaneous, 3 Times Daily Before Meals      metFORMIN 850 MG tablet  Commonly known as: GLUCOPHAGE   TAKE 1 TABLET TWICE DAILY WITH MEALS      vitamin B-12 500 MCG tablet  Commonly known as: CYANOCOBALAMIN   500 mcg, Oral, Daily         Stop These Medications    fluticasone 50 MCG/ACT nasal spray  Commonly known as: FLONASE     levocetirizine 5 MG tablet  Commonly known as: XYZAL     PARoxetine 20 MG tablet  Commonly known as: PAXIL            No Known Allergies      Discharge Disposition:  Rehab Facility or Unit (DC - External)    Discharge Diet:  Diet Order   Procedures   • Diet Regular; Thin; Consistent Carbohydrate, Daily Fluid Restriction, Cardiac; 1500 mL Fluid Per Day       Discharge Activity:  Activity Instructions     Activity as Tolerated      Measure Blood Pressure     Additional Activity Instructions:    Patient takes medications crushed with pudding             CODE STATUS:    Code Status and Medical Interventions:   Ordered at:  01/23/22 0321     Level Of Support Discussed With:    Patient     Code Status (Patient has no pulse and is not breathing):    CPR (Attempt to Resuscitate)     Medical Interventions (Patient has pulse or is breathing):    Full Support         Future Appointments   Date Time Provider Department Center   2/10/2022 10:45 AM [facility] CT 1 [facility]   2/10/2022  2:30 PM Miguel Ángel Scruggs PA-C [facility]   3/7/2022  9:00 AM Reid Wood MD [facility] N CT [facility]       Additional Instructions for the Follow-ups that You Need to Schedule     Discharge Follow-up with PCP   As directed       Currently Documented PCP:    Diaz Lee MD    PCP Phone Number:    [phone]     Follow Up Details: after DC from SNF         Discharge Follow-up with PCP   As directed       Currently Documented PCP:    Diaz Lee MD    PCP Phone Number:    [phone]     Follow Up Details: f/u PCP 1 week of dc from rehab         Discharge Follow-up with Specialty: f/[facility] Dr [facility] with [facility] [place] 3/7/22 as prior scheduled   As directed      Specialty: f/u Dr Wood with [facility] on 3/7/22 as prior scheduled         Discharge Follow-up with Specialty: neurology scheduled for 3/7/2022   As directed      Specialty: neurology scheduled for 3/7/2022         Discharge Follow-up with Specified Provider: Pt to f/u with neurosurgery as prior scheduled on 2/10/2022   As directed      To: Pt to f/u with neurosurgery as prior scheduled on 2/10/2022         Discharge Follow-up with Specified Provider: carlos manuel vizcaino; 1 Week   As directed      To: miguel ángel scruggs neurosurgery    Follow Up: 1 Week         Discharge Follow-up with Specified Provider: [facility]u [facility] 2-4 weeks (family to call and kirsten appt on Monday)   As directed      To: f/u [facility] in 2-4 weeks (family to call and kirsten appt on Monday)               Time Spent on Discharge: I spent 40 minutes on this discharge activity which  included: face-to-face encounter with the patient, reviewing the data in the system, coordination of the care with the nursing staff as well as consultants, documentation, and entering orders.      Electronically signed by SCARLETT Medina, 02/05/22, 10:23 AM EST.

## 2022-02-10 ENCOUNTER — HOSPITAL ENCOUNTER (OUTPATIENT)
Dept: CT IMAGING | Facility: HOSPITAL | Age: 82
Discharge: HOME | End: 2022-02-10
Admitting: PHYSICIAN ASSISTANT

## 2022-02-10 ENCOUNTER — OFFICE VISIT (OUTPATIENT)
Dept: NEUROSURGERY | Facility: CLINIC | Age: 82
End: 2022-02-10

## 2022-02-10 VITALS
SYSTOLIC BLOOD PRESSURE: 124 MMHG | BODY MASS INDEX: 23.79 KG/M2 | TEMPERATURE: 97.3 F | HEIGHT: 65 IN | DIASTOLIC BLOOD PRESSURE: 72 MMHG | WEIGHT: 142.8 LBS

## 2022-02-10 DIAGNOSIS — S06.5XAA SDH (SUBDURAL HEMATOMA): ICD-10-CM

## 2022-02-10 DIAGNOSIS — I60.9 SAH (SUBARACHNOID HEMORRHAGE): Primary | ICD-10-CM

## 2022-02-10 PROCEDURE — 70450 CT HEAD/BRAIN W/O DYE: CPT

## 2022-02-10 PROCEDURE — 99214 OFFICE O/P EST MOD 30 MIN: CPT | Performed by: PHYSICIAN ASSISTANT

## 2022-02-10 NOTE — PROGRESS NOTES
Patient: Derian Joya  : 1940    Primary Care Provider: Diaz Lee MD      Chief Complaint: CT follow-up    History of Present Illness:       Patient very nice 82-year-old gentleman who is known to the [employer] system for falling down a set of stairs and having a traumatic subarachnoid hemorrhage that was in the tentorial area that traveled up to the falx.  Patient was readmitted a couple times secondary to a seizure and then altered mental status.  Patient was noted to have hyponatremia that corrected and patient had better mentation when Keppra was decreased.    Patient is back today for a repeat CT scan to ensure subarachnoid blood is improved.    Patient is accompanied by his son.    Upon my examination today patient is much more with that is able to carry conversation which is a vast improvement from the initial evaluation in the hospital.  Patient and son do seem to think the he is improving but is far from where he was prior to the fall.  Patient does have episodes of tiredness which is reasonable after the amount of blood/injury that was sustained    Review of Systems   Constitutional: Positive for activity change and fatigue.   HENT: Negative.    Eyes: Negative.    Respiratory: Negative.    Cardiovascular: Negative.    Gastrointestinal: Negative.    Endocrine: Negative.    Genitourinary: Negative.    Musculoskeletal: Negative.    Skin: Negative.    Allergic/Immunologic: Negative.    Neurological: Positive for dizziness, speech difficulty, weakness and light-headedness.   Hematological: Negative.    Psychiatric/Behavioral: Positive for confusion and decreased concentration.       Past Medical History:     Past Medical History:   Diagnosis Date   • Allergic rhinitis    • Aortic stenosis    • Atopic rhinitis 2016   • Benign non-nodular prostatic hyperplasia with lower urinary tract symptoms 9/15/2017   • CAD (coronary artery disease)    • Cardiomyopathy, ischemic    • Colon polyps      "tubular adenoma 2016   • Community acquired pneumonia    • Depression 7/6/2016   • Diabetes (HCC)    • Diverticulosis of intestine 7/6/2016    Description: Left and sigmoid   • Dyslipidemia    • LBBB (left bundle branch block)    • PAD (peripheral artery disease) (HCC)        Family History:     Family History   Problem Relation Age of Onset   • Diabetes Other    • Hypertension Other    • Stroke Other    • Coronary artery disease Other        Social History:    reports that he quit smoking about 25 years ago. His smoking use included cigarettes. He has never used smokeless tobacco. He reports that he does not use drugs.   SMOKING STATUS: Non-smoker    Surgical History:     Past Surgical History:   Procedure Laterality Date   • CARDIAC CATHETERIZATION  2018    100% cx   • CATARACT EXTRACTION     • COLONOSCOPY W/ BIOPSIES AND POLYPECTOMY  05/2021    1 benign polyp   • HIP SURGERY Left 08/2020    Dr Mandy BOUDREAUX   • LEG SURGERY Left 07/2016    Popliteal Artery stenting by Dr Hdz   • LEG SURGERY Left 07/2016    skin graft by Dr Nance   • LIPOMA EXCISION      s/p excision on chest   • PACEMAKER IMPLANTATION  01/2019    and defibrillator       Allergies:   Patient has no known allergies.    Physical Exam:    Vital Signs:/72 (BP Location: Left arm, Patient Position: Sitting, Cuff Size: Adult)   Temp 97.3 °F (36.3 °C) (Infrared)   Ht 165.1 cm (65\")   Wt 64.8 kg (142 lb 12.8 oz)   BMI 23.76 kg/m²    BMI: Body mass index is 23.76 kg/m².     GENERAL:           The patient is in no acute distress, and is able to answer all questions appropriately.    Neck:          Supple without lymphadenopathy    Cardiovascular:       Peripheral pulses 2+ at dorsalis pedis and posterior tibialis    Lungs:         Breathing unlabored    Musculoskeletal:            strength is 5 out of 5 bilaterally.        Shoulder abduction is 5 out of 5.         Dorsiflexion is 5/5 Bilaterally       Plantarflexion is 5/5 bilaterally       " Hip Flexion 5/5 bilaterally.         The patient´s gait is normal without antalgia.    Neurologic:          The patient is alert and oriented by 3.          Pupils are equal and reactive to light.         Visual fields are full.         Extraocular movements are intact without nystagmus.         There is no evidence of central motor drift. No facial droop.  No difficulty with rapid alternating movements.         Sensation is equal bilaterally with no deficit.           Reflexes:  2+ through out    Patient was able to do single step instructions but multiple step instruction patient was a little bit more confused by    CRANIAL NERVES:         Cranial nerve II: Visual fields are full to confrontation.       Cranial nerves III, IV and VI: PERRLA DC.  Extraocular movements are intact.  Nystagmus is not present.       Cranial nerve V: Facial sensation is intact to light touch.       Cranial nerve VII: Muscles of facial expression revealed no asymmetry.       Cranial nerve VIII: Hearing is intact to finger rub bilaterally.       Cranial nerve IX and X: Palate elevates symmetrically.        Cranial nerve XI: Shoulder shrug is intact.       Cranial nerve XII: Tongue is midline without evidence of Atrophy or fasciculation.        Medical Decision Making    Data Review:   CT of the head was reviewed and compared with one initially.  These were shown to the son who was not sure of the extent of the bleed initially    Diagnosis:   Subarachnoid hemorrhage traumatic  Confusion  Hyponatremia  Generalized cerebral atrophy    Treatment Options:   Patient is going to see neurology in a month.  I suspect that when he gets off of Keppra he will feel much better and be a little bit more alert.  Since the hyponatremia was corrected he has seemed to be much more with it.    From neurosurgical standpoint I do not see any reason the patient should continue follow-up however we be happy to see him back in any time.    Patient's Body mass  index is 23.76 kg/m². indicating that he is within normal range (BMI 18.5-24.9). No BMI management plan needed.    No diagnosis found.

## 2022-02-14 ENCOUNTER — APPOINTMENT (OUTPATIENT)
Dept: NEUROLOGY | Facility: HOSPITAL | Age: 82
End: 2022-02-14

## 2022-02-14 ENCOUNTER — APPOINTMENT (OUTPATIENT)
Dept: CT IMAGING | Facility: HOSPITAL | Age: 82
End: 2022-02-14

## 2022-02-14 ENCOUNTER — HOSPITAL ENCOUNTER (INPATIENT)
Facility: HOSPITAL | Age: 82
LOS: 6 days | Discharge: NURSING, REHAB OR LONG-TERM CARE | End: 2022-02-20
Attending: EMERGENCY MEDICINE | Admitting: INTERNAL MEDICINE

## 2022-02-14 ENCOUNTER — APPOINTMENT (OUTPATIENT)
Dept: GENERAL RADIOLOGY | Facility: HOSPITAL | Age: 82
End: 2022-02-14

## 2022-02-14 DIAGNOSIS — I62.01 ACUTE ON CHRONIC INTRACRANIAL SUBDURAL HEMATOMA: Primary | ICD-10-CM

## 2022-02-14 DIAGNOSIS — I62.03 ACUTE ON CHRONIC INTRACRANIAL SUBDURAL HEMATOMA: Primary | ICD-10-CM
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
## 2022-02-14 DIAGNOSIS — R56.9 SEIZURE: ICD-10-CM

## 2022-02-14 DIAGNOSIS — S42.201A CLOSED FRACTURE OF PROXIMAL END OF RIGHT HUMERUS, UNSPECIFIED FRACTURE MORPHOLOGY, INITIAL ENCOUNTER: ICD-10-CM
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
## 2022-02-14 DIAGNOSIS — R13.10 DYSPHAGIA, UNSPECIFIED TYPE: ICD-10-CM

## 2022-02-14 PROBLEM — S12.9XXA CERVICAL SPINE FRACTURE (HCC): Status: ACTIVE | Noted: 2022-02-14

## 2022-02-14 LAB
ALBUMIN SERPL-MCNC: 3.6 G/DL (ref 3.5–5.2)
ALBUMIN/GLOB SERPL: 1.4 G/DL
ALP SERPL-CCNC: 118 U/L (ref 39–117)
ALT SERPL W P-5'-P-CCNC: 19 U/L (ref 1–41)
ANION GAP SERPL CALCULATED.3IONS-SCNC: 15 MMOL/L (ref 5–15)
AST SERPL-CCNC: 22 U/L (ref 1–40)
BACTERIA UR QL AUTO: ABNORMAL /HPF
BASOPHILS # BLD AUTO: 0.02 10*3/MM3 (ref 0–0.2)
BASOPHILS NFR BLD AUTO: 0.2 % (ref 0–1.5)
BILIRUB SERPL-MCNC: 0.7 MG/DL (ref 0–1.2)
BILIRUB UR QL STRIP: NEGATIVE
BUN SERPL-MCNC: 11 MG/DL (ref 8–23)
BUN/CREAT SERPL: 19.6 (ref 7–25)
CALCIUM SPEC-SCNC: 8.8 MG/DL (ref 8.6–10.5)
CHLORIDE SERPL-SCNC: 101 MMOL/L (ref 98–107)
CK SERPL-CCNC: 78 U/L (ref 20–200)
CLARITY UR: CLEAR
CO2 SERPL-SCNC: 25 MMOL/L (ref 22–29)
COLOR UR: YELLOW
CREAT SERPL-MCNC: 0.56 MG/DL (ref 0.76–1.27)
D-LACTATE SERPL-SCNC: 2.5 MMOL/L (ref 0.5–2)
D-LACTATE SERPL-SCNC: 2.8 MMOL/L (ref 0.5–2)
DEPRECATED RDW RBC AUTO: 45.6 FL (ref 37–54)
EOSINOPHIL # BLD AUTO: 0.06 10*3/MM3 (ref 0–0.4)
EOSINOPHIL NFR BLD AUTO: 0.5 % (ref 0.3–6.2)
ERYTHROCYTE [DISTWIDTH] IN BLOOD BY AUTOMATED COUNT: 12.4 % (ref 12.3–15.4)
FLUAV RNA RESP QL NAA+PROBE: NOT DETECTED
FLUBV RNA RESP QL NAA+PROBE: NOT DETECTED
GFR SERPL CREATININE-BSD FRML MDRD: 139 ML/MIN/1.73
GLOBULIN UR ELPH-MCNC: 2.6 GM/DL
GLUCOSE BLDC GLUCOMTR-MCNC: 224 MG/DL (ref 70–130)
GLUCOSE BLDC GLUCOMTR-MCNC: 232 MG/DL (ref 70–130)
GLUCOSE SERPL-MCNC: 245 MG/DL (ref 65–99)
GLUCOSE UR STRIP-MCNC: ABNORMAL MG/DL
HCT VFR BLD AUTO: 37.9 % (ref 37.5–51)
HGB BLD-MCNC: 12.2 G/DL (ref 13–17.7)
HGB UR QL STRIP.AUTO: ABNORMAL
HYALINE CASTS UR QL AUTO: ABNORMAL /LPF
IMM GRANULOCYTES # BLD AUTO: 0.05 10*3/MM3 (ref 0–0.05)
IMM GRANULOCYTES NFR BLD AUTO: 0.5 % (ref 0–0.5)
KETONES UR QL STRIP: ABNORMAL
LEUKOCYTE ESTERASE UR QL STRIP.AUTO: NEGATIVE
LYMPHOCYTES # BLD AUTO: 0.77 10*3/MM3 (ref 0.7–3.1)
LYMPHOCYTES NFR BLD AUTO: 7 % (ref 19.6–45.3)
MCH RBC QN AUTO: 32.2 PG (ref 26.6–33)
MCHC RBC AUTO-ENTMCNC: 32.2 G/DL (ref 31.5–35.7)
MCV RBC AUTO: 100 FL (ref 79–97)
MONOCYTES # BLD AUTO: 0.54 10*3/MM3 (ref 0.1–0.9)
MONOCYTES NFR BLD AUTO: 4.9 % (ref 5–12)
NEUTROPHILS NFR BLD AUTO: 86.9 % (ref 42.7–76)
NEUTROPHILS NFR BLD AUTO: 9.57 10*3/MM3 (ref 1.7–7)
NITRITE UR QL STRIP: NEGATIVE
NRBC BLD AUTO-RTO: 0 /100 WBC (ref 0–0.2)
PH UR STRIP.AUTO: <=5 [PH] (ref 5–8)
PLATELET # BLD AUTO: 207 10*3/MM3 (ref 140–450)
PMV BLD AUTO: 10.7 FL (ref 6–12)
POTASSIUM SERPL-SCNC: 3.5 MMOL/L (ref 3.5–5.2)
PROCALCITONIN SERPL-MCNC: <0.02 NG/ML (ref 0–0.25)
PROT SERPL-MCNC: 6.2 G/DL (ref 6–8.5)
PROT UR QL STRIP: NEGATIVE
QT INTERVAL: 380 MS
QT INTERVAL: 394 MS
QTC INTERVAL: 507 MS
QTC INTERVAL: 525 MS
RBC # BLD AUTO: 3.79 10*6/MM3 (ref 4.14–5.8)
RBC # UR STRIP: ABNORMAL /HPF
REF LAB TEST METHOD: ABNORMAL
SARS-COV-2 RNA RESP QL NAA+PROBE: NOT DETECTED
SODIUM SERPL-SCNC: 141 MMOL/L (ref 136–145)
SP GR UR STRIP: 1.02 (ref 1–1.03)
SQUAMOUS #/AREA URNS HPF: ABNORMAL /HPF
TROPONIN T SERPL-MCNC: <0.01 NG/ML (ref 0–0.03)
UROBILINOGEN UR QL STRIP: ABNORMAL
WBC # UR STRIP: ABNORMAL /HPF
WBC NRBC COR # BLD: 11.01 10*3/MM3 (ref 3.4–10.8)

## 2022-02-14 PROCEDURE — 95819 EEG AWAKE AND ASLEEP: CPT

## 2022-02-14 PROCEDURE — 99221 1ST HOSP IP/OBS SF/LOW 40: CPT | Performed by: ORTHOPAEDIC SURGERY

## 2022-02-14 PROCEDURE — 87086 URINE CULTURE/COLONY COUNT: CPT | Performed by: EMERGENCY MEDICINE

## 2022-02-14 PROCEDURE — 84145 PROCALCITONIN (PCT): CPT | Performed by: EMERGENCY MEDICINE

## 2022-02-14 PROCEDURE — 25010000002 LEVETIRACETAM IN NACL 0.82% 500 MG/100ML SOLUTION: Performed by: INTERNAL MEDICINE

## 2022-02-14 PROCEDURE — 93010 ELECTROCARDIOGRAM REPORT: CPT | Performed by: STUDENT IN AN ORGANIZED HEALTH CARE EDUCATION/TRAINING PROGRAM

## 2022-02-14 PROCEDURE — 84484 ASSAY OF TROPONIN QUANT: CPT | Performed by: EMERGENCY MEDICINE

## 2022-02-14 PROCEDURE — 82962 GLUCOSE BLOOD TEST: CPT

## 2022-02-14 PROCEDURE — 87040 BLOOD CULTURE FOR BACTERIA: CPT | Performed by: INTERNAL MEDICINE

## 2022-02-14 PROCEDURE — 93005 ELECTROCARDIOGRAM TRACING: CPT | Performed by: INTERNAL MEDICINE

## 2022-02-14 PROCEDURE — 83605 ASSAY OF LACTIC ACID: CPT | Performed by: EMERGENCY MEDICINE

## 2022-02-14 PROCEDURE — 25010000002 MAGNESIUM SULFATE IN D5W 1G/100ML (PREMIX) 1-5 GM/100ML-% SOLUTION: Performed by: INTERNAL MEDICINE

## 2022-02-14 PROCEDURE — 71045 X-RAY EXAM CHEST 1 VIEW: CPT

## 2022-02-14 PROCEDURE — 70450 CT HEAD/BRAIN W/O DYE: CPT

## 2022-02-14 PROCEDURE — 93005 ELECTROCARDIOGRAM TRACING: CPT | Performed by: EMERGENCY MEDICINE

## 2022-02-14 PROCEDURE — 73030 X-RAY EXAM OF SHOULDER: CPT

## 2022-02-14 PROCEDURE — 99285 EMERGENCY DEPT VISIT HI MDM: CPT

## 2022-02-14 PROCEDURE — 82550 ASSAY OF CK (CPK): CPT | Performed by: INTERNAL MEDICINE

## 2022-02-14 PROCEDURE — 63710000001 INSULIN LISPRO (HUMAN) PER 5 UNITS: Performed by: NURSE PRACTITIONER

## 2022-02-14 PROCEDURE — 99222 1ST HOSP IP/OBS MODERATE 55: CPT | Performed by: PSYCHIATRY & NEUROLOGY

## 2022-02-14 PROCEDURE — 80053 COMPREHEN METABOLIC PANEL: CPT | Performed by: EMERGENCY MEDICINE

## 2022-02-14 PROCEDURE — 72125 CT NECK SPINE W/O DYE: CPT

## 2022-02-14 PROCEDURE — 81001 URINALYSIS AUTO W/SCOPE: CPT | Performed by: EMERGENCY MEDICINE

## 2022-02-14 PROCEDURE — 85025 COMPLETE CBC W/AUTO DIFF WBC: CPT | Performed by: EMERGENCY MEDICINE

## 2022-02-14 PROCEDURE — 0 POTASSIUM CHLORIDE 10 MEQ/100ML SOLUTION: Performed by: INTERNAL MEDICINE

## 2022-02-14 PROCEDURE — 99284 EMERGENCY DEPT VISIT MOD MDM: CPT

## 2022-02-14 PROCEDURE — 87636 SARSCOV2 & INF A&B AMP PRB: CPT | Performed by: EMERGENCY MEDICINE

## 2022-02-14 PROCEDURE — P9612 CATHETERIZE FOR URINE SPEC: HCPCS

## 2022-02-14 PROCEDURE — 25010000002 LEVETIRACETAM IN NACL 0.82% 500 MG/100ML SOLUTION: Performed by: EMERGENCY MEDICINE

## 2022-02-14 PROCEDURE — 99291 CRITICAL CARE FIRST HOUR: CPT | Performed by: INTERNAL MEDICINE

## 2022-02-14 RX ORDER — LACOSAMIDE 10 MG/ML
200 INJECTION, SOLUTION INTRAVENOUS ONCE
Status: COMPLETED | OUTPATIENT
Start: 2022-02-14 | End: 2022-02-14

## 2022-02-14 RX ORDER — LACOSAMIDE 10 MG/ML
100 INJECTION, SOLUTION INTRAVENOUS EVERY 12 HOURS SCHEDULED
Status: DISCONTINUED | OUTPATIENT
Start: 2022-02-14 | End: 2022-02-18

## 2022-02-14 RX ORDER — NICOTINE POLACRILEX 4 MG
15 LOZENGE BUCCAL
Status: DISCONTINUED | OUTPATIENT
Start: 2022-02-14 | End: 2022-02-20 | Stop reason: HOSPADM

## 2022-02-14 RX ORDER — LEVETIRACETAM 5 MG/ML
500 INJECTION INTRAVASCULAR EVERY 12 HOURS SCHEDULED
Status: DISCONTINUED | OUTPATIENT
Start: 2022-02-14 | End: 2022-02-16

## 2022-02-14 RX ORDER — LABETALOL HYDROCHLORIDE 5 MG/ML
10 INJECTION, SOLUTION INTRAVENOUS ONCE
Status: COMPLETED | OUTPATIENT
Start: 2022-02-14 | End: 2022-02-14

## 2022-02-14 RX ORDER — ACETAMINOPHEN 325 MG/1
650 TABLET ORAL EVERY 6 HOURS PRN
Status: DISCONTINUED | OUTPATIENT
Start: 2022-02-14 | End: 2022-02-20 | Stop reason: HOSPADM

## 2022-02-14 RX ORDER — LEVETIRACETAM 5 MG/ML
500 INJECTION INTRAVASCULAR ONCE
Status: COMPLETED | OUTPATIENT
Start: 2022-02-14 | End: 2022-02-14

## 2022-02-14 RX ORDER — FAMOTIDINE 10 MG/ML
20 INJECTION, SOLUTION INTRAVENOUS EVERY 12 HOURS SCHEDULED
Status: DISCONTINUED | OUTPATIENT
Start: 2022-02-14 | End: 2022-02-17

## 2022-02-14 RX ORDER — POTASSIUM CHLORIDE 7.45 MG/ML
10 INJECTION INTRAVENOUS
Status: DISCONTINUED | OUTPATIENT
Start: 2022-02-14 | End: 2022-02-16

## 2022-02-14 RX ORDER — DEXTROSE MONOHYDRATE 25 G/50ML
25 INJECTION, SOLUTION INTRAVENOUS
Status: DISCONTINUED | OUTPATIENT
Start: 2022-02-14 | End: 2022-02-20 | Stop reason: HOSPADM

## 2022-02-14 RX ORDER — HYDROMORPHONE HYDROCHLORIDE 1 MG/ML
0.25 INJECTION, SOLUTION INTRAMUSCULAR; INTRAVENOUS; SUBCUTANEOUS
Status: DISCONTINUED | OUTPATIENT
Start: 2022-02-14 | End: 2022-02-20 | Stop reason: HOSPADM

## 2022-02-14 RX ORDER — ATORVASTATIN CALCIUM 40 MG/1
80 TABLET, FILM COATED ORAL NIGHTLY
Status: DISCONTINUED | OUTPATIENT
Start: 2022-02-14 | End: 2022-02-20 | Stop reason: HOSPADM

## 2022-02-14 RX ORDER — SODIUM CHLORIDE 0.9 % (FLUSH) 0.9 %
10 SYRINGE (ML) INJECTION AS NEEDED
Status: DISCONTINUED | OUTPATIENT
Start: 2022-02-14 | End: 2022-02-20 | Stop reason: HOSPADM

## 2022-02-14 RX ORDER — SODIUM CHLORIDE, SODIUM LACTATE, POTASSIUM CHLORIDE, CALCIUM CHLORIDE 600; 310; 30; 20 MG/100ML; MG/100ML; MG/100ML; MG/100ML
75 INJECTION, SOLUTION INTRAVENOUS CONTINUOUS
Status: DISCONTINUED | OUTPATIENT
Start: 2022-02-14 | End: 2022-02-15

## 2022-02-14 RX ORDER — MAGNESIUM SULFATE 1 G/100ML
1 INJECTION INTRAVENOUS ONCE
Status: COMPLETED | OUTPATIENT
Start: 2022-02-14 | End: 2022-02-14

## 2022-02-14 RX ORDER — SODIUM CHLORIDE 0.9 % (FLUSH) 0.9 %
10 SYRINGE (ML) INJECTION EVERY 12 HOURS SCHEDULED
Status: DISCONTINUED | OUTPATIENT
Start: 2022-02-14 | End: 2022-02-20 | Stop reason: HOSPADM

## 2022-02-14 RX ORDER — ACETAMINOPHEN 650 MG/1
650 SUPPOSITORY RECTAL EVERY 6 HOURS PRN
Status: DISCONTINUED | OUTPATIENT
Start: 2022-02-14 | End: 2022-02-20 | Stop reason: HOSPADM

## 2022-02-14 RX ADMIN — NICARDIPINE HYDROCHLORIDE 5 MG/HR: 25 INJECTION, SOLUTION INTRAVENOUS at 17:19

## 2022-02-14 RX ADMIN — SODIUM CHLORIDE, POTASSIUM CHLORIDE, SODIUM LACTATE AND CALCIUM CHLORIDE 100 ML/HR: 600; 310; 30; 20 INJECTION, SOLUTION INTRAVENOUS at 21:02

## 2022-02-14 RX ADMIN — INSULIN LISPRO 4 UNITS: 100 INJECTION, SOLUTION INTRAVENOUS; SUBCUTANEOUS at 17:23

## 2022-02-14 RX ADMIN — ACETAMINOPHEN 650 MG: 650 SUPPOSITORY RECTAL at 09:34

## 2022-02-14 RX ADMIN — POTASSIUM CHLORIDE 10 MEQ: 7.46 INJECTION, SOLUTION INTRAVENOUS at 13:49

## 2022-02-14 RX ADMIN — LABETALOL 20 MG/4 ML (5 MG/ML) INTRAVENOUS SYRINGE 10 MG: at 06:51

## 2022-02-14 RX ADMIN — NICARDIPINE HYDROCHLORIDE 5 MG/HR: 25 INJECTION, SOLUTION INTRAVENOUS at 10:11

## 2022-02-14 RX ADMIN — LEVETIRACETAM 500 MG: 5 INJECTION INTRAVASCULAR at 21:02

## 2022-02-14 RX ADMIN — POTASSIUM CHLORIDE 10 MEQ: 7.46 INJECTION, SOLUTION INTRAVENOUS at 11:58

## 2022-02-14 RX ADMIN — FAMOTIDINE 20 MG: 10 INJECTION, SOLUTION INTRAVENOUS at 21:02

## 2022-02-14 RX ADMIN — INSULIN LISPRO 4 UNITS: 100 INJECTION, SOLUTION INTRAVENOUS; SUBCUTANEOUS at 13:54

## 2022-02-14 RX ADMIN — NICARDIPINE HYDROCHLORIDE 10 MG/HR: 25 INJECTION, SOLUTION INTRAVENOUS at 13:47

## 2022-02-14 RX ADMIN — POTASSIUM CHLORIDE 10 MEQ: 7.46 INJECTION, SOLUTION INTRAVENOUS at 17:23

## 2022-02-14 RX ADMIN — LACOSAMIDE 100 MG: 10 INJECTION INTRAVENOUS at 21:02

## 2022-02-14 RX ADMIN — SODIUM CHLORIDE, PRESERVATIVE FREE 10 ML: 5 INJECTION INTRAVENOUS at 09:35

## 2022-02-14 RX ADMIN — LACOSAMIDE 200 MG: 10 INJECTION INTRAVENOUS at 09:34

## 2022-02-14 RX ADMIN — SODIUM CHLORIDE, PRESERVATIVE FREE 10 ML: 5 INJECTION INTRAVENOUS at 21:02

## 2022-02-14 RX ADMIN — FAMOTIDINE 20 MG: 10 INJECTION, SOLUTION INTRAVENOUS at 09:35

## 2022-02-14 RX ADMIN — LEVETIRACETAM 500 MG: 5 INJECTION INTRAVASCULAR at 08:18

## 2022-02-14 RX ADMIN — POTASSIUM CHLORIDE 10 MEQ: 7.46 INJECTION, SOLUTION INTRAVENOUS at 10:11

## 2022-02-14 RX ADMIN — SODIUM CHLORIDE, POTASSIUM CHLORIDE, SODIUM LACTATE AND CALCIUM CHLORIDE 100 ML/HR: 600; 310; 30; 20 INJECTION, SOLUTION INTRAVENOUS at 09:35

## 2022-02-14 RX ADMIN — MAGNESIUM SULFATE HEPTAHYDRATE 1 G: 1 INJECTION, SOLUTION INTRAVENOUS at 10:11

## 2022-02-14 NOTE — ED NOTES
Per daughter pt was seen on January 11 th at [facility] ER r/t fall. Pt was diagnosed with two brain bleeds. Pt was sent to [facility] for rehab and on 1/14/22 had seizure and was sent back [facility]. Pt became non verbal, pt was admitted with metabolic encephalopathy. Pt was discharged to [place] for assistance.   Pt stop taking anticoagulants per Dr. Scruggs who follows pt. Brain bleeds have not fully healed and pt is continuing care with Dr. Scruggs and was seen on 2/10/22 for follow up appointment.      Rosalba Nguyen, RN  02/14/22 8106

## 2022-02-14 NOTE — ED PROVIDER NOTES
Subjective   Mr. Joya is brought by ambulance from the [facility] after being found on the floor this morning.  He is not able to provide any history.  Staff found him in the floor and noted that his right shoulder was deformed.  He has had 3 hospitalizations here over the last month.  He has had acute on chronic subdural hematoma, subarachnoid hemorrhage, and hyponatremia.  He has been started on Keppra due to seizure.  Staff did not report any additional concerns.      History provided by:  Medical records, nursing home and EMS personnel  History limited by:  Patient nonverbal  Fall  Mechanism of injury: fall    Injury location:  Head/neck and shoulder/arm  Shoulder/arm injury location:  R shoulder  Incident location:  [facility]  Time since incident: Unknown, he was found in the floor.  Arrived directly from scene: yes    Fall:     Fall occurred:  Standing    Impact surface:  Hard floor    Point of impact:  Head  Prior to arrival data:     Responsiveness at scene:  Responsive to voice    Loss of consciousness: Unknowable.      Immobilization:  C-collar      Review of Systems   Unable to perform ROS: Mental status change       Past Medical History:   Diagnosis Date   • Allergic rhinitis    • Aortic stenosis    • Atopic rhinitis 7/6/2016   • Benign non-nodular prostatic hyperplasia with lower urinary tract symptoms 9/15/2017   • CAD (coronary artery disease)    • Cardiomyopathy, ischemic    • Colon polyps     tubular adenoma 2016   • Community acquired pneumonia    • Depression 7/6/2016   • Diabetes (HCC)    • Diverticulosis of intestine 7/6/2016    Description: Left and sigmoid   • Dyslipidemia    • LBBB (left bundle branch block)    • PAD (peripheral artery disease) (HCC)        No Known Allergies    Past Surgical History:   Procedure Laterality Date   • CARDIAC CATHETERIZATION  2018    100% cx   • CATARACT EXTRACTION     • COLONOSCOPY W/ BIOPSIES AND POLYPECTOMY  05/2021    1 benign polyp   • HIP SURGERY Left  2020    Dr Mandy CHAVEZ   • LEG SURGERY Left 2016    Popliteal Artery stenting by Dr Hdz   • LEG SURGERY Left 2016    skin graft by Dr Nance   • LIPOMA EXCISION      s/p excision on chest   • PACEMAKER IMPLANTATION  2019    and defibrillator       Family History   Problem Relation Age of Onset   • Diabetes Other    • Hypertension Other    • Stroke Other    • Coronary artery disease Other        Social History     Socioeconomic History   • Marital status:    Tobacco Use   • Smoking status: Former Smoker     Types: Cigarettes     Quit date: 1997     Years since quittin.1   • Smokeless tobacco: Never Used   Substance and Sexual Activity   • Drug use: No           Objective   Physical Exam  Vitals and nursing note reviewed.   Constitutional:       General: He is not in acute distress.     Comments: Mr. Joya is lying supine in bed with his eyes closed.  When I speak to him he mumbles and answer.  He does not appear to be in any discomfort   HENT:      Head:      Comments: There is tender bump on the back of his head.  No visible trauma to his face     Nose: Nose normal. No congestion or rhinorrhea.      Mouth/Throat:      Mouth: Mucous membranes are dry.      Pharynx: No posterior oropharyngeal erythema.   Eyes:      General: No scleral icterus.     Conjunctiva/sclera: Conjunctivae normal.   Neck:      Comments: He is in a c-collar, he is unable to tell me if his neck hurts or not so I have left him in the collar  Cardiovascular:      Rate and Rhythm: Regular rhythm. Tachycardia present.      Heart sounds: No murmur heard.  No friction rub.   Pulmonary:      Effort: Pulmonary effort is normal.      Breath sounds: Normal breath sounds. No wheezing or rales.   Abdominal:      General: Bowel sounds are normal. There is no distension.      Palpations: Abdomen is soft.      Tenderness: There is no abdominal tenderness.   Musculoskeletal:         General: Swelling and deformity present.       Right lower leg: No edema.      Left lower leg: No edema.      Comments: He has swelling over his right shoulder, he has pain with attempts at movement.  Likely anterior dislocation.  He does not appear to have any pain with movement of his hips or lower extremities.   Skin:     General: Skin is warm and dry.      Capillary Refill: Capillary refill takes less than 2 seconds.      Coloration: Skin is pale.   Neurological:      General: No focal deficit present.      GCS: GCS eye subscore is 1. GCS verbal subscore is 2. GCS motor subscore is 4.      Comments: He is not able to follow commands.  Unable to assess very completely   Psychiatric:      Comments: Unable to assess due to decreased mental status         Critical Care  Performed by: Joseph Phillip MD  Authorized by: Joseph Phillip MD     Critical care provider statement:     Critical care time (minutes):  40    Critical care was necessary to treat or prevent imminent or life-threatening deterioration of the following conditions:  CNS failure or compromise and trauma    Critical care was time spent personally by me on the following activities:  Discussions with consultants, evaluation of patient's response to treatment, examination of patient, obtaining history from patient or surrogate, review of old charts, re-evaluation of patient's condition, pulse oximetry, ordering and review of radiographic studies, ordering and performing treatments and interventions and ordering and review of laboratory studies               ED Course  ED Course as of 02/14/22 0812   Mon Feb 14, 2022   0618 I reviewed his records.  He has low-grade fever and tachycardia today.  Will undertake sepsis work-up.  Will obtain Covid swab as well.  Have ordered CT scan of his neck and head.  He is due for his Bystolic.  At this point cannot take p.o. due to depressed mental status.  We will give IV labetalol as he is hypertensive and tachycardic. [DT]   0638 I have reviewed his CT scan and  it shows acute subdural hematoma which is new from his last scan which was from 4 days ago.  I do not see any subarachnoid bleeding. [DT]   0652 I spoke with the radiologist.  They advised that they measure 3 to 4 mm of midline shift and they measure the subdural at 12 mm.  They noted possible cervical spine fractures of unknown chronicity.  Blood pressure is coming down.  I am awaiting labs. [DT]   0708 I spoke with Dr. Dove who will review Mr. Joya's CT scans and see him. He agrees with current work-up. Blood pressure is now down to 158/90 [DT]   0714 Dr. Dove has reviewed his CAT scans and advised me that emergency surgery is not indicated.  He recommends neurology consult. [DT]   0732 I assisted with his portable x-rays.  I maintained cervical spine precautions.  I reviewed his x-rays at bedside.  He has a proximal humerus fracture on the right.  Its not dislocated.  We will place him in a shoulder sling.  Mental status remains depressed.  Have ordered MRI of his cervical spine. [DT]   0753 I spoke with his wife and daughter.  I have paged the intensivist. [DT]   0803 I discussed with Dr. Cardoza who will admit him to the ICU. [DT]   0809 D/w Dr Jiang who asked that I give 200 mg of Vimpat. [DT]      ED Course User Index  [DT] Joseph Phillip MD                                                 MDM  Number of Diagnoses or Management Options  Acute on chronic intracranial subdural hematoma (HCC): new and requires workup  Closed fracture of proximal end of right humerus, unspecified fracture morphology, initial encounter: new and requires workup  Fall at nursing home, initial encounter: new and requires workup     Amount and/or Complexity of Data Reviewed  Clinical lab tests: reviewed and ordered  Tests in the radiology section of CPT®: ordered and reviewed  Obtain history from someone other than the patient: yes  Review and summarize past medical records: yes  Discuss the patient with other providers:  yes  Independent visualization of images, tracings, or specimens: yes    Critical Care  Total time providing critical care: 30-74 minutes    Patient Progress  Patient progress: stable      Final diagnoses:   Fall at nursing home, initial encounter   Acute on chronic intracranial subdural hematoma (HCC)   Closed fracture of proximal end of right humerus, unspecified fracture morphology, initial encounter       ED Disposition  ED Disposition     ED Disposition Condition Comment    Decision to Admit  Level of Care: Critical Care [6]   Admitting Physician: ANA PAULA RASHEED [[id number]            No follow-up provider specified.       Medication List      No changes were made to your prescriptions during this visit.          Joseph Phillip MD  02/14/22 0812

## 2022-02-14 NOTE — ED NOTES
Pt is alert and able to answer yes or no questions but, becomes confused very easily. Pt currently on stretcher with right shoulder splinted and C-collar on neck. 2 x bedrails, bed is locked and in lowest position. Call light with in reach but pt seems unsure how to use. Pt is close to nurses station and this nurse is making frequent rounds to monitor pt.      Rosalba Nguyen, RN  02/14/22 0666

## 2022-02-14 NOTE — H&P
"Intensive Care Admission Note     Acute on chronic intracranial subdural hematoma (HCC)    History of Present Illness     Derian Joya is a 82 y.o. male, former smoker, with a past medical history significant for SAH, SDH, CAD, cardiomyopathy, HTN, aortic stenosis, DM2, and HL.  Patient presents to [facility] ED from \"the [place]\" having been found down this a.m. by nursing home staff.  He was found with \"deformed shoulder\" and x-ray revealed acute proximal right humeral fracture, and elevated hemidiaphragm.  Notably, this is the third hospitalization in the past 1.5 months.  He has had frequent falls with acute on chronic subdural hematoma, subarachnoid hemorrhage, and hyponatremia.  Additionally he has had hospitalization for seizure.  CT head/C-spine was obtained which revealed \"acute subdural hematoma measuring up to 12 mm, mild secondary mass-effect including midline shift to left measuring 3-4 mm, and mild anterior wedge deformities of C7, T1, and T2 with possible acute cervical spine fracture.\"  Patient was evaluated by neurosurgery who did not recommend surgical intervention.  He was placed in c-collar, and neurology was consulted.    Problem List, Surgical History, Family, Social History, and ROS     Patient Active Problem List    Diagnosis    • *Acute on chronic intracranial subdural hematoma (HCC) [I62.01, I62.03]    • Closed fracture of proximal end of right humerus [S42.201A]    • Cervical spine fracture (HCC) [S12.9XXA]    • Dyslipidemia [E78.5]    • Altered mental status [R41.82]    • High anion gap metabolic acidosis [E87.2]    • Subarachnoid hemorrhage (HCC) [I60.9]    • Lactic acidosis [E87.2]    • Head trauma [S09.90XA]    • SAH (subarachnoid hemorrhage) (HCC) [I60.9]    • SDH (subdural hematoma) (HCC) [S06.5X9A]    • Head trauma, initial encounter [S09.90XA]    • Cardiomyopathy, nonischemic (HCC) [I42.8]    • Aortic stenosis [I35.0]    • Uncontrolled type 2 diabetes mellitus (HCC) [E11.65]    • Other " hyperlipidemia [E78.49]    • Essential hypertension [I10]      Past Surgical History:   Procedure Laterality Date   • CARDIAC CATHETERIZATION  2018    100% cx   • CATARACT EXTRACTION     • COLONOSCOPY W/ BIOPSIES AND POLYPECTOMY  05/2021    1 benign polyp   • HIP SURGERY Left 08/2020    Dr Mandy BOUDREAUX   • LEG SURGERY Left 07/2016    Popliteal Artery stenting by Dr Hdz   • LEG SURGERY Left 07/2016    skin graft by Dr Nance   • LIPOMA EXCISION      s/p excision on chest   • PACEMAKER IMPLANTATION  01/2019    and defibrillator       No Known Allergies  No current facility-administered medications on file prior to encounter.     Current Outpatient Medications on File Prior to Encounter   Medication Sig   • atorvastatin (LIPITOR) 80 MG tablet Take 1 tablet by mouth Every Night.   • bisoprolol (ZEBeta) 10 MG tablet Take 10 mg by mouth Daily.   • doxazosin (CARDURA) 2 MG tablet Take 0.5 tablets by mouth Every Night.   • famotidine (PEPCID) 40 MG tablet Take 1 tablet by mouth Daily.   • insulin detemir (LEVEMIR) 100 UNIT/ML injection Inject 5 Units under the skin into the appropriate area as directed Every 12 (Twelve) Hours.   • insulin lispro (humaLOG) 100 UNIT/ML injection Inject 0-9 Units under the skin into the appropriate area as directed 3 (Three) Times a Day Before Meals.   • levETIRAcetam (KEPPRA) 250 MG tablet Take 1 tablet by mouth 2 (Two) Times a Day.   • metFORMIN (GLUCOPHAGE) 850 MG tablet TAKE 1 TABLET TWICE DAILY WITH MEALS   • polyethylene glycol (MIRALAX) 17 g packet Take 17 g by mouth Daily As Needed (Use if senna-docusate is ineffective).   • sennosides-docusate (PERICOLACE) 8.6-50 MG per tablet Take 1 tablet by mouth 2 (Two) Times a Day. Hold for loose stools   • sodium chloride 1 g tablet Take 2 tablets by mouth 2 (Two) Times a Day With Meals.   • vitamin B-12 (CYANOCOBALAMIN) 500 MCG tablet Take 500 mcg by mouth Daily.     MEDICATION LIST AND ALLERGIES REVIEWED.    Family History   Problem  "Relation Age of Onset   • Diabetes Other    • Hypertension Other    • Stroke Other    • Coronary artery disease Other      Social History     Tobacco Use   • Smoking status: Former Smoker     Types: Cigarettes     Quit date: 1997     Years since quittin.1   • Smokeless tobacco: Never Used   Substance Use Topics   • Alcohol use: Not on file   • Drug use: No     Social History     Social History Narrative   • Not on file     FAMILY AND SOCIAL HISTORY REVIEWED.    Review of systems unable to be obtained as patient comatose currently    Physical Exam and Clinical Information   /94   Pulse 105   Temp 99.8 °F (37.7 °C) (Oral)   Resp 16   Ht 182.9 cm (72\")   Wt 61.2 kg (135 lb)   SpO2 91%   BMI 18.31 kg/m²   Physical Exam    General Appearance: Sleepy, comatose  Head:    Atraumatic, Normocephalic, Pupils reactive & symmetrical B/L.  Neck: Cervical collar in place  Lungs:   B/L Breath sounds present with decreased breath sounds on bases, no wheezing heard, no crackles.   Heart: S1 and S2 present, 2/6 SE murmur  Abdomen: Soft, nontender, no guarding or rigidity, bowel sounds positive.  Extremities: Right shoulder sling in place, no cyanosis or clubbing,  no edema, warm to touch.  Neurologic: Seems to be withdrawing right side more so compared to left     1a. Level of Consciousness: 2-->Not alert, requires repeated stimulation to attend, or is obtunded and requires strong or painful stimulation to make movements (not stereotyped)  1b. LOC Questions: 2-->Answers neither question correctly  1c. LOC Commands: 2-->Performs neither task correctly  2. Best Gaze: 0-->Normal  3. Visual: 0-->No visual loss  4. Facial Palsy: 0-->Normal symmetrical movements  5a. Motor Arm, Left: 0-->No drift, limb holds 90 (or 45) degrees for full 10 secs (PT NOT FOLLOWING COMMANDS, DIFFICULT TO ASSESS)  5b. Motor Arm, Right: 0-->No drift, limb holds 90 (or 45) degrees for full 10 secs  6a. Motor Leg, Left: 0-->No drift, leg " holds 30 degree position for full 5 secs  6b. Motor Leg, Right: 0-->No drift, leg holds 30 degree position for full 5 secs  7. Limb Ataxia: 0-->Absent  8. Sensory: 0-->Normal, no sensory loss  9. Best Language: 3-->Mute, global aphasia, no usable speech or auditory comprehension  10. Dysarthria: (UN) Intubated or other physical barrier  11. Extinction and Inattention (formerly Neglect): 0-->No abnormality (PT ABLE TO MOVE ALL EXTREMITIES- DIFFICULT TO ASSESS)    Total (NIH Stroke Scale): 9          Results from last 7 days   Lab Units 02/14/22  0643   WBC 10*3/mm3 11.01*   HEMOGLOBIN g/dL 12.2*   PLATELETS 10*3/mm3 207     Results from last 7 days   Lab Units 02/14/22  0643   SODIUM mmol/L 141   POTASSIUM mmol/L 3.5   CO2 mmol/L 25.0   BUN mg/dL 11   CREATININE mg/dL 0.56*   GLUCOSE mg/dL 245*     Estimated Creatinine Clearance: 60.6 mL/min (A) (by C-G formula based on SCr of 0.56 mg/dL (L)).          Lab Results   Component Value Date    LACTATE 2.8 (C) 02/14/2022        Images:   CT head and cervical spine reviewed.   IMPRESSION:  HEAD CT:  Acute subdural hematoma measuring up to 12 mm in maximal thickness over the right frontal region. Mild secondary mass effect including midline shift to left measuring 3-4 mm.     Stable, moderate white matter changes, nonspecific but commonly due to chronic microangiopathic change.     CERVICAL SPINE CT:  Mild anterior wedge deformities of the C7, T1, and likely T2 vertebral bodies seen in association with mild widening of the C6-7 intervertebral disc space anteriorly. Findings can reflect traumatic injury but are age-indeterminate, noting that there are   no associated soft tissue abnormalities to increase suspicion for an acute injury. If no contraindication, and clinically indicated, MRI offered for better evaluation.      Mild retrolisthesis of C5 on C6, likely degenerative in nature.     RESULTS COMMUNICATION: Direct phone communication of results and all acute medical  "imaging concerns were discussed with NESTOR BARRAGAN and acknowledged. 2/14/2022 4:47 AM Mountain Time.     CRITICAL RESULT OF NEW INTRACRANIAL HEMORRHAGE IDENTIFIED.     CRITICAL RESULT OF POSSIBLE ACUTE CERVICAL SPINE FRACTURE IDENTIFIED.     Electronically signed by:  French Quinn    2/14/2022 4:49 AM Mountain Time    I reviewed the patient's results and images.     Chest x-ray reviewed personally and showed left hemidiaphragm elevation which is chronic.  No acute infiltrate noted.  Proximal right humeral fracture noted.    X-ray shoulder revealed comminuted fracture of proximal humerus with mild foreshortening of femoral shaft without any significant displacement.    EKG showed paced rhythm.    Impression     Acute on chronic intracranial subdural hematoma (HCC)    Uncontrolled type 2 diabetes mellitus (HCC)    Essential hypertension    Aortic stenosis    Cardiomyopathy, nonischemic (HCC)    Dyslipidemia    Closed fracture of proximal end of right humerus    Cervical spine fracture (HCC)    Plan/Recommendations     82-year-old male who is a former smoker with past medical history of coronary disease with ischemic cardiomyopathy, hypertension, aortic stenosis, diabetes mellitus, dyslipidemia, multiple recent hospitalization secondary to falls and more recent admission for fall and traumatic subarachnoid hemorrhage and subdural hematoma.  Patient also has chronically elevated left hemidiaphragm as well.  He has been admitted 3 times in the last 2 months.  Patient has also been hospitalized for seizure in the recent past.  He was brought in from \"the [facility]\" where he was found down by a nursing staff this morning.  Further history is not clear.  Patient was almost unresponsive and brought him to ER.  Further work-up included imaging of brain which showed acute on chronic subdural hematoma with mild midline shift of 3 to 4 mm.  Also potential cervical spine fracture noted.  Patient was discussed with " neurosurgery and they did not think any surgical intervention is warranted.  Neurology consult was recommended and admission to ICU.  During work-up patient was also noted to have acute right humeral fracture for which orthopedics has been consulted.  Cervical collar was placed and patient transferred to ICU.  Patient apparently had fever of 102.7 °F.  Currently low-grade fever of 99.8.  Blood pressure is acceptable.  Currently paced rhythm.  Patient is unable to provide any history.  I tried to reach patient's daughter but unable to connect and left a message for her.    1.  Admit patient to intensive care unit for closer hemodynamic and neurologic monitoring.  Patient with acute on chronic subdural hematoma after fall today, etiology of fall is not clear.  No further history is available at this point.  Wondering if it was seizure episode.  Neurology consult has been obtained and will await their recommendations.  Continue Keppra for now.  Dose of Vimpat given per neurology.  Will monitor closely for now.  No definite evidence of stroke noted on CT scan.  There is question of cervical spine fracture and MRI spine is pending at this point.    2.  Patient is somewhat unresponsive but seems to be protecting his airway.  Currently on room air.  Will monitor status closely.    3.  Patient did have a fever earlier.  Will get blood cultures.  Urinalysis is not suggesting any infection.  No definite evidence of pneumonia noted on chest x-ray.  Procalcitonin is normal.  Will observe off antibiotics for now.    4.  Nicardipine drip as needed for blood pressure control less than 140.    5.  Ketones noted in urinalysis.  Mild lactic acidosis which could be again seizure related or just volume depletion.  Will recheck lactate later on.  We will continue lactated Ringer at 100 mL/h for now for hydration.  Sliding scale insulin coverage.    6.  GI prophylaxis.    7.  SCDs for DVT prophylaxis.    8.  Orthopedic consult for  humeral fracture.    This is where things stand at this point.  Further changes of plan will be made as more data is obtained.  Discussed plan of care with nursing staff.  Unable to reach patient's daughter.  CODE STATUS will need to be discussed.    High level of risk due to:  illness with threat to life or bodily function.    Time spent Critical care 35 min (exclusive of procedure time)  including high complexity decision making to assess, manipulate, and support vital organ system failure in this individual who has impairment of one or more vital organ systems such that there is a high probability of imminent or life threatening deterioration in the patient’s condition.      Ricky Stephen MD, [facility]  Pulmonary and Critical Care Medicine  02/14/22 10:52 EST     CC: Diaz Lee MD

## 2022-02-14 NOTE — PLAN OF CARE
Goal Outcome Evaluation:  Plan of Care Reviewed With: daughter, son        Progress: no change   Patient arrived to ICU. Minimally responsive. Will open eyes to voice, will not follow commands. Will move all extremities spont. GCS 8. R arm in sling. EEG done. Son and daughter updated. Discussed code status with son, wanted to keep patient full code at this time, but said he will discuss this with family. Cardene on to keep SBP <140.  Patient febrile (102.7) on arrival to unit, afebrile now after tylenol.

## 2022-02-14 NOTE — CONSULTS
----- Message from Gordy Yang MD sent at 9/9/2020  5:42 PM CDT -----  Please reach out to her to see how she is doing post Tenex procedure.  ----- Message -----  From: Gordy Yang MD  Sent: 8/24/2020  To: Gordy Yang MD           Neurology Note    Patient:  Derian Joya    YOB: 1940    REFERRING PHYSICIAN:  Dr. Stephen    CHIEF COMPLAINT:    AMS    HISTORY OF PRESENT ILLNESS:   The patient is a 82 y.o. male with prior h/o SDH and seizures, 3 other admissions in the past 2 months, found down in SNF this am, in ED [facility] with  Acute on chronic right frontal SDH 12 mm, 3-4 mm shift, evaluated by NS. XR right shoulder with displaced right humeral fracture. Patient's temp 102 in ED, last 99.3, initial /111, , Na 141, lactate 2.8, UA w 6-12 WBCs. Received Keppra 500 mg IV and Vimpat 200 mg IV load upon suspicion of unobserved seizure, already on Keppra, labetalol.     Past Medical History:  Past Medical History:   Diagnosis Date   • Allergic rhinitis    • Aortic stenosis    • Atopic rhinitis 7/6/2016   • Benign non-nodular prostatic hyperplasia with lower urinary tract symptoms 9/15/2017   • CAD (coronary artery disease)    • Cardiomyopathy, ischemic    • Colon polyps     tubular adenoma 2016   • Community acquired pneumonia    • Depression 7/6/2016   • Diabetes (HCC)    • Diverticulosis of intestine 7/6/2016    Description: Left and sigmoid   • Dyslipidemia    • LBBB (left bundle branch block)    • PAD (peripheral artery disease) (HCC)        Past Surgical History:  Past Surgical History:   Procedure Laterality Date   • CARDIAC CATHETERIZATION  2018    100% cx   • CATARACT EXTRACTION     • COLONOSCOPY W/ BIOPSIES AND POLYPECTOMY  05/2021    1 benign polyp   • HIP SURGERY Left 08/2020    Dr Mandy BOUDREAUX   • LEG SURGERY Left 07/2016    Popliteal Artery stenting by Dr Hdz   • LEG SURGERY Left 07/2016    skin graft by Dr Nance   • LIPOMA EXCISION      s/p excision on chest   • PACEMAKER IMPLANTATION  01/2019    and defibrillator       Social History:   Social History     Socioeconomic History   • Marital status:    Tobacco Use   • Smoking status: Former Smoker     Types: Cigarettes     Quit date: 1/11/1997      Years since quittin.1   • Smokeless tobacco: Never Used   Substance and Sexual Activity   • Drug use: No        Family History:   Family History   Problem Relation Age of Onset   • Diabetes Other    • Hypertension Other    • Stroke Other    • Coronary artery disease Other        Medications Prior to Admission:    Prior to Admission medications    Medication Sig Start Date End Date Taking? Authorizing Provider   atorvastatin (LIPITOR) 80 MG tablet Take 1 tablet by mouth Every Night. 22   Julissa Pereira APRN   bisoprolol (ZEBeta) 10 MG tablet Take 10 mg by mouth Daily.    Provider, MD Joselin   doxazosin (CARDURA) 2 MG tablet Take 0.5 tablets by mouth Every Night. 22   Julissa Pereira APRN   famotidine (PEPCID) 40 MG tablet Take 1 tablet by mouth Daily. 22   Lindsay Chavarria APRN   insulin detemir (LEVEMIR) 100 UNIT/ML injection Inject 5 Units under the skin into the appropriate area as directed Every 12 (Twelve) Hours. 22   Lindsay Chavarria APRN   insulin lispro (humaLOG) 100 UNIT/ML injection Inject 0-9 Units under the skin into the appropriate area as directed 3 (Three) Times a Day Before Meals. 22   Julissa Pereira APRN   levETIRAcetam (KEPPRA) 250 MG tablet Take 1 tablet by mouth 2 (Two) Times a Day. 22   Lindsay Chavarria APRN   metFORMIN (GLUCOPHAGE) 850 MG tablet TAKE 1 TABLET TWICE DAILY WITH MEALS 3/3/21   Diaz Lee MD   polyethylene glycol (MIRALAX) 17 g packet Take 17 g by mouth Daily As Needed (Use if senna-docusate is ineffective). 22   Lindsay Chavarria APRN   sennosides-docusate (PERICOLACE) 8.6-50 MG per tablet Take 1 tablet by mouth 2 (Two) Times a Day. Hold for loose stools 22   Lindsay Chavraria APRN   sodium chloride 1 g tablet Take 2 tablets by mouth 2 (Two) Times a Day With Meals. 22   Brothers-Cancino, Lindsay Cris, APRN   vitamin B-12 (CYANOCOBALAMIN) 500 MCG tablet Take 500 mcg  by mouth Daily.    Provider, Joselin, MD       Allergies:  Patient has no known allergies.      Review of system  Review of Systems   Unable to perform ROS: Mental status change       Vitals:    02/14/22 1315   BP: 134/67   Pulse: 112   Resp:    Temp:    SpO2: 90%       Physical exam  Physical Exam  Cardiovascular:      Pulses: Normal pulses.   Pulmonary:      Effort: Pulmonary effort is normal.   Neurological:      Deep Tendon Reflexes: Babinski sign absent on the right side. Babinski sign absent on the left side.      Comments: In cervical collar, alerts to voice, makes eye contact, mumbles, follows some commands, moves limbs against gravity.           Lab Results   Component Value Date    WBC 11.01 (H) 02/14/2022    HGB 12.2 (L) 02/14/2022    HCT 37.9 02/14/2022    .0 (H) 02/14/2022     02/14/2022     Lab Results   Component Value Date    GLUCOSE 245 (H) 02/14/2022    BUN 11 02/14/2022    CREATININE 0.56 (L) 02/14/2022    EGFRIFNONA 139 02/14/2022    EGFRIFAFRI 135 10/22/2021    BCR 19.6 02/14/2022    CO2 25.0 02/14/2022    CALCIUM 8.8 02/14/2022    PROTENTOTREF 6.2 10/22/2021    ALBUMIN 3.60 02/14/2022    LABIL2 2.1 10/22/2021    AST 22 02/14/2022    ALT 19 02/14/2022         Radiological Studies:  EEG    Result Date: 2/14/2022  Reason for referral: 82 y.o.male with altered mental status Technical Summary:  A 19 channel digital EEG was performed using the international 10-20 placement system, including eye leads and EKG leads. Duration: 20 minutes Video: Off Findings: The patient is resting in bed, moaning.  The background shows diffuse medium amplitude 3-5 Hz intermixed delta and theta activity which is present over both hemispheres.  Sharp and slow wave discharges are seen with phase reversal over the left frontal head region.  There are also seen independently, and at times synchronously over the right frontocentral leads.  At times there are brief runs of right hemispheric sharps at  approximately 1 Hz (lateralized periodic discharges).  Electrographic seizures are however not seen.  Photic stimulation and hyperventilation are not performed. Technical quality: Good EKG: Regular, 80-90 bpm SUMMARY: Moderate generalized slow Independent left frontal and right frontal central sharp waves Lateralized periodic discharges, right central     The background suggest diffuse cerebral dysfunction which is however nonspecific as to cause The sharp wave discharges can be seen in seizure disorders of the partial type Ongoing seizures are not however seen The lateralized periodic discharges seen above are both associated with seizures, and can also be seen in the setting of acute CNS insult (such as, in this case, subdural hematoma) This report is transcribed using the Dragon dictation system.      EEG    Result Date: 1/24/2022  Reason for referral: 82 y.o.male with altered mental status Technical Summary:  A 19 channel digital EEG was performed using the international 10-20 placement system, including eye leads and EKG leads. Duration: 21 minutes Video: Off Findings: The awake tracing shows diffuse medium amplitude 4-6 Hz intermixed delta and theta activity which is present symmetrically over both hemispheres.  EMG and movement artifact are variably present.  Stage II sleep is not seen.  Photic stimulation does not change the background.  Hyperventilation is not performed.  No focal features are seen.  No epileptiform activity is seen. Technical quality: Excellent EKG: Regular, 60-70 bpm SUMMARY: Mild-moderate generalized slow No focal features or epileptiform activity is seen     The study shows evidence for diffuse cerebral dysfunction of at least mild degree No evidence for epilepsy is seen This report is transcribed using the Dragon dictation system.      XR Shoulder 2+ View Right    Result Date: 2/14/2022  DATE OF EXAM: 2/14/2022 7:16 AM  PROCEDURE: XR SHOULDER 2+ VW RIGHT-  INDICATIONS: FALL;  I62.01-Nontraumatic acute subdural hemorrhage; I62.03-Nontraumatic chronic subdural hemorrhage; W19.XXXA-Unspecified fall, initial encounter; Y92.129-Unspecified place in nursing home as the place of occurrence of the external cause; S42.201A-Unspecified fracture of upper end of right humerus, initial encounter for closed fracture  COMPARISON: No Comparisons Available  TECHNIQUE: A minimum of two radiologic views of the right shoulder were obtained.  FINDINGS: Comminuted fracture of the proximal humerus centered at the surgical neck with fracture lines extending into the humeral head and greater tuberosity. There appears be mild foreshortening of the humeral shaft. No dislocation at the shoulder joint noted. Mild degenerative changes are noted at the glenohumeral joints and mild to moderate degenerative changes at the AC joint. Soft tissues appear grossly unremarkable. Limited imaging of the chest shows no acute abnormality.      Comminuted fracture of the proximal humerus with mild foreshortening.  No evidence of dislocation.  This report was finalized on 2/14/2022 7:48 AM by Dhruv Lund.      CT Head Without Contrast    Result Date: 2/14/2022  EXAM: CT HEAD WO CONTRAST, CT CERVICAL SPINE WO CONTRAST EXAM DATE: 2/14/2022 6:11 AM INDICATION: Trauma.  COMPARISON: 1/22/2022. TECHNIQUE: Images through the head without intravenous contrast. Images through the cervical spine without intravenous contrast. Low-dose CT acquisition technique included one or more of the following options: Automated exposure control; Adjustment of mA and/or KV according to patient's size; Use of iterative reconstruction. CONTRAST: None. FINDINGS TECHNICAL: Motion degraded examination. HEAD CT: INTRACRANIAL CONTENTS: Mixed density, partially hyperdense, subdural hematoma over the right cerebral hemisphere measuring approximately 12 mm in maximal thickness in the right frontal region. No intracranial mass. No acute large territorial  infarct. Moderate hypodensity within the supratentorial white matter. Allowing for differences in technique, approximately unchanged. Midline shift to left measuring 3-4 mm. No herniation or hydrocephalus. SKULL: No acute abnormality. PARANASAL SINUSES: Essentially clear. ORBITS: Within normal limits. MASTOID AIR CELLS: No significant opacification. INTRACRANIAL VESSELS: Dense calcification of the intracranial internal carotid and vertebral arteries. CERVICAL SPINE CT: VERTEBRAE: Mild anterior wedge deformity of the C7, T1, likely T2 vertebral bodies with slight widening of the C6-7 intervertebral disc space anteriorly. BONE MINERALIZATION: Diffuse osteopenia. SPINAL ALIGNMENT: Straightening of the normal cervical lordosis.  Mild retrolisthesis: C5 on C6. SPINAL CANAL: No gross intraspinal collection, mass, or other abnormality. FACET HYPERTROPHY: Multilevel facet hypertrophy. SPINAL CANAL STENOSIS: At most mild stenosis of the spinal canal. NEURAL FORAMINAL NARROWING: Most prominent at C5-6 (mild/moderate). SOFT TISSUES: No significant abnormality. EXTRACRANIAL VESSELS: Calcification of the extracranial internal carotid arteries. OTHER SIGNIFICANT FINDINGS: None.     HEAD CT: Acute subdural hematoma measuring up to 12 mm in maximal thickness over the right frontal region. Mild secondary mass effect including midline shift to left measuring 3-4 mm. Stable, moderate white matter changes, nonspecific but commonly due to chronic microangiopathic change. CERVICAL SPINE CT: Mild anterior wedge deformities of the C7, T1, and likely T2 vertebral bodies seen in association with mild widening of the C6-7 intervertebral disc space anteriorly. Findings can reflect traumatic injury but are age-indeterminate, noting that there are no associated soft tissue abnormalities to increase suspicion for an acute injury. If no contraindication, and clinically indicated, MRI offered for better evaluation. Mild retrolisthesis of C5 on C6,  likely degenerative in nature. RESULTS COMMUNICATION: Direct phone communication of results and all acute medical imaging concerns were discussed with NESTOR BARRAGAN and acknowledged. 2/14/2022 4:47 AM Mountain Time. CRITICAL RESULT OF NEW INTRACRANIAL HEMORRHAGE IDENTIFIED. CRITICAL RESULT OF POSSIBLE ACUTE CERVICAL SPINE FRACTURE IDENTIFIED. Electronically signed by:  French Quinn  2/14/2022 4:49 AM Mountain Time    CT Head Without Contrast    Result Date: 2/10/2022   DATE OF EXAM: 2/10/2022 11:22 AM  PROCEDURE: CT HEAD WO CONTRAST-  INDICATIONS: SDH; S06.2K5J-Yqynulrgr subdural hemorrhage with loss of consciousness of unspecified duration, initial encounter  COMPARISON: January 22, 2022  TECHNIQUE: Routine transaxial cuts were obtained through the head without the administration of contrast. Automated exposure control and iterative reconstruction methods were used.  FINDINGS: There remains prominent extra-axial CSF along the right frontal lobe in the subdural space. There is generalized cerebral atrophy. There are periventricular and deep white matter changes which could reflect more chronic small vessel ischemic change.  The previously noted prepontine blood has resolved additionally areas of subarachnoid hemorrhage also is no longer seen nor is there definitive subdural hemorrhage apparent. The previously noted intraventricular hemorrhage also has resolved.  An acute abnormality of the calvarium is not apparent. The paranasal sinuses seem relatively clear.      1.  Previously noted subarachnoid, subdural and intraventricular hemorrhage has resolved 2.  Prominent extra-axial CSF along the right frontal lobe which has been suggested and may relate to subdural hygroma or more prominent cerebral atrophy. 3.  Periventricular and deep white matter changes which could reflect more chronic small vessel ischemic change. 4.  Atherosclerotic changes are noted involving the vertebral arteries as well as the distal  internal carotid arteries.  This report was finalized on 2/10/2022 11:53 AM by Lloyd Salazar MD.      CT Head Without Contrast    Result Date: 1/22/2022  CT Head WO HISTORY: Metal status changes. Recent intracranial hemorrhage. TECHNIQUE: Axial unenhanced head CT with multiplanar reformats. Radiation dose reduction techniques included automated exposure control or exposure modulation based on body size. Count of known CT and cardiac nuc med studies performed in previous 12 months: 8. COMPARISON: 1/17/2022 FINDINGS: Generalized atrophy is again seen. Ventricular size and configuration remain normal. Advanced chronic small vessel ischemic changes are present in the white matter.  No evidence of acute infarct. No mass. Intracranial atherosclerotic disease is again seen. There is a small amount of residual intraventricular hemorrhage in the occipital horn of the right lateral ventricle. This appears improved. There is subarachnoid hemorrhage in the prepontine region, predominantly to the left of midline. The volume is improved. Left frontal parafalcine subdural hemorrhage is less dense than on the prior study. There is some persistent left frontal subdural hemorrhage measuring 3 mm in thickness. Although the volume is improved, this is slightly more dense suggesting that some of it may be acute. Scattered areas of subarachnoid hemorrhage in both cerebral hemispheres overall is improved, particularly in the left sylvian fissure. No skull fracture.     Persistent intracranial hemorrhage, the majority of which is improved since 1/17/2022. There is some dense left frontal subdural hemorrhage measuring about 3 mm in thickness. Overall volume is improved but the density has increased suggesting that some of this is more acute. 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  TECHNIQUE: Multiple axial CT imaging was obtained of the head from the skull base to the skull vertex without the administration of intravenous contrast.  The radiation dose reduction device was turned on for each scan per the ALARA (As Low as Reasonably Achievable) protocol.  COMPARISON: 01/15/2022.  FINDINGS: There is again extensive atrophy identified of the brain. There is redemonstration identified of the hemorrhage seen on the prior examination with subarachnoid hemorrhage again seen in the suprasellar cistern extending into the left sylvian fissure. There is a small amount of extraaxial hemorrhage seen along the falx in the frontal region. Findings are essentially unchanged and evolving when compared to the prior examination. Arachnoid hemorrhage is again seen at the vertex bilaterally. No signs of new hemorrhage. No evidence of midline shift. No evidence of hydrocephalus. Minimal intraventricular hemorrhage identified stable and unchanged.      Stable expected evolving changes of the previously seen areas of intracranial hemorrhage. No new findings in the interval.  D:  01/17/2022 E:  01/17/2022  This report was finalized on 1/18/2022 9:33 AM by Dr. Danii Benito MD.      CT Cervical Spine Without Contrast    Result Date: 2/14/2022  EXAM: CT HEAD WO CONTRAST, CT CERVICAL SPINE WO CONTRAST EXAM DATE: 2/14/2022 6:11 AM INDICATION: Trauma.  COMPARISON: 1/22/2022. TECHNIQUE: Images through the head without intravenous contrast. Images through the cervical spine without intravenous contrast. Low-dose CT acquisition technique included one or more of the following options: Automated exposure control; Adjustment of mA and/or  KV according to patient's size; Use of iterative reconstruction. CONTRAST: None. FINDINGS TECHNICAL: Motion degraded examination. HEAD CT: INTRACRANIAL CONTENTS: Mixed density, partially hyperdense, subdural hematoma over the right cerebral hemisphere measuring approximately 12 mm in maximal thickness in the right frontal region. No intracranial mass. No acute large territorial infarct. Moderate hypodensity within the supratentorial white matter. Allowing for differences in technique, approximately unchanged. Midline shift to left measuring 3-4 mm. No herniation or hydrocephalus. SKULL: No acute abnormality. PARANASAL SINUSES: Essentially clear. ORBITS: Within normal limits. MASTOID AIR CELLS: No significant opacification. INTRACRANIAL VESSELS: Dense calcification of the intracranial internal carotid and vertebral arteries. CERVICAL SPINE CT: VERTEBRAE: Mild anterior wedge deformity of the C7, T1, likely T2 vertebral bodies with slight widening of the C6-7 intervertebral disc space anteriorly. BONE MINERALIZATION: Diffuse osteopenia. SPINAL ALIGNMENT: Straightening of the normal cervical lordosis.  Mild retrolisthesis: C5 on C6. SPINAL CANAL: No gross intraspinal collection, mass, or other abnormality. FACET HYPERTROPHY: Multilevel facet hypertrophy. SPINAL CANAL STENOSIS: At most mild stenosis of the spinal canal. NEURAL FORAMINAL NARROWING: Most prominent at C5-6 (mild/moderate). SOFT TISSUES: No significant abnormality. EXTRACRANIAL VESSELS: Calcification of the extracranial internal carotid arteries. OTHER SIGNIFICANT FINDINGS: None.     HEAD CT: Acute subdural hematoma measuring up to 12 mm in maximal thickness over the right frontal region. Mild secondary mass effect including midline shift to left measuring 3-4 mm. Stable, moderate white matter changes, nonspecific but commonly due to chronic microangiopathic change. CERVICAL SPINE CT: Mild anterior wedge deformities of the C7, T1, and likely T2 vertebral  bodies seen in association with mild widening of the C6-7 intervertebral disc space anteriorly. Findings can reflect traumatic injury but are age-indeterminate, noting that there are no associated soft tissue abnormalities to increase suspicion for an acute injury. If no contraindication, and clinically indicated, MRI offered for better evaluation. Mild retrolisthesis of C5 on C6, likely degenerative in nature. RESULTS COMMUNICATION: Direct phone communication of results and all acute medical imaging concerns were discussed with NESTOR BARRAGAN and acknowledged. 2/14/2022 4:47 AM Mountain Time. CRITICAL RESULT OF NEW INTRACRANIAL HEMORRHAGE IDENTIFIED. CRITICAL RESULT OF POSSIBLE ACUTE CERVICAL SPINE FRACTURE IDENTIFIED. 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  TECHNIQUE: Multiplanar MRI of the brain without intravenous contrast administration.  COMPARISON: CT head 01/17/2021.  FINDINGS: No restricted diffusion to suggest acute ischemia with areas of T1 shortening noted of scattered subarachnoid hemorrhage including parafalcine involvement as seen on recent CT. No susceptibility artifact apart from these areas of hemosiderin deposition and acute/subacute blood products. Motion degraded examinations T2 and FLAIR axials, however, at least mild to moderate generalized atrophy and age-related volume loss along with moderate chronic small vessel ischemic disease. Globes and orbits are unremarkable. Paranasal sinuses and mastoid air cells are grossly clear and well pneumatized. Pituitary and sella within normal limits with noted prepontine  and suprasellar cistern hemorrhage. Cervicomedullary junction widely patent.      Multifocal areas of parafalcine and scattered subarachnoid hemorrhage including suprasellar and prepontine regions as seen on recent CT. No acute infarction or acute intracranial findings otherwise noted with moderate senescent changes of generalized atrophy and age-related volume loss as well as chronic small vessel ischemic disease.   D:  01/17/2022 E:  01/17/2022  This report was finalized on 1/17/2022 3:22 PM by Dr. Joon Clark.      FL Video Swallow With Speech Single Contrast    Result Date: 1/26/2022  EXAMINATION: FL VIDEO SWALLOW W SPEECH SINGLE-CONTRAST-  INDICATION: dysphagia; I62.01-Nontraumatic acute subdural hemorrhage; I62.03-Nontraumatic chronic subdural hemorrhage; R41.82-Altered mental status, unspecified; R13.11-Dysphagia, oral phase; R41.841-Cognitive communication deficit  TECHNIQUE: 42 seconds of fluoroscopic time was used for this exam. 6 associated fluoroscopic loops were saved. The patient was evaluated in the seated lateral position while taking a variety of consistencies of barium by mouth under the direction of speech pathology.  COMPARISON: NONE  FINDINGS: 42 seconds of fluoroscopy provided for a modified barium swallow. Please see speech therapy report for full details and recommendations.       Fluoroscopy provided for a modified barium swallow. Please see speech therapy report for full details and recommendations.    This report was finalized on 1/26/2022 4:40 PM by Tal Garza.      XR Chest 1 View    Result Date: 2/14/2022   DATE OF EXAM: 2/14/2022 7:16 AM  PROCEDURE: XR CHEST 1 VW-  INDICATIONS: FALL; I62.01-Nontraumatic acute subdural hemorrhage; I62.03-Nontraumatic chronic subdural hemorrhage; W19.XXXA-Unspecified fall, initial encounter; Y92.129-Unspecified place in nursing home as the place of occurrence of the external cause; S42.201A-Unspecified fracture of upper end of right humerus,  initial encounter for closed fracture  COMPARISON: 1/22/2022 and prior  TECHNIQUE: 2 view chest  FINDINGS:  Elevation left hemidiaphragm again noted with vascular crowding and linear opacity compatible with atelectasis at the left lung base. Left lung is otherwise grossly clear. AICD/pacemaker device from a left subclavian approach again noted. Heart size is stable. Pulmonary vascularity appears within normal limits. Right lung is grossly clear. Healed right-sided inferior rib fracture noted. Multiple degenerative changes noted of the spine. Acute right-sided proximal humeral fracture noted.      IMPRESSION :  1. Acute proximal right humeral fracture, incompletely evaluated. 2. Elevation left hemidiaphragm with vascular crowding left basilar atelectasis appears unchanged.[  This report was finalized on 2/14/2022 7:46 AM by Dhruv Lund.      XR Chest 1 View    Result Date: 1/22/2022  CR Chest 1 Vw INDICATION: Altered mental status for a few days. COMPARISON:  1/15/2022 FINDINGS: Portable AP view(s) of the chest.  Stable cardiac silhouette. Left-sided multilead pacemaker/defibrillator unchanged. The aorta is tortuous ectatic and atherosclerotic. Unremarkable vascularity. The right lung is clear. There is chronic elevation of the left hemidiaphragm with some probable atelectasis in the left lower lung zone. No new effusion or pneumothorax.     1. No significant change. Signer Name: Guy Guaman MD  Signed: 1/22/2022 9:54 PM  Workstation Name: [email or web address]  [facility]    CT Angiogram Head w AI Analysis of LVO    Result Date: 1/23/2022  EXAMINATION: CT ANGIOGRAM HEAD W AI ANALYSIS OF LVO-  INDICATION: Stroke, follow up; I62.01-Nontraumatic acute subdural hemorrhage; I62.03-Nontraumatic chronic subdural hemorrhage; R41.82-Altered mental status, unspecified  TECHNIQUE: Pre and post contrast 3 mm and 0.75 mm axial images of the brain with sagittal and coronal 2-D reconstructions and 3-D VRT  and MIP angiographic  The radiation dose reduction device was turned on for each scan per the ALARA (As Low as Reasonably Achievable) protocol.  AI analysis of LVO was utilized.  COMPARISON: Unenhanced head CT scan 1/22/2022. CTA 1/11/2022  FINDINGS: As noted on prior CTA, there is heavy calcification of the intracranial vertebral arteries, relatively large caliber left vertebral artery, and residual subarachnoid prepontine hemorrhage. There is only trace remaining intraventricular hemorrhage. There is a faint suggestion of a left frontal subdural hemorrhage unchanged.  Angiographic images show stable high-grade mid-distal basilar artery stenosis, perhaps best appreciated on sagittal reconstruction MIP image 23 series 902, axial thin section reconstruction image 198 series 901.  Intracranial portions the anterior and middle cerebral arteries appear normal. Extensive calcification of the cavernous carotid arteries appears peripheral without evidence of hemodynamically significant stenosis. Posterior cerebral arteries appear symmetric and normal.        1. 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 2. Diffuse cavernous carotid calcification without evidence of high-grade stenosis. 3. No evidence of hemodynamically significant intracranial vascular stenosis elsewhere. 4. Stable small focus of prepontine subarachnoid hemorrhage, trace intraventricular hemorrhage and trace left subdural hemorrhage.   This report was finalized on 1/23/2022 8:30 PM by Dr. Kendrick Jane MD.          During this visit the following were done:  Labs Reviewed [x]    Labs Ordered []    Radiology Reports Reviewed [x]    Radiology Ordered []    EKG, echo, and/or stress test reviewed []    EEG results reviewed  [x]    EEG reviewed and interpreted per myself   []    Discussed case with neurointerventionalist or neuroradiologist []    Referring Provider Records Reviewed []    ER  Records Reviewed []    Hospital Records Reviewed []    History Obtained From Family []    Radiological images view and Interpreted per myself [x]    Case Discussed with referring provider []     Decision to obtain and request outside records  []        Assessment and Plan     Recurrent fall with acute on chronic SDH, right humeral head fracture, fever, HTN suspected break-thru seizure.   - ICU observation.   - Given Vimpat 200 mg in ED, continue 100 mg q12.   - Continue Keppra 500 mg q12.   - EEG.   - Consider palliative medicine consult for GOC.    Thanks,          Electronically signed by Merritt Jiang MD on 2/14/2022 at 13:55 EST

## 2022-02-14 NOTE — CONSULTS
[facility] Consultation Note    Subjective     Patient Care Team:  Patient Care Team:  Diaz Lee MD as PCP - General    Referring Provider: Dr. Stephen  Reason for Consultation: Right shoulder pain/fracture    Chief Complaint   Patient presents with   • Fall        HPI    Derian Joya is a 82 y.o. male found down at the [facility] by nursing staff, and subsequently noted to have a proximal humerus fracture on radiographs.  This is his third hospitalization in the past 1.5 months.  He has a chronic subdural hematoma, subarachnoid hemorrhage, and hyponatremia.  He also apparently had a seizure recently.  There is also question of a cervical spine fracture, being evaluated by neurosurgery.  Information is provided by the medical record, as well as his nurse this evening.  Patient is not able to communicate currently.      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)   • Dyslipidemia   • Closed fracture of proximal end of right humerus   • Cervical spine fracture (HCC)     Past Medical History:   Diagnosis Date   • Allergic rhinitis    • Aortic stenosis    • Atopic rhinitis 7/6/2016   • Benign non-nodular prostatic hyperplasia with lower urinary tract symptoms 9/15/2017   • CAD (coronary artery disease)    • Cardiomyopathy, ischemic    • Colon polyps     tubular adenoma 2016   • Community acquired pneumonia    • Depression 7/6/2016   • Diabetes (HCC)    • Diverticulosis of intestine 7/6/2016    Description: Left and sigmoid   • Dyslipidemia    • LBBB  (left bundle branch block)    • PAD (peripheral artery disease) (HCC)       Past Surgical History:   Procedure Laterality Date   • CARDIAC CATHETERIZATION  2018    100% cx   • CATARACT EXTRACTION     • COLONOSCOPY W/ BIOPSIES AND POLYPECTOMY  2021    1 benign polyp   • HIP SURGERY Left 2020    Dr Mandy BOUDREAUX   • LEG SURGERY Left 2016    Popliteal Artery stenting by Dr Hdz   • LEG SURGERY Left 2016    skin graft by Dr Nance   • LIPOMA EXCISION      s/p excision on chest   • PACEMAKER IMPLANTATION  2019    and defibrillator      Family History   Problem Relation Age of Onset   • Diabetes Other    • Hypertension Other    • Stroke Other    • Coronary artery disease Other      Social History     Socioeconomic History   • Marital status:    Tobacco Use   • Smoking status: Former Smoker     Types: Cigarettes     Quit date: 1997     Years since quittin.1   • Smokeless tobacco: Never Used   Substance and Sexual Activity   • Drug use: No      Medications Prior to Admission   Medication Sig Dispense Refill Last Dose   • atorvastatin (LIPITOR) 80 MG tablet Take 1 tablet by mouth Every Night.      • bisoprolol (ZEBeta) 10 MG tablet Take 10 mg by mouth Daily.      • doxazosin (CARDURA) 2 MG tablet Take 0.5 tablets by mouth Every Night.      • famotidine (PEPCID) 40 MG tablet Take 1 tablet by mouth Daily.      • insulin detemir (LEVEMIR) 100 UNIT/ML injection Inject 5 Units under the skin into the appropriate area as directed Every 12 (Twelve) Hours.  12    • insulin lispro (humaLOG) 100 UNIT/ML injection Inject 0-9 Units under the skin into the appropriate area as directed 3 (Three) Times a Day Before Meals.  12    • levETIRAcetam (KEPPRA) 250 MG tablet Take 1 tablet by mouth 2 (Two) Times a Day.      • metFORMIN (GLUCOPHAGE) 850 MG tablet TAKE 1 TABLET TWICE DAILY WITH MEALS 180 tablet 3    • polyethylene glycol (MIRALAX) 17 g packet Take 17 g by mouth Daily As Needed (Use if senna-docusate  "is ineffective).      • sennosides-docusate (PERICOLACE) 8.6-50 MG per tablet Take 1 tablet by mouth 2 (Two) Times a Day. Hold for loose stools      • sodium chloride 1 g tablet Take 2 tablets by mouth 2 (Two) Times a Day With Meals.      • vitamin B-12 (CYANOCOBALAMIN) 500 MCG tablet Take 500 mcg by mouth Daily.        No Known Allergies     Review of Systems   Unable to obtain secondary to mental status    Objective      Physical Exam  /68   Pulse 98   Temp 99.3 °F (37.4 °C) (Axillary)   Resp 18   Ht 165.1 cm (65\")   Wt 61.2 kg (135 lb)   SpO2 94%   BMI 22.47 kg/m²     Body mass index is 22.47 kg/m².    General:   Mental Status: Eyes open, not cooperative with examination and following commands   Posture: Laying in a hospital bed    Examination of the right shoulder: Mild swelling, no gross deformity, sling in place.  He will grasp my fingers.  Good radial pulse.    Imaging/Studies  Imaging Results (Last 24 Hours)     Procedure Component Value Units Date/Time    XR Shoulder 2+ View Right [[id number]] Collected: 02/14/22 0746     Updated: 02/14/22 0751    Narrative:      DATE OF EXAM:  2/14/2022 7:16 AM     PROCEDURE:  XR SHOULDER 2+ VW RIGHT-     INDICATIONS:  FALL; I62.01-Nontraumatic acute subdural hemorrhage; I62.03-Nontraumatic  chronic subdural hemorrhage; W19.XXXA-Unspecified fall, initial  encounter; Y92.129-Unspecified place in nursing home as the place of  occurrence of the external cause; S42.201A-Unspecified fracture of upper  end of right humerus, initial encounter for closed fracture     COMPARISON:  No Comparisons Available     TECHNIQUE:   A minimum of two radiologic views of the right shoulder were obtained.     FINDINGS:  Comminuted fracture of the proximal humerus centered at the surgical  neck with fracture lines extending into the humeral head and greater  tuberosity. There appears be mild foreshortening of the humeral shaft.  No dislocation at the shoulder joint noted. Mild " degenerative changes  are noted at the glenohumeral joints and mild to moderate degenerative  changes at the AC joint. Soft tissues appear grossly unremarkable.  Limited imaging of the chest shows no acute abnormality.        Impression:      Comminuted fracture of the proximal humerus with mild foreshortening.     No evidence of dislocation.     This report was finalized on 2/14/2022 7:48 AM by Dhruv Lund.       XR Chest 1 View [[id number]] Collected: 02/14/22 0744     Updated: 02/14/22 0749    Narrative:         DATE OF EXAM:   2/14/2022 7:16 AM     PROCEDURE:   XR CHEST 1 VW-     INDICATIONS:   FALL; I62.01-Nontraumatic acute subdural hemorrhage; I62.03-Nontraumatic  chronic subdural hemorrhage; W19.XXXA-Unspecified fall, initial  encounter; Y92.129-Unspecified place in nursing home as the place of  occurrence of the external cause; S42.201A-Unspecified fracture of upper  end of right humerus, initial encounter for closed fracture     COMPARISON:  1/22/2022 and prior     TECHNIQUE:   2 view chest     FINDINGS:      Elevation left hemidiaphragm again noted with vascular crowding and  linear opacity compatible with atelectasis at the left lung base. Left  lung is otherwise grossly clear. AICD/pacemaker device from a left  subclavian approach again noted. Heart size is stable. Pulmonary  vascularity appears within normal limits. Right lung is grossly clear.  Healed right-sided inferior rib fracture noted. Multiple degenerative  changes noted of the spine. Acute right-sided proximal humeral fracture  noted.        Impression:      IMPRESSION :      1. Acute proximal right humeral fracture, incompletely evaluated.  2. Elevation left hemidiaphragm with vascular crowding left basilar  atelectasis appears unchanged.[     This report was finalized on 2/14/2022 7:46 AM by Dhruv Lund.       CT Head Without Contrast [[id number]] Collected: 02/14/22 0639     Updated: 02/14/22 0650    Narrative:      EXAM: CT HEAD WO  CONTRAST, CT CERVICAL SPINE WO CONTRAST    EXAM DATE: 2/14/2022 6:11 AM    INDICATION: Trauma.      COMPARISON: 1/22/2022.    TECHNIQUE:  Images through the head without intravenous contrast.    Images through the cervical spine without intravenous contrast.    Low-dose CT acquisition technique included one or more of the following options: Automated exposure control; Adjustment of mA and/or KV according to patient's size; Use of iterative reconstruction.    CONTRAST:  None.    FINDINGS  TECHNICAL: Motion degraded examination.    HEAD CT:  INTRACRANIAL CONTENTS:  Mixed density, partially hyperdense, subdural hematoma over the right cerebral hemisphere measuring approximately 12 mm in maximal thickness in the right frontal region.    No intracranial mass.    No acute large territorial infarct.    Moderate hypodensity within the supratentorial white matter. Allowing for differences in technique, approximately unchanged.    Midline shift to left measuring 3-4 mm. No herniation or hydrocephalus.    SKULL: No acute abnormality.    PARANASAL SINUSES: Essentially clear.    ORBITS: Within normal limits.    MASTOID AIR CELLS: No significant opacification.    INTRACRANIAL VESSELS: Dense calcification of the intracranial internal carotid and vertebral arteries.    CERVICAL SPINE CT:   VERTEBRAE: Mild anterior wedge deformity of the C7, T1, likely T2 vertebral bodies with slight widening of the C6-7 intervertebral disc space anteriorly.    BONE MINERALIZATION: Diffuse osteopenia.    SPINAL ALIGNMENT: Straightening of the normal cervical lordosis.  Mild retrolisthesis: C5 on C6.    SPINAL CANAL: No gross intraspinal collection, mass, or other abnormality.    FACET HYPERTROPHY: Multilevel facet hypertrophy.    SPINAL CANAL STENOSIS: At most mild stenosis of the spinal canal.    NEURAL FORAMINAL NARROWING: Most prominent at C5-6 (mild/moderate).    SOFT TISSUES: No significant abnormality.    EXTRACRANIAL VESSELS: Calcification of  the extracranial internal carotid arteries.    OTHER SIGNIFICANT FINDINGS: None.        Impression:      HEAD CT:  Acute subdural hematoma measuring up to 12 mm in maximal thickness over the right frontal region. Mild secondary mass effect including midline shift to left measuring 3-4 mm.    Stable, moderate white matter changes, nonspecific but commonly due to chronic microangiopathic change.    CERVICAL SPINE CT:  Mild anterior wedge deformities of the C7, T1, and likely T2 vertebral bodies seen in association with mild widening of the C6-7 intervertebral disc space anteriorly. Findings can reflect traumatic injury but are age-indeterminate, noting that there are   no associated soft tissue abnormalities to increase suspicion for an acute injury. If no contraindication, and clinically indicated, MRI offered for better evaluation.     Mild retrolisthesis of C5 on C6, likely degenerative in nature.    RESULTS COMMUNICATION: Direct phone communication of results and all acute medical imaging concerns were discussed with NESTOR BARRAGAN and acknowledged. 2/14/2022 4:47 AM Mountain Time.    CRITICAL RESULT OF NEW INTRACRANIAL HEMORRHAGE IDENTIFIED.    CRITICAL RESULT OF POSSIBLE ACUTE CERVICAL SPINE FRACTURE IDENTIFIED.    Electronically signed by:  French Quinn    2/14/2022 4:49 AM Mountain Time    CT Cervical Spine Without Contrast [[id number]] Collected: 02/14/22 0639     Updated: 02/14/22 0650    Narrative:      EXAM: CT HEAD WO CONTRAST, CT CERVICAL SPINE WO CONTRAST    EXAM DATE: 2/14/2022 6:11 AM    INDICATION: Trauma.      COMPARISON: 1/22/2022.    TECHNIQUE:  Images through the head without intravenous contrast.    Images through the cervical spine without intravenous contrast.    Low-dose CT acquisition technique included one or more of the following options: Automated exposure control; Adjustment of mA and/or KV according to patient's size; Use of iterative  reconstruction.    CONTRAST:  None.    FINDINGS  TECHNICAL: Motion degraded examination.    HEAD CT:  INTRACRANIAL CONTENTS:  Mixed density, partially hyperdense, subdural hematoma over the right cerebral hemisphere measuring approximately 12 mm in maximal thickness in the right frontal region.    No intracranial mass.    No acute large territorial infarct.    Moderate hypodensity within the supratentorial white matter. Allowing for differences in technique, approximately unchanged.    Midline shift to left measuring 3-4 mm. No herniation or hydrocephalus.    SKULL: No acute abnormality.    PARANASAL SINUSES: Essentially clear.    ORBITS: Within normal limits.    MASTOID AIR CELLS: No significant opacification.    INTRACRANIAL VESSELS: Dense calcification of the intracranial internal carotid and vertebral arteries.    CERVICAL SPINE CT:   VERTEBRAE: Mild anterior wedge deformity of the C7, T1, likely T2 vertebral bodies with slight widening of the C6-7 intervertebral disc space anteriorly.    BONE MINERALIZATION: Diffuse osteopenia.    SPINAL ALIGNMENT: Straightening of the normal cervical lordosis.  Mild retrolisthesis: C5 on C6.    SPINAL CANAL: No gross intraspinal collection, mass, or other abnormality.    FACET HYPERTROPHY: Multilevel facet hypertrophy.    SPINAL CANAL STENOSIS: At most mild stenosis of the spinal canal.    NEURAL FORAMINAL NARROWING: Most prominent at C5-6 (mild/moderate).    SOFT TISSUES: No significant abnormality.    EXTRACRANIAL VESSELS: Calcification of the extracranial internal carotid arteries.    OTHER SIGNIFICANT FINDINGS: None.        Impression:      HEAD CT:  Acute subdural hematoma measuring up to 12 mm in maximal thickness over the right frontal region. Mild secondary mass effect including midline shift to left measuring 3-4 mm.    Stable, moderate white matter changes, nonspecific but commonly due to chronic microangiopathic change.    CERVICAL SPINE CT:  Mild anterior wedge  deformities of the C7, T1, and likely T2 vertebral bodies seen in association with mild widening of the C6-7 intervertebral disc space anteriorly. Findings can reflect traumatic injury but are age-indeterminate, noting that there are   no associated soft tissue abnormalities to increase suspicion for an acute injury. If no contraindication, and clinically indicated, MRI offered for better evaluation.     Mild retrolisthesis of C5 on C6, likely degenerative in nature.    RESULTS COMMUNICATION: Direct phone communication of results and all acute medical imaging concerns were discussed with NESTOR BARRAGAN and acknowledged. 2/14/2022 4:47 AM Mountain Time.    CRITICAL RESULT OF NEW INTRACRANIAL HEMORRHAGE IDENTIFIED.    CRITICAL RESULT OF POSSIBLE ACUTE CERVICAL SPINE FRACTURE IDENTIFIED.    Electronically signed by:  French Quinn    2/14/2022 4:49 AM Mountain Time          Results from last 7 days   Lab Units 02/14/22  0643   WBC 10*3/mm3 11.01*   HEMOGLOBIN g/dL 12.2*   HEMATOCRIT % 37.9   MCV fL 100.0*   PLATELETS 10*3/mm3 207         Results Review:   I reviewed the patient's new imaging test results.    Assessment and Plan     Assessment:    Right proximal humerus fracture    Acute on chronic intracranial subdural hematoma (HCC)    Acute on chronic intracranial subdural hematoma (HCC)    Uncontrolled type 2 diabetes mellitus (HCC)    Essential hypertension    Aortic stenosis    Cardiomyopathy, nonischemic (HCC)    Dyslipidemia    Closed fracture of proximal end of right humerus    Cervical spine fracture (HCC)      Plan:    He does have a proximal humerus fracture, which appears to be amenable to conservative treatment with a sling.  He should follow-up in the next 3 to 4 weeks with a repeat x-ray in our office.  Please feel free to contact me for any questions during this hospitalization.    Discussed with Audra, his daughter, and her questions were answered.    I discussed the patients findings and my  recommendations with nursing staff    Medical Decision Making  Management Options : close treatment of fracture or dislocation  Data/Risk: independent visualization of imaging, lab tests, or EMG/NCV      Willie Jorgensen MD  02/14/22  18:01 EST

## 2022-02-15 ENCOUNTER — APPOINTMENT (OUTPATIENT)
Dept: MRI IMAGING | Facility: HOSPITAL | Age: 82
End: 2022-02-15

## 2022-02-15 ENCOUNTER — APPOINTMENT (OUTPATIENT)
Dept: CT IMAGING | Facility: HOSPITAL | Age: 82
End: 2022-02-15

## 2022-02-15 LAB
ALBUMIN SERPL-MCNC: 3.5 G/DL (ref 3.5–5.2)
ALBUMIN/GLOB SERPL: 1.3 G/DL
ALP SERPL-CCNC: 99 U/L (ref 39–117)
ALT SERPL W P-5'-P-CCNC: 17 U/L (ref 1–41)
ANION GAP SERPL CALCULATED.3IONS-SCNC: 11 MMOL/L (ref 5–15)
AST SERPL-CCNC: 18 U/L (ref 1–40)
BACTERIA SPEC AEROBE CULT: NORMAL
BASOPHILS # BLD AUTO: 0.02 10*3/MM3 (ref 0–0.2)
BASOPHILS NFR BLD AUTO: 0.3 % (ref 0–1.5)
BILIRUB SERPL-MCNC: 1.1 MG/DL (ref 0–1.2)
BUN SERPL-MCNC: 6 MG/DL (ref 8–23)
BUN/CREAT SERPL: 15 (ref 7–25)
CALCIUM SPEC-SCNC: 8.6 MG/DL (ref 8.6–10.5)
CHLORIDE SERPL-SCNC: 99 MMOL/L (ref 98–107)
CO2 SERPL-SCNC: 26 MMOL/L (ref 22–29)
CREAT SERPL-MCNC: 0.4 MG/DL (ref 0.76–1.27)
DEPRECATED RDW RBC AUTO: 44.1 FL (ref 37–54)
EOSINOPHIL # BLD AUTO: 0.04 10*3/MM3 (ref 0–0.4)
EOSINOPHIL NFR BLD AUTO: 0.5 % (ref 0.3–6.2)
ERYTHROCYTE [DISTWIDTH] IN BLOOD BY AUTOMATED COUNT: 12.6 % (ref 12.3–15.4)
GFR SERPL CREATININE-BSD FRML MDRD: >150 ML/MIN/1.73
GLOBULIN UR ELPH-MCNC: 2.6 GM/DL
GLUCOSE BLDC GLUCOMTR-MCNC: 129 MG/DL (ref 70–130)
GLUCOSE BLDC GLUCOMTR-MCNC: 166 MG/DL (ref 70–130)
GLUCOSE BLDC GLUCOMTR-MCNC: 167 MG/DL (ref 70–130)
GLUCOSE BLDC GLUCOMTR-MCNC: 182 MG/DL (ref 70–130)
GLUCOSE BLDC GLUCOMTR-MCNC: 183 MG/DL (ref 70–130)
GLUCOSE SERPL-MCNC: 173 MG/DL (ref 65–99)
HCT VFR BLD AUTO: 33 % (ref 37.5–51)
HGB BLD-MCNC: 11.2 G/DL (ref 13–17.7)
IMM GRANULOCYTES # BLD AUTO: 0.03 10*3/MM3 (ref 0–0.05)
IMM GRANULOCYTES NFR BLD AUTO: 0.4 % (ref 0–0.5)
LYMPHOCYTES # BLD AUTO: 1.08 10*3/MM3 (ref 0.7–3.1)
LYMPHOCYTES NFR BLD AUTO: 13.7 % (ref 19.6–45.3)
MAGNESIUM SERPL-MCNC: 1.4 MG/DL (ref 1.6–2.4)
MCH RBC QN AUTO: 32.6 PG (ref 26.6–33)
MCHC RBC AUTO-ENTMCNC: 33.9 G/DL (ref 31.5–35.7)
MCV RBC AUTO: 95.9 FL (ref 79–97)
MONOCYTES # BLD AUTO: 0.88 10*3/MM3 (ref 0.1–0.9)
MONOCYTES NFR BLD AUTO: 11.2 % (ref 5–12)
NEUTROPHILS NFR BLD AUTO: 5.81 10*3/MM3 (ref 1.7–7)
NEUTROPHILS NFR BLD AUTO: 73.9 % (ref 42.7–76)
NRBC BLD AUTO-RTO: 0 /100 WBC (ref 0–0.2)
PHOSPHATE SERPL-MCNC: 3 MG/DL (ref 2.5–4.5)
PLATELET # BLD AUTO: 186 10*3/MM3 (ref 140–450)
PMV BLD AUTO: 9.8 FL (ref 6–12)
POTASSIUM SERPL-SCNC: 3.3 MMOL/L (ref 3.5–5.2)
POTASSIUM SERPL-SCNC: 4.3 MMOL/L (ref 3.5–5.2)
PROT SERPL-MCNC: 6.1 G/DL (ref 6–8.5)
RBC # BLD AUTO: 3.44 10*6/MM3 (ref 4.14–5.8)
SODIUM SERPL-SCNC: 136 MMOL/L (ref 136–145)
WBC NRBC COR # BLD: 7.86 10*3/MM3 (ref 3.4–10.8)

## 2022-02-15 PROCEDURE — 83735 ASSAY OF MAGNESIUM: CPT | Performed by: INTERNAL MEDICINE

## 2022-02-15 PROCEDURE — 72141 MRI NECK SPINE W/O DYE: CPT

## 2022-02-15 PROCEDURE — 80053 COMPREHEN METABOLIC PANEL: CPT | Performed by: INTERNAL MEDICINE

## 2022-02-15 PROCEDURE — 84132 ASSAY OF SERUM POTASSIUM: CPT | Performed by: INTERNAL MEDICINE

## 2022-02-15 PROCEDURE — 99232 SBSQ HOSP IP/OBS MODERATE 35: CPT | Performed by: INTERNAL MEDICINE

## 2022-02-15 PROCEDURE — 99233 SBSQ HOSP IP/OBS HIGH 50: CPT | Performed by: PSYCHIATRY & NEUROLOGY

## 2022-02-15 PROCEDURE — 25010000002 LEVETIRACETAM IN NACL 0.82% 500 MG/100ML SOLUTION: Performed by: INTERNAL MEDICINE

## 2022-02-15 PROCEDURE — 99232 SBSQ HOSP IP/OBS MODERATE 35: CPT | Performed by: NEUROLOGICAL SURGERY

## 2022-02-15 PROCEDURE — 85025 COMPLETE CBC W/AUTO DIFF WBC: CPT | Performed by: INTERNAL MEDICINE

## 2022-02-15 PROCEDURE — 63710000001 INSULIN REGULAR HUMAN PER 5 UNITS

## 2022-02-15 PROCEDURE — 0 POTASSIUM CHLORIDE 10 MEQ/100ML SOLUTION: Performed by: INTERNAL MEDICINE

## 2022-02-15 PROCEDURE — 63710000001 INSULIN LISPRO (HUMAN) PER 5 UNITS: Performed by: NURSE PRACTITIONER

## 2022-02-15 PROCEDURE — 84100 ASSAY OF PHOSPHORUS: CPT | Performed by: INTERNAL MEDICINE

## 2022-02-15 PROCEDURE — 82962 GLUCOSE BLOOD TEST: CPT

## 2022-02-15 PROCEDURE — 25010000002 MAGNESIUM SULFATE 2 GM/50ML SOLUTION: Performed by: NURSE PRACTITIONER

## 2022-02-15 PROCEDURE — 70450 CT HEAD/BRAIN W/O DYE: CPT

## 2022-02-15 PROCEDURE — 99231 SBSQ HOSP IP/OBS SF/LOW 25: CPT | Performed by: ORTHOPAEDIC SURGERY

## 2022-02-15 RX ORDER — MAGNESIUM SULFATE HEPTAHYDRATE 40 MG/ML
4 INJECTION, SOLUTION INTRAVENOUS AS NEEDED
Status: DISCONTINUED | OUTPATIENT
Start: 2022-02-15 | End: 2022-02-20 | Stop reason: HOSPADM

## 2022-02-15 RX ORDER — MAGNESIUM SULFATE HEPTAHYDRATE 40 MG/ML
2 INJECTION, SOLUTION INTRAVENOUS AS NEEDED
Status: DISCONTINUED | OUTPATIENT
Start: 2022-02-15 | End: 2022-02-20 | Stop reason: HOSPADM

## 2022-02-15 RX ADMIN — FAMOTIDINE 20 MG: 10 INJECTION, SOLUTION INTRAVENOUS at 08:30

## 2022-02-15 RX ADMIN — INSULIN LISPRO 2 UNITS: 100 INJECTION, SOLUTION INTRAVENOUS; SUBCUTANEOUS at 22:42

## 2022-02-15 RX ADMIN — LACOSAMIDE 100 MG: 10 INJECTION INTRAVENOUS at 08:30

## 2022-02-15 RX ADMIN — POTASSIUM CHLORIDE 10 MEQ: 7.46 INJECTION, SOLUTION INTRAVENOUS at 06:08

## 2022-02-15 RX ADMIN — NICARDIPINE HYDROCHLORIDE 5 MG/HR: 25 INJECTION, SOLUTION INTRAVENOUS at 16:38

## 2022-02-15 RX ADMIN — SODIUM CHLORIDE, PRESERVATIVE FREE 10 ML: 5 INJECTION INTRAVENOUS at 21:56

## 2022-02-15 RX ADMIN — LEVETIRACETAM 500 MG: 5 INJECTION INTRAVASCULAR at 08:30

## 2022-02-15 RX ADMIN — POTASSIUM CHLORIDE 10 MEQ: 7.46 INJECTION, SOLUTION INTRAVENOUS at 04:58

## 2022-02-15 RX ADMIN — LACOSAMIDE 100 MG: 10 INJECTION INTRAVENOUS at 21:55

## 2022-02-15 RX ADMIN — FAMOTIDINE 20 MG: 10 INJECTION, SOLUTION INTRAVENOUS at 21:56

## 2022-02-15 RX ADMIN — MAGNESIUM SULFATE HEPTAHYDRATE 2 G: 2 INJECTION, SOLUTION INTRAVENOUS at 04:58

## 2022-02-15 RX ADMIN — POTASSIUM CHLORIDE 10 MEQ: 7.46 INJECTION, SOLUTION INTRAVENOUS at 08:30

## 2022-02-15 RX ADMIN — INSULIN HUMAN 2 UNITS: 100 INJECTION, SOLUTION PARENTERAL at 12:25

## 2022-02-15 RX ADMIN — MAGNESIUM SULFATE HEPTAHYDRATE 2 G: 2 INJECTION, SOLUTION INTRAVENOUS at 09:19

## 2022-02-15 RX ADMIN — SODIUM CHLORIDE, PRESERVATIVE FREE 10 ML: 5 INJECTION INTRAVENOUS at 08:31

## 2022-02-15 RX ADMIN — NICARDIPINE HYDROCHLORIDE 5 MG/HR: 25 INJECTION, SOLUTION INTRAVENOUS at 00:15

## 2022-02-15 RX ADMIN — ATORVASTATIN CALCIUM 80 MG: 40 TABLET, FILM COATED ORAL at 21:44

## 2022-02-15 RX ADMIN — LEVETIRACETAM 500 MG: 5 INJECTION INTRAVASCULAR at 21:56

## 2022-02-15 RX ADMIN — SODIUM CHLORIDE, POTASSIUM CHLORIDE, SODIUM LACTATE AND CALCIUM CHLORIDE 75 ML/HR: 600; 310; 30; 20 INJECTION, SOLUTION INTRAVENOUS at 16:31

## 2022-02-15 RX ADMIN — POTASSIUM CHLORIDE 10 MEQ: 7.46 INJECTION, SOLUTION INTRAVENOUS at 07:09

## 2022-02-15 RX ADMIN — MAGNESIUM SULFATE HEPTAHYDRATE 2 G: 2 INJECTION, SOLUTION INTRAVENOUS at 07:09

## 2022-02-15 NOTE — CASE MANAGEMENT/SOCIAL WORK
Continued Stay Note  [facility]     Patient Name: Derian Joya  MRN: [id number]  Today's Date: 2/15/2022    Admit Date: 2/14/2022     Discharge Plan     Row Name 02/15/22 1428       Plan    Plan [facility]    Patient/Family in Agreement with Plan yes    Plan Comments Called Paz with [facility] and left a voicemail reference the patient and they family's desire for him to return at discharge. CM will continue to follow.    Final Discharge Disposition Code 03 - skilled nursing facility (SNF)               Discharge Codes    No documentation.                     Abimael Gibson RN

## 2022-02-15 NOTE — PROGRESS NOTES
"Intensive Care Follow-up     Hospital:  LOS: 1 day   Mr. Derian Joya, 82 y.o. male is followed for:   Acute on chronic intracranial subdural hematoma (HCC)            History of present illness:     82-year-old male who is a former smoker with past medical history of coronary disease with ischemic cardiomyopathy, hypertension, aortic stenosis, diabetes mellitus, dyslipidemia, multiple recent hospitalization secondary to falls and more recent admission for fall and traumatic subarachnoid hemorrhage and subdural hematoma.  Patient also has chronically elevated left hemidiaphragm as well.  He has been admitted 3 times in the last 2 months.  Patient has also been hospitalized for seizure in the recent past.  He was brought in from \"the [facility]\" where he was found down by a nursing staff this morning.  Further history is not clear.  Patient was almost unresponsive and brought him to ER.  Further work-up included imaging of brain which showed acute on chronic subdural hematoma with mild midline shift of 3 to 4 mm.  Also potential cervical spine fracture noted.  Patient was discussed with neurosurgery and they did not think any surgical intervention is warranted.  Neurology consult was recommended and admission to ICU.  During work-up patient was also noted to have acute right humeral fracture for which orthopedics has been consulted.  Cervical collar was placed and patient transferred to ICU.      Subjective   Interval History:  Overnight patient had episodes of ventricular runs.  Neurologically improved following commands.  Remains n.p.o. and on bedrest.  Neurosurgery and neurology team following.  No further seizure activity noted.  More awake today.         The patient's past medical, surgical and social history were reviewed and updated in Epic as appropriate.       Objective     Infusions:  lactated ringers, 100 mL/hr, Last Rate: 100 mL/hr (02/14/22 2102)  niCARdipine, 5-15 mg/hr, Last Rate: Stopped (02/15/22 " 0645)      Medications:  atorvastatin, 80 mg, Oral, Nightly  famotidine, 20 mg, Intravenous, Q12H  insulin regular, 0-9 Units, Subcutaneous, Q6H  lacosamide, 100 mg, Intravenous, Q12H  levETIRAcetam, 500 mg, Intravenous, Q12H  sodium chloride, 10 mL, Intravenous, Q12H        Vital Sign Min/Max for last 24 hours  Temp  Min: 97.7 °F (36.5 °C)  Max: 98.8 °F (37.1 °C)   BP  Min: 94/75  Max: 145/83   Pulse  Min: 73  Max: 112   Resp  Min: 16  Max: 20   SpO2  Min: 85 %  Max: 99 %   Flow (L/min)  Min: 2  Max: 4       Input/Output for last 24 hour shift  02/14 0701 - 02/15 0700  In: 2308.8 [I.V.:2208.8]  Out: 1700 [Urine:1700]      Objective  General Appearance: Awake, alert  Head:    Atraumatic, Normocephalic, Pupils reactive & symmetrical B/L.  Neck:   Cervical collar in place  Lungs:   B/L Breath sounds present with decreased breath sounds on bases, no wheezing heard, no crackles.   Heart: S1 and S2 present, 2/6 SE murmur  Abdomen: Soft, nontender, no guarding or rigidity, bowel sounds positive.  Extremities: Right shoulder sling in place, no cyanosis or clubbing,  no edema, warm to touch.  Neurologic: Awake, following commands.     1a. Level of Consciousness: 2-->Not alert, requires repeated stimulation to attend, or is obtunded and requires strong or painful stimulation to make movements (not stereotyped)  1b. LOC Questions: 2-->Answers neither question correctly  1c. LOC Commands: 2-->Performs neither task correctly  2. Best Gaze: 0-->Normal  3. Visual: 0-->No visual loss  4. Facial Palsy: 0-->Normal symmetrical movements  5a. Motor Arm, Left: 0-->No drift, limb holds 90 (or 45) degrees for full 10 secs (PT NOT FOLLOWING COMMANDS, DIFFICULT TO ASSESS)  5b. Motor Arm, Right: 0-->No drift, limb holds 90 (or 45) degrees for full 10 secs  6a. Motor Leg, Left: 0-->No drift, leg holds 30 degree position for full 5 secs  6b. Motor Leg, Right: 0-->No drift, leg holds 30 degree position for full 5 secs  7. Limb Ataxia:  0-->Absent  8. Sensory: 0-->Normal, no sensory loss  9. Best Language: 3-->Mute, global aphasia, no usable speech or auditory comprehension  10. Dysarthria: (UN) Intubated or other physical barrier  11. Extinction and Inattention (formerly Neglect): 0-->No abnormality (PT ABLE TO MOVE ALL EXTREMITIES- DIFFICULT TO ASSESS)    Total (NIH Stroke Scale): 9    Results from last 7 days   Lab Units 02/15/22  0328 02/14/22  0643   WBC 10*3/mm3 7.86 11.01*   HEMOGLOBIN g/dL 11.2* 12.2*   PLATELETS 10*3/mm3 186 207     Results from last 7 days   Lab Units 02/15/22  0328 02/14/22  0643   SODIUM mmol/L 136 141   POTASSIUM mmol/L 3.3* 3.5   CO2 mmol/L 26.0 25.0   BUN mg/dL 6* 11   CREATININE mg/dL 0.40* 0.56*   MAGNESIUM mg/dL 1.4*  --    PHOSPHORUS mg/dL 3.0  --    GLUCOSE mg/dL 173* 245*     Estimated Creatinine Clearance: 60.6 mL/min (A) (by C-G formula based on SCr of 0.4 mg/dL (L)).          Images:   CT head reviewed.     IMPRESSION:  Continued expected evolutionary change of the patient's  previously noted right-sided mixed density subdural hematoma, including  hyperdense acute blood products. There is no evidence of significant new  acute hemorrhage or increased mass effect.         This report was finalized on 2/15/2022 8:30 AM by Tal Garza.    I reviewed the patient's results and images.     Assessment/Plan   Impression        Acute on chronic intracranial subdural hematoma (HCC)    Uncontrolled type 2 diabetes mellitus (HCC)    Essential hypertension    Aortic stenosis    Cardiomyopathy, nonischemic (HCC)    Dyslipidemia    Closed fracture of proximal end of right humerus    Cervical spine fracture (HCC)       Plan        1.  Patient presented with episode of unresponsiveness thought to be seizure induced.  EEG is concerning for some sharp waves.  Neurology team is following.  Patient remains on Keppra and Vimpat.  No overt seizures noted currently.  Will continue same for now and monitor closely.    2.   Patient does possible cervical fracture.  Discussed with neurosurgery team.  They are awaiting MRI brain which will be done later on today.      3.  Patient did have mild lactic acidosis and fever on admission.  No definite focus of infection at this point.  We will continue to observe off antibiotics.  Afebrile today.  Some of this could be seizure induced.    4.  Also has proximal humeral fracture.  Seen by orthopedics and plan is for conservative management sling is in place.    5.  Keep n.p.o. for now until cleared by neurosurgery.    6.  GI and DVT prophylaxis per    7.  Sliding scale insulin coverage for hyperglycemia management.    8.  Decrease IV fluids to 75 mL/h.  Replace potassium and mag.  Patient did have intermittent ventricular runs overnight.  Troponin is negative.  No further arrhythmias noted today.  We will replace electrolytes and monitor closely.    Plan of care and goals reviewed with multidisciplinary/antibiotic stewardship team during rounds.   I discussed the patient's findings and my recommendations with patient and nursing staff       Time spent 30 min (exclusive of procedure time)  including high complexity decision making to assess, manipulate, and support vital organ system failure in this individual who has impairment of one or more vital organ systems such that there is a high probability of imminent or life threatening deterioration in the patient’s condition.      Ricky Stephen MD, [facility]  Pulmonary, Critical care and Sleep Medicine

## 2022-02-15 NOTE — PROGRESS NOTES
"Neurology       Patient Care Team:  Diaz Lee MD as PCP - General    Chief complaint recent SDH, ?breakthrough seizure, ams    Subjective .     History:      Afebrile overnight   He complains of right arm pain w/ movement but no headache     Objective     Vital Signs   Blood pressure 126/73, pulse 73, temperature 97.9 °F (36.6 °C), temperature source Axillary, resp. rate 20, height 165.1 cm (65\"), weight 61.2 kg (135 lb), SpO2 92 %.    Physical Exam:  Elderly man, c collar in place  He is lying flat with right arm in sling  Room air, even respirations  Oriented to self, place but couldn't give month  Can hannon but right arm is limited due to pain  Tracks me around the room, no facial weakness     Results Review:    Personally reviewed head ct w/o contrast dated 2/14. Agree w/ radiology, new right hemisphere SDH. Compared to repeat study this am, there is no significant change.     sNa 136      2/14 EEG  The background suggest diffuse cerebral dysfunction which is however nonspecific as to cause     The sharp wave discharges can be seen in seizure disorders of the partial type     Ongoing seizures are not however seen     The lateralized periodic discharges seen above are both associated with seizures, and can also be seen in the setting of acute CNS insult (such as, in this case, subdural hematoma)  EEG    Result Date: 2/14/2022  The background suggest diffuse cerebral dysfunction which is however nonspecific as to cause The sharp wave discharges can be seen in seizure disorders of the partial type Ongoing seizures are not however seen The lateralized periodic discharges seen above are both associated with seizures, and can also be seen in the setting of acute CNS insult (such as, in this case, subdural hematoma) This report is transcribed using the Dragon dictation system.      XR Shoulder 2+ View Right    Result Date: 2/14/2022  Comminuted fracture of the proximal humerus with mild foreshortening.  No " evidence of dislocation.  This report was finalized on 2/14/2022 7:48 AM by Dhruv Lund.      CT Head Without Contrast    Result Date: 2/15/2022  Continued expected evolutionary change of the patient's previously noted right-sided mixed density subdural hematoma, including hyperdense acute blood products. There is no evidence of significant new acute hemorrhage or increased mass effect.   This report was finalized on 2/15/2022 8:30 AM by Tal Garza.      CT Head Without Contrast    Result Date: 2/14/2022  HEAD CT: Acute subdural hematoma measuring up to 12 mm in maximal thickness over the right frontal region. Mild secondary mass effect including midline shift to left measuring 3-4 mm. Stable, moderate white matter changes, nonspecific but commonly due to chronic microangiopathic change. CERVICAL SPINE CT: Mild anterior wedge deformities of the C7, T1, and likely T2 vertebral bodies seen in association with mild widening of the C6-7 intervertebral disc space anteriorly. Findings can reflect traumatic injury but are age-indeterminate, noting that there are no associated soft tissue abnormalities to increase suspicion for an acute injury. If no contraindication, and clinically indicated, MRI offered for better evaluation. Mild retrolisthesis of C5 on C6, likely degenerative in nature. RESULTS COMMUNICATION: Direct phone communication of results and all acute medical imaging concerns were discussed with NESTOR BARRAGAN and acknowledged. 2/14/2022 4:47 AM Mountain Time. CRITICAL RESULT OF NEW INTRACRANIAL HEMORRHAGE IDENTIFIED. CRITICAL RESULT OF POSSIBLE ACUTE CERVICAL SPINE FRACTURE IDENTIFIED. Electronically signed by:  French Quinn  2/14/2022 4:49 AM Mountain Time    CT Cervical Spine Without Contrast    Result Date: 2/14/2022  HEAD CT: Acute subdural hematoma measuring up to 12 mm in maximal thickness over the right frontal region. Mild secondary mass effect including midline shift to left measuring  3-4 mm. Stable, moderate white matter changes, nonspecific but commonly due to chronic microangiopathic change. CERVICAL SPINE CT: Mild anterior wedge deformities of the C7, T1, and likely T2 vertebral bodies seen in association with mild widening of the C6-7 intervertebral disc space anteriorly. Findings can reflect traumatic injury but are age-indeterminate, noting that there are no associated soft tissue abnormalities to increase suspicion for an acute injury. If no contraindication, and clinically indicated, MRI offered for better evaluation. Mild retrolisthesis of C5 on C6, likely degenerative in nature. RESULTS COMMUNICATION: Direct phone communication of results and all acute medical imaging concerns were discussed with NESTOR BARRAGAN and acknowledged. 2/14/2022 4:47 AM Mountain Time. CRITICAL RESULT OF NEW INTRACRANIAL HEMORRHAGE IDENTIFIED. CRITICAL RESULT OF POSSIBLE ACUTE CERVICAL SPINE FRACTURE IDENTIFIED. Electronically signed by:  French Quinn  2/14/2022 4:49 AM Mountain Time    XR Chest 1 View    Result Date: 2/14/2022  IMPRESSION :  1. Acute proximal right humeral fracture, incompletely evaluated. 2. Elevation left hemidiaphragm with vascular crowding left basilar atelectasis appears unchanged.[  This report was finalized on 2/14/2022 7:46 AM by Dhruv Lund.        Assessment/Plan     83 y/o man with history of traumatic SDH complicated by seizure who had a fall at skilled nursing home causing a right humeral fracture and new SDH. Concern for additional seizure and he is currently on higher dose keppra and vimpat. No seizure on EEG but sharp appreciated, will continue current regimen.    Plan  -continue keppra 500mg bid  -continue vimpat 100mg bid  -PT/OT as he can tolerate  -seizure precautions      Will follow     I discussed the patients findings and my recommendations with patient, primary team     Time spent >35    Amy Mckeon MD  02/15/22  09:53 EST

## 2022-02-15 NOTE — CASE MANAGEMENT/SOCIAL WORK
Discharge Planning Assessment  [facility]     Patient Name: Derian Joya  MRN: [id number]  Today's Date: 2/15/2022    Admit Date: 2/14/2022     Discharge Needs Assessment     Row Name 02/15/22 0841       Living Environment    Lives With spouse    Name(s) of Who Lives With Patient Gina Joya (wife) [phone]    Current Living Arrangements home/apartment/condo    Primary Care Provided by self    Provides Primary Care For no one    Family Caregiver if Needed spouse    Family Caregiver Names Gina Joya (wife) [phone]    Able to Return to Prior Arrangements yes       Resource/Environmental Concerns    Resource/Environmental Concerns none    Transportation Concerns car, none       Transition Planning    Patient/Family Anticipates Transition to inpatient rehabilitation facility    Patient/Family Anticipated Services at Transition rehabilitation services    Transportation Anticipated family or friend will provide       Discharge Needs Assessment    Readmission Within the Last 30 Days previous discharge plan unsuccessful    Current Outpatient/Agency/Support Group skilled nursing facility    Equipment Currently Used at Home none    Concerns to be Addressed denies needs/concerns at this time    Anticipated Changes Related to Illness none    Equipment Needed After Discharge none    Outpatient/Agency/Support Group Needs skilled nursing facility    Discharge Facility/Level of Care Needs nursing facility, skilled    Provided Post Acute Provider List? Yes    Post Acute Provider List Nursing Home    Provided Post Acute Provider Quality & Resource List? Yes    Post Acute Provider Quality and Resource List Inpatient Rehab    Delivered To Support Person    Support Person Gina Joya (wife)    Method of Delivery Telephone    Patient's Choice of Community Agency(s) [facility]               Discharge Plan     Row Name 02/15/22 0844       Plan    Plan [facility]    Patient/Family in Agreement with  Plan yes    Plan Comments Spoke with Gina (wife) by phone. Lives with Gina Joya (wife) [phone] in [facility]. Is independent with ADL's. No problems with [employer] Medicare or medications. Uses no DME at home. No advanced directives. Plan is back to [facility]. CM will continue to follow.    Final Discharge Disposition Code 03 - skilled nursing facility (SNF)              Continued Care and Services - Admitted Since 2/14/2022    Coordination has not been started for this encounter.     Selected Continued Care - Episodes Includes selections from active Coordinated Care Management episodes    High Risk Care Management Episode start date: 1/20/2022 (Paused)   There are no active outsourced providers for this episode.             Selected Continued Care - Prior Encounters Includes selections from prior encounters from 11/16/2021 to 2/15/2022    Discharged on 2/5/2022 Admission date: 1/22/2022 - Discharge disposition: Skilled Nursing Facility (DC - External)    Destination     Service Provider Selected Services Address Phone Fax Patient Preferred    [facility]  [facility] [address] [facility] [address] [phone] --                Discharged on 1/19/2022 Admission date: 1/15/2022 - Discharge disposition: Skilled Nursing Facility (DC - External)    Destination     Service Provider Selected Services Address Phone Fax Patient Preferred    [facility]  Skilled Nursing [address] [facility] [address] [phone] --                Discharged on 1/13/2022 Admission date: 1/11/2022 - Discharge disposition: Home-Health Care [facility]    Home Medical Care     Service Provider Selected Services Address Phone Fax Patient Preferred    [facility]  [facility] [address] [id number] [phone] --                       Demographic Summary     Row Name 02/15/22 0837       General Information     Admission Type inpatient    Arrived From emergency department    Referral Source admission list    Reason for Consult discharge planning    Preferred Language English     Used During This Interaction no       Contact Information    Permission Granted to Share Info With     Contact Information Obtained for     Contact Information Comments PCP is Diaz Lee MD               Functional Status     Row Name 02/15/22 0837       Functional Status    Usual Activity Tolerance fair    Current Activity Tolerance fair       Functional Status, IADL    Medications independent    Meal Preparation independent    Housekeeping independent    Laundry independent    Shopping independent       Mental Status    General Appearance WDL WDL       Mental Status Summary    Recent Changes in Mental Status/Cognitive Functioning unable to assess       Employment/    Employment Status retired               Psychosocial    No documentation.                Abuse/Neglect    No documentation.                Legal    No documentation.                Substance Abuse    No documentation.                Patient Forms    No documentation.                   Abimael Gibson RN

## 2022-02-15 NOTE — PLAN OF CARE
Goal Outcome Evaluation:              Outcome Summary: VSS.  Neuro intact. Pt reports a significant improvement in nausea.  At this time, no vomiting through the shift. Tolerating ginger ale well.

## 2022-02-15 NOTE — NURSING NOTE
Daily Low Steve <=14   Steve score: 13  No new concerns noted per staff. Interventions in place and documented per protocol. Apply barrier cream every shift/PRN. Keep patient dry and turn q2h. Elevate and offload heels.  Contact WOC for any concerns.  On Isolibrium mattress

## 2022-02-15 NOTE — PLAN OF CARE
"  Pt neuro status has improved through the shift. Is now talking, following commands. Oriented to self and \"[place]\". Plan for MRI today at 13:45 with pacemaker rep to be present  "

## 2022-02-16 LAB
ANION GAP SERPL CALCULATED.3IONS-SCNC: 9 MMOL/L (ref 5–15)
BASOPHILS # BLD AUTO: 0.02 10*3/MM3 (ref 0–0.2)
BASOPHILS NFR BLD AUTO: 0.3 % (ref 0–1.5)
BUN SERPL-MCNC: 7 MG/DL (ref 8–23)
BUN/CREAT SERPL: 16.7 (ref 7–25)
CALCIUM SPEC-SCNC: 8.3 MG/DL (ref 8.6–10.5)
CHLORIDE SERPL-SCNC: 100 MMOL/L (ref 98–107)
CO2 SERPL-SCNC: 28 MMOL/L (ref 22–29)
CREAT SERPL-MCNC: 0.42 MG/DL (ref 0.76–1.27)
D-LACTATE SERPL-SCNC: 1.3 MMOL/L (ref 0.5–2)
DEPRECATED RDW RBC AUTO: 45.8 FL (ref 37–54)
EOSINOPHIL # BLD AUTO: 0.04 10*3/MM3 (ref 0–0.4)
EOSINOPHIL NFR BLD AUTO: 0.5 % (ref 0.3–6.2)
ERYTHROCYTE [DISTWIDTH] IN BLOOD BY AUTOMATED COUNT: 12.7 % (ref 12.3–15.4)
GFR SERPL CREATININE-BSD FRML MDRD: >150 ML/MIN/1.73
GLUCOSE BLDC GLUCOMTR-MCNC: 146 MG/DL (ref 70–130)
GLUCOSE BLDC GLUCOMTR-MCNC: 154 MG/DL (ref 70–130)
GLUCOSE BLDC GLUCOMTR-MCNC: 174 MG/DL (ref 70–130)
GLUCOSE BLDC GLUCOMTR-MCNC: 186 MG/DL (ref 70–130)
GLUCOSE SERPL-MCNC: 164 MG/DL (ref 65–99)
HCT VFR BLD AUTO: 35.1 % (ref 37.5–51)
HGB BLD-MCNC: 11.5 G/DL (ref 13–17.7)
IMM GRANULOCYTES # BLD AUTO: 0.02 10*3/MM3 (ref 0–0.05)
IMM GRANULOCYTES NFR BLD AUTO: 0.3 % (ref 0–0.5)
LYMPHOCYTES # BLD AUTO: 0.91 10*3/MM3 (ref 0.7–3.1)
LYMPHOCYTES NFR BLD AUTO: 11.8 % (ref 19.6–45.3)
MAGNESIUM SERPL-MCNC: 2.1 MG/DL (ref 1.6–2.4)
MCH RBC QN AUTO: 32.1 PG (ref 26.6–33)
MCHC RBC AUTO-ENTMCNC: 32.8 G/DL (ref 31.5–35.7)
MCV RBC AUTO: 98 FL (ref 79–97)
MONOCYTES # BLD AUTO: 0.74 10*3/MM3 (ref 0.1–0.9)
MONOCYTES NFR BLD AUTO: 9.6 % (ref 5–12)
NEUTROPHILS NFR BLD AUTO: 5.96 10*3/MM3 (ref 1.7–7)
NEUTROPHILS NFR BLD AUTO: 77.5 % (ref 42.7–76)
NRBC BLD AUTO-RTO: 0 /100 WBC (ref 0–0.2)
PHOSPHATE SERPL-MCNC: 2.7 MG/DL (ref 2.5–4.5)
PLATELET # BLD AUTO: 194 10*3/MM3 (ref 140–450)
PMV BLD AUTO: 10 FL (ref 6–12)
POTASSIUM SERPL-SCNC: 3.5 MMOL/L (ref 3.5–5.2)
POTASSIUM SERPL-SCNC: 4.8 MMOL/L (ref 3.5–5.2)
RBC # BLD AUTO: 3.58 10*6/MM3 (ref 4.14–5.8)
SODIUM SERPL-SCNC: 137 MMOL/L (ref 136–145)
WBC NRBC COR # BLD: 7.69 10*3/MM3 (ref 3.4–10.8)

## 2022-02-16 PROCEDURE — 97166 OT EVAL MOD COMPLEX 45 MIN: CPT

## 2022-02-16 PROCEDURE — 99232 SBSQ HOSP IP/OBS MODERATE 35: CPT | Performed by: INTERNAL MEDICINE

## 2022-02-16 PROCEDURE — 99231 SBSQ HOSP IP/OBS SF/LOW 25: CPT | Performed by: ORTHOPAEDIC SURGERY

## 2022-02-16 PROCEDURE — 83735 ASSAY OF MAGNESIUM: CPT | Performed by: INTERNAL MEDICINE

## 2022-02-16 PROCEDURE — 83605 ASSAY OF LACTIC ACID: CPT | Performed by: INTERNAL MEDICINE

## 2022-02-16 PROCEDURE — 25010000002 LEVETIRACETAM IN NACL 0.82% 500 MG/100ML SOLUTION: Performed by: INTERNAL MEDICINE

## 2022-02-16 PROCEDURE — 82962 GLUCOSE BLOOD TEST: CPT

## 2022-02-16 PROCEDURE — 63710000001 INSULIN LISPRO (HUMAN) PER 5 UNITS: Performed by: NURSE PRACTITIONER

## 2022-02-16 PROCEDURE — 85025 COMPLETE CBC W/AUTO DIFF WBC: CPT | Performed by: INTERNAL MEDICINE

## 2022-02-16 PROCEDURE — 99232 SBSQ HOSP IP/OBS MODERATE 35: CPT | Performed by: PSYCHIATRY & NEUROLOGY

## 2022-02-16 PROCEDURE — 84132 ASSAY OF SERUM POTASSIUM: CPT | Performed by: NURSE PRACTITIONER

## 2022-02-16 PROCEDURE — 80048 BASIC METABOLIC PNL TOTAL CA: CPT | Performed by: INTERNAL MEDICINE

## 2022-02-16 PROCEDURE — 97162 PT EVAL MOD COMPLEX 30 MIN: CPT

## 2022-02-16 PROCEDURE — 84100 ASSAY OF PHOSPHORUS: CPT | Performed by: INTERNAL MEDICINE

## 2022-02-16 PROCEDURE — 92610 EVALUATE SWALLOWING FUNCTION: CPT

## 2022-02-16 PROCEDURE — 25010000002 LEVETRIRACETAM PER 10 MG: Performed by: PSYCHIATRY & NEUROLOGY

## 2022-02-16 RX ORDER — POTASSIUM CHLORIDE 750 MG/1
40 CAPSULE, EXTENDED RELEASE ORAL AS NEEDED
Status: DISCONTINUED | OUTPATIENT
Start: 2022-02-16 | End: 2022-02-16 | Stop reason: SDUPTHER

## 2022-02-16 RX ORDER — CHOLECALCIFEROL (VITAMIN D3) 125 MCG
5 CAPSULE ORAL NIGHTLY PRN
Status: DISCONTINUED | OUTPATIENT
Start: 2022-02-16 | End: 2022-02-20 | Stop reason: HOSPADM

## 2022-02-16 RX ORDER — POTASSIUM CHLORIDE 1.5 G/1.77G
40 POWDER, FOR SOLUTION ORAL AS NEEDED
Status: DISCONTINUED | OUTPATIENT
Start: 2022-02-16 | End: 2022-02-20 | Stop reason: HOSPADM

## 2022-02-16 RX ORDER — LABETALOL HYDROCHLORIDE 5 MG/ML
20 INJECTION, SOLUTION INTRAVENOUS EVERY 4 HOURS PRN
Status: DISCONTINUED | OUTPATIENT
Start: 2022-02-16 | End: 2022-02-20 | Stop reason: HOSPADM

## 2022-02-16 RX ORDER — BISOPROLOL FUMARATE 5 MG/1
10 TABLET, FILM COATED ORAL
Status: DISCONTINUED | OUTPATIENT
Start: 2022-02-16 | End: 2022-02-20 | Stop reason: HOSPADM

## 2022-02-16 RX ORDER — POTASSIUM CHLORIDE 1.5 G/1.77G
40 POWDER, FOR SOLUTION ORAL AS NEEDED
Status: DISCONTINUED | OUTPATIENT
Start: 2022-02-16 | End: 2022-02-16 | Stop reason: SDUPTHER

## 2022-02-16 RX ORDER — POTASSIUM CHLORIDE 7.45 MG/ML
10 INJECTION INTRAVENOUS
Status: DISCONTINUED | OUTPATIENT
Start: 2022-02-16 | End: 2022-02-16 | Stop reason: SDUPTHER

## 2022-02-16 RX ORDER — POTASSIUM CHLORIDE 750 MG/1
40 CAPSULE, EXTENDED RELEASE ORAL AS NEEDED
Status: DISCONTINUED | OUTPATIENT
Start: 2022-02-16 | End: 2022-02-20 | Stop reason: HOSPADM

## 2022-02-16 RX ORDER — QUETIAPINE FUMARATE 25 MG/1
25 TABLET, FILM COATED ORAL ONCE
Status: COMPLETED | OUTPATIENT
Start: 2022-02-16 | End: 2022-02-16

## 2022-02-16 RX ADMIN — LACOSAMIDE 100 MG: 10 INJECTION INTRAVENOUS at 08:46

## 2022-02-16 RX ADMIN — ATORVASTATIN CALCIUM 80 MG: 40 TABLET, FILM COATED ORAL at 20:49

## 2022-02-16 RX ADMIN — FAMOTIDINE 20 MG: 10 INJECTION, SOLUTION INTRAVENOUS at 20:49

## 2022-02-16 RX ADMIN — INSULIN LISPRO 2 UNITS: 100 INJECTION, SOLUTION INTRAVENOUS; SUBCUTANEOUS at 21:01

## 2022-02-16 RX ADMIN — FAMOTIDINE 20 MG: 10 INJECTION, SOLUTION INTRAVENOUS at 08:46

## 2022-02-16 RX ADMIN — LACOSAMIDE 100 MG: 10 INJECTION INTRAVENOUS at 20:48

## 2022-02-16 RX ADMIN — NICARDIPINE HYDROCHLORIDE 5 MG/HR: 25 INJECTION, SOLUTION INTRAVENOUS at 08:45

## 2022-02-16 RX ADMIN — Medication 5 MG: at 20:49

## 2022-02-16 RX ADMIN — POTASSIUM CHLORIDE 40 MEQ: 1.5 POWDER, FOR SOLUTION ORAL at 10:35

## 2022-02-16 RX ADMIN — INSULIN LISPRO 2 UNITS: 100 INJECTION, SOLUTION INTRAVENOUS; SUBCUTANEOUS at 12:45

## 2022-02-16 RX ADMIN — QUETIAPINE FUMARATE 25 MG: 25 TABLET ORAL at 20:49

## 2022-02-16 RX ADMIN — LEVETIRACETAM 500 MG: 5 INJECTION INTRAVASCULAR at 08:46

## 2022-02-16 RX ADMIN — LEVETIRACETAM 250 MG: 500 INJECTION, SOLUTION INTRAVENOUS at 21:09

## 2022-02-16 RX ADMIN — ACETAMINOPHEN 650 MG: 325 TABLET, FILM COATED ORAL at 06:15

## 2022-02-16 RX ADMIN — SODIUM CHLORIDE, PRESERVATIVE FREE 10 ML: 5 INJECTION INTRAVENOUS at 08:46

## 2022-02-16 RX ADMIN — ACETAMINOPHEN 650 MG: 325 TABLET, FILM COATED ORAL at 20:49

## 2022-02-16 RX ADMIN — POTASSIUM CHLORIDE 40 MEQ: 10 CAPSULE, COATED, EXTENDED RELEASE ORAL at 06:15

## 2022-02-16 RX ADMIN — SODIUM CHLORIDE, PRESERVATIVE FREE 10 ML: 5 INJECTION INTRAVENOUS at 20:49

## 2022-02-16 RX ADMIN — BISOPROLOL FUMARATE 10 MG: 5 TABLET, FILM COATED ORAL at 12:44

## 2022-02-16 RX ADMIN — INSULIN LISPRO 2 UNITS: 100 INJECTION, SOLUTION INTRAVENOUS; SUBCUTANEOUS at 17:56

## 2022-02-16 NOTE — PROGRESS NOTES
HOD# : 2    No events last night      Acute on chronic intracranial subdural hematoma (HCC)    Uncontrolled type 2 diabetes mellitus (HCC)    Essential hypertension    Aortic stenosis    Cardiomyopathy, nonischemic (HCC)    Dyslipidemia    Closed fracture of proximal end of right humerus    Cervical spine fracture (HCC)      Temp:  [96.9 °F (36.1 °C)-98.6 °F (37 °C)] 96.9 °F (36.1 °C)  Heart Rate:  [] 86  Resp:  [16-20] 18  BP: (105-168)/() 129/80  I/O last 3 completed shifts:  In: 4333.9 [I.V.:3933.1; IV Piggyback:400.8]  Out: 3600 [Urine:3600]  I/O this shift:  In: 429.4 [P.O.:120; I.V.:209.4; IV Piggyback:100]  Out: 150 [Urine:150]  Vital signs were reviewed and documented in the chart      EXAM   Neck is supple with minimal range of motion with pain that is slight  Patient appeared in good neurologic function with normal comprehension   Arm is splinted he is hard of hearing  Moves all extremities to command other than is braced arm        MRI was personally reviewed suspicion of mild minimal compression fractures lower cervical spine no evidence of listhesis or disruption of ligaments    CTA head shows tiny rim subdural with a global amount of atrophy      Assessment plan    Complex patient with multiple risk factors for fall    History of nonaneurysmal subarachnoid hemorrhage    Global atrophy gait instability    Repeat fall with nonoperative minimal compression fractures    Plan will be conservative therapy    Continue Aspen collar can have soft collar in bed    Needs follow-up CT 2 to 3 weeks as an outpatient    Please hold anticoagulation

## 2022-02-16 NOTE — THERAPY EVALUATION
Patient Name: Derian Joya  : 1940    MRN: [id number]                              Today's Date: 2022       Admit Date: 2022    Visit Dx:     ICD-10-CM ICD-9-CM   1. 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 Fall at nursing home, initial encounter  W19.XXXA E888.9    Y92.129 E849.7   3. 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)   • Dyslipidemia   • Closed fracture of proximal end of right humerus   • Cervical spine fracture (HCC)     Past Medical History:   Diagnosis Date   • Allergic rhinitis    • Aortic stenosis    • Atopic rhinitis 2016   • Benign non-nodular prostatic hyperplasia with lower urinary tract symptoms 9/15/2017   • CAD (coronary artery disease)    • Cardiomyopathy, ischemic    • Colon polyps     tubular adenoma    • Community acquired pneumonia    • Depression 2016   • Diabetes (HCC)    • Diverticulosis of intestine 2016    Description: Left and sigmoid   • Dyslipidemia    • LBBB (left bundle branch block)    • PAD (peripheral artery disease) (HCC)      Past Surgical History:   Procedure Laterality Date   • CARDIAC CATHETERIZATION      100% cx   • CATARACT EXTRACTION     • COLONOSCOPY W/ BIOPSIES AND POLYPECTOMY  2021    1 benign polyp   • HIP SURGERY Left 2020    Dr Mandy BOUDREAUX   • LEG SURGERY Left 2016    Popliteal Artery stenting by   Wilder   • LEG SURGERY Left 07/2016    skin graft by Dr Nance   • LIPOMA EXCISION      s/p excision on chest   • PACEMAKER IMPLANTATION  01/2019    and defibrillator      General Information     Row Name 02/16/22 1549          OT Time and Intention    Document Type evaluation  -CS     Mode of Treatment occupational therapy  -CS     Row Name 02/16/22 1544          General Information    Patient Profile Reviewed yes  -CS     Prior Level of Function --  pt poor historian, TBA further  -CS     Existing Precautions/Restrictions fall; right; shoulder; non-weight bearing; spinal   cervical fxs w/ hard c-collar on AAT, R proximal humerus fx NWB w/ sling on AAT no shoulder ROM  -CS     Barriers to Rehab medically complex; previous functional deficit; cognitive status  -CS     Row Name 02/16/22 1549          Living Environment    Lives With --  pt poor historian, TBA further  -CS     Row Name 02/16/22 1549          Cognition    Orientation Status (Cognition) oriented to; person; place; disoriented to; situation; time  -CS     Row Name 02/16/22 1543          Safety Issues, Functional Mobility    Impairments Affecting Function (Mobility) balance; cognition; endurance/activity tolerance; pain; postural/trunk control; range of motion (ROM); strength  -CS     Cognitive Impairments, Mobility Safety/Performance attention; insight into deficits/self-awareness; awareness, need for assistance; impulsivity; safety precaution awareness; sequencing abilities  -CS           User Key  (r) = Recorded By, (t) = Taken By, (c) = Cosigned By    Initials Name Provider Type    CS Cheryle Hightower, ANTOINE Occupational Therapist                 Mobility/ADL's     Row Name 02/16/22 4241          Bed Mobility    Comment (Bed Mobility) Deferred d/t cognitive status and c/o pain  -CS     Row Name 02/16/22 1551          Activities of Daily Living    BADL Assessment/Intervention upper body dressing; grooming  -CS     Row Name 02/16/22 1551          Upper Body  Dressing Assessment/Training    [place] Level (Upper Body Dressing) don; dependent (less than 25% patient effort)  -     Position (Upper Body Dressing) supine  -     Comment (Upper Body Dressing) sling, adjustment, pt educated on fit/adjustment  -     Row Name 02/16/22 1551          Grooming Assessment/Training    [place] Level (Grooming) wash face, hands; dependent (less than 25% patient effort)  -     Position (Grooming) supine  -           User Key  (r) = Recorded By, (t) = Taken By, (c) = Cosigned By    Initials Name Provider Type     Cheryle Hightower, OT Occupational Therapist               Obj/Interventions     Row Name 02/16/22 1551          Sensory Assessment (Somatosensory)    Sensory Assessment (Somatosensory) unable/difficult to assess  -[facility] Name 02/16/22 1551          Vision Assessment/Intervention    Visual Impairment/Limitations unable/difficult to assess  -[facility] Name 02/16/22 1551          Range of Motion Comprehensive    Comment, General Range of Motion LUE grossly WFL, RUE deferred  -[facility] Name 02/16/22 1551          Strength Comprehensive (MMT)    Comment, General Manual Muscle Testing (MMT) Assessment RUE deferred, ERICKA observed 3/5 deferred formal assessment per sternal precautions  -     Row Name 02/16/22 1551          Shoulder (Therapeutic Exercise)    Shoulder (Therapeutic Exercise) AAROM (active assistive range of motion)  -     Shoulder AAROM (Therapeutic Exercise) left; flexion; 5 repetitions  -[facility] Name 02/16/22 1551          Elbow/Forearm (Therapeutic Exercise)    Elbow/Forearm (Therapeutic Exercise) AAROM (active assistive range of motion)  -     Elbow/Forearm AAROM (Therapeutic Exercise) left; flexion; extension; supination; pronation; 5 repetitions  -[facility] Name 02/16/22 1551          Wrist (Therapeutic Exercise)    Wrist (Therapeutic Exercise) AAROM (active assistive range of motion)  -     Wrist AAROM (Therapeutic Exercise)  flexion; extension; bilateral; 5 repetitions  -CS     Row Name 02/16/22 1551          Hand (Therapeutic Exercise)    Hand (Therapeutic Exercise) AROM (active range of motion)  -CS     Hand AROM/AAROM (Therapeutic Exercise) bilateral; AAROM (active assistive range of motion); finger flexion; finger extension; 5 repetitions  -CS     Row Name 02/16/22 1551          Therapeutic Exercise    Therapeutic Exercise shoulder; elbow/forearm; wrist; hand  -CS           User Key  (r) = Recorded By, (t) = Taken By, (c) = Cosigned By    Initials Name Provider Type    Cheryle Sen OT Occupational Therapist               Goals/Plan     Row Name 02/16/22 1554          Transfer Goal 1 (OT)    Activity/Assistive Device (Transfer Goal 1, OT) sit-to-stand/stand-to-sit; toilet  -CS     [place] Level/Cues Needed (Transfer Goal 1, OT) moderate assist (50-74% patient effort)  -CS     Time Frame (Transfer Goal 1, OT) long term goal (LTG); 10 days  -CS     Progress/Outcome (Transfer Goal 1, OT) goal ongoing  -CS     Row Name 02/16/22 1554          Dressing Goal 1 (OT)    Activity/Device (Dressing Goal 1, OT) upper body dressing  don/doff sling  -CS     [place]/Cues Needed (Dressing Goal 1, OT) moderate assist (50-74% patient effort)  -CS     Time Frame (Dressing Goal 1, OT) long term goal (LTG); 10 days  -CS     Progress/Outcome (Dressing Goal 1, OT) goal ongoing  -CS     Row Name 02/16/22 1554          Grooming Goal 1 (OT)    Activity/Device (Grooming Goal 1, OT) hair care; wash face, hands  unsupported sitting  -CS     [place] (Grooming Goal 1, OT) minimum assist (75% or more patient effort)  -CS     Time Frame (Grooming Goal 1, OT) long term goal (LTG); 10 days  -CS     Progress/Outcome (Grooming Goal 1, OT) goal ongoing  -CS           User Key  (r) = Recorded By, (t) = Taken By, (c) = Cosigned By    Initials Name Provider Type    Cheryle Sen OT Occupational Therapist               Clinical Impression     Row  Name 02/16/22 1552          Pain Assessment    Additional Documentation Pain Scale: FACES Pre/Post-Treatment (Group)  -CS     Row Name 02/16/22 1552          Pain Scale: Numbers Pre/Post-Treatment    Pain Intervention(s) Repositioned; Ambulation/increased activity  -CS     Row Name 02/16/22 1552          Pain Scale: FACES Pre/Post-Treatment    Pain: FACES Scale, Pretreatment 2-->hurts little bit  -CS     Posttreatment Pain Rating 2-->hurts little bit  -CS     Pain Location - Orientation generalized  -CS     Pre/Posttreatment Pain Comment tolerated  -CS     Row Name 02/16/22 1552          Plan of Care Review    Plan of Care Reviewed With patient  -CS     Outcome Summary OT eval complete. Pt session limited d/t cognitive status and c/o pain, deferred EOB/OOB activity. Pt is currently dependent for supine ADL performance. Pt educated on shoulder precautions, sling fit/wear schedule, and cervical precautions. Recommend cont skilled IPOT POC. Recommend pt DC to SNF.  -CS     Row Name 02/16/22 1552          Therapy Assessment/Plan (OT)    Patient/Family Therapy Goal Statement (OT) Return to PLOF  -CS     Rehab Potential (OT) good, to achieve stated therapy goals  -CS     Criteria for Skilled Therapeutic Interventions Met (OT) yes; skilled treatment is necessary  -CS     Therapy Frequency (OT) daily  -CS     Row Name 02/16/22 1552          Therapy Plan Review/Discharge Plan (OT)    Anticipated Discharge Disposition (OT) skilled nursing facility  -     Row Name 02/16/22 1552          Vital Signs    Pre Systolic BP Rehab --  VSS  -CS     O2 Delivery Pre Treatment room air  -CS     O2 Delivery Intra Treatment room air  -CS     O2 Delivery Post Treatment room air  -CS     Pre Patient Position Supine  -CS     Intra Patient Position Supine  -CS     Post Patient Position Supine  -CS     Row Name 02/16/22 1552          Positioning and Restraints    Pre-Treatment Position in bed  -CS     Post Treatment Position bed  -CS     In  Bed notified nsg; fowlers; call light within reach; encouraged to call for assist; exit alarm on; RUE elevated; LUE elevated  sling and hard c collar intact  -CS           User Key  (r) = Recorded By, (t) = Taken By, (c) = Cosigned By    Initials Name Provider Type    Cheryle Sen OT Occupational Therapist               Outcome Measures     Row Name 02/16/22 1554          How much help from another is currently needed...    Putting on and taking off regular lower body clothing? 1  -CS     Bathing (including washing, rinsing, and drying) 1  -CS     Toileting (which includes using toilet bed pan or urinal) 1  -CS     Putting on and taking off regular upper body clothing 2  -CS     Taking care of personal grooming (such as brushing teeth) 2  -CS     Eating meals 2  -CS     AM-PAC 6 Clicks Score (OT) 9  -CS     Row Name 02/16/22 1544 02/16/22 0800       How much help from another person do you currently need...    Turning from your back to your side while in flat bed without using bedrails? 1  -KG 1  -CB    Moving from lying on back to sitting on the side of a flat bed without bedrails? 1  -KG 1  -CB    Moving to and from a bed to a chair (including a wheelchair)? 1  -KG 1  -CB    Standing up from a chair using your arms (e.g., wheelchair, bedside chair)? 1  -KG 1  -CB    Climbing 3-5 steps with a railing? 1  -KG 1  -CB    To walk in hospital room? 1  -KG 1  -CB    AM-PAC 6 Clicks Score (PT) 6  -KG 6  -CB    Row Name 02/16/22 1554 02/16/22 1544       Functional Assessment    Outcome Measure Options AM-PAC 6 Clicks Daily Activity (OT)  -CS AM-PAC 6 Clicks Basic Mobility (PT)  -KG          User Key  (r) = Recorded By, (t) = Taken By, (c) = Cosigned By    Initials Name Provider Type    Cheryle Sen, ANTOINE Occupational Therapist    KG Caitlin Evans, PT Physical Therapist    Duane Caal, RN Registered Nurse                Occupational Therapy Education                 Title: PT OT SLP Therapies (In  Progress)     Topic: Occupational Therapy (In Progress)     Point: ADL training (In Progress)     Description:   Instruct learner(s) on proper safety adaptation and remediation techniques during self care or transfers.   Instruct in proper use of assistive devices.              Learning Progress Summary           Patient Acceptance, E, NR by  at 2/16/2022 1555                   Point: Home exercise program (In Progress)     Description:   Instruct learner(s) on appropriate technique for monitoring, assisting and/or progressing therapeutic exercises/activities.              Learning Progress Summary           Patient Acceptance, E, NR by CS at 2/16/2022 1555                   Point: Precautions (In Progress)     Description:   Instruct learner(s) on prescribed precautions during self-care and functional transfers.              Learning Progress Summary           Patient Acceptance, E, NR by CS at 2/16/2022 1555                   Point: Body mechanics (In Progress)     Description:   Instruct learner(s) on proper positioning and spine alignment during self-care, functional mobility activities and/or exercises.              Learning Progress Summary           Patient Acceptance, E, NR by CS at 2/16/2022 1555                               User Key     Initials Effective Dates Name Provider Type Discipline     09/02/21 -  Cheryle Hightower OT Occupational Therapist OT              OT Recommendation and Plan  Therapy Frequency (OT): daily  Plan of Care Review  Plan of Care Reviewed With: patient  Outcome Summary: OT eval complete. Pt session limited d/t cognitive status and c/o pain, deferred EOB/OOB activity. Pt is currently dependent for supine ADL performance. Pt educated on shoulder precautions, sling fit/wear schedule, and cervical precautions. Recommend cont skilled IPOT POC. Recommend pt DC to SNF.     Time Calculation:    Time Calculation- OT     Row Name 02/16/22 1555             Time Calculation- OT    OT  Start Time 1455  -CS      OT Received On 02/16/22  -CS      OT Goal Re-Cert Due Date 02/26/22  -CS              Untimed Charges    OT Eval/Re-eval Minutes 31  -CS              Total Minutes    Untimed Charges Total Minutes 31  -CS       Total Minutes 31  -CS            User Key  (r) = Recorded By, (t) = Taken By, (c) = Cosigned By    Initials Name Provider Type    CS Cheryle Hightower OT Occupational Therapist              Therapy Charges for Today     Code Description Service Date Service Provider Modifiers Qty    87174831128 HC OT EVAL MOD COMPLEXITY 3 2/16/2022 Cheryle Hightower OT GO 1               Cheryle Hightower OT  2/16/2022

## 2022-02-16 NOTE — PROGRESS NOTES
"      [facility] Orthopaedic Surgery Progress Note    Subjective      LOS: 2 days   Patient Care Team:  Diaz Lee MD as PCP - General    CC: Right shoulder pain    Interval History:   No new complaints this morning. Patient awake and alert.    Objective      Vital Signs  Temp (24hrs), Av.1 °F (36.7 °C), Min:97.4 °F (36.3 °C), Max:98.6 °F (37 °C)      /84   Pulse 89   Temp 98.6 °F (37 °C) (Axillary)   Resp 20   Ht 165.1 cm (65\")   Wt 61.2 kg (135 lb)   SpO2 96%   BMI 22.47 kg/m²     Examination:   Integument:   Right shoulder: Ecchymosis and swelling    Upper Extremities:   Right Shoulder:    Tenderness:  Positive    Swelling:  Positive  Deformities:  None  Sensation and motor intact in the hand    Labs:  Results from last 7 days   Lab Units 22  0400 02/15/22  0328 22  0643   WBC 10*3/mm3 7.69 7.86 11.01*   HEMOGLOBIN g/dL 11.5* 11.2* 12.2*   HEMATOCRIT % 35.1* 33.0* 37.9   MCV fL 98.0* 95.9 100.0*   PLATELETS 10*3/mm3 194 186 207       Radiology:  Imaging Results (Last 24 Hours)     Procedure Component Value Units Date/Time    MRI Cervical Spine Without Contrast [[id number]] Collected: 02/15/22 1623     Updated: 02/15/22 1643    Narrative:      EXAMINATION: MRI CERVICAL SPINE WO CONTRAST-      INDICATION: trauma, abnl ct scan; I62.01-Nontraumatic acute subdural  hemorrhage; I62.03-Nontraumatic chronic subdural hemorrhage;  W19.XXXA-Unspecified fall, initial encounter; Y92.129-Unspecified place  in nursing home as the place of occurrence of the external cause;  S42.201A-Unspecified fracture of upper end of right humerus, initial  encounter for closed fracture     TECHNIQUE: Sagittal T1 and T2 STIR axial gradient echo and sagittal T2  space images of the cervical spine     COMPARISON: Cervical spine CT scan to 2022     FINDINGS: Previous exam report indicated mild anterior wedging of C7, T1  and probably T2, mild widening of C6-7 intervertebral disc space  nonspecific.     Today's " study shows no significant focal subluxation. No cervical  compression deformity or marrow edema is seen down to the level of C6.  No paraspinous soft tissue edema is appreciated. There is, however, very  subtle marrow edema of C7, T1, T2, and the superior endplate of T3, as  suspected from previous cervical spine CT scan, consistent with mild  vertebral injury. There are low signal T1 changes of the upper margins  of C7 T1 T2 and T3, again consistent with mild underlying acute or  recent compression deformities here.     There is focal narrowing of the canal at the C5-6 level, due to  vertebral osteophyte facet osteophyte and disc protrusion. No  significant stenosis is appreciated at the remaining levels. No  intrinsic cervical cord lesions are identified. Anatomy of the  craniocervical junction appears normal. Included upper thoracic canal  and cord appear normal.     Axial images are motion degraded, but show the canal to appear lower  limits of normal diameter at C2-3 due to central disc bulge.     At C3-4, there is a central disc protrusion with borderline canal  stenosis. Foramina appear normal on the right, moderately narrowed of  left allowing for fairly extensive motion artifact.     At C4-5, there is a central disc protrusion with mild canal stenosis.  Foramina are probably normal but again suffer from motion-related  blurring.     At C5-6, there is mild-to-moderate canal stenosis, due to broad-based  disc protrusion, and posterior element hypertrophy with effacement of  all but a small amount of anterior CSF, and moderate to marked bilateral  foraminal stenosis.     At C6-7, canal and right neural foramen appear normal. There is moderate  left-sided foraminal narrowing.     At C7-T1, canal and foramina appear grossly normal.     The motion degraded axial images do not show obvious cervical cord edema  or atrophy.                Impression:      1. Motion degraded exam, but with marrow edema of C7, T1, T2  and the  superior endplate of T3, consistent with acute or recent vertebral  injury. No significant associated subluxation or spinal stenosis.  Findings are consistent with patient's previous CT scan abnormalities.  2. Mild multilevel canal stenosis, a little greater at C5-C6 than  elsewhere, due to a combination of posterior element hypertrophy, disc  protrusion and vertebral osteophyte. No high-grade canal stenosis is  seen.        This report was finalized on 2/15/2022 4:40 PM by Dr. Kendrick Jane MD.       CT Head Without Contrast [[id number]] Collected: 02/15/22 0826     Updated: 02/15/22 0833    Narrative:      EXAMINATION: CT HEAD WO CONTRAST-      INDICATION: f/u Subdural hematoma; I62.01-Nontraumatic acute subdural  hemorrhage; I62.03-Nontraumatic chronic subdural hemorrhage;  W19.XXXA-Unspecified fall, initial encounter; Y92.129-Unspecified place  in nursing home as the place of occurrence of the external cause;  S42.201A-Unspecified fracture of upper end of right humerus, initial  encounter for closed fracture     TECHNIQUE: Axial noncontrast CT of the head with multiplanar  reconstruction     The radiation dose reduction device was turned on for each scan per the  ALARA (As Low as Reasonably Achievable) protocol.     COMPARISON: One day prior     FINDINGS: Continued expected evolutionary change of the patient's  previously noted right-sided mixed density subdural hematoma, including  acute blood products. There is no evidence of significant new acute  hemorrhage or increased mass effect. Advanced age-related changes are  again present with diffuse atrophy. The ventricles are unchanged in size  and configuration. The calvarium is intact.       Impression:      Continued expected evolutionary change of the patient's  previously noted right-sided mixed density subdural hematoma, including  hyperdense acute blood products. There is no evidence of significant new  acute hemorrhage or increased mass effect.          This report was finalized on 2/15/2022 8:30 AM by Tal Garza.             PT:  Physical Therapy - Plan of Care Review - Outcome Summary:   ]       Results Review:     I reviewed the patient's new clinical results.    Assessment and Plan     Assessment:   Right proximal humerus fracture    Continue sling  Follow-up with me in 3 weeks in the office  Please feel free to contact me for any questions      Acute on chronic intracranial subdural hematoma (HCC)    Uncontrolled type 2 diabetes mellitus (HCC)    Essential hypertension    Aortic stenosis    Cardiomyopathy, nonischemic (HCC)    Dyslipidemia    Closed fracture of proximal end of right humerus    Cervical spine fracture (HCC)        Future Appointments   Date Time Provider Department Center   3/7/2022  9:00 AM Reid Wood MD [facility]           Willie Jorgensen MD  02/16/22  08:03 EST

## 2022-02-16 NOTE — THERAPY EVALUATION
Acute Care - Speech Language Pathology   Swallow Initial Evaluation [facility]   Clinical Swallow Evaluation     Patient Name: Derian Joya  : 1940  MRN: [id number]  Today's Date: 2022               Admit Date: 2022    Visit Dx:     ICD-10-CM ICD-9-CM   1. 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 Fall at nursing home, initial encounter  W19.XXXA E888.9    Y92.129 E849.7   3. Closed fracture of proximal end of right humerus, unspecified fracture morphology, initial encounter  S42.201A 812.00   4. Dysphagia, unspecified type  R13.10 787.20     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)   • Dyslipidemia   • Closed fracture of proximal end of right humerus   • Cervical spine fracture (HCC)     Past Medical History:   Diagnosis Date   • Allergic rhinitis    • Aortic stenosis    • Atopic rhinitis 2016   • Benign non-nodular prostatic hyperplasia with lower urinary tract symptoms 9/15/2017   • CAD (coronary artery disease)    • Cardiomyopathy, ischemic    • Colon polyps     tubular adenoma    • Community acquired pneumonia    • Depression 2016   • Diabetes (HCC)    • Diverticulosis of intestine 2016    Description: Left and sigmoid   • Dyslipidemia    • LBBB (left bundle branch block)    • PAD (peripheral artery disease) (HCC)      Past Surgical History:   Procedure Laterality Date   • CARDIAC CATHETERIZATION      100% cx   • CATARACT EXTRACTION     • COLONOSCOPY W/ BIOPSIES AND POLYPECTOMY   05/2021    1 benign polyp   • HIP SURGERY Left 08/2020    Dr Mandy BOUDREAUX   • LEG SURGERY Left 07/2016    Popliteal Artery stenting by Dr Hdz   • LEG SURGERY Left 07/2016    skin graft by Dr Nance   • LIPOMA EXCISION      s/p excision on chest   • PACEMAKER IMPLANTATION  01/2019    and defibrillator       SLP Recommendation and Plan  SLP Swallowing Diagnosis: R/O pharyngeal dysphagia (02/16/22 1515)  SLP Diet Recommendation: mechanical soft with no mixed consistencies, nectar thick liquids (02/16/22 1515)  Recommended Precautions and Strategies: upright posture during/after eating, general aspiration precautions, assist with feeding (02/16/22 1515)  SLP Rec. for Method of Medication Administration: meds whole, with thick liquids, with pudding or applesauce, as tolerated (02/16/22 1515)     Monitor for Signs of Aspiration: yes, notify SLP if any concerns (02/16/22 1515)  Recommended Diagnostics: reassess via clinical swallow evaluation (02/16/22 1515)  Swallow Criteria for Skilled Therapeutic Interventions Met: demonstrates skilled criteria (02/16/22 1515)  Anticipated Discharge Disposition (SLP): anticipate therapy at next level of care (02/16/22 1515)  Rehab Potential/Prognosis, Swallowing: good, to achieve stated therapy goals (02/16/22 1515)        Demonstrates Need for Referral to Another Service: speech therapy, other (see comments) (for eval/tx of cognitive impairments) (02/16/22 1515)                               Plan of Care Reviewed With: patient  Progress: no change      SWALLOW EVALUATION (last 72 hours)     SLP Adult Swallow Evaluation     Row Name 02/16/22 1515                   Rehab Evaluation    Document Type evaluation  -AC        Subjective Information no complaints  -AC        Patient Observations alert; cooperative  -AC        Patient/Family/Caregiver Comments/Observations No family present.  -AC        Patient Effort good  -AC                  General Information    Patient Profile  Reviewed yes  -AC        Pertinent History Of Current Problem Re-adm from [place] after found down & sustained a/c SDH, R humeral fx, & possible c-spine fx. In c-collar. Adm mult times in Jan w/ sz, falls, traumatic SAH/SDH. MBS 1/26 revealed oral dysphagia, pharyngeal phase WFL, SLP rec'd soft/ground diet & thin liquids, eventually upgraded to regular diet @ bedside. PCT noted coughing w/ meals.  -AC        Current Method of Nutrition regular textures; thin liquids  -AC        Precautions/Limitations, Vision WFL; for purposes of eval  -AC        Precautions/Limitations, Hearing WFL; for purposes of eval  -AC        Prior Level of Function-Communication cognitive-linguistic impairment; other (see comments)  since SDH in January  -AC        Prior Level of Function-Swallowing no diet consistency restrictions  -AC        Plans/Goals Discussed with patient; agreed upon  -AC        Barriers to Rehab medically complex; cognitive status  -AC        Patient's Goals for Discharge patient did not state  -AC                  Pain Scale: FACES Pre/Post-Treatment    Pain: FACES Scale, Pretreatment 0-->no hurt  -AC        Posttreatment Pain Rating 0-->no hurt  -AC                  Oral Motor Structure and Function    Dentition Assessment missing teeth  -AC        Secretion Management anterior loss  -AC        Mucosal Quality dry  -AC                  Oral Musculature and Cranial Nerve Assessment    Oral Motor General Assessment generalized oral motor weakness  -AC                  General Eating/Swallowing Observations    Eating/Swallowing Skills fed by SLP  -AC        Positioning During Eating upright 90 degree; upright in bed  -AC        Utensils Used spoon; straw; other (see comments)  u/a to test cup 2' c-collar  -AC        Consistencies Trialed thin liquids; nectar/syrup-thick liquids; pureed; regular textures  -AC                  Clinical Swallow Eval    Oral Prep Phase WFL  -AC        Oral Transit WFL  -AC        Oral  Residue WFL  -AC        Pharyngeal Phase suspected pharyngeal impairment  -                  Pharyngeal Phase Concerns    Pharyngeal Phase Concerns throat clear  -        Throat Clear thin  -        Pharyngeal Phase Concerns, Comment Oral phase seemingly WFL for consistencies trialed. Eventually exhibited consistent throat clear w/ thin liquids. No overt clinical s/sxs aspiration w/ nectar, puree, solid.  -                  SLP Evaluation Clinical Impression    SLP Swallowing Diagnosis R/O pharyngeal dysphagia  -        Functional Impact risk of aspiration/pneumonia  -        Rehab Potential/Prognosis, Swallowing good, to achieve stated therapy goals  -        Swallow Criteria for Skilled Therapeutic Interventions Met demonstrates skilled criteria  -AC                  Recommendations    SLP Diet Recommendation mechanical soft with no mixed consistencies; nectar thick liquids  -        Recommended Diagnostics reassess via clinical swallow evaluation  -        Recommended Precautions and Strategies upright posture during/after eating; general aspiration precautions; assist with feeding  -        Oral Care Recommendations Oral Care BID/PRN  -        SLP Rec. for Method of Medication Administration meds whole; with thick liquids; with pudding or applesauce; as tolerated  -        Monitor for Signs of Aspiration yes; notify SLP if any concerns  -        Anticipated Discharge Disposition (SLP) anticipate therapy at next level of care  -AC        Demonstrates Need for Referral to Another Service speech therapy; other (see comments)  for eval/tx of cognitive impairments  -              User Key  (r) = Recorded By, (t) = Taken By, (c) = Cosigned By    Initials Name Effective Dates     Alyssa Agarwal MS CCC-SLP 06/16/21 -                 EDUCATION  The patient has been educated in the following areas:   Dysphagia (Swallowing Impairment) Oral Care/Hydration Modified Diet Instruction.               Time Calculation:    Time Calculation- SLP     Row Name 02/16/22 1607             Time Calculation- SLP    SLP Start Time 1515  -AC      SLP Received On 02/16/22  -AC              Untimed Charges    14443-EU Eval Oral Pharyng Swallow Minutes 58  -AC              Total Minutes    Untimed Charges Total Minutes 58  -AC       Total Minutes 58  -AC            User Key  (r) = Recorded By, (t) = Taken By, (c) = Cosigned By    Initials Name Provider Type    Alyssa Bullock MS CCC-SLP Speech and Language Pathologist                Therapy Charges for Today     Code Description Service Date Service Provider Modifiers Qty    [id number]  ST EVAL ORAL PHARYNG SWALLOW 4 2/16/2022 Alyssa Agarwal MS CCC-SLP GN 1      Patient was not wearing a face mask and did exhibit coughing during this therapy encounter.  Procedure performed was aerosolizing, involved close contact (within 6 feet for at least 15 minutes or longer), and involved contact with infectious secretions or specimens.  Therapist used appropriate personal protective equipment including gloves, standard procedure mask, eye protection and N95 mask.  Appropriate PPE was worn during the entire therapy session.  Hand hygiene was completed before and after therapy session.            Alyssa Agarwal MS CCC-SLP  2/16/2022

## 2022-02-16 NOTE — PROGRESS NOTES
"Intensive Care Follow-up     Hospital:  LOS: 2 days   Mr. Derian Joya, 82 y.o. male is followed for:   Acute on chronic intracranial subdural hematoma (HCC)            History of present illness:     82-year-old male who is a former smoker with past medical history of coronary disease with ischemic cardiomyopathy, hypertension, aortic stenosis, diabetes mellitus, dyslipidemia, multiple recent hospitalization secondary to falls and more recent admission for fall and traumatic subarachnoid hemorrhage and subdural hematoma.  Patient also has chronically elevated left hemidiaphragm as well.  He has been admitted 3 times in the last 2 months.  Patient has also been hospitalized for seizure in the recent past.  He was brought in from \"the [place]\" where he was found down by a nursing staff this morning.  Further history is not clear.  Patient was almost unresponsive and brought him to ER.  Further work-up included imaging of brain which showed acute on chronic subdural hematoma with mild midline shift of 3 to 4 mm.  Also potential cervical spine fracture noted.  Patient was discussed with neurosurgery and they did not think any surgical intervention is warranted.  Neurology consult was recommended and admission to ICU.  During work-up patient was also noted to have acute right humeral fracture for which orthopedics has been consulted.  Cervical collar was placed and patient transferred to ICU.      Subjective   Interval History:  Overnight patient required nicardipine for BP control. Awake, following commands. Denies any acute issues. Starting to eat,.      The patient's past medical, surgical and social history were reviewed and updated in Epic as appropriate.       Objective     Infusions:  niCARdipine, 5-15 mg/hr, Last Rate: Stopped (02/16/22 1131)      Medications:  atorvastatin, 80 mg, Oral, Nightly  famotidine, 20 mg, Intravenous, Q12H  insulin lispro, 0-9 Units, Subcutaneous, 4x Daily With Meals & " "Nightly  lacosamide, 100 mg, Intravenous, Q12H  levETIRAcetam, 500 mg, Intravenous, Q12H  sodium chloride, 10 mL, Intravenous, Q12H        Vital Sign Min/Max for last 24 hours  Temp  Min: 97.4 °F (36.3 °C)  Max: 98.6 °F (37 °C)   BP  Min: 105/65  Max: 168/95   Pulse  Min: 80  Max: 104   Resp  Min: 16  Max: 20   SpO2  Min: 89 %  Max: 99 %   Flow (L/min)  Min: 2  Max: 4       Input/Output for last 24 hour shift  02/15 0701 - 02/16 0700  In: 2125.1 [I.V.:1724.3]  Out: 1900 [Urine:1900]      Objective:  Vital signs: (most recent): Blood pressure 122/75, pulse 86, temperature 97.7 °F (36.5 °C), temperature source Axillary, resp. rate 20, height 165.1 cm (65\"), weight 61.2 kg (135 lb), SpO2 (!) 89 %.            General Appearance: Awake, alert  Head:    Atraumatic, Normocephalic, Pupils reactive & symmetrical B/L.  Neck:   Cervical collar in place  Lungs:   B/L Breath sounds present with decreased breath sounds on bases, no wheezing heard, no crackles.   Heart: S1 and S2 present, 2/6 SE murmur  Abdomen: Soft, nontender, no guarding or rigidity, bowel sounds positive.  Extremities: Right shoulder sling in place, no cyanosis or clubbing,  no edema, warm to touch.  Neurologic: Awake, following commands.     1a. Level of Consciousness: 2-->Not alert, requires repeated stimulation to attend, or is obtunded and requires strong or painful stimulation to make movements (not stereotyped)  1b. LOC Questions: 2-->Answers neither question correctly  1c. LOC Commands: 2-->Performs neither task correctly  2. Best Gaze: 0-->Normal  3. Visual: 0-->No visual loss  4. Facial Palsy: 0-->Normal symmetrical movements  5a. Motor Arm, Left: 0-->No drift, limb holds 90 (or 45) degrees for full 10 secs (PT NOT FOLLOWING COMMANDS, DIFFICULT TO ASSESS)  5b. Motor Arm, Right: 0-->No drift, limb holds 90 (or 45) degrees for full 10 secs  6a. Motor Leg, Left: 0-->No drift, leg holds 30 degree position for full 5 secs  6b. Motor Leg, Right: 0-->No " drift, leg holds 30 degree position for full 5 secs  7. Limb Ataxia: 0-->Absent  8. Sensory: 0-->Normal, no sensory loss  9. Best Language: 3-->Mute, global aphasia, no usable speech or auditory comprehension  10. Dysarthria: (UN) Intubated or other physical barrier  11. Extinction and Inattention (formerly Neglect): 0-->No abnormality (PT ABLE TO MOVE ALL EXTREMITIES- DIFFICULT TO ASSESS)    Total (NIH Stroke Scale): 9    Results from last 7 days   Lab Units 02/16/22  0400 02/15/22  0328 02/14/22  0643   WBC 10*3/mm3 7.69 7.86 11.01*   HEMOGLOBIN g/dL 11.5* 11.2* 12.2*   PLATELETS 10*3/mm3 194 186 207     Results from last 7 days   Lab Units 02/16/22  0400 02/15/22  1613 02/15/22  0328 02/14/22  0643 02/14/22  0643   SODIUM mmol/L 137  --  136  --  141   POTASSIUM mmol/L 3.5 4.3 3.3*   < > 3.5   CO2 mmol/L 28.0  --  26.0  --  25.0   BUN mg/dL 7*  --  6*  --  11   CREATININE mg/dL 0.42*  --  0.40*  --  0.56*   MAGNESIUM mg/dL 2.1  --  1.4*  --   --    PHOSPHORUS mg/dL 2.7  --  3.0  --   --    GLUCOSE mg/dL 164*  --  173*  --  245*    < > = values in this interval not displayed.     Estimated Creatinine Clearance: 60.6 mL/min (A) (by C-G formula based on SCr of 0.42 mg/dL (L)).          Images:   CT head reviewed.     IMPRESSION:  Continued expected evolutionary change of the patient's  previously noted right-sided mixed density subdural hematoma, including  hyperdense acute blood products. There is no evidence of significant new  acute hemorrhage or increased mass effect.         This report was finalized on 2/15/2022 8:30 AM by Tal Garza.    I reviewed the patient's results and images.       MRI reviewed.   IMPRESSION:  1. Motion degraded exam, but with marrow edema of C7, T1, T2 and the  superior endplate of T3, consistent with acute or recent vertebral  injury. No significant associated subluxation or spinal stenosis.  Findings are consistent with patient's previous CT scan abnormalities.  2. Mild  multilevel canal stenosis, a little greater at C5-C6 than  elsewhere, due to a combination of posterior element hypertrophy, disc  protrusion and vertebral osteophyte. No high-grade canal stenosis is  seen.        This report was finalized on 2/15/2022 4:40 PM by Dr. Kendrick Jane MD.         Assessment/Plan   Impression        Acute on chronic intracranial subdural hematoma (HCC)    Uncontrolled type 2 diabetes mellitus (HCC)    Essential hypertension    Aortic stenosis    Cardiomyopathy, nonischemic (HCC)    Dyslipidemia    Closed fracture of proximal end of right humerus    Cervical spine fracture (HCC)       Plan        1.  Patient presented with episode of unresponsiveness thought to be seizure induced.  EEG is concerning for some sharp waves.  Neurology team is following.  No overt seizure activity noted.  Continue Vimpat and Keppra.  Appreciate neurology team help.    2.  Patient has possible cervical fracture.  MRI somewhat concerning for some marrow edema at C7, T1 and T2 level.  No definite subluxation noted.  Neurosurgery team has not seen the patient yet.  Nursing staff will contact them.  Discussed case with Dr. Dove yesterday.    3.  Patient had fever on arrival to the ED but has been afebrile here.  White count is normal.  No other focus of infection..    4.  High blood pressure is elevated nicardipine drip.  Will start patient's home dose of bisoprolol.  Will use as needed labetalol to get better blood pressure control rather than relying on nicardipine.    4.  Patient has proximal humeral fracture.  Seen by orthopedics and plan is for conservative management.  Shoulder sling is in place.    5.  Diet as recommended by speech therapy.    6.  GI and DVT prophylaxis.    7.  Sliding scale insulin coverage for hyperglycemia management.    8.  Stop IV fluids.  Replace potassium.  Magnesium is normalized.  No further ventricular runs noted.    Plan of care and goals reviewed with multidisciplinary/antibiotic  stewardship team during rounds.   I discussed the patient's findings and my recommendations with patient and nursing staff       Time spent 30 min (exclusive of procedure time)  including high complexity decision making to assess, manipulate, and support vital organ system failure in this individual who has impairment of one or more vital organ systems such that there is a high probability of imminent or life threatening deterioration in the patient’s condition.      Ricky Stephen MD, FCCP  Pulmonary, Critical care and Sleep Medicine

## 2022-02-16 NOTE — THERAPY EVALUATION
Patient Name: Derian Joya  : 1940    MRN: [id number]                              Today's Date: 2022       Admit Date: 2022    Visit Dx:     ICD-10-CM ICD-9-CM   1. 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 Fall at nursing home, initial encounter  W19.XXXA E888.9    Y92.129 E849.7   3. 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)   • Dyslipidemia   • Closed fracture of proximal end of right humerus   • Cervical spine fracture (HCC)     Past Medical History:   Diagnosis Date   • Allergic rhinitis    • Aortic stenosis    • Atopic rhinitis 2016   • Benign non-nodular prostatic hyperplasia with lower urinary tract symptoms 9/15/2017   • CAD (coronary artery disease)    • Cardiomyopathy, ischemic    • Colon polyps     tubular adenoma    • Community acquired pneumonia    • Depression 2016   • Diabetes (HCC)    • Diverticulosis of intestine 2016    Description: Left and sigmoid   • Dyslipidemia    • LBBB (left bundle branch block)    • PAD (peripheral artery disease) (HCC)      Past Surgical History:   Procedure Laterality Date   • CARDIAC CATHETERIZATION      100% cx   • CATARACT EXTRACTION     • COLONOSCOPY W/ BIOPSIES AND POLYPECTOMY  2021    1 benign polyp   • HIP SURGERY Left 2020    Dr Mandy BOUDREAUX   • LEG SURGERY Left 2016    Popliteal Artery stenting by   Wilder   • LEG SURGERY Left 07/2016    skin graft by Dr Nance   • LIPOMA EXCISION      s/p excision on chest   • PACEMAKER IMPLANTATION  01/2019    and defibrillator      General Information     Row Name 02/16/22 1536          Physical Therapy Time and Intention    Document Type evaluation  -KG     Mode of Treatment physical therapy  -KG     Row Name 02/16/22 1536          General Information    Patient Profile Reviewed yes  -KG     Prior Level of Function --  pt is poor historian; TBA later  -KG     Existing Precautions/Restrictions fall; spinal; right; shoulder; other (see comments)  R prox humerus fx; NWB R UE; sling on AAT; cervical fx; cervical collar on AAT  -KG     Barriers to Rehab medically complex; previous functional deficit; cognitive status  -KG     Row Name 02/16/22 1536          Living Environment    Lives With facility resident  -KG     Row Name 02/16/22 1536          Home Main Entrance    Number of Stairs, Main Entrance none  -KG     Row Name 02/16/22 1536          Stairs Within Home, Primary    Number of Stairs, Within Home, Primary none  -KG     Row Name 02/16/22 1536          Cognition    Orientation Status (Cognition) oriented to; person  -KG     Row Name 02/16/22 1536          Safety Issues, Functional Mobility    Safety Issues Affecting Function (Mobility) ability to follow commands; awareness of need for assistance; insight into deficits/self-awareness; safety precaution awareness; safety precautions follow-through/compliance  -KG     Impairments Affecting Function (Mobility) balance; cognition; endurance/activity tolerance; pain; postural/trunk control; range of motion (ROM); strength  -KG     Cognitive Impairments, Mobility Safety/Performance attention; awareness, need for assistance; insight into deficits/self-awareness; safety precaution awareness; safety precaution follow-through  -KG     Comment, Safety Issues/Impairments (Mobility) pt lethargic; limited command following; delayed  processing  -KG           User Key  (r) = Recorded By, (t) = Taken By, (c) = Cosigned By    Initials Name Provider Type    Caitlin Turk PT Physical Therapist               Mobility     Row Name 02/16/22 1539          Bed Mobility    Comment (Bed Mobility) All EOB mobility deferred this date due to pt's cognitive status and limited command following.  -KG     Row Name 02/16/22 1539          Transfers    Comment (Transfers) All OOB mobility deferred this date. Not appropriate to assess.  -KG     Row Name 02/16/22 1539          Bed-Chair Transfer    Bed-Chair [place] (Transfers) unable to assess  -KG     Row Name 02/16/22 1539          Sit-Stand Transfer    Sit-Stand [place] (Transfers) unable to assess  -KG     Row Name 02/16/22 1539          Gait/Stairs (Locomotion)    [place] Level (Gait) unable to assess  -KG     Comment (Gait/Stairs) Ambulation deferred. Not appropriate to assess.  -KG           User Key  (r) = Recorded By, (t) = Taken By, (c) = Cosigned By    Initials Name Provider Type    Caitlin Turk PT Physical Therapist               Obj/Interventions     Row Name 02/16/22 1539          Range of Motion Comprehensive    Comment, General Range of Motion sudha ankle DF limited 25%; sudha knee flexion limited 50%; sudha hip flexors limited 25%  -KG     Row Name 02/16/22 1539          Strength Comprehensive (MMT)    Comment, General Manual Muscle Testing (MMT) Assessment unable to formally assess due to cognition  -KG     Row Name 02/16/22 1539          Sensory Assessment (Somatosensory)    Left LE Sensory Assessment light touch awareness; impaired  limited formal assessment due to cognition  -KG     Right LE Sensory Assessment light touch awareness; intact  limited formal assessment due to cognition  -KG           User Key  (r) = Recorded By, (t) = Taken By, (c) = Cosigned By    Initials Name Provider Type    Caitlin Turk PT Physical Therapist                Goals/Plan     Row Name 02/16/22 1543          Bed Mobility Goal 1 (PT)    Activity/Assistive Device (Bed Mobility Goal 1, PT) sit to supine; supine to sit  -KG     [place] Level/Cues Needed (Bed Mobility Goal 1, PT) maximum assist (25-49% patient effort)  -KG     Time Frame (Bed Mobility Goal 1, PT) 2 weeks  -KG     Progress/Outcomes (Bed Mobility Goal 1, PT) goal ongoing  -KG     Row Name 02/16/22 1543          Transfer Goal 1 (PT)    Activity/Assistive Device (Transfer Goal 1, PT) sit-to-stand/stand-to-sit; bed-to-chair/chair-to-bed  -KG     [place] Level/Cues Needed (Transfer Goal 1, PT) maximum assist (25-49% patient effort)  -KG     Time Frame (Transfer Goal 1, PT) 2 weeks  -KG     Progress/Outcome (Transfer Goal 1, PT) goal ongoing  -KG           User Key  (r) = Recorded By, (t) = Taken By, (c) = Cosigned By    Initials Name Provider Type    KG Caitlin Evans, PT Physical Therapist               Clinical Impression     Row Name 02/16/22 1541          Pain    Additional Documentation Pain Scale: FACES Pre/Post-Treatment (Group); Pain Scale: Numbers Pre/Post-Treatment (Group)  -KG     Row Name 02/16/22 1541          Pain Scale: Numbers Pre/Post-Treatment    Pretreatment Pain Rating 10/10  -KG     Posttreatment Pain Rating 10/10  -KG     Pain Location - Side Right  -KG     Pain Location shoulder; neck  -KG     Pain Intervention(s) Repositioned; Ambulation/increased activity  -KG     [facility] Name 02/16/22 1541          Pain Scale: FACES Pre/Post-Treatment    Pain: FACES Scale, Pretreatment 2-->hurts little bit  -KG     Posttreatment Pain Rating 2-->hurts little bit  -KG     Pain Location - Side Right  -KG     Pain Location shoulder; neck  -KG     Row Name 02/16/22 1544          Plan of Care Review    Plan of Care Reviewed With patient  -KG     Outcome Summary PT initial evaluation completed for pt presenting with generalized weakness, R proximal humerus fx, cervical fx, confusion, impaired balance,  decreased ROM, and decreased functional mobility. All EOB/OOB mobility deferred this date due to pt's cognitive status. Pt tolerated B LE ROM. Pt's decreased independence warrants PT skilled care. Recommend D/C to SNF.  -KG     Row Name 02/16/22 1541          Therapy Assessment/Plan (PT)    Patient/Family Therapy Goals Statement (PT) pt unable to state  -KG     Rehab Potential (PT) fair, will monitor progress closely  -KG     Criteria for Skilled Interventions Met (PT) yes; skilled treatment is necessary  -KG     Row Name 02/16/22 1541          Vital Signs    Pre Systolic BP Rehab 140  -KG     Pre Treatment Diastolic BP 84  -KG     Post Systolic BP Rehab 137  -KG     Post Treatment Diastolic BP 74  -KG     Pretreatment Heart Rate (beats/min) 72  -KG     Posttreatment Heart Rate (beats/min) 72  -KG     Pre SpO2 (%) 95  -KG     O2 Delivery Pre Treatment room air  -KG     Post SpO2 (%) 97  -KG     O2 Delivery Post Treatment room air  -KG     Pre Patient Position Supine  -KG     Intra Patient Position Supine  -KG     Post Patient Position Supine  -KG     Row Name 02/16/22 1541          Positioning and Restraints    Pre-Treatment Position in bed  -KG     Post Treatment Position bed  -KG     In Bed notified nsg; supine; call light within reach; encouraged to call for assist; exit alarm on; side rails up x3; RUE elevated; LUE elevated; heels elevated  -KG           User Key  (r) = Recorded By, (t) = Taken By, (c) = Cosigned By    Initials Name Provider Type    KG Caitlin Evans, PT Physical Therapist               Outcome Measures     Row Name 02/16/22 1544 02/16/22 0800       How much help from another person do you currently need...    Turning from your back to your side while in flat bed without using bedrails? 1  -KG 1  -CB    Moving from lying on back to sitting on the side of a flat bed without bedrails? 1  -KG 1  -CB    Moving to and from a bed to a chair (including a wheelchair)? 1  -KG 1  -CB    Standing up  from a chair using your arms (e.g., wheelchair, bedside chair)? 1  -KG 1  -CB    Climbing 3-5 steps with a railing? 1  -KG 1  -CB    To walk in hospital room? 1  -KG 1  -CB    AM-PAC 6 Clicks Score (PT) 6  -KG 6  -CB    Row Name 02/16/22 1544          Functional Assessment    Outcome Measure Options AM-PAC 6 Clicks Basic Mobility (PT)  -KG           User Key  (r) = Recorded By, (t) = Taken By, (c) = Cosigned By    Initials Name Provider Type    KG Caitlin Evans, PT Physical Therapist    Duane Caal, RN Registered Nurse                             Physical Therapy Education                 Title: PT OT SLP Therapies (In Progress)     Topic: Physical Therapy (In Progress)     Point: Mobility training (In Progress)     Learning Progress Summary           Patient Acceptance, E, NR by KG at 2/16/2022 1508                   Point: Home exercise program (In Progress)     Learning Progress Summary           Patient Acceptance, E, NR by KG at 2/16/2022 1508                   Point: Body mechanics (In Progress)     Learning Progress Summary           Patient Acceptance, E, NR by KG at 2/16/2022 1508                   Point: Precautions (In Progress)     Learning Progress Summary           Patient Acceptance, E, NR by KG at 2/16/2022 1508                               User Key     Initials Effective Dates Name Provider Type Discipline    KG 05/22/20 -  Caitlin Evans, PT Physical Therapist PT              PT Recommendation and Plan  Planned Therapy Interventions (PT): balance training, bed mobility training, strengthening, transfer training  Plan of Care Reviewed With: patient  Outcome Summary: PT initial evaluation completed for pt presenting with generalized weakness, R proximal humerus fx, cervical fx, confusion, impaired balance, decreased ROM, and decreased functional mobility. All EOB/OOB mobility deferred this date due to pt's cognitive status. Pt tolerated B LE ROM. Pt's decreased independence  warrants PT skilled care. Recommend D/C to SNF.     Time Calculation:    PT Charges     Row Name 02/16/22 1508             Time Calculation    Start Time 1508  -KG      PT Received On 02/16/22  -KG      PT Goal Re-Cert Due Date 02/26/22  -KG              Untimed Charges    PT Eval/Re-eval Minutes 31  -KG              Total Minutes    Untimed Charges Total Minutes 31  -KG       Total Minutes 31  -KG            User Key  (r) = Recorded By, (t) = Taken By, (c) = Cosigned By    Initials Name Provider Type    KG Caitlin Evans, PT Physical Therapist              Therapy Charges for Today     Code Description Service Date Service Provider Modifiers Qty    32936319931 HC PT EVAL MOD COMPLEXITY 3 2/16/2022 Caitlin Evans, PT GP 1          PT G-Codes  Outcome Measure Options: AM-PAC 6 Clicks Basic Mobility (PT)  AM-PAC 6 Clicks Score (PT): 6    Angle Evans PT  2/16/2022

## 2022-02-16 NOTE — PLAN OF CARE
Goal Outcome Evaluation:  Plan of Care Reviewed With: patient           Outcome Summary: PT initial evaluation completed for pt presenting with generalized weakness, R proximal humerus fx, cervical fx, confusion, impaired balance, decreased ROM, and decreased functional mobility. All EOB/OOB mobility deferred this date due to pt's cognitive status. Pt tolerated B LE ROM. Pt's decreased independence warrants PT skilled care. Recommend D/C to SNF.

## 2022-02-16 NOTE — PLAN OF CARE
Goal Outcome Evaluation:           Progress: improving  Outcome Summary: VSS. requiring cardene at times to maintain sbp < 140.  pt is very alert. talkative, oriented,  tolerating diet.

## 2022-02-16 NOTE — PROGRESS NOTES
"Neurology       Patient Care Team:  Diaz Lee MD as PCP - General    Chief complaint sdh, falls, sz    Subjective .     History:    Patient is sleepy but woke briefly, he denies headache           Objective     Vital Signs   Blood pressure (!) 135/101, pulse 96, temperature 98.6 °F (37 °C), temperature source Axillary, resp. rate 20, height 165.1 cm (65\"), weight 61.2 kg (135 lb), SpO2 95 %.    Physical Exam:  Elderly man, c collar in place. He is sleeping but woke briefly. He is oriented to self, place, city and month. Speech is clear. Right upper arm bruised/arm is in sling. Can lift legs off the bed. Speech is clear/no dysarthria. Tracks me. Follows commands.     Results Review:  Am labs reviewed     Assessment/Plan      83 y/o man with history of traumatic SDH complicated by seizure who had a fall at skilled nursing home causing a right humeral fracture and new SDH. Concern for additional seizure and he is currently on higher dose keppra and vimpat. No seizure on EEG but sharps were appreciated, and so will continue current regimen.    Plan is to continue keppra and vimpat given risk of breakthrough seizure; can decide as an outpatient how to best taper.      Plan  -continue keppra 500mg bid  -continue vimpat 100mg bid  -PT/OT as he can tolerate  -seizure precautions        Neurology will sign off. Please call with any concerns.      I discussed the patients findings and my recommendations with patient, primary team     230pm update. I spoke with the daughter and updated her on the plan for anti-epileptics. She is concerned about sedation and new onset agitation. Will decrease keppra to 250mg bid, add B6 supplement and start vimpat.     I will continue to follow given these new changes.       Amy Mckeon MD  02/16/22  07:21 EST    "

## 2022-02-16 NOTE — PLAN OF CARE
Goal Outcome Evaluation:  Plan of Care Reviewed With: patient           Outcome Summary: OT eval complete. Pt session limited d/t cognitive status and c/o pain, deferred EOB/OOB activity. Pt is currently dependent for supine ADL performance. Pt educated on shoulder precautions, sling fit/wear schedule, and cervical precautions. Recommend cont skilled IPOT POC. Recommend pt DC to SNF.

## 2022-02-17 ENCOUNTER — APPOINTMENT (OUTPATIENT)
Dept: GENERAL RADIOLOGY | Facility: HOSPITAL | Age: 82
End: 2022-02-17

## 2022-02-17 ENCOUNTER — TELEPHONE (OUTPATIENT)
Dept: NEUROSURGERY | Facility: CLINIC | Age: 82
End: 2022-02-17

## 2022-02-17 DIAGNOSIS — I60.9 SAH (SUBARACHNOID HEMORRHAGE): Primary | ICD-10-CM

## 2022-02-17 DIAGNOSIS — S06.5XAA SDH (SUBDURAL HEMATOMA): ICD-10-CM

## 2022-02-17 LAB
ANION GAP SERPL CALCULATED.3IONS-SCNC: 10 MMOL/L (ref 5–15)
BUN SERPL-MCNC: 10 MG/DL (ref 8–23)
BUN/CREAT SERPL: 21.7 (ref 7–25)
CALCIUM SPEC-SCNC: 8.8 MG/DL (ref 8.6–10.5)
CHLORIDE SERPL-SCNC: 102 MMOL/L (ref 98–107)
CO2 SERPL-SCNC: 25 MMOL/L (ref 22–29)
CREAT SERPL-MCNC: 0.46 MG/DL (ref 0.76–1.27)
GFR SERPL CREATININE-BSD FRML MDRD: >150 ML/MIN/1.73
GLUCOSE BLDC GLUCOMTR-MCNC: 112 MG/DL (ref 70–130)
GLUCOSE BLDC GLUCOMTR-MCNC: 121 MG/DL (ref 70–130)
GLUCOSE BLDC GLUCOMTR-MCNC: 291 MG/DL (ref 70–130)
GLUCOSE BLDC GLUCOMTR-MCNC: 367 MG/DL (ref 70–130)
GLUCOSE SERPL-MCNC: 111 MG/DL (ref 65–99)
MAGNESIUM SERPL-MCNC: 2 MG/DL (ref 1.6–2.4)
PHOSPHATE SERPL-MCNC: 3.4 MG/DL (ref 2.5–4.5)
POTASSIUM SERPL-SCNC: 3.8 MMOL/L (ref 3.5–5.2)
SODIUM SERPL-SCNC: 137 MMOL/L (ref 136–145)

## 2022-02-17 PROCEDURE — 97110 THERAPEUTIC EXERCISES: CPT

## 2022-02-17 PROCEDURE — 84100 ASSAY OF PHOSPHORUS: CPT | Performed by: INTERNAL MEDICINE

## 2022-02-17 PROCEDURE — 25010000002 LEVETRIRACETAM PER 10 MG: Performed by: INTERNAL MEDICINE

## 2022-02-17 PROCEDURE — 74230 X-RAY XM SWLNG FUNCJ C+: CPT

## 2022-02-17 PROCEDURE — 63710000001 INSULIN LISPRO (HUMAN) PER 5 UNITS: Performed by: INTERNAL MEDICINE

## 2022-02-17 PROCEDURE — 99231 SBSQ HOSP IP/OBS SF/LOW 25: CPT | Performed by: ORTHOPAEDIC SURGERY

## 2022-02-17 PROCEDURE — 82962 GLUCOSE BLOOD TEST: CPT

## 2022-02-17 PROCEDURE — 99232 SBSQ HOSP IP/OBS MODERATE 35: CPT | Performed by: STUDENT IN AN ORGANIZED HEALTH CARE EDUCATION/TRAINING PROGRAM

## 2022-02-17 PROCEDURE — 80048 BASIC METABOLIC PNL TOTAL CA: CPT | Performed by: INTERNAL MEDICINE

## 2022-02-17 PROCEDURE — 92611 MOTION FLUOROSCOPY/SWALLOW: CPT

## 2022-02-17 PROCEDURE — 97535 SELF CARE MNGMENT TRAINING: CPT

## 2022-02-17 PROCEDURE — 83735 ASSAY OF MAGNESIUM: CPT | Performed by: INTERNAL MEDICINE

## 2022-02-17 PROCEDURE — 92523 SPEECH SOUND LANG COMPREHEN: CPT

## 2022-02-17 PROCEDURE — 92610 EVALUATE SWALLOWING FUNCTION: CPT

## 2022-02-17 PROCEDURE — 97530 THERAPEUTIC ACTIVITIES: CPT

## 2022-02-17 PROCEDURE — 99232 SBSQ HOSP IP/OBS MODERATE 35: CPT | Performed by: PSYCHIATRY & NEUROLOGY

## 2022-02-17 RX ORDER — LANOLIN ALCOHOL/MO/W.PET/CERES
50 CREAM (GRAM) TOPICAL DAILY
Status: DISCONTINUED | OUTPATIENT
Start: 2022-02-17 | End: 2022-02-20 | Stop reason: HOSPADM

## 2022-02-17 RX ORDER — FAMOTIDINE 20 MG/1
20 TABLET, FILM COATED ORAL
Status: DISCONTINUED | OUTPATIENT
Start: 2022-02-17 | End: 2022-02-20 | Stop reason: HOSPADM

## 2022-02-17 RX ORDER — HYDROCODONE BITARTRATE AND ACETAMINOPHEN 5; 325 MG/1; MG/1
1 TABLET ORAL ONCE AS NEEDED
Status: COMPLETED | OUTPATIENT
Start: 2022-02-17 | End: 2022-02-17

## 2022-02-17 RX ORDER — TRAMADOL HYDROCHLORIDE 50 MG/1
50 TABLET ORAL EVERY 6 HOURS PRN
Status: DISCONTINUED | OUTPATIENT
Start: 2022-02-17 | End: 2022-02-20 | Stop reason: HOSPADM

## 2022-02-17 RX ADMIN — BARIUM SULFATE 30 ML: 0.81 POWDER, FOR SUSPENSION ORAL at 15:21

## 2022-02-17 RX ADMIN — HYDROCODONE BITARTRATE AND ACETAMINOPHEN 1 TABLET: 5; 325 TABLET ORAL at 06:02

## 2022-02-17 RX ADMIN — ATORVASTATIN CALCIUM 80 MG: 40 TABLET, FILM COATED ORAL at 20:07

## 2022-02-17 RX ADMIN — BARIUM SULFATE 10 ML: 400 PASTE ORAL at 15:21

## 2022-02-17 RX ADMIN — INSULIN LISPRO 6 UNITS: 100 INJECTION, SOLUTION INTRAVENOUS; SUBCUTANEOUS at 17:41

## 2022-02-17 RX ADMIN — FAMOTIDINE 20 MG: 20 TABLET, FILM COATED ORAL at 17:41

## 2022-02-17 RX ADMIN — LEVETIRACETAM 250 MG: 500 INJECTION, SOLUTION INTRAVENOUS at 21:21

## 2022-02-17 RX ADMIN — ACETAMINOPHEN 650 MG: 325 TABLET, FILM COATED ORAL at 14:38

## 2022-02-17 RX ADMIN — LACOSAMIDE 100 MG: 10 INJECTION INTRAVENOUS at 20:07

## 2022-02-17 RX ADMIN — BISOPROLOL FUMARATE 10 MG: 5 TABLET, FILM COATED ORAL at 08:51

## 2022-02-17 RX ADMIN — LEVETIRACETAM 250 MG: 500 INJECTION, SOLUTION INTRAVENOUS at 10:18

## 2022-02-17 RX ADMIN — LACOSAMIDE 100 MG: 10 INJECTION INTRAVENOUS at 11:34

## 2022-02-17 RX ADMIN — SODIUM CHLORIDE, PRESERVATIVE FREE 10 ML: 5 INJECTION INTRAVENOUS at 08:51

## 2022-02-17 RX ADMIN — FAMOTIDINE 20 MG: 10 INJECTION, SOLUTION INTRAVENOUS at 08:51

## 2022-02-17 RX ADMIN — SODIUM CHLORIDE, PRESERVATIVE FREE 10 ML: 5 INJECTION INTRAVENOUS at 20:07

## 2022-02-17 RX ADMIN — PYRIDOXINE HCL TAB 50 MG 50 MG: 50 TAB at 11:34

## 2022-02-17 RX ADMIN — INSULIN LISPRO 7 UNITS: 100 INJECTION, SOLUTION INTRAVENOUS; SUBCUTANEOUS at 20:11

## 2022-02-17 NOTE — CASE MANAGEMENT/SOCIAL WORK
Continued Stay Note  [facility]     Patient Name: Derian Joya  MRN: [id number]  Today's Date: 2/17/2022    Admit Date: 2/14/2022     Discharge Plan     Row Name 02/17/22 1407       Plan    Plan skilled nursing facility    Plan Comments I spoke with Gina, Mr. Joya's wife on the phone regarding his discharge plan. Mr. Joya was at the [facility] prior to this admission. She reports that she would like for him to return at the time of discharge to finish his rehabilitation. I updated Irene in admissions. She stated that she would review Mr. Joya's medical record and let me know if they will be able to accept him back.    Final Discharge Disposition Code 30 - still a patient               Discharge Codes    No documentation.              Cb Deluna RN

## 2022-02-17 NOTE — MBS/VFSS/FEES
Acute Care - Speech Language Pathology   Swallow Initial Evaluation  [place]   Modified Barium Swallow Study (MBS)     Patient Name: Derian Joya  : 1940  MRN: [id number]  Today's Date: 2022               Admit Date: 2022    Visit Dx:     ICD-10-CM ICD-9-CM   1. 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 Fall at nursing home, initial encounter  W19.XXXA E888.9    Y92.129 E849.7   3. Closed fracture of proximal end of right humerus, unspecified fracture morphology, initial encounter  S42.201A 812.00   4. Dysphagia, unspecified type  R13.10 787.20     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)   • Dyslipidemia   • Closed fracture of proximal end of right humerus   • Cervical spine fracture (HCC)     Past Medical History:   Diagnosis Date   • Allergic rhinitis    • Aortic stenosis    • Atopic rhinitis 2016   • Benign non-nodular prostatic hyperplasia with lower urinary tract symptoms 9/15/2017   • CAD (coronary artery disease)    • Cardiomyopathy, ischemic    • Colon polyps     tubular adenoma    • Community acquired pneumonia    • Depression 2016   • Diabetes (HCC)    • Diverticulosis of intestine 2016    Description: Left and sigmoid   • Dyslipidemia    • LBBB (left bundle branch block)    • PAD (peripheral artery disease) (HCC)      Past Surgical History:   Procedure Laterality Date   • CARDIAC CATHETERIZATION      100% cx   • CATARACT EXTRACTION     • COLONOSCOPY W/ BIOPSIES AND  POLYPECTOMY  05/2021    1 benign polyp   • HIP SURGERY Left 08/2020    Dr Mandy BOUDREAUX   • LEG SURGERY Left 07/2016    Popliteal Artery stenting by Dr Hdz   • LEG SURGERY Left 07/2016    skin graft by Dr Nance   • LIPOMA EXCISION      s/p excision on chest   • PACEMAKER IMPLANTATION  01/2019    and defibrillator       SLP Recommendation and Plan  SLP Swallowing Diagnosis: mild, oral dysphagia, pharyngeal dysphagia (02/17/22 1500)  SLP Diet Recommendation: chopped, thin liquids (02/17/22 1500)  Recommended Precautions and Strategies: no straw, upright posture during/after eating, small bites of food and sips of liquid, alternate between small bites of food and sips of liquid, general aspiration precautions (02/17/22 1500)  SLP Rec. for Method of Medication Administration: meds whole, meds crushed, with pudding or applesauce, as tolerated (02/17/22 1500)     Monitor for Signs of Aspiration: yes, notify SLP if any concerns (02/17/22 1500)  Recommended Diagnostics: [place] [place] (02/17/22 1115)  Swallow Criteria for Skilled Therapeutic Interventions Met: demonstrates skilled criteria (02/17/22 1500)  Anticipated Discharge Disposition (SLP): anticipate therapy at next level of care (02/17/22 1500)  Rehab Potential/Prognosis, Swallowing: good, to achieve stated therapy goals (02/17/22 1500)  Therapy Frequency (Swallow): 5 days per week (02/17/22 1500)  Predicted Duration Therapy Intervention (Days): until discharge (02/17/22 1500)                                  Plan of Care Reviewed With: patient      SWALLOW EVALUATION (last 72 hours)     SLP Adult Swallow Evaluation     Row Name 02/17/22 1500 02/17/22 1115 02/16/22 1515             Rehab Evaluation    Document Type evaluation  -MP -- evaluation  -AC      Subjective Information no complaints  -MP -- no complaints  -AC      Patient Observations alert; cooperative  -MP -- alert; cooperative  -AC      Patient/Family/Caregiver Comments/Observations No family present  -MP  -- No family present.  -AC      Patient Effort good  -MP -- good  -AC              General Information    Patient Profile Reviewed yes  -MP -- yes  -AC      Pertinent History Of Current Problem See AM eval  -MP -- Re-adm from [place] after found down & sustained a/c SDH, R humeral fx, & possible c-spine fx. In c-collar. Adm mult times in Jan w/ sz, falls, traumatic SAH/SDH. MBS 1/26 revealed oral dysphagia, pharyngeal phase WFL, SLP rec'd soft/ground diet & thin liquids, eventually upgraded to regular diet @ bedside. PCT noted coughing w/ meals.  -AC      Current Method of Nutrition -- mechanical soft, no mixed consistencies; nectar/syrup-thick liquids  - regular textures; thin liquids  -AC      Precautions/Limitations, Vision -- -- WFL; for purposes of eval  -AC      Precautions/Limitations, Hearing -- -- WFL; for purposes of eval  -AC      Prior Level of Function-Communication -- cognitive-linguistic impairment  -MP cognitive-linguistic impairment; other (see comments)  since SDH in January  -      Prior Level of Function-Swallowing -- no diet consistency restrictions  -MP no diet consistency restrictions  -AC      Plans/Goals Discussed with -- -- patient; agreed upon  -      Barriers to Rehab -- -- medically complex; cognitive status  -AC      Patient's Goals for Discharge -- patient did not state  -MP patient did not state  -AC              Pain    Additional Documentation Pain Scale: FACES Pre/Post-Treatment (Group)  -MP -- --              Pain Scale: FACES Pre/Post-Treatment    Pain: FACES Scale, Pretreatment 0-->no hurt  -MP -- 0-->no hurt  -AC      Posttreatment Pain Rating 0-->no hurt  -MP -- 0-->no hurt  -AC              Oral Motor Structure and Function    Dentition Assessment -- -- missing teeth  -AC      Secretion Management -- WNL/WFL  -MP anterior loss  -AC      Mucosal Quality -- moist, healthy  -MP dry  -AC              Oral Musculature and Cranial Nerve Assessment    Oral Motor General  Assessment -- -- generalized oral motor weakness  -              General Eating/Swallowing Observations    Eating/Swallowing Skills -- fed by SLP  -MP fed by SLP  -      Positioning During Eating -- upright in bed  - upright 90 degree; upright in bed  -      Utensils Used -- spoon; cup; straw  -MP spoon; straw; other (see comments)  u/a to test cup 2' c-collar  -      Consistencies Trialed -- thin liquids; nectar/syrup-thick liquids; pureed; regular textures  - thin liquids; nectar/syrup-thick liquids; pureed; regular textures  -              Clinical Swallow Eval    Oral Prep Phase -- -- WFL  -AC      Oral Transit -- -- WFL  -AC      Oral Residue -- -- WFL  -AC      Pharyngeal Phase -- suspected pharyngeal impairment  - suspected pharyngeal impairment  -      Clinical Swallow Evaluation Summary -- Cough w/ thin liquid wash following solid. Otherwise no s/sxs. Rec continue mec soft/no mixed & nectar-thick. SLP will proceed w/ MBS to further assess.  - --              Pharyngeal Phase Concerns    Pharyngeal Phase Concerns -- cough  -MP throat clear  -      Cough -- thin; other (see comments)  liquid wash following solid  -MP --      Throat Clear -- -- thin  -AC      Pharyngeal Phase Concerns, Comment -- -- Oral phase seemingly WFL for consistencies trialed. Eventually exhibited consistent throat clear w/ thin liquids. No overt clinical s/sxs aspiration w/ nectar, puree, solid.  -              MBS/VFSS    Utensils Used spoon; cup; straw  - -- --      Consistencies Trialed thin liquids; pudding thick; regular textures  - -- --              MBS/VFSS Interpretation    Oral Prep Phase impaired oral phase of swallowing  -MP -- --      Oral Transit Phase WFL  -MP -- --      Oral Residue WFL  -MP -- --      VFSS Summary Mild oropharyngeal dysphagia. Prolonged mastication w/ regular solid. Aspiration before/during the swallow w/ thin liquid wash via straw following solid. Cough response. U/a to  replicate / prevented w/ thin liquid wash via cup following solid. Mild-moderate pyriform residue across consistencies & mild-moderate vallecular residue w/ solid - residue did not build and majority cleared by alternating bites/sips. Rec: soft, chopped diet & thin liquids via cup, no straws. Alternate bites/sips. Meds whole or crushed in pudding/puree. SLP will continue to follow for tx.  -MP -- --              Oral Preparatory Phase    Oral Preparatory Phase prolonged manipulation  -MP -- --      Prolonged Manipulation regular textures  -MP -- --              Initiation of Pharyngeal Swallow    Initiation of Pharyngeal Swallow bolus in pyriform sinuses  -MP -- --      Pharyngeal Phase impaired pharyngeal phase of swallowing  -MP -- --      Aspiration Before the Swallow thin liquids; secondary to delayed swallow initiation or mistiming; other (see comments)  w/ thin liquid wash via straw  -MP -- --      Aspiration During the Swallow thin liquids; secondary to delayed swallow initiation or mistiming; other (see comments)  w/ thin liquid wash via straw  -MP -- --      Response to Aspiration cough  -MP -- --      Rosenbek's Scale thin:; 6--->level 6  -MP -- --      Pharyngeal Residue thin liquids; pudding/puree; pyriform sinuses; secondary to reduced laryngeal elevation; secondary to reduced hyolaryngeal excursion; regular textures; valleculae; secondary to reduced base of tongue retraction  -MP -- --      Response to Residue other (see comments)  did not build, majority cleared by alternating bites/sips  -MP -- --      Attempted Compensatory Maneuvers bolus size; bolus presentation style; alternative liquids/solids  -MP -- --      Response to Attempted Compensatory Maneuvers prevented aspiration; reduced residue  -MP -- --              SLP Evaluation Clinical Impression    SLP Swallowing Diagnosis mild; oral dysphagia; pharyngeal dysphagia  -MP suspected pharyngeal dysphagia  -MP R/O pharyngeal dysphagia  -AC       Functional Impact risk of aspiration/pneumonia  -MP risk of aspiration/pneumonia  -MP risk of aspiration/pneumonia  -AC      Rehab Potential/Prognosis, Swallowing good, to achieve stated therapy goals  -MP good, to achieve stated therapy goals  -MP good, to achieve stated therapy goals  -AC      Swallow Criteria for Skilled Therapeutic Interventions Met demonstrates skilled criteria  -MP demonstrates skilled criteria  -MP demonstrates skilled criteria  -AC              Recommendations    Therapy Frequency (Swallow) 5 days per week  -MP -- --      Predicted Duration Therapy Intervention (Days) until discharge  -MP -- --      SLP Diet Recommendation chopped; thin liquids  -MP mechanical soft with no mixed consistencies; nectar thick liquids  -MP mechanical soft with no mixed consistencies; nectar thick liquids  -      Recommended Diagnostics -- VFSS (MBS)  -MP reassess via clinical swallow evaluation  -      Recommended Precautions and Strategies no straw; upright posture during/after eating; small bites of food and sips of liquid; alternate between small bites of food and sips of liquid; general aspiration precautions  -MP upright posture during/after eating; general aspiration precautions; assist with feeding  -MP upright posture during/after eating; general aspiration precautions; assist with feeding  -      Oral Care Recommendations Oral Care BID/PRN  -MP Oral Care BID/PRN  -MP Oral Care BID/PRN  -AC      SLP Rec. for Method of Medication Administration meds whole; meds crushed; with pudding or applesauce; as tolerated  -MP meds whole; with thick liquids; with pudding or applesauce; as tolerated  -MP meds whole; with thick liquids; with pudding or applesauce; as tolerated  -AC      Monitor for Signs of Aspiration yes; notify SLP if any concerns  -MP yes; notify SLP if any concerns  -MP yes; notify SLP if any concerns  -AC      Anticipated Discharge Disposition (SLP) anticipate therapy at next level of care   -MP anticipate therapy at next level of care  -MP anticipate therapy at next level of care  -AC      Demonstrates Need for Referral to Another Service -- -- speech therapy; other (see comments)  for eval/tx of cognitive impairments  -AC            User Key  (r) = Recorded By, (t) = Taken By, (c) = Cosigned By    Initials Name Effective Dates    AC Alyssa Agarwal S, MS CCC-SLP 06/16/21 -     MP eDni Interiano, MS CCC-SLP 12/28/21 -                 EDUCATION  The patient has been educated in the following areas:   Dysphagia (Swallowing Impairment) Modified Diet Instruction.        SLP GOALS     Row Name 02/17/22 1500 02/17/22 1115          Oral Nutrition/Hydration Goal 1 (SLP)    Oral Nutrition/Hydration Goal 1, SLP LTG: Pt will tolerate safest, least restrictive diet w/ no overt s/sxs aspiration/distress w/ 100% acc and no cues  -MP --     Time Frame (Oral Nutrition/Hydration Goal 1, SLP) by discharge  -MP --            Oral Nutrition/Hydration Goal 2 (SLP)    Oral Nutrition/Hydration Goal 2, SLP Pt will tolerate soft solids & thin liquids via cup w/ no overt s/sxs aspiration/distress w/ 100% acc and no cues  -MP --     Time Frame (Oral Nutrition/Hydration Goal 2, SLP) short term goal (STG)  -MP --            Lingual Strengthening Goal 1 (SLP)    Activity (Lingual Strengthening Goal 1, SLP) increase tongue back strength  -MP --     Increase Tongue Back Strength swallow trials; lingual resistance exercises  -MP --     [place]/Accuracy (Lingual Strengthening Goal 1, SLP) with minimal cues (75-90% accuracy)  -MP --     Time Frame (Lingual Strengthening Goal 1, SLP) short term goal (STG)  -MP --            Pharyngeal Strengthening Exercise Goal 1 (SLP)    Activity (Pharyngeal Strengthening Goal 1, SLP) increase timing; increase closure at entrance to airway/closure of airway at glottis; increase superior movement of the hyolaryngeal complex; increase anterior movement of the hyolaryngeal complex; increase  squeeze/positive pressure generation; increase tongue base retraction  -MP --     Increase Timing prepping - 3 second prep or suck swallow or 3-step swallow  -MP --     Increase Superior Movement of the Hyolaryngeal Complex effortful pitch glide (falsetto + pharyngeal squeeze)  -MP --     Increase Anterior Movement of the Hyolaryngeal Complex chin tuck against resistance (CTAR)  -MP --     Increase Closure at Entrance to Airway/Closure of Airway at Glottis supraglottic swallow; hard effortful swallow  -MP --     Increase Squeeze/Positive Pressure Generation hard effortful swallow  -MP --     Increase Tongue Base Retraction rashawn  -MP --     [place]/Accuracy (Pharyngeal Strengthening Goal 1, SLP) with moderate cues (50-74% accuracy)  -MP --     Time Frame (Pharyngeal Strengthening Goal 1, SLP) short term goal (STG)  -MP --            Swallow Management Recall Goal 1 (SLP)    Activity (Swallow Management Recall Goal 1, SLP) recall of; compensatory swallow strategies/techniques  -MP --     [place]/Accuracy (Swallow Management Recall Goal 1, SLP) with minimal cues (75-90% accuracy)  -MP --     Time Frame (Swallow Management Recall Goal 1, SLP) short term goal (STG)  -MP --            Swallow Compensatory Strategies Goal 1 (SLP)    Activity (Swallow Compensatory Strategies/Techniques Goal 1, SLP) compensatory strategies; small cup sips; alternate food/liquid intake; during p.o. trials; during meal intake; other (see comments)  no straws  -MP --     [place]/Accuracy (Swallow Compensatory Strategies/Techniques Goal 1, SLP) with minimal cues (75-90% accuracy)  -MP --     Time Frame (Swallow Compensatory Strategies/Techniques Goal 1, SLP) short term goal (STG)  -MP --            Comprehend Questions Goal 1 (SLP)    Improve Ability to Comprehend Questions Goal 1 (SLP) complex yes/no questions; complex wh questions; multi-unit questions; 80%; with minimal cues (75-90%)  -MP complex yes/no questions; complex wh  questions; multi-unit questions; 80%; with minimal cues (75-90%)  -MP     Time Frame (Comprehend Questions Goal 1, SLP) short term goal (STG)  -MP short term goal (STG)  -MP     Progress/Outcomes (Comprehend Questions Goal 1, SLP) goal ongoing  -MP --            Follow Directions Goal 2 (SLP)    Improve Ability to Follow Directions Goal 1 (SLP) 2 step commands; 80%; with minimal cues (75-90%)  -MP 2 step commands; 80%; with minimal cues (75-90%)  -MP     Time Frame (Follow Directions Goal 1, SLP) short term goal (STG)  -MP short term goal (STG)  -MP     Progress/Outcomes (Follow Directions Goal 1, SLP) goal ongoing  -MP --            Attention Goal 1 (SLP)    Improve Attention by Goal 1 (SLP) looking at speaker; attending to task; 80%; with minimal cues (75-90%)  -MP looking at speaker; attending to task; 80%; with minimal cues (75-90%)  -MP     Time Frame (Attention Goal 1, SLP) short term goal (STG)  -MP short term goal (STG)  -MP     Progress/Outcomes (Attention Goal 1, SLP) goal ongoing  -MP --            Orientation Goal 1 (SLP)    Improve Orientation Through Goal 1 (SLP) demonstrating orientation to day; demonstrating orientation to month; demonstrating orientation to year; demonstrating orientation to place; demonstrating orientation to disease/impairment; use environmental aids to assist with orientation; 80%; with minimal cues (75-90%)  -MP demonstrating orientation to day; demonstrating orientation to month; demonstrating orientation to year; demonstrating orientation to place; demonstrating orientation to disease/impairment; use environmental aids to assist with orientation; 80%; with minimal cues (75-90%)  -MP     Time Frame (Orientation Goal 1, SLP) short term goal (STG)  -MP short term goal (STG)  -MP     Progress/Outcomes (Orientation Goal 1, SLP) goal ongoing  -MP --            Functional Problem Solving Skills Goal 1 (SLP)    Improve Problem Solving Through Goal 1 (SLP) answer questions about ADL  problems; determine solutions to simple ADL/safety problems; 80%; with minimal cues (75-90%)  -MP answer questions about ADL problems; determine solutions to simple ADL/safety problems; 80%; with minimal cues (75-90%)  -MP     Time Frame (Problem Solving Goal 1, SLP) short term goal (STG)  -MP short term goal (STG)  -MP     Progress/Outcomes (Problem Solving Goal 1, SLP) goal ongoing  -MP --            Additional Goal 1 (SLP)    Additional Goal 1, SLP LTG: Pt will improve cognitive-communication in order to fully participate in care while in hospital setting w/ 100% acc and no cues  -MP LTG: Pt will improve cognitive-communication in order to fully participate in care while in hospital setting w/ 100% acc and no cues  -MP     Time Frame (Additional Goal 1, SLP) by discharge  -MP by discharge  -MP     Progress/Outcomes (Additional Goal 1, SLP) goal ongoing  -MP --           User Key  (r) = Recorded By, (t) = Taken By, (c) = Cosigned By    Initials Name Provider Type    Deni Mims MS CCC-SLP Speech and Language Pathologist                   Time Calculation:    Time Calculation- SLP     Row Name 02/17/22 1537 02/17/22 1137          Time Calculation- SLP    SLP Start Time 1500  -MP 1115  -MP     SLP Received On 02/17/22  -MP 02/17/22  -MP            Untimed Charges    00942-LT Eval Speech and Production w/ Language Minutes -- 25  -MP     13227-QQ Eval Oral Pharyng Swallow Minutes -- 25  -MP     67140-RG Motion Fluoro Eval Swallow Minutes 40  -MP --            Total Minutes    Untimed Charges Total Minutes 40  -MP 50  -MP      Total Minutes 40  -MP 50  -MP           User Key  (r) = Recorded By, (t) = Taken By, (c) = Cosigned By    Initials Name Provider Type    Deni Mims MS CCC-SLP Speech and Language Pathologist                Therapy Charges for Today     Code Description Service Date Service Provider Modifiers Qty    [id number]  ST EVAL ORAL PHARYNG SWALLOW 2 2/17/2022 Siomara Evangelista  MS Deni CCC-SLP GN 1    [id number] HC ST EVAL SPEECH AND PROD W LANG  2 2/17/2022 Deni Interiano MS CCC-SLP GN 1    [id number] HC ST MOTION FLUORO EVAL SWALLOW 3 2/17/2022 Deni Interiano, MS CCC-SLP GN 1        Patient was not wearing a face mask and did exhibit coughing during this therapy encounter.  Procedure performed was aerosolizing, involved close contact (within 6 feet for at least 15 minutes or longer), and did not involve contact with infectious secretions or specimens.  Therapist used appropriate personal protective equipment including gloves, standard procedure mask and eye protection.  Appropriate PPE was worn during the entire therapy session.  Hand hygiene was completed before and after therapy session.        Deni Evangelista MS CCC-SLP  2/17/2022

## 2022-02-17 NOTE — THERAPY EVALUATION
Acute Care - Speech Language Pathology Initial Evaluation  [facility]   Cognitive-Communication Evaluation  Clinical Swallow Re-Evaluation     Patient Name: Derian Joya  : 1940  MRN: [id number]  Today's Date: 2022               Admit Date: 2022     Visit Dx:    ICD-10-CM ICD-9-CM   1. 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 Fall at nursing home, initial encounter  W19.XXXA E888.9    Y92.129 E849.7   3. Closed fracture of proximal end of right humerus, unspecified fracture morphology, initial encounter  S42.201A 812.00   4. Dysphagia, unspecified type  R13.10 787.20     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)   • Dyslipidemia   • Closed fracture of proximal end of right humerus   • Cervical spine fracture (HCC)     Past Medical History:   Diagnosis Date   • Allergic rhinitis    • Aortic stenosis    • Atopic rhinitis 2016   • Benign non-nodular prostatic hyperplasia with lower urinary tract symptoms 9/15/2017   • CAD (coronary artery disease)    • Cardiomyopathy, ischemic    • Colon polyps     tubular adenoma    • Community acquired pneumonia    • Depression 2016   • Diabetes (HCC)    • Diverticulosis of intestine 2016    Description: Left and sigmoid   • Dyslipidemia    • LBBB (left bundle branch block)    • PAD (peripheral artery disease) (HCC)      Past Surgical History:   Procedure Laterality Date   • CARDIAC CATHETERIZATION      100% cx   • CATARACT EXTRACTION     • COLONOSCOPY  W/ BIOPSIES AND POLYPECTOMY  05/2021    1 benign polyp   • HIP SURGERY Left 08/2020    Dr Mandy BOUDREAUX   • LEG SURGERY Left 07/2016    Popliteal Artery stenting by Dr Hdz   • LEG SURGERY Left 07/2016    skin graft by Dr Nance   • LIPOMA EXCISION      s/p excision on chest   • PACEMAKER IMPLANTATION  01/2019    and defibrillator       SLP Recommendation and Plan  SLP Diagnosis: Difficult to fully assess cognitive-communication skills, but at least mild-moderate receptive language deficits (? cog impacting) & moderate cognitive deficits (02/17/22 1115)        Swallow Criteria for Skilled Therapeutic Interventions Met: demonstrates skilled criteria (02/17/22 1115)  SLC Criteria for Skilled Therapy Interventions Met: yes (02/17/22 1115)  Anticipated Discharge Disposition (SLP): anticipate therapy at next level of care (02/17/22 1115)        Predicted Duration Therapy Intervention (Days): until discharge (02/17/22 1115)                    Plan of Care Reviewed With: patient (02/17/22 1137)      SLP EVALUATION (last 72 hours)     SLP SLC Evaluation     Row Name 02/17/22 1115                   Communication Assessment/Intervention    Document Type evaluation  -MP        Subjective Information no complaints  -MP        Patient Observations alert; cooperative  -MP        Patient/Family/Caregiver Comments/Observations No family present  -MP        Patient Effort adequate  -MP                  General Information    Patient Profile Reviewed yes  -MP        Pertinent History Of Current Problem Re-adm from [place] after found down & sustained a/c SDH, R humeral fx, & possible c-spine fx. In c-collar. Adm mult times in Jan w/ sz, falls, traumatic SAH/SDH. MBS 1/26 revealed oral dysphagia, pharyngeal phase WFL, SLP rec'd soft/ground diet & thin liquids, eventually upgraded to regular diet @ bedside.  -MP        Precautions/Limitations, Vision WFL; for purposes of eval  -MP        Precautions/Limitations, Hearing WFL; for  purposes of eval  -MP        Prior Level of Function-Communication receptive language impairment; cognitive-linguistic impairment; other (see comments)  pt seen by SLP for language & cognitive tx last adm  -MP        Plans/Goals Discussed with patient; agreed upon  -MP        Barriers to Rehab medically complex; cognitive status  -MP        Patient's Goals for Discharge patient did not state  -MP                  Pain    Additional Documentation Pain Scale: FACES Pre/Post-Treatment (Group)  -MP                  Pain Scale: FACES Pre/Post-Treatment    Pain: FACES Scale, Pretreatment 2-->hurts little bit  -MP        Posttreatment Pain Rating 2-->hurts little bit  -MP                  Comprehension Assessment/Intervention    Comprehension Assessment/Intervention Auditory Comprehension  -MP                  Auditory Comprehension Assessment/Intervention    Auditory Comprehension (Communication) mild impairment; moderate impairment  -MP        Answers Questions (Communication) yes/no; personal; simple; complex; mild impairment; moderate impairment  -MP        Able to Follow Commands (Communication) 1-step; WFL; 2-step; mild impairment; moderate impairment  -MP        Narrative Discourse conversational level; moderate impairment  -MP                  Expression Assessment/Intervention    Expression Assessment/Intervention verbal expression  -MP                  Verbal Expression Assessment/Intervention    Verbal Expression WFL; other (see comments)  for simple, needs dx tx higher level language  -MP        Automatic Speech (Communication) response to greeting; WFL  -MP        Repetition words; phrases; WFL  -MP        Confrontational Naming high frequency; WFL  -MP        Sentence Formulation simple; WFL  -MP                  Oral Motor Structure and Function    Oral Motor Structure and Function WFL  -MP        Dentition Assessment missing teeth  -MP                  Oral Musculature and Cranial Nerve Assessment    Oral  Motor General Assessment WFL  -MP                  Motor Speech Assessment/Intervention    Motor Speech Function WFL  -MP                  Cognitive Assessment Intervention- SLP    Cognitive Function (Cognition) moderate impairment; other (see comments)  difficult to fully assess severity, @ least mod deficits  -MP        Orientation Status (Cognition) awareness of basic personal information; person; WFL; place; time; situation; mild impairment; moderate impairment  -MP        Attention (Cognitive) sustained; moderate impairment  -MP        Problem Solving (Cognitive) simple; mild impairment  -MP                  SLP Evaluation Clinical Impressions    SLP Diagnosis Difficult to fully assess cognitive-communication skills, but at least mild-moderate receptive language deficits (? cog impacting) & moderate cognitive deficits  -MP        Rehab Potential/Prognosis good  -MP        SLC Criteria for Skilled Therapy Interventions Met yes  -MP        Functional Impact functional impact in social situations; functional impact in ADLs; difficulty in expressing complex messages; restrictions in personal and social life  -MP                  Recommendations    Therapy Frequency (SLP SLC) 5 days per week  -MP        Predicted Duration Therapy Intervention (Days) until discharge  -MP              User Key  (r) = Recorded By, (t) = Taken By, (c) = Cosigned By    Initials Name Effective Dates    MP Deni Interiano MS CCC-SLP 12/28/21 -                    EDUCATION  The patient has been educated in the following areas:     Cognitive Impairment Communication Impairment.           SLP GOALS     Row Name 02/17/22 1115             Comprehend Questions Goal 1 (SLP)    Improve Ability to Comprehend Questions Goal 1 (SLP) complex yes/no questions; complex wh questions; multi-unit questions; 80%; with minimal cues (75-90%)  -MP      Time Frame (Comprehend Questions Goal 1, SLP) short term goal (STG)  -MP              Follow  Directions Goal 2 (SLP)    Improve Ability to Follow Directions Goal 1 (SLP) 2 step commands; 80%; with minimal cues (75-90%)  -MP      Time Frame (Follow Directions Goal 1, SLP) short term goal (STG)  -MP              Attention Goal 1 (SLP)    Improve Attention by Goal 1 (SLP) looking at speaker; attending to task; 80%; with minimal cues (75-90%)  -MP      Time Frame (Attention Goal 1, SLP) short term goal (STG)  -MP              Orientation Goal 1 (SLP)    Improve Orientation Through Goal 1 (SLP) demonstrating orientation to day; demonstrating orientation to month; demonstrating orientation to year; demonstrating orientation to place; demonstrating orientation to disease/impairment; use environmental aids to assist with orientation; 80%; with minimal cues (75-90%)  -MP      Time Frame (Orientation Goal 1, SLP) short term goal (STG)  -MP              Functional Problem Solving Skills Goal 1 (SLP)    Improve Problem Solving Through Goal 1 (SLP) answer questions about ADL problems; determine solutions to simple ADL/safety problems; 80%; with minimal cues (75-90%)  -MP      Time Frame (Problem Solving Goal 1, SLP) short term goal (STG)  -MP              Additional Goal 1 (SLP)    Additional Goal 1, SLP LTG: Pt will improve cognitive-communication in order to fully participate in care while in hospital setting w/ 100% acc and no cues  -MP      Time Frame (Additional Goal 1, SLP) by discharge  -MP            User Key  (r) = Recorded By, (t) = Taken By, (c) = Cosigned By    Initials Name Provider Type    MP Deni Interiano MS CCC-SLP Speech and Language Pathologist                        Time Calculation:      Time Calculation- SLP     Row Name 02/17/22 1137             Time Calculation- SLP    SLP Start Time 1115  -MP      SLP Received On 02/17/22  -MP              Untimed Charges    61076-CI Eval Speech and Production w/ Language Minutes 25  -MP      68201-CP Eval Oral Pharyng Swallow Minutes 25  -MP               Total Minutes    Untimed Charges Total Minutes 50  -MP       Total Minutes 50  -MP            User Key  (r) = Recorded By, (t) = Taken By, (c) = Cosigned By    Initials Name Provider Type    MP Deni Interiano MS CCC-SLP Speech and Language Pathologist                Therapy Charges for Today     Code Description Service Date Service Provider Modifiers Qty    08727405190 HC ST EVAL ORAL PHARYNG SWALLOW 2 2022 Deni Interiano MS CCC-SLP GN 1    [id number] HC ST EVAL SPEECH AND PROD W LANG  2 2022 Deni Interiano MS CCC-SLP GN 1                     Deni Evangelista MS CCC-SLP  2022 and Acute Care - Speech Language Pathology   Swallow Re-Evaluation  [place]     Patient Name: Derian Joya  : 1940  MRN: [id number]  Today's Date: 2022               Admit Date: 2022    Visit Dx:     ICD-10-CM ICD-9-CM   1. 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 Fall at nursing home, initial encounter  W19.XXXA E888.9    Y92.129 E849.7   3. Closed fracture of proximal end of right humerus, unspecified fracture morphology, initial encounter  S42.201A 812.00   4. Dysphagia, unspecified type  R13.10 787.20     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)   • Dyslipidemia   • Closed fracture of proximal end of right humerus   • Cervical spine fracture (HCC)     Past Medical History:   Diagnosis Date   • Allergic rhinitis    • Aortic stenosis    • Atopic rhinitis 2016   • Benign  non-nodular prostatic hyperplasia with lower urinary tract symptoms 9/15/2017   • CAD (coronary artery disease)    • Cardiomyopathy, ischemic    • Colon polyps     tubular adenoma 2016   • Community acquired pneumonia    • Depression 7/6/2016   • Diabetes (HCC)    • Diverticulosis of intestine 7/6/2016    Description: Left and sigmoid   • Dyslipidemia    • LBBB (left bundle branch block)    • PAD (peripheral artery disease) (HCC)      Past Surgical History:   Procedure Laterality Date   • CARDIAC CATHETERIZATION  2018    100% cx   • CATARACT EXTRACTION     • COLONOSCOPY W/ BIOPSIES AND POLYPECTOMY  05/2021    1 benign polyp   • HIP SURGERY Left 08/2020    Dr Mandy BOUDREAUX   • LEG SURGERY Left 07/2016    Popliteal Artery stenting by Dr Hdz   • LEG SURGERY Left 07/2016    skin graft by Dr Nance   • LIPOMA EXCISION      s/p excision on chest   • PACEMAKER IMPLANTATION  01/2019    and defibrillator       SLP Recommendation and Plan  SLP Swallowing Diagnosis: suspected pharyngeal dysphagia (02/17/22 1115)  SLP Diet Recommendation: mechanical soft with no mixed consistencies, nectar thick liquids (02/17/22 1115)  Recommended Precautions and Strategies: upright posture during/after eating, general aspiration precautions, assist with feeding (02/17/22 1115)  SLP Rec. for Method of Medication Administration: meds whole, with thick liquids, with pudding or applesauce, as tolerated (02/17/22 1115)     Monitor for Signs of Aspiration: yes, notify SLP if any concerns (02/17/22 1115)  Recommended Diagnostics: VFSS (MBS) (02/17/22 1115)  Swallow Criteria for Skilled Therapeutic Interventions Met: demonstrates skilled criteria (02/17/22 1115)  Anticipated Discharge Disposition (SLP): anticipate therapy at next level of care (02/17/22 1115)  Rehab Potential/Prognosis, Swallowing: good, to achieve stated therapy goals (02/17/22 1115)     Predicted Duration Therapy Intervention (Days): until discharge (02/17/22 1115)                                   Plan of Care Reviewed With: patient      SWALLOW EVALUATION (last 72 hours)     SLP Adult Swallow Evaluation     Row Name 02/17/22 1115 02/16/22 6574                Rehab Evaluation    Document Type -- evaluation  -       Subjective Information -- no complaints  -       Patient Observations -- alert; cooperative  -       Patient/Family/Caregiver Comments/Observations -- No family present.  -AC       Patient Effort -- good  -                General Information    Patient Profile Reviewed -- yes  -AC       Pertinent History Of Current Problem -- Re-adm from [place] after found down & sustained a/c SDH, R humeral fx, & possible c-spine fx. In c-collar. Adm mult times in Jan w/ sz, falls, traumatic SAH/SDH. MBS 1/26 revealed oral dysphagia, pharyngeal phase WFL, SLP rec'd soft/ground diet & thin liquids, eventually upgraded to regular diet @ bedside. PCT noted coughing w/ meals.  -       Current Method of Nutrition mechanical soft, no mixed consistencies; nectar/syrup-thick liquids  - regular textures; thin liquids  -AC       Precautions/Limitations, Vision -- WFL; for purposes of eval  -AC       Precautions/Limitations, Hearing -- WFL; for purposes of eval  -AC       Prior Level of Function-Communication cognitive-linguistic impairment  -MP cognitive-linguistic impairment; other (see comments)  since SDH in January  -       Prior Level of Function-Swallowing no diet consistency restrictions  -MP no diet consistency restrictions  -       Plans/Goals Discussed with -- patient; agreed upon  -       Barriers to Rehab -- medically complex; cognitive status  -       Patient's Goals for Discharge patient did not state  -MP patient did not state  -AC                Pain Scale: FACES Pre/Post-Treatment    Pain: FACES Scale, Pretreatment -- 0-->no hurt  -AC       Posttreatment Pain Rating -- 0-->no hurt  -AC                Oral Motor Structure and Function    Dentition Assessment --  missing teeth  -AC       Secretion Management WNL/WFL  -MP anterior loss  -AC       Mucosal Quality moist, healthy  -MP dry  -AC                Oral Musculature and Cranial Nerve Assessment    Oral Motor General Assessment -- generalized oral motor weakness  -                General Eating/Swallowing Observations    Eating/Swallowing Skills fed by SLP  -MP fed by SLP  -AC       Positioning During Eating upright in bed  - upright 90 degree; upright in bed  -       Utensils Used spoon; cup; straw  -MP spoon; straw; other (see comments)  u/a to test cup 2' c-collar  -AC       Consistencies Trialed thin liquids; nectar/syrup-thick liquids; pureed; regular textures  - thin liquids; nectar/syrup-thick liquids; pureed; regular textures  -                Clinical Swallow Eval    Oral Prep Phase -- WFL  -AC       Oral Transit -- WFL  -AC       Oral Residue -- WFL  -AC       Pharyngeal Phase suspected pharyngeal impairment  -MP suspected pharyngeal impairment  -       Clinical Swallow Evaluation Summary Cough w/ thin liquid wash following solid. Otherwise no s/sxs. Rec continue [facility] soft/no mixed & nectar-thick. SLP will proceed w/ MBS to further assess.  -MP --                Pharyngeal Phase Concerns    Pharyngeal Phase Concerns cough  -MP throat clear  -AC       Cough thin; other (see comments)  liquid wash following solid  -MP --       Throat Clear -- thin  -AC       Pharyngeal Phase Concerns, Comment -- Oral phase seemingly WFL for consistencies trialed. Eventually exhibited consistent throat clear w/ thin liquids. No overt clinical s/sxs aspiration w/ nectar, puree, solid.  -AC                SLP Evaluation Clinical Impression    SLP Swallowing Diagnosis suspected pharyngeal dysphagia  - R/O pharyngeal dysphagia  -       Functional Impact risk of aspiration/pneumonia  -MP risk of aspiration/pneumonia  -AC       Rehab Potential/Prognosis, Swallowing good, to achieve stated therapy goals  -MP good, to  achieve stated therapy goals  -       Swallow Criteria for Skilled Therapeutic Interventions Met demonstrates skilled criteria  -MP demonstrates skilled criteria  -                Recommendations    SLP Diet Recommendation mechanical soft with no mixed consistencies; nectar thick liquids  -MP mechanical soft with no mixed consistencies; nectar thick liquids  -       Recommended Diagnostics VFSS (MBS)  - reassess via clinical swallow evaluation  -       Recommended Precautions and Strategies upright posture during/after eating; general aspiration precautions; assist with feeding  -MP upright posture during/after eating; general aspiration precautions; assist with feeding  -       Oral Care Recommendations Oral Care BID/PRN  -MP Oral Care BID/PRN  -AC       SLP Rec. for Method of Medication Administration meds whole; with thick liquids; with pudding or applesauce; as tolerated  -MP meds whole; with thick liquids; with pudding or applesauce; as tolerated  -       Monitor for Signs of Aspiration yes; notify SLP if any concerns  -MP yes; notify SLP if any concerns  -       Anticipated Discharge Disposition (SLP) anticipate therapy at next level of care  - anticipate therapy at next level of care  -AC       Demonstrates Need for Referral to Another Service -- speech therapy; other (see comments)  for eval/tx of cognitive impairments  -             User Key  (r) = Recorded By, (t) = Taken By, (c) = Cosigned By    Initials Name Effective Dates    AC Alyssa Agarwal MS CCC-SLP 06/16/21 -     Deni Mims MS CCC-SLP 12/28/21 -                 EDUCATION  The patient has been educated in the following areas:   Dysphagia (Swallowing Impairment) Modified Diet Instruction.        SLP GOALS     Row Name 02/17/22 1115             Comprehend Questions Goal 1 (SLP)    Improve Ability to Comprehend Questions Goal 1 (SLP) complex yes/no questions; complex wh questions; multi-unit questions; 80%; with  minimal cues (75-90%)  -MP      Time Frame (Comprehend Questions Goal 1, SLP) short term goal (STG)  -MP              Follow Directions Goal 2 (SLP)    Improve Ability to Follow Directions Goal 1 (SLP) 2 step commands; 80%; with minimal cues (75-90%)  -MP      Time Frame (Follow Directions Goal 1, SLP) short term goal (STG)  -MP              Attention Goal 1 (SLP)    Improve Attention by Goal 1 (SLP) looking at speaker; attending to task; 80%; with minimal cues (75-90%)  -MP      Time Frame (Attention Goal 1, SLP) short term goal (STG)  -MP              Orientation Goal 1 (SLP)    Improve Orientation Through Goal 1 (SLP) demonstrating orientation to day; demonstrating orientation to month; demonstrating orientation to year; demonstrating orientation to place; demonstrating orientation to disease/impairment; use environmental aids to assist with orientation; 80%; with minimal cues (75-90%)  -MP      Time Frame (Orientation Goal 1, SLP) short term goal (STG)  -MP              Functional Problem Solving Skills Goal 1 (SLP)    Improve Problem Solving Through Goal 1 (SLP) answer questions about ADL problems; determine solutions to simple ADL/safety problems; 80%; with minimal cues (75-90%)  -MP      Time Frame (Problem Solving Goal 1, SLP) short term goal (STG)  -MP              Additional Goal 1 (SLP)    Additional Goal 1, SLP LTG: Pt will improve cognitive-communication in order to fully participate in care while in hospital setting w/ 100% acc and no cues  -MP      Time Frame (Additional Goal 1, SLP) by discharge  -MP            User Key  (r) = Recorded By, (t) = Taken By, (c) = Cosigned By    Initials Name Provider Type    Deni Mims MS CCC-SLP Speech and Language Pathologist                   Time Calculation:    Time Calculation- SLP     Row Name 02/17/22 1137             Time Calculation- SLP    SLP Start Time 1115  -MP      SLP Received On 02/17/22  -MP              Untimed Charges    83214-CQ Eval  Speech and Production w/ Language Minutes 25  -MP      57925-US Eval Oral Pharyng Swallow Minutes 25  -MP              Total Minutes    Untimed Charges Total Minutes 50  -MP       Total Minutes 50  -MP            User Key  (r) = Recorded By, (t) = Taken By, (c) = Cosigned By    Initials Name Provider Type    Deni Mims MS CCC-SLP Speech and Language Pathologist                Therapy Charges for Today     Code Description Service Date Service Provider Modifiers Qty    [id number] HC ST EVAL ORAL PHARYNG SWALLOW 2 2/17/2022 Deni Interiano MS CCC-SLP GN 1    [id number] HC ST EVAL SPEECH AND PROD W LANG  2 2/17/2022 Deni Interiano MS CCC-SLP GN 1        Patient was not wearing a face mask and did exhibit coughing during this therapy encounter.  Procedure performed was aerosolizing, involved close contact (within 6 feet for at least 15 minutes or longer), and did not involve contact with infectious secretions or specimens.  Therapist used appropriate personal protective equipment including gloves, standard procedure mask and eye protection.  Appropriate PPE was worn during the entire therapy session.  Hand hygiene was completed before and after therapy session.        MS PH[place]EN[place] Humphrey  2/17/2022

## 2022-02-17 NOTE — THERAPY TREATMENT NOTE
Patient Name: Derian Joya  : 1940    MRN: [id number]                              Today's Date: 2022       Admit Date: 2022    Visit Dx:     ICD-10-CM ICD-9-CM   1. 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 Fall at nursing home, initial encounter  W19.XXXA E888.9    Y92.129 E849.7   3. Closed fracture of proximal end of right humerus, unspecified fracture morphology, initial encounter  S42.201A 812.00   4. Dysphagia, unspecified type  R13.10 787.20     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)   • Dyslipidemia   • Closed fracture of proximal end of right humerus   • Cervical spine fracture (HCC)     Past Medical History:   Diagnosis Date   • Allergic rhinitis    • Aortic stenosis    • Atopic rhinitis 2016   • Benign non-nodular prostatic hyperplasia with lower urinary tract symptoms 9/15/2017   • CAD (coronary artery disease)    • Cardiomyopathy, ischemic    • Colon polyps     tubular adenoma    • Community acquired pneumonia    • Depression 2016   • Diabetes (HCC)    • Diverticulosis of intestine 2016    Description: Left and sigmoid   • Dyslipidemia    • LBBB (left bundle branch block)    • PAD (peripheral artery disease) (HCC)      Past Surgical History:   Procedure Laterality Date   • CARDIAC CATHETERIZATION      100% cx   • CATARACT EXTRACTION     • COLONOSCOPY W/ BIOPSIES AND POLYPECTOMY  2021    1 benign polyp   • HIP SURGERY Left 2020    Dr Galvan ORIF   • LEG SURGERY  Left 07/2016    Popliteal Artery stenting by Dr Hdz   • LEG SURGERY Left 07/2016    skin graft by Dr Nance   • LIPOMA EXCISION      s/p excision on chest   • PACEMAKER IMPLANTATION  01/2019    and defibrillator      General Information     Row Name 02/17/22 1406          Physical Therapy Time and Intention    Document Type therapy note (daily note)  -CD     Mode of Treatment physical therapy  -CD     Row Name 02/17/22 1407          General Information    Patient Profile Reviewed yes  -CD     Existing Precautions/Restrictions fall; non-weight bearing; right; brace worn when out of bed; spinal  cervical fxs w/ hard c-collar on AAT, R proximal humerus fx NWB w/ sling on AAT no shoulder ROM, ACUTE ON CHRONIC SDH.  -CD     Barriers to Rehab medically complex; previous functional deficit; cognitive status  -CD     Row Name 02/17/22 1402          Cognition    Orientation Status (Cognition) oriented to; person; disoriented to; place; situation; time  -CD     Row Name 02/17/22 1402          Safety Issues, Functional Mobility    Safety Issues Affecting Function (Mobility) ability to follow commands; awareness of need for assistance; insight into deficits/self-awareness; positioning of assistive device; safety precaution awareness; safety precautions follow-through/compliance; sequencing abilities  -CD     Impairments Affecting Function (Mobility) balance; cognition; endurance/activity tolerance; pain; postural/trunk control; range of motion (ROM)  -CD     Cognitive Impairments, Mobility Safety/Performance attention; insight into deficits/self-awareness; safety precaution awareness; safety precaution follow-through; sequencing abilities; awareness, need for assistance  -CD     Comment, Safety Issues/Impairments (Mobility) PT DROWSY BUT AROUSABLE AND COOPERATIVE. PT HAS DECRESED PROCESSING AND NEED MAX VERBAL CUES FOR SEQUENCING. LIMITED BY R UE PAIN (SLING), HAS CERVICAL COLLAR.  -CD           User Key  (r) = Recorded By, (t)  = Taken By, (c) = Cosigned By    Initials Name Provider Type     Marybeth Edwards, PT Physical Therapist               Mobility     Row Name 02/17/22 1412          Bed Mobility    Bed Mobility supine-sit  -CD     Supine-Sit Wake (Bed Mobility) maximum assist (25% patient effort); 2 person assist  -     Assistive Device (Bed Mobility) bed rails; draw sheet; head of bed elevated  -CD     Comment (Bed Mobility) O.T. ADJUSTED SLING FOR PROPER FIT R UE PRIOR TO SITTING EOB. ONCE SITTING EOB ADJUSTED CERVICAL COLLAR FOR PROPER FIT. PT DEPENDENT VIA DRAW SHEET TO SCOOT B HIPS TO EOB.  -     Row Name 02/17/22 1412          Transfers    Comment (Transfers) CUES FOR HAND PLACEMENT. STS VIA L UE SUPPORT AND GAIT BELT. STEP PIVOT BED TO CHAIR VIA L UE SUPPORT AND GAIT BELT. PT DEMONSTRATES VERY NARROW BOBBI WITH SHUFFLING STEPS. NEEDS MANUAL ASSIST TO WT SHIFT.  -     Row Name 02/17/22 1412          Bed-Chair Transfer    Bed-Chair Wake (Transfers) moderate assist (50% patient effort); 2 person assist; verbal cues  -     Row Name 02/17/22 1412          Sit-Stand Transfer    Sit-Stand Wake (Transfers) moderate assist (50% patient effort); 2 person assist; verbal cues  -CD     Row Name 02/17/22 1412          Gait/Stairs (Locomotion)    Comment (Gait/Stairs) DEFERRED DUE TO FATIGUE, IMPAIRED BALANCE.  -           User Key  (r) = Recorded By, (t) = Taken By, (c) = Cosigned By    Initials Name Provider Type    Marybeth Tavarez PT Physical Therapist               Obj/Interventions     Row Name 02/17/22 1415          Range of Motion Comprehensive    General Range of Motion bilateral lower extremity ROM WFL  -CD     Comment, General Range of Motion DECREASED HAMSTRING FLEXIBILITY B LE IN LAQ.  -     Row Name 02/17/22 1415          Strength Comprehensive (MMT)    Comment, General Manual Muscle Testing (MMT) Assessment B LE GROSSLY 3+ TO 4/5. PT UNABLE TO FOLLOW COMMANDS FOR MMT.  -     Row Name  02/17/22 1415          Motor Skills    Motor Skills coordination; functional endurance  -CD     Coordination gross motor deficit; bilateral; lower extremity; minimal impairment  -CD     Functional Endurance PT FATIGUES QUICKLY. O2 SATS STABLE ON RA.  -CD     Therapeutic Exercise --  SEATED B LE THER EX: 1 SET OF 10 REPS EACH: LAQ, HIP FLEX, AP'S - AAROM TO STAY ON TASK.  -CD     Row Name 02/17/22 1415          Balance    Balance Assessment sitting static balance; standing static balance; sitting dynamic balance; standing dynamic balance  -CD     Static Sitting Balance WFL; supported; sitting, edge of bed  -CD     Dynamic Sitting Balance mild impairment; supported; sitting, edge of bed  -CD     Static Standing Balance moderate impairment; supported; standing  -CD     Dynamic Standing Balance supported; standing; severe impairment  -CD     Balance Interventions sitting; standing; sit to stand; supported; static; dynamic  -CD     Comment, Balance STAND STEP PIVOT TRANSFER BED TO CHAIR WITH MOD ASSIST OF 2. PT HAS SHUFFLING STEPS WITH NARROW BOBBI.  -CD           User Key  (r) = Recorded By, (t) = Taken By, (c) = Cosigned By    Initials Name Provider Type    CD Marybeth Edwards, PT Physical Therapist               Goals/Plan    No documentation.                Clinical Impression     Row Name 02/17/22 1421          Pain    Additional Documentation Pain Scale: FACES Pre/Post-Treatment (Group)  -CD     Row Name 02/17/22 1421          Pain Scale: FACES Pre/Post-Treatment    Pain: FACES Scale, Pretreatment 0-->no hurt  6 /10 WITH POSITIONING OF R UE PROPERLY IN SLING/DONNING GOWN.  -CD     Posttreatment Pain Rating 0-->no hurt  -CD     Pre/Posttreatment Pain Comment TOLERATED PIVOT TRANSFER BED TO CHAIR.  -CD     Row Name 02/17/22 1421          Plan of Care Review    Plan of Care Reviewed With patient  -CD     Outcome Summary PT COOPERATIVE WITH THERAPY BUT NEEDS INCREASED TIME TO PROCESS COMMANDS. C/O PAIN R SHOULDER WITH  MOVEMENT TO ADJUST SLING. COMPLETED SUPINE TO SIT WITH MAX ASSIST OF 2 AND SIT TO STAND, PIVOT STEP TRANSFER WITH MOD ASSIST OF 2. RECOMMEND SNF AT D/C.  -CD     Row Name 02/17/22 1421          Therapy Assessment/Plan (PT)    Patient/Family Therapy Goals Statement (PT) PT UNABLE TO STATE.  -CD     Rehab Potential (PT) fair, will monitor progress closely  -CD     Criteria for Skilled Interventions Met (PT) yes; skilled treatment is necessary  -CD     Row Name 02/17/22 1421          Vital Signs    Post Systolic BP Rehab 138  -CD     Post Treatment Diastolic BP 75  -CD     Posttreatment Heart Rate (beats/min) 83  -CD     Pre SpO2 (%) 96  -CD     O2 Delivery Pre Treatment room air  -CD     O2 Delivery Intra Treatment room air  -CD     Post SpO2 (%) 96  -CD     O2 Delivery Post Treatment room air  -CD     Pre Patient Position Supine  -CD     Intra Patient Position Standing  -CD     Post Patient Position Sitting  -CD     Row Name 02/17/22 1421          Positioning and Restraints    Pre-Treatment Position in bed  -CD     Post Treatment Position chair  -CD     In Chair reclined; encouraged to call for assist; call light within reach; exit alarm on; legs elevated; waffle cushion; on mechanical lift sling  SLING R UE AND CERVICAL COLLAR INTACT.  -CD           User Key  (r) = Recorded By, (t) = Taken By, (c) = Cosigned By    Initials Name Provider Type    CD Marybeth Edwards, PT Physical Therapist               Outcome Measures     Row Name 02/17/22 1427 02/17/22 0800       How much help from another person do you currently need...    Turning from your back to your side while in flat bed without using bedrails? 2  -CD 3  -DEVIN    Moving from lying on back to sitting on the side of a flat bed without bedrails? 2  -CD 3  -DEVIN    Moving to and from a bed to a chair (including a wheelchair)? 2  -CD 1  -DEVIN    Standing up from a chair using your arms (e.g., wheelchair, bedside chair)? 2  -CD 1  -DEVIN    Climbing 3-5 steps with a railing? 1   -CD 1  -DEVIN    To walk in hospital room? 2  -CD 1  -DEVIN    AM-PAC 6 Clicks Score (PT) 11  -CD 10  -DEVIN          User Key  (r) = Recorded By, (t) = Taken By, (c) = Cosigned By    Initials Name Provider Type    CD Marybeth Edwards, PT Physical Therapist    Elena Bolanos, RN Registered Nurse                             Physical Therapy Education                 Title: PT OT SLP Therapies (In Progress)     Topic: Physical Therapy (Done)     Point: Mobility training (Done)     Learning Progress Summary           Patient Acceptance, E, VU,NR by CD at 2/17/2022 1428    Comment: SEE FLOWSHEET.    Acceptance, E, NR by KG at 2/16/2022 1508                   Point: Home exercise program (Done)     Learning Progress Summary           Patient Acceptance, E, VU,NR by CD at 2/17/2022 1428    Comment: SEE FLOWSHEET.    Acceptance, E, NR by KG at 2/16/2022 1508                   Point: Body mechanics (Done)     Learning Progress Summary           Patient Acceptance, E, VU,NR by CD at 2/17/2022 1428    Comment: SEE FLOWSHEET.    Acceptance, E, NR by KG at 2/16/2022 1508                   Point: Precautions (Done)     Learning Progress Summary           Patient Acceptance, E, VU,NR by CD at 2/17/2022 1428    Comment: SEE FLOWSHEET.    Acceptance, E, NR by KG at 2/16/2022 1508                               User Key     Initials Effective Dates Name Provider Type Discipline    CD 06/16/21 -  Marybeth Edwards, PT Physical Therapist PT    KG 05/22/20 -  Caitlin Evans, PT Physical Therapist PT              PT Recommendation and Plan     Plan of Care Reviewed With: patient  Outcome Summary: PT COOPERATIVE WITH THERAPY BUT NEEDS INCREASED TIME TO PROCESS COMMANDS. C/O PAIN R SHOULDER WITH MOVEMENT TO ADJUST SLING. COMPLETED SUPINE TO SIT WITH MAX ASSIST OF 2 AND SIT TO STAND, PIVOT STEP TRANSFER WITH MOD ASSIST OF 2. RECOMMEND SNF AT D/C.     Time Calculation:    PT Charges     Row Name 02/17/22 1429             Time Calculation     Start Time 1328  -CD      PT Received On 02/17/22  -CD      PT Goal Re-Cert Due Date 02/26/22  -CD              Time Calculation- PT    Total Timed Code Minutes- PT 16 minute(s)  -CD              Timed Charges    06691 - PT Therapeutic Activity Minutes 16  -CD              Total Minutes    Timed Charges Total Minutes 16  -CD       Total Minutes 16  -CD            User Key  (r) = Recorded By, (t) = Taken By, (c) = Cosigned By    Initials Name Provider Type    CD Marybeth Edwards, PT Physical Therapist              Therapy Charges for Today     Code Description Service Date Service Provider Modifiers Qty    [id number]  PT THERAPEUTIC ACT EA 15 MIN 2/17/2022 Marybeth Edwards, PT GP 1          PT G-Codes  Outcome Measure Options: AM-PAC 6 Clicks Daily Activity (OT)  AM-PAC 6 Clicks Score (PT): 11  AM-PAC 6 Clicks Score (OT): 9    Marybeth Edwards, PT  2/17/2022

## 2022-02-17 NOTE — PROGRESS NOTES
[facility] Medicine Services  PROGRESS NOTE    Patient Name: Derian Joya  : 1940  MRN: [id number]    Date of Admission: 2022  Primary Care Physician: Diaz Lee MD    Subjective   Subjective     CC:  Fall    HPI:  Somnolent today but he had just received a dose of pain medication    ROS:  Gen- No fevers, chills  CV- No chest pain, palpitations  Resp- No cough, dyspnea  GI- No N/V/D, abd pain         Objective   Objective     Vital Signs:   Temp:  [96.9 °F (36.1 °C)-98.7 °F (37.1 °C)] 97.4 °F (36.3 °C)  Heart Rate:  [70-96] 73  Resp:  [16-20] 16  BP: (105-159)/() 153/85     Physical Exam:  Constitutional: Drowsy, chronically ill-appearing, frail appearing  HENT: NCAT, mucous membranes moist  Respiratory: Clear to auscultation bilaterally, respiratory effort normal   Cardiovascular: RRR, no murmurs, rubs, or gallops  Gastrointestinal: Positive bowel sounds, soft, nontender, nondistended  Musculoskeletal: No bilateral ankle edema  Psychiatric: Appropriate affect, cooperative  Neurologic: Oriented to person and place, strength symmetric in all extremities, Cranial Nerves grossly intact to confrontation, drowsy  Skin: No rashes      Results Reviewed:  LAB RESULTS:      Lab 22  0400 02/15/22  0328 22  1440 22  0643   WBC 7.69 7.86  --  11.01*   HEMOGLOBIN 11.5* 11.2*  --  12.2*   HEMATOCRIT 35.1* 33.0*  --  37.9   PLATELETS 194 186  --  207   NEUTROS ABS 5.96 5.81  --  9.57*   IMMATURE GRANS (ABS) 0.02 0.03  --  0.05   LYMPHS ABS 0.91 1.08  --  0.77   MONOS ABS 0.74 0.88  --  0.54   EOS ABS 0.04 0.04  --  0.06   MCV 98.0* 95.9  --  100.0*   PROCALCITONIN  --   --   --  <0.02   LACTATE 1.3  --  2.5* 2.8*         Lab 22  1757 22  0400 02/15/22  1613 02/15/22  0328 22  0643   SODIUM  --  137  --  136 141   POTASSIUM 4.8 3.5 4.3 3.3* 3.5   CHLORIDE  --  100  --  99 101   CO2  --  28.0  --  26.0 25.0   ANION GAP  --  9.0  --  11.0 15.0    BUN  --  7*  --  6* 11   CREATININE  --  0.42*  --  0.40* 0.56*   GLUCOSE  --  164*  --  173* 245*   CALCIUM  --  8.3*  --  8.6 8.8   MAGNESIUM  --  2.1  --  1.4*  --    PHOSPHORUS  --  2.7  --  3.0  --          Lab 02/15/22  0328 02/14/22  0643   TOTAL PROTEIN 6.1 6.2   ALBUMIN 3.50 3.60   GLOBULIN 2.6 2.6   ALT (SGPT) 17 19   AST (SGOT) 18 22   BILIRUBIN 1.1 0.7   ALK PHOS 99 118*         Lab 02/14/22  0643   TROPONIN T <0.010                 Brief Urine Lab Results  (Last result in the past 365 days)      Color   Clarity   Blood   Leuk Est   Nitrite   Protein   CREAT   Urine HCG        02/14/22 0815 Yellow   Clear   Trace   Negative   Negative   Negative                 Microbiology Results Abnormal     Procedure Component Value - Date/Time    Blood Culture - Blood, Hand, Right [[id number]]  (Normal) Collected: 02/14/22 1519    Lab Status: Preliminary result Specimen: Blood from Hand, Right Updated: 02/16/22 1545     Blood Culture No growth at 2 days    Narrative:      Aerobic only    Blood Culture - Blood, Wrist, Right [[id number]]  (Normal) Collected: 02/14/22 1524    Lab Status: Preliminary result Specimen: Blood from Wrist, Right Updated: 02/16/22 1545     Blood Culture No growth at 2 days    Urine Culture - Urine, Urine, Catheter In/Out [[id number]] Collected: 02/14/22 0815    Lab Status: Final result Specimen: Urine, Catheter In/Out Updated: 02/15/22 1025     Urine Culture 25,000 CFU/mL Normal Urogenital Lesli    COVID PRE-OP / PRE-PROCEDURE SCREENING ORDER (NO ISOLATION) - Swab, Nasopharynx [[id number]]  (Normal) Collected: 02/14/22 0651    Lab Status: Final result Specimen: Swab from Nasopharynx Updated: 02/14/22 0752    Narrative:      The following orders were created for panel order COVID PRE-OP / PRE-PROCEDURE SCREENING ORDER (NO ISOLATION) - Swab, Nasopharynx.  Procedure                               Abnormality         Status                     ---------                               -----------          ------                     COVID-19 and FLU A/B PCR...[[id number]]  Normal              Final result                 Please view results for these tests on the individual orders.    COVID-19 and FLU A/B PCR - Swab, Nasopharynx [[id number]]  (Normal) Collected: 02/14/22 0651    Lab Status: Final result Specimen: Swab from Nasopharynx Updated: 02/14/22 0751     COVID19 Not Detected     Influenza A PCR Not Detected     Influenza B PCR Not Detected    Narrative:      Fact sheet for providers: https://www.fda.gov/media/937907/download    Fact sheet for patients: https://www.fda.gov/media/497108/download    Test performed by PCR.          MRI Cervical Spine Without Contrast    Result Date: 2/15/2022  EXAMINATION: MRI CERVICAL SPINE WO CONTRAST-  INDICATION: trauma, abnl ct scan; I62.01-Nontraumatic acute subdural hemorrhage; I62.03-Nontraumatic chronic subdural hemorrhage; W19.XXXA-Unspecified fall, initial encounter; Y92.129-Unspecified place in nursing home as the place of occurrence of the external cause; S42.201A-Unspecified fracture of upper end of right humerus, initial encounter for closed fracture  TECHNIQUE: Sagittal T1 and T2 STIR axial gradient echo and sagittal T2 space images of the cervical spine  COMPARISON: Cervical spine CT scan to 14 2022  FINDINGS: Previous exam report indicated mild anterior wedging of C7, T1 and probably T2, mild widening of C6-7 intervertebral disc space nonspecific.  Today's study shows no significant focal subluxation. No cervical compression deformity or marrow edema is seen down to the level of C6. No paraspinous soft tissue edema is appreciated. There is, however, very subtle marrow edema of C7, T1, T2, and the superior endplate of T3, as suspected from previous cervical spine CT scan, consistent with mild vertebral injury. There are low signal T1 changes of the upper margins of C7 T1 T2 and T3, again consistent with mild underlying acute or recent compression deformities here.   There is focal narrowing of the canal at the C5-6 level, due to vertebral osteophyte facet osteophyte and disc protrusion. No significant stenosis is appreciated at the remaining levels. No intrinsic cervical cord lesions are identified. Anatomy of the craniocervical junction appears normal. Included upper thoracic canal and cord appear normal.  Axial images are motion degraded, but show the canal to appear lower limits of normal diameter at C2-3 due to central disc bulge.  At C3-4, there is a central disc protrusion with borderline canal stenosis. Foramina appear normal on the right, moderately narrowed of left allowing for fairly extensive motion artifact.  At C4-5, there is a central disc protrusion with mild canal stenosis. Foramina are probably normal but again suffer from motion-related blurring.  At C5-6, there is mild-to-moderate canal stenosis, due to broad-based disc protrusion, and posterior element hypertrophy with effacement of all but a small amount of anterior CSF, and moderate to marked bilateral foraminal stenosis.  At C6-7, canal and right neural foramen appear normal. There is moderate left-sided foraminal narrowing.  At C7-T1, canal and foramina appear grossly normal.  The motion degraded axial images do not show obvious cervical cord edema or atrophy.         Impression: 1. Motion degraded exam, but with marrow edema of C7, T1, T2 and the superior endplate of T3, consistent with acute or recent vertebral injury. No significant associated subluxation or spinal stenosis. Findings are consistent with patient's previous CT scan abnormalities. 2. Mild multilevel canal stenosis, a little greater at C5-C6 than elsewhere, due to a combination of posterior element hypertrophy, disc protrusion and vertebral osteophyte. No high-grade canal stenosis is seen.   This report was finalized on 2/15/2022 4:40 PM by Dr. Kendrick Jane MD.        Results for orders placed during the hospital encounter of  01/11/22    Adult Transthoracic Echo Complete W/ Cont if Necessary Per Protocol    Interpretation Summary  · Mild aortic valve stenosis is present.  · Estimated right ventricular systolic pressure from tricuspid regurgitation is normal (<35 mmHg).  · LVSF is normal  · MAC      I have reviewed the medications:  Scheduled Meds:atorvastatin, 80 mg, Oral, Nightly  bisoprolol, 10 mg, Oral, Q24H  famotidine, 20 mg, Oral, BID AC  insulin lispro, 0-9 Units, Subcutaneous, 4x Daily With Meals & Nightly  lacosamide, 100 mg, Intravenous, Q12H  levETIRAcetam, 250 mg, Intravenous, Q12H  sodium chloride, 10 mL, Intravenous, Q12H      Continuous Infusions:niCARdipine, 5-15 mg/hr, Last Rate: Stopped (02/16/22 1131)      PRN Meds:.•  acetaminophen **OR** acetaminophen  •  dextrose  •  dextrose  •  glucagon (human recombinant)  •  HYDROmorphone  •  labetalol  •  magnesium sulfate **OR** magnesium sulfate **OR** magnesium sulfate  •  melatonin  •  potassium chloride  •  potassium chloride  •  sodium chloride    Assessment/Plan   Assessment & Plan     Active Hospital Problems    Diagnosis  POA   • **Acute on chronic intracranial subdural hematoma (HCC) [I62.01, I62.03]  Yes   • Closed fracture of proximal end of right humerus [S42.201A]  Yes   • Cervical spine fracture (HCC) [S12.9XXA]  Yes   • Dyslipidemia [E78.5]  Yes   • Cardiomyopathy, nonischemic (HCC) [I42.8]  Yes   • Aortic stenosis [I35.0]  Yes   • Uncontrolled type 2 diabetes mellitus (HCC) [E11.65]  Yes   • Essential hypertension [I10]  Yes      Resolved Hospital Problems   No resolved problems to display.        Brief Hospital Course to date:  Derian Joya is a 82 y.o. male with a history of tobacco abuse, CAD, hypertension, aortic stenosis, diabetes, multiple hospitalizations secondary to falls and subarachnoid hemorrhage and subdural hematoma, who is being admitted again after he was found down by nursing staff at the [facility].  He was unresponsive upon admission and  admitted to the ICU.  Imaging work-up with the brain showed acute on chronic subdural hematoma with mild midline shift of 3 to 4 mm.  Neurosurgery evaluated and did not recommend surgical intervention at this time.  Neurology continue to follow.  He also has an acute right humeral fracture and orthopedics has been consulted for this.    Acute encephalopathy  -Etiology suspected to be secondary to seizure.  EEG shows sharp waves but no epileptic activity  -Continue Vimpat and Keppra per neurology recommendations as well as seizure precautions. Decrease Keppra per neurology recommendations, B6 supplement and Vimpat started as well for increased somnolence    Possible cervical fracture  Marrow edema of C7, T1, T2  -Neurosurgery consulted    Right proximal humerus fracture  -Evaluated by Dr. Jorgensen who recommends follow-up outpatient in 3 weeks in the office and to continue sling    Hypertension  -Initially on nicardipine but able to be transitioned off following resumption of home bisoprolol    Diabetes  Hyperglycemia  -On correctional insulin    DVT prophylaxis:  Mechanical DVT prophylaxis orders are present.       AM-PAC 6 Clicks Score (PT): 10 (02/16/22 1955)    Disposition: I expect the patient to be discharged Saturday.    CODE STATUS:   Code Status and Medical Interventions:   Ordered at: 02/14/22 0844     Code Status (Patient has no pulse and is not breathing):    CPR (Attempt to Resuscitate)     Medical Interventions (Patient has pulse or is breathing):    Full Support       Anitra Dimas MD  02/17/22

## 2022-02-17 NOTE — PLAN OF CARE
Goal Outcome Evaluation:  Plan of Care Reviewed With: patient            SLP re-evaluation completed. Will  proceed w/ MBS to further assess . Please see note for further details and recommendations.

## 2022-02-17 NOTE — PROGRESS NOTES
"      [facility] Orthopaedic Surgery Progress Note    Subjective      LOS: 3 days   Patient Care Team:  Diaz Lee MD as PCP - General    CC: Right shoulder pain    Interval History:   Working with therapy. No new complaints.    Objective      Vital Signs  Temp (24hrs), Av.9 °F (36.6 °C), Min:97.4 °F (36.3 °C), Max:98.7 °F (37.1 °C)      /85   Pulse 84   Temp 97.6 °F (36.4 °C) (Oral)   Resp 18   Ht 165.1 cm (65\")   Wt 61.7 kg (136 lb)   SpO2 96%   BMI 22.63 kg/m²     Examination:   Integument:   Right shoulder: Ecchymosis and swelling    Upper Extremities:   Right Shoulder:    Tenderness:  Positive    Swelling:  Positive  Deformities:  None  Sensation and motor intact in the hand    Labs:  Results from last 7 days   Lab Units 22  0400 02/15/22  0328 22  0643   WBC 10*3/mm3 7.69 7.86 11.01*   HEMOGLOBIN g/dL 11.5* 11.2* 12.2*   HEMATOCRIT % 35.1* 33.0* 37.9   MCV fL 98.0* 95.9 100.0*   PLATELETS 10*3/mm3 194 186 207       Radiology:  Imaging Results (Last 24 Hours)     Procedure Component Value Units Date/Time    FL Video Swallow With Speech Single Contrast [[id number]] Resulted: 22 1526     Updated: 22 1521          PT:  Physical Therapy - Plan of Care Review - Outcome Summary:  Outcome Summary: PT COOPERATIVE WITH THERAPY BUT NEEDS INCREASED TIME TO PROCESS COMMANDS. C/O PAIN R SHOULDER WITH MOVEMENT TO ADJUST SLING. COMPLETED SUPINE TO SIT WITH MAX ASSIST OF 2 AND SIT TO STAND, PIVOT STEP TRANSFER WITH MOD ASSIST OF 2. RECOMMEND SNF AT D/C. (22 1421)]       Results Review:     I reviewed the patient's new clinical results.    Assessment and Plan     Assessment:   Right proximal humerus fracture    Continue sling  Follow-up with me in 3 weeks in the office  Please feel free to contact me for any questions      Acute on chronic intracranial subdural hematoma (HCC)    Uncontrolled type 2 diabetes mellitus (HCC)    Essential hypertension    Aortic stenosis    " Cardiomyopathy, nonischemic (HCC)    Dyslipidemia    Closed fracture of proximal end of right humerus    Cervical spine fracture (HCC)        Future Appointments   Date Time Provider Department Center   3/7/2022  9:00 AM Reid Wood MD [facility] N [facility]   3/7/2022  3:10 PM Willie Jorgensen MD [facility]   3/14/2022  9:45 AM [facility] CT 1 [facility] TOPHER CT SO [facility]   3/14/2022 12:00 PM Miguel Ángel Scruggs PA-C [facility]           Willie Jorgensen MD  02/17/22  15:34 EST

## 2022-02-17 NOTE — PROGRESS NOTES
"Neurology       Patient Care Team:  Diaz Lee MD as PCP - General    Chief complaint SDH, Seizure       Subjective .     History:    More awake today but didn't eat breakfast  No further seizure        Objective     Vital Signs   Blood pressure 153/85, pulse 73, temperature 97.4 °F (36.3 °C), temperature source Oral, resp. rate 16, height 165.1 cm (65\"), weight 61.7 kg (136 lb), SpO2 96 %.    Physical Exam:  Elderly man, more awake today but distant, in a c-collar  He is oriented to person/place and time  Antigravity lower and left upper, right upper is in a sling  Speech is clear  Follows commands  Sensation intact     Results Review:  Am labs reviewed     Assessment/Plan     81 y/o man with history of traumatic SDH complicated by seizure who had a fall at skilled nursing home causing a right humeral fracture and new SDH. Concern for additional seizure and he is currently on higher dose keppra and vimpat. No seizure on EEG but sharps were appreciated.     No seizures since admission but was somnolent and so decreased keppra and he is more alert. Added B6 for keppra agitation. Will continue vimpat 100mg bid.     Encouraged patient to eat lunch, he is not taking in much at all.     I discussed the patients findings and my recommendations with patient and primary team   Amy Mckeon MD  02/17/22  10:25 EST    "

## 2022-02-17 NOTE — NURSING NOTE
"Pt found, trying to get out of bed. Pt d/c'd his monitoring devices, his peripheral IV, and his external catheter. Pt was pulling at his c-collar trying to pull it off.  While trying to reorient patient, pt became agitated and restless.  Pt states, 'Im going to go take a shower.\"    RN reoriented pt to place and situation. Incontinence care performed.  Pt denies pain.     Moshe Santizo APRN to see.  "

## 2022-02-17 NOTE — THERAPY TREATMENT NOTE
Patient Name: Derian Joya  : 1940    MRN: [id number]                              Today's Date: 2022       Admit Date: 2022    Visit Dx:     ICD-10-CM ICD-9-CM   1. 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 Fall at nursing home, initial encounter  W19.XXXA E888.9    Y92.129 E849.7   3. Closed fracture of proximal end of right humerus, unspecified fracture morphology, initial encounter  S42.201A 812.00   4. Dysphagia, unspecified type  R13.10 787.20     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)   • Dyslipidemia   • Closed fracture of proximal end of right humerus   • Cervical spine fracture (HCC)     Past Medical History:   Diagnosis Date   • Allergic rhinitis    • Aortic stenosis    • Atopic rhinitis 2016   • Benign non-nodular prostatic hyperplasia with lower urinary tract symptoms 9/15/2017   • CAD (coronary artery disease)    • Cardiomyopathy, ischemic    • Colon polyps     tubular adenoma    • Community acquired pneumonia    • Depression 2016   • Diabetes (HCC)    • Diverticulosis of intestine 2016    Description: Left and sigmoid   • Dyslipidemia    • LBBB (left bundle branch block)    • PAD (peripheral artery disease) (HCC)      Past Surgical History:   Procedure Laterality Date   • CARDIAC CATHETERIZATION      100% cx   • CATARACT EXTRACTION     • COLONOSCOPY W/ BIOPSIES AND POLYPECTOMY  2021    1 benign polyp   • HIP SURGERY Left 2020    Dr Galvan ORIF   • LEG SURGERY  Left 07/2016    Popliteal Artery stenting by Dr Hdz   • LEG SURGERY Left 07/2016    skin graft by Dr Nance   • LIPOMA EXCISION      s/p excision on chest   • PACEMAKER IMPLANTATION  01/2019    and defibrillator      General Information     Row Name 02/17/22 1424          OT Time and Intention    Document Type therapy note (daily note)  -JY     Mode of Treatment occupational therapy  -J     Row Name 02/17/22 1424          General Information    Patient Profile Reviewed yes  -JY     Existing Precautions/Restrictions fall; non-weight bearing; right; brace worn when out of bed; spinal  RUE NWB w/ sling donned AAT, no shoulder ROM, cervical collar donned AAT d/t cervical fxs, acute on chronic SDH  -JY     Barriers to Rehab medically complex; previous functional deficit; cognitive status  -     Row Name 02/17/22 1424          Cognition    Orientation Status (Cognition) oriented to; person; disoriented to; place; situation; time  -JY     Row Name 02/17/22 1424          Safety Issues, Functional Mobility    Safety Issues Affecting Function (Mobility) ability to follow commands; awareness of need for assistance; insight into deficits/self-awareness; positioning of assistive device; safety precaution awareness; safety precautions follow-through/compliance; sequencing abilities  -JY     Impairments Affecting Function (Mobility) balance; cognition; endurance/activity tolerance; pain; postural/trunk control; range of motion (ROM)  -JY     Cognitive Impairments, Mobility Safety/Performance attention; insight into deficits/self-awareness; safety precaution awareness; safety precaution follow-through; sequencing abilities; awareness, need for assistance  -JY     Comment, Safety Issues/Impairments (Mobility) pt drowsy yet able to arouse for safe ADLs, fxl t/f; pt presents w/ need for increased processing time and further response time, provided cues for sequencing, safety; R sling and cervical collar both donned during all  mobility; pt reports increased pain at RUE with any mobility even with precautions in place  -JY           User Key  (r) = Recorded By, (t) = Taken By, (c) = Cosigned By    Initials Name Provider Type    Roberta Galeas OT Occupational Therapist                 Mobility/ADL's     Row Name 02/17/22 1429          Bed Mobility    Bed Mobility supine-sit; scooting/bridging  -JY     Scooting/Bridging [place] (Bed Mobility) maximum assist (25% patient effort); 2 person assist; verbal cues  -JY     Supine-Sit [place] (Bed Mobility) maximum assist (25% patient effort); 2 person assist; verbal cues  -JY     Bed Mobility, Safety Issues decreased use of arms for pushing/pulling; decreased use of legs for bridging/pushing  -JY     Assistive Device (Bed Mobility) bed rails; head of bed elevated  -JY     Comment (Bed Mobility) pt grossly req'd max A for supine > sitting and scooting to EOB (req'd cues for weight shifting and assisted progression to move hips to edge for increased contact of feet on floor; adjusted RUE sling in semi reclined position to support forearm and hand and promote optimal 90 degrees and adducted, adjusted cervical collar and reviewed spinal /cervical precautions w/ pt prior to mobility; denied any dizziness or feeling LH  -ABRAHAM     Row Name 02/17/22 1429          Transfers    Transfers bed-chair transfer; sit-stand transfer  -JY     Comment (Transfers) skilled cues for optimal hand placement for controlled ascend, descend while adhering to RUE precautions and cervial/spinal orders; provided LUE support and gait belt for safety protocol; demonstrated very narrow BOBBI and shuffling steps and req'd manual weight shifting to advance LEs  -JY     Bed-Chair [place] (Transfers) moderate assist (50% patient effort); 2 person assist; verbal cues  -JY     Assistive Device (Bed-Chair Transfers) other (see comments)  LUE support, hand on R side of gait belt  -JY     Sit-Stand [place] (Transfers)  moderate assist (50% patient effort); 2 person assist; verbal cues  -[facility] Name 02/17/22 1429          Sit-Stand Transfer    Assistive Device (Sit-Stand Transfers) other (see comments)  LUE support, hand on R side of gait belt  -[facility] Name 02/17/22 1429          Functional Mobility    Functional Mobility- Ind. Level not tested  -     Functional Mobility- Comment defer to PT for specifics  -JY     Row Name 02/17/22 1429          Activities of Daily Living    BADL Assessment/Intervention upper body dressing; lower body dressing  -[facility] Name 02/17/22 1429          Mobility    Extremity Weight-bearing Status right upper extremity  -     Right Upper Extremity (Weight-bearing Status) non weight-bearing (NWB)   -[facility] Name 02/17/22 1429          Upper Body Dressing Assessment/Training    [place] Level (Upper Body Dressing) doff; don; pajama/robe; maximum assist (25% patient effort); other (see comments)  sling with dependent care  -     Position (Upper Body Dressing) supine  -     Comment (Upper Body Dressing) reviewed with pt RUE and cervical precautions and outlined approach to d/d gown, pt only able to minimally flex LUE for threading into gown sleeve, dependent mgmt at R side for IV mgmt and precaution adherence  -JY     Row Name 02/17/22 1429          Lower Body Dressing Assessment/Training    [place] Level (Lower Body Dressing) doff; don; socks; dependent (less than 25% patient effort)  -     Position (Lower Body Dressing) sitting up in bed  -           User Key  (r) = Recorded By, (t) = Taken By, (c) = Cosigned By    Initials Name Provider Type    Roberta Galeas OT Occupational Therapist               Obj/Interventions     [facility] Name 02/17/22 1439          Sensory Assessment (Somatosensory)    Sensory Assessment (Somatosensory) unable/difficult to assess  -JY     Row Name 02/17/22 1439          Sensory Interventions    Comment, Sensory Intervention difficult to assess pt  sensation, did not indicate any numbness or tingling yet pt's cognition deficits limited optimal assessment, pt reporting pain at L UE indicating pain sensation  -[facility] Name 02/17/22 1439          Strength Comprehensive (MMT)    Comment, General Manual Muscle Testing (MMT) Assessment pt u/a to follow commands for MMT yet demonstrated fxl use of L UE in bed mob, supporting self at EOB, grasping UE for A in pivot  -[facility] Name 02/17/22 1439          Elbow/Forearm (Therapeutic Exercise)    Elbow/Forearm (Therapeutic Exercise) AAROM (active assistive range of motion); other (see comments)  -J     Elbow/Forearm AAROM (Therapeutic Exercise) right; flexion; extension; supination; pronation; sitting  -[facility] Name 02/17/22 1439          Wrist (Therapeutic Exercise)    Wrist (Therapeutic Exercise) AAROM (active assistive range of motion)  -J     Wrist AAROM (Therapeutic Exercise) right; flexion; extension; 10 repetitions  -[facility] Name 02/17/22 1439          Hand (Therapeutic Exercise)    Hand (Therapeutic Exercise) AROM (active range of motion)  -J     Hand AROM/AAROM (Therapeutic Exercise) right; AROM (active range of motion); finger flexion; finger extension; finger aBduction; finger aDduction; 10 repetitions  -[facility] Name 02/17/22 1439          Motor Skills    Motor Skills therapeutic exercise; functional endurance  -J     Functional Endurance pt fatigues quickly, requires seated rest break, maintained SPO2 and currently on RA  -[facility] Name 02/17/22 1439          Balance    Balance Assessment sitting static balance; sitting dynamic balance; standing static balance; standing dynamic balance  -JY     Static Sitting Balance WFL; supported; sitting, edge of bed  -JY     Dynamic Sitting Balance mild impairment; supported; sitting, edge of bed; other (see comments)  pre transfer skills  -JY     Static Standing Balance moderate impairment; supported; standing  -JY     Dynamic Standing Balance severe  impairment; supported; standing; other (see comments)  fxl transfer  -JY     Balance Interventions sitting; standing; static; dynamic; sit to stand; supported; occupation based/functional task  -JY     Comment, Balance no significant LOB during seated or standing tasks yt req'd A x 2 given narrow BOBBI and shuffling steps, cognitive deficits, RUE immobilized and cervical collar donned  -JY     Row Name 02/17/22 1439          Therapeutic Exercise    Therapeutic Exercise elbow/forearm; wrist; hand; other (see comments)  facilitated elbow, wrist and hand ROM at R UE with NO shoulder movement in effort to promote tolerable joint mobility; pt agreeable to AROM yet did not follow through and received PROM or AAROM  -JY           User Key  (r) = Recorded By, (t) = Taken By, (c) = Cosigned By    Initials Name Provider Type    Roberta Galeas, ANTOINE Occupational Therapist               Goals/Plan    No documentation.                Clinical Impression     Row Name 02/17/22 1452          Pain Assessment    Additional Documentation Pain Scale: FACES Pre/Post-Treatment (Group)  -ABRAHAM     Row Name 02/17/22 1450          Pain Scale: FACES Pre/Post-Treatment    Pain: FACES Scale, Pretreatment 0-->no hurt  increased pain , grossly 6/10 on FACES scale for pain at RUE during repositioning of RUE for sling mgmt  -JY     Posttreatment Pain Rating 0-->no hurt  -JY     Pain Location - Side Right  -JY     Pain Location - Orientation generalized  -JY     Pain Location shoulder; neck  -JY     Pre/Posttreatment Pain Comment improved position of sling and cervical collar for more optimal support and comfort  -ABRAHAM     Row Name 02/17/22 1451          Plan of Care Review    Plan of Care Reviewed With patient  -JY     Progress improving  -JY     Outcome Summary Pt agreeable to OT, pleasant, oriented to grossly only person. OT adjuted sling at RUE with dependent care for improved support and comfort and cervical collar once in sitting and reviewed  precautions w/ pt however do not expect carryover or recall. Pt req'd max A x 2 for supine > sitting, scooting to EOB, mod A x 2 for STS and SPT to chair for upgraded position, change in position. Pt at semi reclined position in bed req'd max A for d/d gown with pt assisting minimally with shoulder flexion at LUE to aid in threading, unthreading. Pt dependent for d/d socks. Facilitated elbow, wrist, and hand ROM only at RUE for tolerable joint mobility w/ respect to precautions, pt tolerated 10 reps each with need for AAROM/PROM, unable to sustain attention for follow through for AROM. Will progress pt as able while hospitalized.  -JY     Row Name 02/17/22 1450          Therapy Plan Review/Discharge Plan (OT)    Anticipated Discharge Disposition (OT) skilled nursing facility  -J     Row Name 02/17/22 1450          Vital Signs    Pre Systolic BP Rehab 138  -JY     Pre Treatment Diastolic BP 75  -JY     Pretreatment Heart Rate (beats/min) 87  -JY     Posttreatment Heart Rate (beats/min) 85  -JY     Pre SpO2 (%) 97  -JY     O2 Delivery Intra Treatment room air  -JY     Post SpO2 (%) 96  -JY     O2 Delivery Post Treatment room air  -JY     Pre Patient Position Supine  -JY     Intra Patient Position Standing  -JY     Post Patient Position Sitting  -JY     Row Name 02/17/22 1450          Positioning and Restraints    Pre-Treatment Position in bed  -JY     Post Treatment Position chair  -JY     In Chair notified nsg; reclined; call light within reach; encouraged to call for assist; exit alarm on; on mechanical lift sling; waffle cushion; with PT  RUE with sling donned and cervical collar donned  -JY           User Key  (r) = Recorded By, (t) = Taken By, (c) = Cosigned By    Initials Name Provider Type    Roberta Galeas, OT Occupational Therapist               Outcome Measures     Row Name 02/17/22 1903          How much help from another is currently needed...    Putting on and taking off regular lower body clothing? 1   -JY     Bathing (including washing, rinsing, and drying) 1  -JY     Toileting (which includes using toilet bed pan or urinal) 1  -JY     Putting on and taking off regular upper body clothing 2  -JY     Taking care of personal grooming (such as brushing teeth) 2  -JY     Eating meals 2  -JY     AM-PAC 6 Clicks Score (OT) 9  -JY     Row Name 02/17/22 1427 02/17/22 0800       How much help from another person do you currently need...    Turning from your back to your side while in flat bed without using bedrails? 2  -CD 3  -DEVIN    Moving from lying on back to sitting on the side of a flat bed without bedrails? 2  -CD 3  -DEVIN    Moving to and from a bed to a chair (including a wheelchair)? 2  -CD 1  -DEVIN    Standing up from a chair using your arms (e.g., wheelchair, bedside chair)? 2  -CD 1  -DEVIN    Climbing 3-5 steps with a railing? 1  -CD 1  -DEVIN    To walk in hospital room? 2  -CD 1  -DEVIN    AM-PAC 6 Clicks Score (PT) 11  -CD 10  -DEVIN    Row Name 02/17/22 1459          Functional Assessment    Outcome Measure Options AM-PAC 6 Clicks Daily Activity (OT)  -JY           User Key  (r) = Recorded By, (t) = Taken By, (c) = Cosigned By    Initials Name Provider Type    CD Marybeth Edwards, PT Physical Therapist    Elena Bolanos, RN Registered Nurse    Roberta Galeas, OT Occupational Therapist                Occupational Therapy Education                 Title: PT OT SLP Therapies (In Progress)     Topic: Occupational Therapy (In Progress)     Point: ADL training (In Progress)     Description:   Instruct learner(s) on proper safety adaptation and remediation techniques during self care or transfers.   Instruct in proper use of assistive devices.              Learning Progress Summary           Patient Acceptance, E,D, NR by ABRAHAM at 2/17/2022 1316    Acceptance, E, NR by JOEY at 2/16/2022 1555                   Point: Home exercise program (In Progress)     Description:   Instruct learner(s) on appropriate technique for  monitoring, assisting and/or progressing therapeutic exercises/activities.              Learning Progress Summary           Patient Acceptance, E,D, NR by JGARDENIA at 2/17/2022 1316    Acceptance, E, NR by JEOY at 2/16/2022 1555                   Point: Precautions (In Progress)     Description:   Instruct learner(s) on prescribed precautions during self-care and functional transfers.              Learning Progress Summary           Patient Acceptance, E,D, NR by ABRAHAM at 2/17/2022 1316    Acceptance, E, NR by JOEY at 2/16/2022 1555                   Point: Body mechanics (In Progress)     Description:   Instruct learner(s) on proper positioning and spine alignment during self-care, functional mobility activities and/or exercises.              Learning Progress Summary           Patient Acceptance, E,D, NR by ABRAHAM at 2/17/2022 1316    Acceptance, E, NR by JOEY at 2/16/2022 1555                               User Key     Initials Effective Dates Name Provider Type Discipline    JOEY 09/02/21 -  Cheryle Hightower, OT Occupational Therapist OT    ABRAHAM 06/16/21 -  Roberta Ceja OT Occupational Therapist OT              OT Recommendation and Plan     Plan of Care Review  Plan of Care Reviewed With: patient  Progress: improving  Outcome Summary: Pt agreeable to OT, pleasant, oriented to grossly only person. OT adjuted sling at RUE with dependent care for improved support and comfort and cervical collar once in sitting and reviewed precautions w/ pt however do not expect carryover or recall. Pt req'd max A x 2 for supine > sitting, scooting to EOB, mod A x 2 for STS and SPT to chair for upgraded position, change in position. Pt at semi reclined position in bed req'd max A for d/d gown with pt assisting minimally with shoulder flexion at LUE to aid in threading, unthreading. Pt dependent for d/d socks. Facilitated elbow, wrist, and hand ROM only at RUE for tolerable joint mobility w/ respect to precautions, pt tolerated 10 reps each with need  for AAROM/PROM, unable to sustain attention for follow through for AROM. Will progress pt as able while hospitalized.     Time Calculation:    Time Calculation- OT     Row Name 02/17/22 1500             Time Calculation- OT    OT Start Time 1316  -JY      OT Received On 02/17/22  -JY      OT Goal Re-Cert Due Date 02/26/22  -JY              Timed Charges    78727 - OT Therapeutic Exercise Minutes 9  -JY      27111 - OT Therapeutic Activity Minutes 5  -JY      71766 - OT Self Care/Mgmt Minutes 10  -JY              Total Minutes    Timed Charges Total Minutes 24  -JY       Total Minutes 24  -JY            User Key  (r) = Recorded By, (t) = Taken By, (c) = Cosigned By    Initials Name Provider Type    Roberta Galeas OT Occupational Therapist              Therapy Charges for Today     Code Description Service Date Service Provider Modifiers Qty    45562099739 HC OT THER PROC EA 15 MIN 2/17/2022 Roberta Ceja OT GO 1    09216521455 HC OT SELF CARE/MGMT/TRAIN EA 15 MIN 2/17/2022 Roberta Ceja OT GO 1               Roberta Ceja OT  2/17/2022

## 2022-02-17 NOTE — DISCHARGE INSTR - DIET
MBS/VFSS   2/17/2022  Reason for Referral  Patient was referred for a MBS to assess the efficiency of his/her swallow function, rule out aspiration and make recommendations regarding safe dietary consistencies, effective compensatory strategies, and safe eating environment.             Recommendations/Treatment  SLP Swallowing Diagnosis: mild, oral dysphagia, pharyngeal dysphagia (02/17/22 1500)  Functional Impact: risk of aspiration/pneumonia (02/17/22 1500)  Rehab Potential/Prognosis, Swallowing: good, to achieve stated therapy goals (02/17/22 1500)  Swallow Criteria for Skilled Therapeutic Interventions Met: demonstrates skilled criteria (02/17/22 1500)  Therapy Frequency (Swallow): 5 days per week (02/17/22 1500)  Predicted Duration Therapy Intervention (Days): until discharge (02/17/22 1500)  SLP Diet Recommendation: chopped, thin liquids (02/17/22 1500)  Re[place] Diagn[place] [place] (02/17/22 1115)  Recommended Precautions and Strategies: no straw, upright posture during/after eating, small bites of food and sips of liquid, alternate between small bites of food and sips of liquid, general aspiration precautions (02/17/22 1500)  SLP Rec. for Method of Medication Administration: meds whole, meds crushed, with pudding or applesauce, as tolerated (02/17/22 1500)  Monitor for Signs of Aspiration: yes, notify SLP if any concerns (02/17/22 1500)  Anticipated Discharge Disposition (SLP): anticipate therapy at next level of care (02/17/22 1500)    Instrumental Set-up  Utensils Used: spoon, cup, straw (02/17/22 1500)  Consistencies Trialed: thin liquids, pudding thick, regular textures (02/17/22 1500)    Oral Preparation/ Oral Phase  Oral Prep Phase: impaired oral phase of swallowing (02/17/22 1500)  Oral Transit Phase: WFL (02/17/22 1500)  Oral Residue: WFL (02/17/22 1500)  Oral Preparatory Phase: prolonged manipulation (02/17/22 1500)  Prolonged Manipulation: regular textures (02/17/22 1500)          Pharyngeal  Phase  Initiation of Pharyngeal Swallow: bolus in pyriform sinuses (02/17/22 1500)  Pharyngeal Phase: impaired pharyngeal phase of swallowing (02/17/22 1500)  Aspiration Before the Swallow: thin liquids, secondary to delayed swallow initiation or mistiming, other (see comments) (w/ thin liquid wash via straw) (02/17/22 1500)  Aspiration During the Swallow: thin liquids, secondary to delayed swallow initiation or mistiming, other (see comments) (w/ thin liquid wash via straw) (02/17/22 1500)  Response to Aspiration: cough (02/17/22 1500)  Pharyngeal Residue: thin liquids, pudding/puree, pyriform sinuses, secondary to reduced laryngeal elevation, secondary to reduced hyolaryngeal excursion, regular textures, valleculae, secondary to reduced base of tongue retraction (02/17/22 1500)  Response to Residue: other (see comments) (did not build, majority cleared by alternating bites/sips) (02/17/22 1500)  Attempted Compensatory Maneuvers: bolus size, bolus presentation style, alternative liquids/solids (02/17/22 1500)  Response to Attempted Compensatory Maneuvers: prevented aspiration, reduced residue (02/17/22 1500)  VFSS Summary: Mild oropharyngeal dysphagia. Prolonged mastication w/ regular solid. Aspiration before/during the swallow w/ thin liquid wash via straw following solid. Cough response. U/a to replicate / prevented w/ thin liquid wash via cup following solid. Mild-moderate pyriform residue across consistencies & mild-moderate vallecular residue w/ solid - residue did not build and majority cleared by alternating bites/sips. Rec: soft, chopped diet & thin liquids via cup, no straws. Alternate bites/sips. Meds whole or crushed in pudding/puree. SLP will continue to follow for tx. (02/17/22 1500)    Cervical Esophageal Phase

## 2022-02-17 NOTE — TELEPHONE ENCOUNTER
Per Dr. Dove's note..    Needs follow-up CT 2 to 3 weeks as an outpatient    Can someone please enter the order for scheduling. Thank you.

## 2022-02-17 NOTE — PLAN OF CARE
Goal Outcome Evaluation:  Plan of Care Reviewed With: patient           Outcome Summary: PT COOPERATIVE WITH THERAPY BUT NEEDS INCREASED TIME TO PROCESS COMMANDS. C/O PAIN R SHOULDER WITH MOVEMENT TO ADJUST SLING. COMPLETED SUPINE TO SIT WITH MAX ASSIST OF 2 AND SIT TO STAND, PIVOT STEP TRANSFER WITH MOD ASSIST OF 2. RECOMMEND SNF AT D/C.

## 2022-02-17 NOTE — PLAN OF CARE
Goal Outcome Evaluation:  Plan of Care Reviewed With: patient        Progress: improving  Outcome Summary: Patient oriented to self time and place, VSS, patient is drowsy but arousable. patient worked with therapy and went for modified barium swallow. Patient sat up in chair for a while. No other needs expressed by patient

## 2022-02-17 NOTE — PLAN OF CARE
Problem: Adult Inpatient Plan of Care  Goal: Plan of Care Review  Recent Flowsheet Documentation  Taken 2/17/2022 1450 by Roberta Ceja OT  Progress: improving  Plan of Care Reviewed With: patient  Outcome Summary: Pt agreeable to OT, pleasant, oriented to grossly only person. OT adjuted sling at RUE with dependent care for improved support and comfort and cervical collar once in sitting and reviewed precautions w/ pt however do not expect carryover or recall. Pt req'd max A x 2 for supine > sitting, scooting to EOB, mod A x 2 for STS and SPT to chair for upgraded position, change in position. Pt at semi reclined position in bed req'd max A for d/d gown with pt assisting minimally with shoulder flexion at LUE to aid in threading, unthreading. Pt dependent for d/d socks. Facilitated elbow, wrist, and hand ROM only at RUE for tolerable joint mobility w/ respect to precautions, pt tolerated 10 reps each with need for AAROM/PROM, unable to sustain attention for follow through for AROM. Will progress pt as able while hospitalized.

## 2022-02-18 ENCOUNTER — APPOINTMENT (OUTPATIENT)
Dept: CT IMAGING | Facility: HOSPITAL | Age: 82
End: 2022-02-18

## 2022-02-18 ENCOUNTER — APPOINTMENT (OUTPATIENT)
Dept: NEUROLOGY | Facility: HOSPITAL | Age: 82
End: 2022-02-18

## 2022-02-18 LAB
ANION GAP SERPL CALCULATED.3IONS-SCNC: 8 MMOL/L (ref 5–15)
BUN SERPL-MCNC: 10 MG/DL (ref 8–23)
BUN/CREAT SERPL: 19.2 (ref 7–25)
CALCIUM SPEC-SCNC: 8.7 MG/DL (ref 8.6–10.5)
CHLORIDE SERPL-SCNC: 101 MMOL/L (ref 98–107)
CO2 SERPL-SCNC: 28 MMOL/L (ref 22–29)
CREAT SERPL-MCNC: 0.52 MG/DL (ref 0.76–1.27)
GFR SERPL CREATININE-BSD FRML MDRD: >150 ML/MIN/1.73
GLUCOSE BLDC GLUCOMTR-MCNC: 104 MG/DL (ref 70–130)
GLUCOSE BLDC GLUCOMTR-MCNC: 220 MG/DL (ref 70–130)
GLUCOSE BLDC GLUCOMTR-MCNC: 251 MG/DL (ref 70–130)
GLUCOSE BLDC GLUCOMTR-MCNC: 267 MG/DL (ref 70–130)
GLUCOSE SERPL-MCNC: 213 MG/DL (ref 65–99)
POTASSIUM SERPL-SCNC: 3.6 MMOL/L (ref 3.5–5.2)
SARS-COV-2 RDRP RESP QL NAA+PROBE: NORMAL
SODIUM SERPL-SCNC: 137 MMOL/L (ref 136–145)

## 2022-02-18 PROCEDURE — 80048 BASIC METABOLIC PNL TOTAL CA: CPT | Performed by: STUDENT IN AN ORGANIZED HEALTH CARE EDUCATION/TRAINING PROGRAM

## 2022-02-18 PROCEDURE — 97530 THERAPEUTIC ACTIVITIES: CPT

## 2022-02-18 PROCEDURE — 70450 CT HEAD/BRAIN W/O DYE: CPT

## 2022-02-18 PROCEDURE — 63710000001 INSULIN LISPRO (HUMAN) PER 5 UNITS: Performed by: INTERNAL MEDICINE

## 2022-02-18 PROCEDURE — 25010000002 HYDROMORPHONE PER 4 MG: Performed by: INTERNAL MEDICINE

## 2022-02-18 PROCEDURE — 87635 SARS-COV-2 COVID-19 AMP PRB: CPT | Performed by: STUDENT IN AN ORGANIZED HEALTH CARE EDUCATION/TRAINING PROGRAM

## 2022-02-18 PROCEDURE — 97110 THERAPEUTIC EXERCISES: CPT

## 2022-02-18 PROCEDURE — 99232 SBSQ HOSP IP/OBS MODERATE 35: CPT | Performed by: STUDENT IN AN ORGANIZED HEALTH CARE EDUCATION/TRAINING PROGRAM

## 2022-02-18 PROCEDURE — 99233 SBSQ HOSP IP/OBS HIGH 50: CPT | Performed by: PSYCHIATRY & NEUROLOGY

## 2022-02-18 PROCEDURE — 95816 EEG AWAKE AND DROWSY: CPT

## 2022-02-18 PROCEDURE — 25010000002 LEVETRIRACETAM PER 10 MG: Performed by: INTERNAL MEDICINE

## 2022-02-18 PROCEDURE — 82962 GLUCOSE BLOOD TEST: CPT

## 2022-02-18 RX ORDER — LEVETIRACETAM 250 MG/1
250 TABLET ORAL 2 TIMES DAILY
Status: DISCONTINUED | OUTPATIENT
Start: 2022-02-18 | End: 2022-02-20 | Stop reason: HOSPADM

## 2022-02-18 RX ORDER — LACOSAMIDE 100 MG/1
100 TABLET ORAL EVERY 12 HOURS SCHEDULED
Status: DISCONTINUED | OUTPATIENT
Start: 2022-02-18 | End: 2022-02-19

## 2022-02-18 RX ADMIN — TRAMADOL HYDROCHLORIDE 50 MG: 50 TABLET, COATED ORAL at 01:37

## 2022-02-18 RX ADMIN — SODIUM CHLORIDE, PRESERVATIVE FREE 10 ML: 5 INJECTION INTRAVENOUS at 08:26

## 2022-02-18 RX ADMIN — INSULIN LISPRO 6 UNITS: 100 INJECTION, SOLUTION INTRAVENOUS; SUBCUTANEOUS at 12:42

## 2022-02-18 RX ADMIN — FAMOTIDINE 20 MG: 20 TABLET, FILM COATED ORAL at 08:25

## 2022-02-18 RX ADMIN — FAMOTIDINE 20 MG: 20 TABLET, FILM COATED ORAL at 17:42

## 2022-02-18 RX ADMIN — LEVETIRACETAM 250 MG: 500 INJECTION, SOLUTION INTRAVENOUS at 08:31

## 2022-02-18 RX ADMIN — LACOSAMIDE 100 MG: 100 TABLET, FILM COATED ORAL at 22:07

## 2022-02-18 RX ADMIN — HYDROMORPHONE HYDROCHLORIDE 0.25 MG: 1 INJECTION, SOLUTION INTRAMUSCULAR; INTRAVENOUS; SUBCUTANEOUS at 14:11

## 2022-02-18 RX ADMIN — LEVETIRACETAM 250 MG: 250 TABLET, FILM COATED ORAL at 22:07

## 2022-02-18 RX ADMIN — BISOPROLOL FUMARATE 10 MG: 5 TABLET, FILM COATED ORAL at 08:25

## 2022-02-18 RX ADMIN — INSULIN LISPRO 6 UNITS: 100 INJECTION, SOLUTION INTRAVENOUS; SUBCUTANEOUS at 22:06

## 2022-02-18 RX ADMIN — ATORVASTATIN CALCIUM 80 MG: 40 TABLET, FILM COATED ORAL at 22:07

## 2022-02-18 RX ADMIN — HYDROMORPHONE HYDROCHLORIDE 0.25 MG: 1 INJECTION, SOLUTION INTRAMUSCULAR; INTRAVENOUS; SUBCUTANEOUS at 08:25

## 2022-02-18 RX ADMIN — LACOSAMIDE 100 MG: 10 INJECTION INTRAVENOUS at 08:26

## 2022-02-18 RX ADMIN — PYRIDOXINE HCL TAB 50 MG 50 MG: 50 TAB at 08:25

## 2022-02-18 RX ADMIN — INSULIN LISPRO 4 UNITS: 100 INJECTION, SOLUTION INTRAVENOUS; SUBCUTANEOUS at 17:42

## 2022-02-18 RX ADMIN — SODIUM CHLORIDE, PRESERVATIVE FREE 10 ML: 5 INJECTION INTRAVENOUS at 22:07

## 2022-02-18 NOTE — PLAN OF CARE
Goal Outcome Evaluation:  Plan of Care Reviewed With: patient, daughter           Outcome Summary: TOLERATED B LE THER EX AND STS/  STAND PIVOT TRANSFERS WITH MOD ASSIST OF 2. PT LIMITED BY DECREASED LEVEL OF ALERTNESS BUT WILL OPEN EYES TO NAME  AND FOLLOW COMMANDS 25-50%. RECOMMEND SNF AT D/C.

## 2022-02-18 NOTE — PROGRESS NOTES
[facility] Medicine Services  PROGRESS NOTE    Patient Name: Derian Joya  : 1940  MRN: [id number]    Date of Admission: 2022  Primary Care Physician: Diaz Lee MD    Subjective   Subjective     CC:  Fall    HPI:  Discussed with daughter and  at bedside.  He is better today but still not at his baseline    ROS:  Gen- No fevers, chills  CV- No chest pain, palpitations  Resp- No cough, dyspnea  GI- No N/V/D, abd pain         Objective   Objective     Vital Signs:   Temp:  [97.5 °F (36.4 °C)-98.9 °F (37.2 °C)] 98.4 °F (36.9 °C)  Heart Rate:  [70-92] 81  Resp:  [18] 18  BP: (117-163)/(64-94) 163/87     Physical Exam:  Constitutional: Drowsy, chronically ill-appearing, frail appearing  HENT: NCAT, mucous membranes moist  Respiratory: Clear to auscultation bilaterally, respiratory effort normal   Cardiovascular: RRR, no murmurs, rubs, or gallops  Gastrointestinal: Positive bowel sounds, soft, nontender, nondistended  Musculoskeletal: No bilateral ankle edema  Psychiatric: Appropriate affect, cooperative  Neurologic: Oriented to person and place, strength symmetric in all extremities, Cranial Nerves grossly intact to confrontation, drowsy but improved from yesterday  Skin: No rashes      Results Reviewed:  LAB RESULTS:      Lab 22  0400 02/15/22  0328 22  1440 22  0643   WBC 7.69 7.86  --  11.01*   HEMOGLOBIN 11.5* 11.2*  --  12.2*   HEMATOCRIT 35.1* 33.0*  --  37.9   PLATELETS 194 186  --  207   NEUTROS ABS 5.96 5.81  --  9.57*   IMMATURE GRANS (ABS) 0.02 0.03  --  0.05   LYMPHS ABS 0.91 1.08  --  0.77   MONOS ABS 0.74 0.88  --  0.54   EOS ABS 0.04 0.04  --  0.06   MCV 98.0* 95.9  --  100.0*   PROCALCITONIN  --   --   --  <0.02   LACTATE 1.3  --  2.5* 2.8*         Lab 22  0914 22  1757 22  0400 02/15/22  1613 02/15/22  0328 22  0643 22  0643   SODIUM 137  --  137  --  136  --  141   POTASSIUM 3.8 4.8 3.5 4.3  3.3*   < > 3.5   CHLORIDE 102  --  100  --  99  --  101   CO2 25.0  --  28.0  --  26.0  --  25.0   ANION GAP 10.0  --  9.0  --  11.0  --  15.0   BUN 10  --  7*  --  6*  --  11   CREATININE 0.46*  --  0.42*  --  0.40*  --  0.56*   GLUCOSE 111*  --  164*  --  173*  --  245*   CALCIUM 8.8  --  8.3*  --  8.6  --  8.8   MAGNESIUM 2.0  --  2.1  --  1.4*  --   --    PHOSPHORUS 3.4  --  2.7  --  3.0  --   --     < > = values in this interval not displayed.         Lab 02/15/22  0328 02/14/22  0643   TOTAL PROTEIN 6.1 6.2   ALBUMIN 3.50 3.60   GLOBULIN 2.6 2.6   ALT (SGPT) 17 19   AST (SGOT) 18 22   BILIRUBIN 1.1 0.7   ALK PHOS 99 118*         Lab 02/14/22  0643   TROPONIN T <0.010                 Brief Urine Lab Results  (Last result in the past 365 days)      Color   Clarity   Blood   Leuk Est   Nitrite   Protein   CREAT   Urine HCG        02/14/22 0815 Yellow   Clear   Trace   Negative   Negative   Negative                 Microbiology Results Abnormal     Procedure Component Value - Date/Time    Blood Culture - Blood, Hand, Right [[id number]]  (Normal) Collected: 02/14/22 1519    Lab Status: Preliminary result Specimen: Blood from Hand, Right Updated: 02/17/22 1545     Blood Culture No growth at 3 days    Narrative:      Aerobic only    Blood Culture - Blood, Wrist, Right [[id number]]  (Normal) Collected: 02/14/22 1524    Lab Status: Preliminary result Specimen: Blood from Wrist, Right Updated: 02/17/22 1545     Blood Culture No growth at 3 days    Urine Culture - Urine, Urine, Catheter In/Out [[id number]] Collected: 02/14/22 0815    Lab Status: Final result Specimen: Urine, Catheter In/Out Updated: 02/15/22 1025     Urine Culture 25,000 CFU/mL Normal Urogenital Lesli    COVID PRE-OP / PRE-PROCEDURE SCREENING ORDER (NO ISOLATION) - Swab, Nasopharynx [[id number]]  (Normal) Collected: 02/14/22 0651    Lab Status: Final result Specimen: Swab from Nasopharynx Updated: 02/14/22 0758    Narrative:      The following orders were  created for panel order COVID PRE-OP / PRE-PROCEDURE SCREENING ORDER (NO ISOLATION) - Swab, Nasopharynx.  Procedure                               Abnormality         Status                     ---------                               -----------         ------                     COVID-19 and FLU A/B PCR...[[id number]]  Normal              Final result                 Please view results for these tests on the individual orders.    COVID-19 and FLU A/B PCR - Swab, Nasopharynx [[id number]]  (Normal) Collected: 02/14/22 0651    Lab Status: Final result Specimen: Swab from Nasopharynx Updated: 02/14/22 0751     COVID19 Not Detected     Influenza A PCR Not Detected     Influenza B PCR Not Detected    Narrative:      Fact sheet for providers: https://www.fda.gov/media/942557/download    Fact sheet for patients: https://www.fda.gov/media/242890/download    Test performed by PCR.          FL Video Swallow With Speech Single Contrast    Result Date: 2/17/2022  EXAMINATION: FL VIDEO SWALLOW W SPEECH SINGLE-CONTRAST-  INDICATION: dysphagia; I62.01-Nontraumatic acute subdural hemorrhage; I62.03-Nontraumatic chronic subdural hemorrhage; W19.XXXA-Unspecified fall, initial encounter; Y92.129-Unspecified place in nursing home as the place of occurrence of the external cause; S42.201A-Unspecified fracture of upper end of right humerus, initial encounter for closed fracture; R13.10-Dysphagia, unspecified  TECHNIQUE: 1 minute of fluoroscopic time was used for this exam. 8 associated fluoroscopic loops were saved. The patient was evaluated in the seated lateral position while taking a variety of consistencies of barium by mouth under the direction of speech pathology.  COMPARISON: NONE  FINDINGS: 1 minute of fluoroscopy provided for a modified barium swallow. Please see speech therapy report for full details and recommendations.       Impression: Fluoroscopy provided for a modified barium swallow. Please see speech therapy report for  full details and recommendations.    This report was finalized on 2/17/2022 9:17 PM by Dr. Kendrick Jane MD.        Results for orders placed during the hospital encounter of 01/11/22    Adult Transthoracic Echo Complete W/ Cont if Necessary Per Protocol    Interpretation Summary  · Mild aortic valve stenosis is present.  · Estimated right ventricular systolic pressure from tricuspid regurgitation is normal (<35 mmHg).  · LVSF is normal  · MAC      I have reviewed the medications:  Scheduled Meds:atorvastatin, 80 mg, Oral, Nightly  bisoprolol, 10 mg, Oral, Q24H  famotidine, 20 mg, Oral, BID AC  insulin lispro, 0-9 Units, Subcutaneous, 4x Daily With Meals & Nightly  lacosamide, 100 mg, Intravenous, Q12H  levETIRAcetam, 250 mg, Intravenous, Q12H  sodium chloride, 10 mL, Intravenous, Q12H  vitamin B-6, 50 mg, Oral, Daily      Continuous Infusions:niCARdipine, 5-15 mg/hr, Last Rate: Stopped (02/16/22 1131)      PRN Meds:.•  acetaminophen **OR** acetaminophen  •  dextrose  •  dextrose  •  glucagon (human recombinant)  •  HYDROmorphone  •  labetalol  •  magnesium sulfate **OR** magnesium sulfate **OR** magnesium sulfate  •  melatonin  •  potassium chloride  •  potassium chloride  •  sodium chloride  •  traMADol    Assessment/Plan   Assessment & Plan     Active Hospital Problems    Diagnosis  POA   • **Acute on chronic intracranial subdural hematoma (HCC) [I62.01, I62.03]  Yes   • Closed fracture of proximal end of right humerus [S42.201A]  Yes   • Cervical spine fracture (HCC) [S12.9XXA]  Yes   • Dyslipidemia [E78.5]  Yes   • Cardiomyopathy, nonischemic (HCC) [I42.8]  Yes   • Aortic stenosis [I35.0]  Yes   • Uncontrolled type 2 diabetes mellitus (HCC) [E11.65]  Yes   • Essential hypertension [I10]  Yes      Resolved Hospital Problems   No resolved problems to display.        Brief Hospital Course to date:  Derian Joya is a 82 y.o. male with a history of tobacco abuse, CAD, hypertension, aortic stenosis, diabetes, multiple  hospitalizations secondary to falls and subarachnoid hemorrhage and subdural hematoma, who is being admitted again after he was found down by nursing staff at the [facility].  He was unresponsive upon admission and admitted to the ICU.  Imaging work-up with the brain showed acute on chronic subdural hematoma with mild midline shift of 3 to 4 mm.  Neurosurgery evaluated and did not recommend surgical intervention at this time.  Neurology continue to follow.  He also has an acute right humeral fracture and orthopedics has been consulted for this.    Acute encephalopathy  -Etiology suspected to be secondary to seizure.  EEG shows sharp waves but no epileptic activity  -Continue Vimpat and Keppra per neurology recommendations as well as seizure precautions. Decrease Keppra per neurology recommendations, B6 supplement and Vimpat started as well for increased somnolence   -Repeat EEG and head CT today given decreased somnolence.  Dr.Kleinholz Bueno to determine decrease dosing of Vimpat based on studies      Possible cervical fracture  Marrow edema of C7, T1, T2  -Neurosurgery consulted    Right proximal humerus fracture  -Evaluated by Dr. Jorgensen who recommends follow-up outpatient in 3 weeks in the office and to continue sling    Hypertension  -Initially on nicardipine but able to be transitioned off following resumption of home bisoprolol    Diabetes  Hyperglycemia  -On correctional insulin    DVT prophylaxis:  Mechanical DVT prophylaxis orders are present.       AM-PAC 6 Clicks Score (PT): 11 (02/17/22 0771)    Disposition: I expect the patient to be discharged Sunday    CODE STATUS:   Code Status and Medical Interventions:   Ordered at: 02/14/22 0844     Code Status (Patient has no pulse and is not breathing):    CPR (Attempt to Resuscitate)     Medical Interventions (Patient has pulse or is breathing):    Full Support       Anitra Dimas MD  02/18/22

## 2022-02-18 NOTE — PROGRESS NOTES
"Neurology       Patient Care Team:  Diaz Lee MD as PCP - General    Chief complaint subdural hematoma.  Falls.  Altered mental status.  Seizures      Subjective .     History:   Patient is more awake today but he still has some psychomotor slowing.  His daughter is at bedside she says he is still not at baseline.    Patient is needing a two-person assist to stand, hoping to get back to [facility] for rehab this weekend.     Per nursing report, patient is eating more today.  He had almost all of his breakfast.  Objective     Vital Signs   Blood pressure 163/87, pulse 81, temperature 98.4 °F (36.9 °C), temperature source Oral, resp. rate 18, height 165.1 cm (65\"), weight 60.5 kg (133 lb 6.4 oz), SpO2 91 %.    Physical Exam:  Thin elderly man.  He is awake and alert.  He has a c-collar in place.  He is oriented to self and knows he is at a hospital.  He did not respond to questions for time.  He has significant psycho motor slowing with responses.  But speech is clear with no dysarthria or aphasia.  He does move all extremities but has pain with right arm movement given his humeral fracture.  He needs a two-person assist to make it from the bed to the chair.  He does track me around the room, has no facial weakness, and hearing is poor.  On room air, even respirations.  Cardiac regular rate and rhythm/paced, no pitting edema    Results Review:       A.m. labs reviewed    Assessment/Plan     81 y/o man with history of traumatic SDH complicated by seizure who had a fall at skilled nursing home causing a right humeral fracture and new SDH. Concern for additional seizure and he is currently on higher dose keppra and vimpat. No seizure on EEG but sharps were appreciated.      No seizures since admission but was somnolent and so decreased keppra and he is more alert. Added B6 for keppra agitation.  He is also on Vimpat 100 mg twice daily.    Patient is still not at baseline and has significant psychomotor slowing.  I " am not sure if this is new baseline or if he will just need significant time to improve.  I would like to repeat an EEG and head CT today prior to letting him go back to [facility] over the weekend.  If possible I would like to wait till Sunday to send him back so that we can monitor if I decide to reduce his Vimpat later today.    Long discussion with daughter, she is up-to-date.    Plan  -EEG  -Head CT without contrast  -Keppra 250 twice daily  -B6 50 mg daily  -Vimpat 100 twice daily    I discussed the patients findings and my recommendations withPatient, patient's daughter, primary team  Amy Mckeon MD  02/18/22  13:24 EST    Time spent>35

## 2022-02-18 NOTE — CASE MANAGEMENT/SOCIAL WORK
Case Management Discharge Note      Final Note: Per Irene in admissions, they can accept Mr. Joya back into a skilled bed over the weekend. I have notified his wife Gina. She is agreeable to this plan. [facility] has been scheduled to transport Mr. Joya there on Sunday at 12:00 via stretcher. THey will pick him up at the bedside. Nurse to call report to [phone]. The transfer summary will need to be faxed to [phone].        Selected Continued Care - Admitted Since 2/14/2022     Destination Coordination complete.    Service Provider Selected Services Address Phone Fax Patient Preferred    [facility] [address] [facility] [address] [phone] --          Durable Medical Equipment    No services have been selected for the patient.              Dialysis/Infusion    No services have been selected for the patient.              Home Medical Care    No services have been selected for the patient.              Therapy    No services have been selected for the patient.              Community Resources    No services have been selected for the patient.              Community & DME    No services have been selected for the patient.                Selected Continued Care - Episodes Includes selections from active Coordinated Care Management episodes    High Risk Care Management Episode start date: 1/20/2022 (Paused)   There are no active outsourced providers for this episode.             Selected Continued Care - Prior Encounters Includes selections from prior encounters from 11/16/2021 to 2/18/2022    Discharged on 2/5/2022 Admission date: 1/22/2022 - Discharge disposition: Skilled Nursing Facility (DC - External)    Destination     Service Provider Selected Services Address Phone Fax Patient Preferred    [facility] [address] [facility] [address] [phone] --                Discharged on 1/19/2022  Admission date: 1/15/2022 - Discharge disposition: Skilled Nursing Facility (DC - External)    Destination     Service Provider Selected Services Address Phone Fax Patient Preferred    [facility]  [facility]illed [facility] [address] [place] [id number] [phone] --                Discharged on 1/13/2022 Admission date: 1/11/2022 - Discharge disposition: Home-Health Care [facility]    Home Medical Care     Service Provider Selected Services Address Phone Fax Patient Preferred    [facility]  Home Health Services [address] [facility] [address] [phone] --                    Transportation Services  Ambulance: [facility]    Final Discharge Disposition Code: 03 - skilled nursing facility (SNF)

## 2022-02-18 NOTE — THERAPY TREATMENT NOTE
Patient Name: Derian Joya  : 1940    MRN: [id number]                              Today's Date: 2022       Admit Date: 2022    Visit Dx:     ICD-10-CM ICD-9-CM   1. 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 Fall at nursing home, initial encounter  W19.XXXA E888.9    Y92.129 E849.7   3. Closed fracture of proximal end of right humerus, unspecified fracture morphology, initial encounter  S42.201A 812.00   4. Dysphagia, unspecified type  R13.10 787.20     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)   • Dyslipidemia   • Closed fracture of proximal end of right humerus   • Cervical spine fracture (HCC)     Past Medical History:   Diagnosis Date   • Allergic rhinitis    • Aortic stenosis    • Atopic rhinitis 2016   • Benign non-nodular prostatic hyperplasia with lower urinary tract symptoms 9/15/2017   • CAD (coronary artery disease)    • Cardiomyopathy, ischemic    • Colon polyps     tubular adenoma    • Community acquired pneumonia    • Depression 2016   • Diabetes (HCC)    • Diverticulosis of intestine 2016    Description: Left and sigmoid   • Dyslipidemia    • LBBB (left bundle branch block)    • PAD (peripheral artery disease) (HCC)      Past Surgical History:   Procedure Laterality Date   • CARDIAC CATHETERIZATION      100% cx   • CATARACT EXTRACTION     • COLONOSCOPY W/ BIOPSIES AND POLYPECTOMY  2021    1 benign polyp   • HIP SURGERY Left 2020    Dr Galvan ORIF   • LEG SURGERY  Left 07/2016    Popliteal Artery stenting by Dr Hdz   • LEG SURGERY Left 07/2016    skin graft by Dr Nance   • LIPOMA EXCISION      s/p excision on chest   • PACEMAKER IMPLANTATION  01/2019    and defibrillator      General Information     Row Name 02/18/22 1445          Physical Therapy Time and Intention    Document Type therapy note (daily note)  -CD     Mode of Treatment physical therapy  -CD     Row Name 02/18/22 1445          General Information    Patient Profile Reviewed yes  -CD     Existing Precautions/Restrictions fall; non-weight bearing; right; brace worn when out of bed; spinal  RUE NWB w/ sling donned AAT, no shoulder ROM, cervical collar donned AAT (MIAMI-J WHEN OUT OF BED AND SOFT COLLAR IN BED)  d/t cervical fxs, acute on chronic SDH.  -CD     Barriers to Rehab medically complex; previous functional deficit; cognitive status  -CD     Row Name 02/18/22 1445          Cognition    Orientation Status (Cognition) oriented to; person; disoriented to; place; situation; time  -CD     Row Name 02/18/22 1445          Safety Issues, Functional Mobility    Safety Issues Affecting Function (Mobility) ability to follow commands; awareness of need for assistance; insight into deficits/self-awareness; safety precaution awareness; safety precautions follow-through/compliance; sequencing abilities  -CD     Impairments Affecting Function (Mobility) balance; cognition; endurance/activity tolerance; postural/trunk control; range of motion (ROM); pain  -CD     Cognitive Impairments, Mobility Safety/Performance attention; awareness, need for assistance; insight into deficits/self-awareness; safety precaution awareness; safety precaution follow-through; sequencing abilities  -CD     Comment, Safety Issues/Impairments (Mobility) PT DROWSY BUT WILL OPEN EYES ON COMMAND FOR THER EX AND FOLLOW COMMANDS 25-50% OF TIME BEFORE DRIFTING OFF AGAIN. SPEAKING INTERMITTENTLY. ADJUSTED SLING R UE FOR PROPER FIT AND DONNED MIAMI -J  YANIQUE SITTING UIC (NOTED COLLAR NOT ON UPON ARRIRVAL)  -CD           User Key  (r) = Recorded By, (t) = Taken By, (c) = Cosigned By    Initials Name Provider Type    Marybeth Tavarez PT Physical Therapist               Mobility     Row Name 02/18/22 1449          Bed Mobility    Bed Mobility sit-supine  -CD     Supine-Sit [place] (Bed Mobility) dependent (less than 25% patient effort); 2 person assist; verbal cues  -CD     Comment (Bed Mobility) PT UIC UPON ARRIVAL. BACK TO BED WITH P.T./ NSG ASSIST. DTR PRESENT.  -     Row Name 02/18/22 1449          Transfers    Comment (Transfers) CUES FOR HAND PLACEMENT WITH STS FROM RECLINER. STS VIA L UE SUPPORT AND GAIT BELT, R UE PROTECTED IN SLING. MIAMI- J COLLAR INTACT. PT ABLE TO TAKE SMALL SHUFFLING STEPS FROM RECLINER WITH MANUAL ASSIST TO WT SHIFT TO TURN AND SIT ON EOB.  -     Row Name 02/18/22 1449          Bed-Chair Transfer    Bed-Chair [place] (Transfers) moderate assist (50% patient effort); verbal cues; 2 person assist  TRANSFER TO THE L.  -     Row Name 02/18/22 1449          Sit-Stand Transfer    Sit-Stand [place] (Transfers) moderate assist (50% patient effort); 2 person assist  -     Row Name 02/18/22 1449          Gait/Stairs (Locomotion)    Comment (Gait/Stairs) DEFERRED DUE TO DROWSINESS, IMPAIRED BALANCE.  -     Row Name 02/18/22 1449          Mobility    Extremity Weight-bearing Status right upper extremity  -     Right Upper Extremity (Weight-bearing Status) non weight-bearing (NWB)  -CD           User Key  (r) = Recorded By, (t) = Taken By, (c) = Cosigned By    Initials Name Provider Type    Marybeth Tavarez PT Physical Therapist               Obj/Interventions     Row Name 02/18/22 1452          Motor Skills    Functional Endurance VSS ON RA.  -CD     Therapeutic Exercise --  COMPLETED SEATED AND SUPINE  B LE THER EX: LAQ, HEEL SLIDES, HIP ABD/ADD, DF/PF WITH MAX CUES TO STAY ON TASK AND ASSIST- 1 SET OF 10 REPS EACH.   -CD     Row Name 02/18/22 1452          Balance    Static Sitting Balance mild impairment; supported; sitting in chair  -CD     Dynamic Sitting Balance supported; sitting in chair; moderate impairment  -CD     Static Standing Balance moderate impairment; supported; standing  -CD     Dynamic Standing Balance severe impairment; supported; standing  -CD     Comment, Balance HAS NARROW BOBBI IN STANDING. REQUIRED MOD ASSIST OF 2 FOR STAND STEP PIVOT TRANSFER BED TO CHAIR. LIMITED BY LETHARGY.  -CD           User Key  (r) = Recorded By, (t) = Taken By, (c) = Cosigned By    Initials Name Provider Type    CD Marybeth Edwards, PT Physical Therapist               Goals/Plan    No documentation.                Clinical Impression     Row Name 02/18/22 145          Pain Scale: FACES Pre/Post-Treatment    Pain: FACES Scale, Pretreatment 0-->no hurt  INTRA R SHOULDER PAIN WITH ADJUSTING SLING (3/10)  -CD     Posttreatment Pain Rating 0-->no hurt  -CD     Pre/Posttreatment Pain Comment ADJUSTED SLING AND CERVICAL COLLAR FOR CORRECT FITTING, COMFORT.  -     Row Name 02/18/22 1454          Plan of Care Review    Plan of Care Reviewed With patient; daughter  -     Outcome Summary TOLERATED B LE THER EX AND STS/  STAND PIVOT TRANSFERS WITH MOD ASSIST OF 2. PT LIMITED BY DECREASED LEVEL OF ALERTNESS BUT WILL OPEN EYES TO NAME  AND FOLLOW COMMANDS 25-50%. RECOMMEND SNF AT D/C.  -     Row Name 02/18/22 145          Therapy Assessment/Plan (PT)    Patient/Family Therapy Goals Statement (PT) TO RETURN TO [facility] PER DTR.  -CD     Rehab Potential (PT) fair, will monitor progress closely  -CD     Criteria for Skilled Interventions Met (PT) yes; skilled treatment is necessary  -CD     Row Name 02/18/22 1454          Vital Signs    Pre Systolic BP Rehab --  VSS.  -CD     Pre Patient Position Sitting  -CD     Intra Patient Position Standing  -CD     Post Patient Position Supine  -CD     Row Name 02/18/22 145          Positioning and  Restraints    Pre-Treatment Position sitting in chair/recliner  -CD     Post Treatment Position bed  -CD     In Bed supine; call light within reach; encouraged to call for assist; exit alarm on; notified nsg  R UE IN SLING AND WEARING MIAMI -J COLLAR. ISSUED SOFT COLLAR TO ROOM AND NOTIFIED NSG PT IS ALLOWED TO WEAR SOFT COLLAR IN BED BUT MUST CONTINUE WITH MIAMI-J COLLAR WHEN OOB.  -CD           User Key  (r) = Recorded By, (t) = Taken By, (c) = Cosigned By    Initials Name Provider Type    Marybeth Tavarez, PT Physical Therapist               Outcome Measures     Row Name 02/18/22 1530 02/18/22 0800       How much help from another person do you currently need...    Turning from your back to your side while in flat bed without using bedrails? 2  -CD 2  -EH    Moving from lying on back to sitting on the side of a flat bed without bedrails? 2  -CD 2  -EH    Moving to and from a bed to a chair (including a wheelchair)? 2  -CD 2  -EH    Standing up from a chair using your arms (e.g., wheelchair, bedside chair)? 2  -CD 2  -EH    Climbing 3-5 steps with a railing? 1  -CD 1  -EH    To walk in hospital room? 2  -CD 2  -EH    AM-PAC 6 Clicks Score (PT) 11  -CD 11  -EH          User Key  (r) = Recorded By, (t) = Taken By, (c) = Cosigned By    Initials Name Provider Type    Marybeth Tavarez PT Physical Therapist    EH Matt Vitale, RN Registered Nurse                             Physical Therapy Education                 Title: PT OT SLP Therapies (In Progress)     Topic: Physical Therapy (Done)     Point: Mobility training (Done)     Learning Progress Summary           Patient Acceptance, E, VU,NR by CD at 2/18/2022 1531    Comment: SEE FLOWSHEET.    Acceptance, E, VU,NR by CD at 2/17/2022 1428    Comment: SEE FLOWSHEET.    Acceptance, E, NR by KG at 2/16/2022 1508   Family Acceptance, E, VU,NR by CD at 2/18/2022 1531    Comment: SEE FLOWSHEET.                   Point: Home exercise program (Done)     Learning  Progress Summary           Patient Acceptance, E, VU,NR by CD at 2/18/2022 1531    Comment: SEE FLOWSHEET.    Acceptance, E, VU,NR by CD at 2/17/2022 1428    Comment: SEE FLOWSHEET.    Acceptance, E, NR by KG at 2/16/2022 1508   Family Acceptance, E, VU,NR by CD at 2/18/2022 1531    Comment: SEE FLOWSHEET.                   Point: Body mechanics (Done)     Learning Progress Summary           Patient Acceptance, E, VU,NR by CD at 2/18/2022 1531    Comment: SEE FLOWSHEET.    Acceptance, E, VU,NR by CD at 2/17/2022 1428    Comment: SEE FLOWSHEET.    Acceptance, E, NR by KG at 2/16/2022 1508   Family Acceptance, E, VU,NR by CD at 2/18/2022 1531    Comment: SEE FLOWSHEET.                   Point: Precautions (Done)     Learning Progress Summary           Patient Acceptance, E, VU,NR by CD at 2/18/2022 1531    Comment: SEE FLOWSHEET.    Acceptance, E, VU,NR by CD at 2/17/2022 1428    Comment: SEE FLOWSHEET.    Acceptance, E, NR by KG at 2/16/2022 1508   Family Acceptance, E, VU,NR by CD at 2/18/2022 1531    Comment: SEE FLOWSHEET.                               User Key     Initials Effective Dates Name Provider Type Discipline    CD 06/16/21 -  Marybeth Edwards, PT Physical Therapist PT    KG 05/22/20 -  Caitlin Evans, PT Physical Therapist PT              PT Recommendation and Plan     Plan of Care Reviewed With: patient, daughter  Outcome Summary: TOLERATED B LE THER EX AND STS/  STAND PIVOT TRANSFERS WITH MOD ASSIST OF 2. PT LIMITED BY DECREASED LEVEL OF ALERTNESS BUT WILL OPEN EYES TO NAME  AND FOLLOW COMMANDS 25-50%. RECOMMEND SNF AT D/C.     Time Calculation:    PT Charges     Row Name 02/18/22 1532             Time Calculation    Start Time 1345  -CD      PT Received On 02/18/22  -      PT Goal Re-Cert Due Date 02/26/22  -CD              Time Calculation- PT    Total Timed Code Minutes- PT 45 minute(s)  -CD              Timed Charges    34809 - PT Therapeutic Exercise Minutes 15  -CD      29640 - PT  Therapeutic Activity Minutes 30  -CD              Total Minutes    Timed Charges Total Minutes 45  -CD       Total Minutes 45  -CD            User Key  (r) = Recorded By, (t) = Taken By, (c) = Cosigned By    Initials Name Provider Type    CD Marybeth Edwards, PT Physical Therapist              Therapy Charges for Today     Code Description Service Date Service Provider Modifiers Qty    [id number]  PT THERAPEUTIC ACT EA 15 MIN 2/17/2022 Marybeth Edwards, PT GP 1    [id number]  PT THER PROC EA 15 MIN 2/18/2022 Marybeth Edwards, PT GP 1    [id number]  PT THERAPEUTIC ACT EA 15 MIN 2/18/2022 Marybeth Edwards, PT GP 2          PT G-Codes  Outcome Measure Options: AM-PAC 6 Clicks Daily Activity (OT)  AM-PAC 6 Clicks Score (PT): 11  AM-PAC 6 Clicks Score (OT): 9    Marybeth Edwards, PT  2/18/2022

## 2022-02-18 NOTE — PLAN OF CARE
Goal Outcome Evaluation:  Plan of Care Reviewed With: patient, daughter        Progress: improving  Outcome Summary: Upon arrival pt supine in bed without c-collar donned. Krysten GRESHAM donned in supine prior to OOB activity and educated on wearing at all times. Pt oriented to self only and requiring increased time to process all commands. Following ~25% of one step commands. Pt performed sup>sit EOB with total assist, STS with Mod HHA x 2, and stand pivot to chair with Mod HHA x 2. OT continues to rec SNF at [place].

## 2022-02-18 NOTE — THERAPY TREATMENT NOTE
Patient Name: Derian Joya  : 1940    MRN: [id number]                              Today's Date: 2022       Admit Date: 2022    Visit Dx:     ICD-10-CM ICD-9-CM   1. 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 Fall at nursing home, initial encounter  W19.XXXA E888.9    Y92.129 E849.7   3. Closed fracture of proximal end of right humerus, unspecified fracture morphology, initial encounter  S42.201A 812.00   4. Dysphagia, unspecified type  R13.10 787.20     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)   • Dyslipidemia   • Closed fracture of proximal end of right humerus   • Cervical spine fracture (HCC)     Past Medical History:   Diagnosis Date   • Allergic rhinitis    • Aortic stenosis    • Atopic rhinitis 2016   • Benign non-nodular prostatic hyperplasia with lower urinary tract symptoms 9/15/2017   • CAD (coronary artery disease)    • Cardiomyopathy, ischemic    • Colon polyps     tubular adenoma    • Community acquired pneumonia    • Depression 2016   • Diabetes (HCC)    • Diverticulosis of intestine 2016    Description: Left and sigmoid   • Dyslipidemia    • LBBB (left bundle branch block)    • PAD (peripheral artery disease) (HCC)      Past Surgical History:   Procedure Laterality Date   • CARDIAC CATHETERIZATION      100% cx   • CATARACT EXTRACTION     • COLONOSCOPY W/ BIOPSIES AND POLYPECTOMY  2021    1 benign polyp   • HIP SURGERY Left 2020    Dr Galvan ORIF   • LEG SURGERY  Left 07/2016    Popliteal Artery stenting by Dr Hdz   • LEG SURGERY Left 07/2016    skin graft by Dr Nance   • LIPOMA EXCISION      s/p excision on chest   • PACEMAKER IMPLANTATION  01/2019    and defibrillator      General Information     Row Name 02/18/22 1559          OT Time and Intention    Document Type therapy note (daily note)  -AN     Mode of Treatment occupational therapy  -AN     Row Name 02/18/22 1559          General Information    Patient Profile Reviewed yes  -AN     Existing Precautions/Restrictions fall; non-weight bearing; right; brace worn when out of bed; spinal  RUE NWB w/ sling donned AAT, no shoulder ROM, cervical collar donned AAT (MIAMI-J WHEN OUT OF BED AND SOFT COLLAR IN BED) d/t cervical fxs, slow processing requiring increased time  -AN     Barriers to Rehab medically complex; previous functional deficit; cognitive status  -AN     Row Name 02/18/22 1559          Cognition    Orientation Status (Cognition) oriented to; person; disoriented to; place; situation; time  -AN     Row Name 02/18/22 1559          Safety Issues, Functional Mobility    Safety Issues Affecting Function (Mobility) ability to follow commands; awareness of need for assistance; safety precaution awareness; problem-solving; judgment; insight into deficits/self-awareness; safety precautions follow-through/compliance; sequencing abilities  -AN     Impairments Affecting Function (Mobility) balance; cognition; endurance/activity tolerance; postural/trunk control; range of motion (ROM); pain; strength  -AN     Cognitive Impairments, Mobility Safety/Performance attention; awareness, need for assistance; insight into deficits/self-awareness; judgment; problem-solving/reasoning; safety precaution awareness; safety precaution follow-through; sequencing abilities  -AN     Comment, Safety Issues/Impairments (Mobility) [place] J collar donning in supine, not on upon arrival; RN made aware  -AN           User Key  (r) = Recorded By,  (t) = Taken By, (c) = Cosigned By    Initials Name Provider Type    AN Maddie Thomas OT Occupational Therapist                 Mobility/ADL's     Row Name 02/18/22 1602          Bed Mobility    Bed Mobility supine-sit; rolling left; rolling right  -AN     Rolling Right [place] (Bed Mobility) maximum assist (25% patient effort); 2 person assist; verbal cues; nonverbal cues (demo/gesture)  -AN     Scooting/Bridging [place] (Bed Mobility) verbal cues; nonverbal cues (demo/gesture); maximum assist (25% patient effort); 2 person assist  -AN     Supine-Sit [place] (Bed Mobility) dependent (less than 25% patient effort); 2 person assist; verbal cues  -AN     Bed Mobility, Safety Issues decreased use of arms for pushing/pulling; decreased use of legs for bridging/pushing  -AN     Assistive Device (Bed Mobility) bed rails; head of bed elevated  -AN     Comment (Bed Mobility) increased time due to slow processing and max cues for sequencing of steps  -AN     Row Name 02/18/22 1602          Transfers    Transfers sit-stand transfer; bed-chair transfer  -AN     Comment (Transfers) cues for hand placement and transfer technique  -AN     Bed-Chair [place] (Transfers) moderate assist (50% patient effort); verbal cues; 2 person assist  -AN     Assistive Device (Bed-Chair Transfers) other (see comments)  LUE and trunk support at gait belt  -AN     Sit-Stand [place] (Transfers) moderate assist (50% patient effort); 2 person assist; verbal cues; nonverbal cues (demo/gesture)  -AN     Row Name 02/18/22 1602          Sit-Stand Transfer    Assistive Device (Sit-Stand Transfers) other (see comments)  LUE and trunk support  -AN     Row Name 02/18/22 1602          Functional Mobility    Functional Mobility- Ind. Level not tested  -AN     Row Name 02/18/22 1602          Activities of Daily Living    BADL Assessment/Intervention lower body dressing  -AN     Row Name 02/18/22 1602          Mobility    Extremity  Weight-bearing Status right upper extremity  -AN     Right Upper Extremity (Weight-bearing Status) non weight-bearing (NWB)  -AN     Row Name 02/18/22 1602          Upper Body Dressing Assessment/Training    Comment (Upper Body Dressing) reviewed R shoulder precautions and NWB, however pt not receptive this session due to cognitive deficits; dep for donning and doffing sling for elbow, wrist, and hand ther-ex  -AN     Row Name 02/18/22 1602          Lower Body Dressing Assessment/Training    [place] Level (Lower Body Dressing) doff; don; socks; dependent (less than 25% patient effort)  -AN     Position (Lower Body Dressing) sitting up in bed  -AN           User Key  (r) = Recorded By, (t) = Taken By, (c) = Cosigned By    Initials Name Provider Type    Maddie Kong OT Occupational Therapist               Obj/Interventions     Row Name 02/18/22 1606          Elbow/Forearm (Therapeutic Exercise)    Elbow/Forearm (Therapeutic Exercise) AAROM (active assistive range of motion)  -AN     Elbow/Forearm AAROM (Therapeutic Exercise) right; flexion; extension; supination; pronation; 10 repetitions  -AN     Row Name 02/18/22 1606          Wrist (Therapeutic Exercise)    Wrist (Therapeutic Exercise) AROM (active range of motion)  -AN     Wrist AROM (Therapeutic Exercise) right; flexion; extension; 10 repetitions  -AN     Row Name 02/18/22 1606          Hand (Therapeutic Exercise)    Hand (Therapeutic Exercise) AROM (active range of motion)  -AN     Hand AROM/AAROM (Therapeutic Exercise) right; AROM (active range of motion); finger flexion; finger extension; 10 repetitions  -AN     Row Name 02/18/22 1606          Motor Skills    Motor Skills coordination; functional endurance; therapeutic exercise  -AN     Coordination fine motor deficit; gross motor deficit; upper extremity; minimal impairment  -AN     Row Name 02/18/22 1606          Balance    Balance Assessment sitting static balance; sitting dynamic balance; sit  to stand dynamic balance; standing static balance; standing dynamic balance  -AN     Static Sitting Balance mild impairment; supported; sitting, edge of bed  -AN     Dynamic Sitting Balance mild impairment; supported; sitting, edge of bed  -AN     Sit to Stand Dynamic Balance moderate impairment; supported; standing  -AN     Static Standing Balance moderate impairment; supported; standing  -AN     Dynamic Standing Balance moderate impairment; supported; standing  -AN     Balance Interventions standing; sit to stand; supported; static; dynamic; moderate challenge; occupation based/functional task; narrowed base of support  -AN     Comment, Balance narrow base of support requiring verbal and tactile cues  -AN     Row Name 02/18/22 1606          Therapeutic Exercise    Therapeutic Exercise elbow/forearm; wrist; hand  -AN           User Key  (r) = Recorded By, (t) = Taken By, (c) = Cosigned By    Initials Name Provider Type    AN Maddie Thomas OT Occupational Therapist               Goals/Plan    No documentation.                Clinical Impression     Row Name 02/18/22 1608          Pain Assessment    Additional Documentation Pain Scale: FACES Pre/Post-Treatment (Group)  -AN     Row Name 02/18/22 1608          Pain Scale: FACES Pre/Post-Treatment    Pain: FACES Scale, Pretreatment 0-->no hurt  -AN     Posttreatment Pain Rating 0-->no hurt  -AN     Pre/Posttreatment Pain Comment asymptomatic throughout  -AN     Row Name 02/18/22 1608          Plan of Care Review    Plan of Care Reviewed With patient; daughter  -AN     Progress improving  -AN     Outcome Summary Upon arrival pt supine in bed without c-collar donned. [place] J donned in supine prior to OOB activity and educated on wearing at all times. Pt oriented to self only and requiring increased time to process all commands. Following ~25% of one step commands. Pt performed sup>sit EOB with total assist, STS with Mod HHA x 2, and stand pivot to chair with Mod HHA  x 2. OT continues to rec SNF at [place].  -AN     Row Name 02/18/22 1608          Therapy Plan Review/Discharge Plan (OT)    Anticipated Discharge Disposition (OT) skilled nursing facility  -AN     Row Name 02/18/22 1608          Vital Signs    Pre Systolic BP Rehab --  VSS  -AN     O2 Delivery Pre Treatment room air  -AN     O2 Delivery Intra Treatment room air  -AN     O2 Delivery Post Treatment room air  -AN     Pre Patient Position Supine  -AN     Intra Patient Position Standing  -AN     Post Patient Position Sitting  -AN     Row Name 02/18/22 1608          Positioning and Restraints    Pre-Treatment Position in bed  -AN     Post Treatment Position chair  -AN     In Chair notified nsg; reclined; call light within reach; encouraged to call for assist; exit alarm on; with family/caregiver; RUE elevated; LUE elevated; waffle cushion; on mechanical lift sling; with brace  c-collar and sling donned  -AN           User Key  (r) = Recorded By, (t) = Taken By, (c) = Cosigned By    Initials Name Provider Type    Maddie Kong, ANTOINE Occupational Therapist               Outcome Measures     Row Name 02/18/22 1611          How much help from another is currently needed...    Putting on and taking off regular lower body clothing? 1  -AN     Bathing (including washing, rinsing, and drying) 1  -AN     Toileting (which includes using toilet bed pan or urinal) 1  -AN     Putting on and taking off regular upper body clothing 2  -AN     Taking care of personal grooming (such as brushing teeth) 2  -AN     Eating meals 2  -AN     AM-PAC 6 Clicks Score (OT) 9  -AN     Row Name 02/18/22 1530 02/18/22 0800       How much help from another person do you currently need...    Turning from your back to your side while in flat bed without using bedrails? 2  -CD 2  -EH    Moving from lying on back to sitting on the side of a flat bed without bedrails? 2  -CD 2  -EH    Moving to and from a bed to a chair (including a wheelchair)? 2  -CD 2   -EH    Standing up from a chair using your arms (e.g., wheelchair, bedside chair)? 2  -CD 2  -EH    Climbing 3-5 steps with a railing? 1  -CD 1  -EH    To walk in hospital room? 2  -CD 2  -EH    AM-PAC 6 Clicks Score (PT) 11  -CD 11  -EH    Row Name 02/18/22 1611          Functional Assessment    Outcome Measure Options AM-PAC 6 Clicks Daily Activity (OT)  -AN           User Key  (r) = Recorded By, (t) = Taken By, (c) = Cosigned By    Initials Name Provider Type    Marybeth Tavarez, PT Physical Therapist    Matt Mays, RN Registered Nurse    Maddie Kong OT Occupational Therapist                Occupational Therapy Education                 Title: PT OT SLP Therapies (In Progress)     Topic: Occupational Therapy (Done)     Point: ADL training (Done)     Description:   Instruct learner(s) on proper safety adaptation and remediation techniques during self care or transfers.   Instruct in proper use of assistive devices.              Learning Progress Summary           Patient Acceptance, E, VU,NR by AN at 2/18/2022 1612    Acceptance, E,D, NR by ABRAHAM at 2/17/2022 1316    Acceptance, E, NR by JOEY at 2/16/2022 1555   Family Acceptance, E, VU,NR by AN at 2/18/2022 1612                   Point: Home exercise program (Done)     Description:   Instruct learner(s) on appropriate technique for monitoring, assisting and/or progressing therapeutic exercises/activities.              Learning Progress Summary           Patient Acceptance, E, VU,NR by AN at 2/18/2022 1612    Acceptance, E,D, NR by ABRAHAM at 2/17/2022 1316    Acceptance, E, NR by JOEY at 2/16/2022 1555   Family Acceptance, E, VU,NR by AN at 2/18/2022 1612                   Point: Precautions (Done)     Description:   Instruct learner(s) on prescribed precautions during self-care and functional transfers.              Learning Progress Summary           Patient Acceptance, E, VU,NR by AN at 2/18/2022 1612    Acceptance, E,D, NR by ABRAHAM at 2/17/2022 1316     Acceptance, E, NR by CS at 2/16/2022 1555   Family Acceptance, E, VU,NR by AN at 2/18/2022 1612                   Point: Body mechanics (Done)     Description:   Instruct learner(s) on proper positioning and spine alignment during self-care, functional mobility activities and/or exercises.              Learning Progress Summary           Patient Acceptance, E, VU,NR by AN at 2/18/2022 1612    Acceptance, E,D, NR by JGARDENIA at 2/17/2022 1316    Acceptance, E, NR by CS at 2/16/2022 1555   Family Acceptance, E, VU,NR by AN at 2/18/2022 1612                               User Key     Initials Effective Dates Name Provider Type Discipline     09/02/21 -  Cheryle Hightower, OT Occupational Therapist OT    ABRAHAM 06/16/21 -  Roberta Ceja, OT Occupational Therapist OT    AN 09/21/21 -  Maddie Thomas, OT Occupational Therapist OT              OT Recommendation and Plan     Plan of Care Review  Plan of Care Reviewed With: patient, daughter  Progress: improving  Outcome Summary: Upon arrival pt supine in bed without c-collar donned. Krysten GRESHAM donned in supine prior to OOB activity and educated on wearing at all times. Pt oriented to self only and requiring increased time to process all commands. Following ~25% of one step commands. Pt performed sup>sit EOB with total assist, STS with Mod HHA x 2, and stand pivot to chair with Mod HHA x 2. OT continues to rec SNF at [place].     Time Calculation:    Time Calculation- OT     Row Name 02/18/22 1612             Time Calculation- OT    OT Start Time 1040  -AN      OT Received On 02/18/22  -AN      OT Goal Re-Cert Due Date 02/26/22  -AN              Timed Charges    08348 - OT Therapeutic Exercise Minutes 10  -AN      19748 - OT Therapeutic Activity Minutes 15  -AN      94800 - OT Self Care/Mgmt Minutes --  -AN              Total Minutes    Timed Charges Total Minutes 25  -AN       Total Minutes 25  -AN            User Key  (r) = Recorded By, (t) = Taken By, (c) = Cosigned By    Initials Name  Provider Type    AN Maddie Thomas OT Occupational Therapist              Therapy Charges for Today     Code Description Service Date Service Provider Modifiers Qty    69356576806  OT THER PROC EA 15 MIN 2/18/2022 Maddie Thomas OT GO 1    [id number]  OT THERAPEUTIC ACT EA 15 MIN 2/18/2022 Maddie Thomas OT GO 1    [id number]  OT THER SUPP EA 15 MIN 2/18/2022 Maddie Thomas OT GO 2               Maddie Thomas OT  2/18/2022

## 2022-02-19 ENCOUNTER — APPOINTMENT (OUTPATIENT)
Dept: GENERAL RADIOLOGY | Facility: HOSPITAL | Age: 82
End: 2022-02-19

## 2022-02-19 LAB
ANION GAP SERPL CALCULATED.3IONS-SCNC: 11 MMOL/L (ref 5–15)
BACTERIA SPEC AEROBE CULT: NORMAL
BACTERIA SPEC AEROBE CULT: NORMAL
BUN SERPL-MCNC: 11 MG/DL (ref 8–23)
BUN/CREAT SERPL: 20 (ref 7–25)
CALCIUM SPEC-SCNC: 8.9 MG/DL (ref 8.6–10.5)
CHLORIDE SERPL-SCNC: 96 MMOL/L (ref 98–107)
CO2 SERPL-SCNC: 28 MMOL/L (ref 22–29)
CREAT SERPL-MCNC: 0.55 MG/DL (ref 0.76–1.27)
GFR SERPL CREATININE-BSD FRML MDRD: 142 ML/MIN/1.73
GLUCOSE BLDC GLUCOMTR-MCNC: 132 MG/DL (ref 70–130)
GLUCOSE BLDC GLUCOMTR-MCNC: 141 MG/DL (ref 70–130)
GLUCOSE BLDC GLUCOMTR-MCNC: 200 MG/DL (ref 70–130)
GLUCOSE BLDC GLUCOMTR-MCNC: 248 MG/DL (ref 70–130)
GLUCOSE SERPL-MCNC: 230 MG/DL (ref 65–99)
POTASSIUM SERPL-SCNC: 3.8 MMOL/L (ref 3.5–5.2)
SODIUM SERPL-SCNC: 135 MMOL/L (ref 136–145)

## 2022-02-19 PROCEDURE — 99232 SBSQ HOSP IP/OBS MODERATE 35: CPT | Performed by: STUDENT IN AN ORGANIZED HEALTH CARE EDUCATION/TRAINING PROGRAM

## 2022-02-19 PROCEDURE — 82962 GLUCOSE BLOOD TEST: CPT

## 2022-02-19 PROCEDURE — 97110 THERAPEUTIC EXERCISES: CPT

## 2022-02-19 PROCEDURE — 80048 BASIC METABOLIC PNL TOTAL CA: CPT | Performed by: STUDENT IN AN ORGANIZED HEALTH CARE EDUCATION/TRAINING PROGRAM

## 2022-02-19 PROCEDURE — 99232 SBSQ HOSP IP/OBS MODERATE 35: CPT | Performed by: PSYCHIATRY & NEUROLOGY

## 2022-02-19 PROCEDURE — 71045 X-RAY EXAM CHEST 1 VIEW: CPT

## 2022-02-19 PROCEDURE — 63710000001 INSULIN LISPRO (HUMAN) PER 5 UNITS: Performed by: INTERNAL MEDICINE

## 2022-02-19 PROCEDURE — 97116 GAIT TRAINING THERAPY: CPT

## 2022-02-19 PROCEDURE — 99231 SBSQ HOSP IP/OBS SF/LOW 25: CPT | Performed by: ORTHOPAEDIC SURGERY

## 2022-02-19 RX ORDER — LACOSAMIDE 50 MG/1
50 TABLET ORAL EVERY 12 HOURS SCHEDULED
Status: DISCONTINUED | OUTPATIENT
Start: 2022-02-19 | End: 2022-02-20 | Stop reason: HOSPADM

## 2022-02-19 RX ADMIN — ATORVASTATIN CALCIUM 80 MG: 40 TABLET, FILM COATED ORAL at 21:14

## 2022-02-19 RX ADMIN — TRAMADOL HYDROCHLORIDE 50 MG: 50 TABLET, COATED ORAL at 11:10

## 2022-02-19 RX ADMIN — SODIUM CHLORIDE, PRESERVATIVE FREE 10 ML: 5 INJECTION INTRAVENOUS at 21:14

## 2022-02-19 RX ADMIN — SODIUM CHLORIDE, PRESERVATIVE FREE 10 ML: 5 INJECTION INTRAVENOUS at 09:37

## 2022-02-19 RX ADMIN — LEVETIRACETAM 250 MG: 250 TABLET, FILM COATED ORAL at 09:37

## 2022-02-19 RX ADMIN — INSULIN LISPRO 4 UNITS: 100 INJECTION, SOLUTION INTRAVENOUS; SUBCUTANEOUS at 13:11

## 2022-02-19 RX ADMIN — INSULIN LISPRO 4 UNITS: 100 INJECTION, SOLUTION INTRAVENOUS; SUBCUTANEOUS at 18:10

## 2022-02-19 RX ADMIN — LACOSAMIDE 50 MG: 50 TABLET, FILM COATED ORAL at 09:37

## 2022-02-19 RX ADMIN — FAMOTIDINE 20 MG: 20 TABLET, FILM COATED ORAL at 18:10

## 2022-02-19 RX ADMIN — TRAMADOL HYDROCHLORIDE 50 MG: 50 TABLET, COATED ORAL at 21:25

## 2022-02-19 RX ADMIN — LEVETIRACETAM 250 MG: 250 TABLET, FILM COATED ORAL at 21:14

## 2022-02-19 RX ADMIN — LACOSAMIDE 50 MG: 50 TABLET, FILM COATED ORAL at 21:14

## 2022-02-19 RX ADMIN — PYRIDOXINE HCL TAB 50 MG 50 MG: 50 TAB at 09:36

## 2022-02-19 RX ADMIN — BISOPROLOL FUMARATE 10 MG: 5 TABLET, FILM COATED ORAL at 09:37

## 2022-02-19 RX ADMIN — FAMOTIDINE 20 MG: 20 TABLET, FILM COATED ORAL at 09:37

## 2022-02-19 NOTE — THERAPY TREATMENT NOTE
Patient Name: Derian Joya  : 1940    MRN: [id number]                              Today's Date: 2022       Admit Date: 2022    Visit Dx:     ICD-10-CM ICD-9-CM   1. 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 Fall at nursing home, initial encounter  W19.XXXA E888.9    Y92.129 E849.7   3. Closed fracture of proximal end of right humerus, unspecified fracture morphology, initial encounter  S42.201A 812.00   4. Dysphagia, unspecified type  R13.10 787.20     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)   • Dyslipidemia   • Closed fracture of proximal end of right humerus   • Cervical spine fracture (HCC)     Past Medical History:   Diagnosis Date   • Allergic rhinitis    • Aortic stenosis    • Atopic rhinitis 2016   • Benign non-nodular prostatic hyperplasia with lower urinary tract symptoms 9/15/2017   • CAD (coronary artery disease)    • Cardiomyopathy, ischemic    • Colon polyps     tubular adenoma    • Community acquired pneumonia    • Depression 2016   • Diabetes (HCC)    • Diverticulosis of intestine 2016    Description: Left and sigmoid   • Dyslipidemia    • LBBB (left bundle branch block)    • PAD (peripheral artery disease) (HCC)      Past Surgical History:   Procedure Laterality Date   • CARDIAC CATHETERIZATION      100% cx   • CATARACT EXTRACTION     • COLONOSCOPY W/ BIOPSIES AND POLYPECTOMY  2021    1 benign polyp   • HIP SURGERY Left 2020    Dr Galvan ORIF   • LEG SURGERY  Left 07/2016    Popliteal Artery stenting by Dr Hdz   • LEG SURGERY Left 07/2016    skin graft by Dr Nance   • LIPOMA EXCISION      s/p excision on chest   • PACEMAKER IMPLANTATION  01/2019    and defibrillator      General Information     Row Name 02/19/22 1010          Physical Therapy Time and Intention    Document Type therapy note (daily note)  -CT     Mode of Treatment physical therapy  -CT     Row Name 02/19/22 1010          General Information    Patient Profile Reviewed yes  -CT     Prior Level of Function mod assist:; dressing; bathing  -CT     Existing Precautions/Restrictions brace worn when out of bed; fall; non-weight bearing; seizures; shoulder  -CT     Barriers to Rehab medically complex; previous functional deficit; cognitive status; physical barrier  -CT     Row Name 02/19/22 1010          Living Environment    Lives With facility resident  -CT     Row Name 02/19/22 1010          Cognition    Orientation Status (Cognition) person  -CT     Row Name 02/19/22 1010          Safety Issues, Functional Mobility    Safety Issues Affecting Function (Mobility) problem-solving; sequencing abilities  -CT     Impairments Affecting Function (Mobility) balance; cognition; endurance/activity tolerance; strength  -CT     Cognitive Impairments, Mobility Safety/Performance judgment; safety precaution awareness; sequencing abilities  -CT           User Key  (r) = Recorded By, (t) = Taken By, (c) = Cosigned By    Initials Name Provider Type    CT Carlyle Fermin, PT Physical Therapist               Mobility     Row Name 02/19/22 1012          Bed Mobility    Bed Mobility bed mobility (all) activities  -CT     All Activities, [place] (Bed Mobility) moderate assist (50% patient effort)  -CT     Rolling Right [place] (Bed Mobility) moderate assist (50% patient effort)  -CT     Scooting/Bridging [place] (Bed Mobility) minimum assist (75% patient effort)  -CT     Supine-Sit [place] (Bed  Mobility) moderate assist (50% patient effort)  -CT     Row Name 02/19/22 1012          Bed-Chair Transfer    Bed-Chair [place] (Transfers) moderate assist (50% patient effort)  pivot tranfer to chair with VC for stepping.  -CT     Row Name 02/19/22 1012          Sit-Stand Transfer    Sit-Stand [place] (Transfers) moderate assist (50% patient effort)  -CT     Row Name 02/19/22 1012          Gait/Stairs (Locomotion)    [place] Level (Gait) moderate assist (50% patient effort); 1 person assist; other (see comments)  HHA x 1 for balance and VC for BOBBI and step length  -CT     Assistive Device (Gait) other (see comments)  HHA  -CT     Distance in Feet (Gait) 40 ft with HHA and assit to m/t balance and VC to encourage step length  -CT     Deviations/Abnormal Patterns (Gait) weight shifting decreased; stride length decreased; gait speed decreased; festinating/shuffling  -CT           User Key  (r) = Recorded By, (t) = Taken By, (c) = Cosigned By    Initials Name Provider Type    CT Carlyle Fermin, PT Physical Therapist               Obj/Interventions     Row Name 02/19/22 1017          Strength Comprehensive (MMT)    General Manual Muscle Testing (MMT) Assessment lower extremity strength deficits identified  -CT     Comment, General Manual Muscle Testing (MMT) Assessment functionally 4/5  -CT     Row Name 02/19/22 1017          Motor Skills    Motor Skills functional endurance  -CT     Coordination gross motor deficit; moderate impairment  -CT     Row Name 02/19/22 1017          Balance    Balance Assessment sitting static balance; sitting dynamic balance; standing static balance; standing dynamic balance  -CT     Static Sitting Balance moderate impairment  -CT     Dynamic Sitting Balance severe impairment  -CT     Static Standing Balance moderate impairment  -CT     Dynamic Standing Balance moderate impairment  -CT     Balance Interventions sitting; standing; sit to stand; supported; dynamic;  weight shifting activity  -CT           User Key  (r) = Recorded By, (t) = Taken By, (c) = Cosigned By    Initials Name Provider Type    CT Carlyle Fermin, PT Physical Therapist               Goals/Plan    No documentation.                Clinical Impression     Row Name 02/19/22 1018          Pain    Additional Documentation Pain Scale: Numbers Pre/Post-Treatment (Group)  -CT     Row Name 02/19/22 1018          Pain Scale: Numbers Pre/Post-Treatment    Pretreatment Pain Rating 0/10 - no pain  -CT     Posttreatment Pain Rating 0/10 - no pain  -CT     Pain Intervention(s) Repositioned; Ambulation/increased activity  -CT     Row Name 02/19/22 1018          Plan of Care Review    Plan of Care Reviewed With patient  -CT     Progress improving  -CT     Outcome Summary more alert today, improved transfers to chair gt to 45 ft x 1 w HHA.  OOB in chair.  -CT     Row Name 02/19/22 1018          Therapy Assessment/Plan (PT)    Rehab Potential (PT) good, to achieve stated therapy goals  -CT     Criteria for Skilled Interventions Met (PT) yes  -CT     Row Name 02/19/22 1018          Positioning and Restraints    Pre-Treatment Position in bed  -CT     Post Treatment Position chair  -CT     In Chair notified nsg; reclined; sitting; call light within reach; exit alarm on; on mechanical lift sling; legs elevated  -CT           User Key  (r) = Recorded By, (t) = Taken By, (c) = Cosigned By    Initials Name Provider Type    CT Carlyle Fermin, PT Physical Therapist               Outcome Measures     Row Name 02/19/22 1022          How much help from another person do you currently need...    Turning from your back to your side while in flat bed without using bedrails? 3  -CT     Moving from lying on back to sitting on the side of a flat bed without bedrails? 2  -CT     Moving to and from a bed to a chair (including a wheelchair)? 2  -CT     Standing up from a chair using your arms (e.g., wheelchair, bedside chair)? 2   -CT     Climbing 3-5 steps with a railing? 1  -CT     To walk in hospital room? 2  -CT     AM-PAC 6 Clicks Score (PT) 12  -CT     Row Name 02/19/22 1022          Functional Assessment    Outcome Measure Options AM-PAC 6 Clicks Basic Mobility (PT)  -CT           User Key  (r) = Recorded By, (t) = Taken By, (c) = Cosigned By    Initials Name Provider Type    CT Carlyle Fermin PT Physical Therapist                             Physical Therapy Education                 Title: PT OT SLP Therapies (In Progress)     Topic: Physical Therapy (In Progress)     Point: Mobility training (In Progress)     Learning Progress Summary           Patient Acceptance, E,D, NR by CT at 2/19/2022 1023    Acceptance, E, VU,NR by CD at 2/18/2022 1531    Comment: SEE FLOWSHEET.    Acceptance, E, VU,NR by CD at 2/17/2022 1428    Comment: SEE FLOWSHEET.    Acceptance, E, NR by KG at 2/16/2022 1508   Family Acceptance, E, VU,NR by CD at 2/18/2022 1531    Comment: SEE FLOWSHEET.                   Point: Home exercise program (In Progress)     Learning Progress Summary           Patient Acceptance, E,D, NR by CT at 2/19/2022 1023    Acceptance, E, VU,NR by CD at 2/18/2022 1531    Comment: SEE FLOWSHEET.    Acceptance, E, VU,NR by CD at 2/17/2022 1428    Comment: SEE FLOWSHEET.    Acceptance, E, NR by KG at 2/16/2022 1508   Family Acceptance, E, VU,NR by CD at 2/18/2022 1531    Comment: SEE FLOWSHEET.                   Point: Body mechanics (In Progress)     Learning Progress Summary           Patient Acceptance, E,D, NR by CT at 2/19/2022 1023    Acceptance, E, VU,NR by CD at 2/18/2022 1531    Comment: SEE FLOWSHEET.    Acceptance, E, VU,NR by CD at 2/17/2022 1428    Comment: SEE FLOWSHEET.    Acceptance, E, NR by KG at 2/16/2022 1508   Family Acceptance, E, VU,NR by CD at 2/18/2022 1531    Comment: SEE FLOWSHEET.                   Point: Precautions (In Progress)     Learning Progress Summary           Patient Acceptance, E,D, NR by  CT at 2/19/2022 1023    Acceptance, E, VU,NR by CD at 2/18/2022 1531    Comment: SEE FLOWSHEET.    Acceptance, E, VU,NR by CD at 2/17/2022 1428    Comment: SEE FLOWSHEET.    Acceptance, E, NR by KG at 2/16/2022 1508   Family Acceptance, E, VU,NR by CD at 2/18/2022 1531    Comment: SEE FLOWSHEET.                               User Key     Initials Effective Dates Name Provider Type Discipline    CD 06/16/21 -  Marybeth Edwards, PT Physical Therapist PT    CT 06/16/21 -  Carlyle Fermin, PT Physical Therapist PT    KG 05/22/20 -  Caitlin Evans, PT Physical Therapist PT              PT Recommendation and Plan     Plan of Care Reviewed With: patient  Progress: improving  Outcome Summary: more alert today, improved transfers to chair gt to 45 ft x 1 w HHA.  OOB in chair.     Time Calculation:    PT Charges     Row Name 02/19/22 1024             Time Calculation    Start Time 0945  -CT      PT Received On 02/19/22  -CT              Time Calculation- PT    Total Timed Code Minutes- PT 35 minute(s)  -CT            User Key  (r) = Recorded By, (t) = Taken By, (c) = Cosigned By    Initials Name Provider Type    CT Carlyle Fermin, PT Physical Therapist              Therapy Charges for Today     Code Description Service Date Service Provider Modifiers Qty    01892898543 HC PT THER PROC EA 15 MIN 2/19/2022 Carlyle Fermin, PT GP 1    71434514530 HC GAIT TRAINING EA 15 MIN 2/19/2022 Carlyle Fermin, PT GP 1          PT G-Codes  Outcome Measure Options: AM-PAC 6 Clicks Basic Mobility (PT)  AM-PAC 6 Clicks Score (PT): 12  AM-PAC 6 Clicks Score (OT): 9    Carlyle Fermin PT  2/19/2022

## 2022-02-19 NOTE — PLAN OF CARE
Goal Outcome Evaluation:  Plan of Care Reviewed With: patient        Progress: improving  Outcome Summary: more alert today, improved transfers to chair gt to 45 ft x 1 w HHA.  OOB in chair.

## 2022-02-19 NOTE — PROGRESS NOTES
[facility] Medicine Services  PROGRESS NOTE    Patient Name: Derian Joya  : 1940  MRN: [id number]    Date of Admission: 2022  Primary Care Physician: Diaz Lee MD    Subjective   Subjective     CC:  Fall    HPI:  Per daughter he is improving but still not at baseline.  He also has had a cough these past couple days    ROS:  Gen- No fevers, chills  CV- No chest pain, palpitations  Resp- No cough, dyspnea  GI- No N/V/D, abd pain         Objective   Objective     Vital Signs:   Temp:  [98 °F (36.7 °C)-98.6 °F (37 °C)] 98.6 °F (37 °C)  Heart Rate:  [73-91] 86  Resp:  [16-18] 18  BP: (127-162)/(74-89) 151/89     Physical Exam:  Constitutional: Drowsy, chronically ill-appearing, frail appearing  HENT: NCAT, mucous membranes moist  Respiratory: Clear to auscultation bilaterally, respiratory effort normal   Cardiovascular: RRR, no murmurs, rubs, or gallops  Gastrointestinal: Positive bowel sounds, soft, nontender, nondistended  Musculoskeletal: No bilateral ankle edema  Psychiatric: Appropriate affect, cooperative  Neurologic: Oriented to person and place, strength symmetric in all extremities, Cranial Nerves grossly intact to confrontation, drowsy but improved from yesterday  Skin: No rashes      Results Reviewed:  LAB RESULTS:      Lab 22  0400 02/15/22  0328 22  1440 22  0643   WBC 7.69 7.86  --  11.01*   HEMOGLOBIN 11.5* 11.2*  --  12.2*   HEMATOCRIT 35.1* 33.0*  --  37.9   PLATELETS 194 186  --  207   NEUTROS ABS 5.96 5.81  --  9.57*   IMMATURE GRANS (ABS) 0.02 0.03  --  0.05   LYMPHS ABS 0.91 1.08  --  0.77   MONOS ABS 0.74 0.88  --  0.54   EOS ABS 0.04 0.04  --  0.06   MCV 98.0* 95.9  --  100.0*   PROCALCITONIN  --   --   --  <0.02   LACTATE 1.3  --  2.5* 2.8*         Lab 22  0952 22  0914 22  1757 22  0400 02/15/22  1613 02/15/22  0328 02/15/22  0328 22  0643 22  0643   SODIUM 137 137  --  137  --   --  136  --   141   POTASSIUM 3.6 3.8 4.8 3.5 4.3   < > 3.3*   < > 3.5   CHLORIDE 101 102  --  100  --   --  99  --  101   CO2 28.0 25.0  --  28.0  --   --  26.0  --  25.0   ANION GAP 8.0 10.0  --  9.0  --   --  11.0  --  15.0   BUN 10 10  --  7*  --   --  6*  --  11   CREATININE 0.52* 0.46*  --  0.42*  --   --  0.40*  --  0.56*   GLUCOSE 213* 111*  --  164*  --   --  173*  --  245*   CALCIUM 8.7 8.8  --  8.3*  --   --  8.6  --  8.8   MAGNESIUM  --  2.0  --  2.1  --   --  1.4*  --   --    PHOSPHORUS  --  3.4  --  2.7  --   --  3.0  --   --     < > = values in this interval not displayed.         Lab 02/15/22  0328 02/14/22  0643   TOTAL PROTEIN 6.1 6.2   ALBUMIN 3.50 3.60   GLOBULIN 2.6 2.6   ALT (SGPT) 17 19   AST (SGOT) 18 22   BILIRUBIN 1.1 0.7   ALK PHOS 99 118*         Lab 02/14/22  0643   TROPONIN T <0.010                 Brief Urine Lab Results  (Last result in the past 365 days)      Color   Clarity   Blood   Leuk Est   Nitrite   Protein   CREAT   Urine HCG        02/14/22 0815 Yellow   Clear   Trace   Negative   Negative   Negative                 Microbiology Results Abnormal     Procedure Component Value - Date/Time    COVID PRE-OP / PRE-PROCEDURE SCREENING ORDER (NO ISOLATION) - Swab, Nasopharynx [[id number]]  (Normal) Collected: 02/18/22 1748    Lab Status: Final result Specimen: Swab from Nasopharynx Updated: 02/18/22 1817    Narrative:      The following orders were created for panel order COVID PRE-OP / PRE-PROCEDURE SCREENING ORDER (NO ISOLATION) - Swab, Nasopharynx.  Procedure                               Abnormality         Status                     ---------                               -----------         ------                     COVID-19, ABBOTT IN-HOUS...[[id number]]  Normal              Final result                 Please view results for these tests on the individual orders.    COVID-19, ABBOTT IN-HOUSE,NASAL Swab (NO TRANSPORT MEDIA) 2 HR TAT - Swab, Nasopharynx [[id number]]  (Normal) Collected:  02/18/22 1748    Lab Status: Final result Specimen: Swab from Nasopharynx Updated: 02/18/22 1817     COVID19 Presumptive Negative    Narrative:      Fact sheet for providers: https://www.fda.gov/media/150699/download     Fact sheet for patients: https://www.fda.gov/media/899320/download    Test performed by PCR.  If inconsistent with clinical signs and symptoms patient should be tested with different authorized molecular test.    Blood Culture - Blood, Hand, Right [[id number]]  (Normal) Collected: 02/14/22 1519    Lab Status: Preliminary result Specimen: Blood from Hand, Right Updated: 02/18/22 1546     Blood Culture No growth at 4 days    Narrative:      Aerobic only    Blood Culture - Blood, Wrist, Right [[id number]]  (Normal) Collected: 02/14/22 1524    Lab Status: Preliminary result Specimen: Blood from Wrist, Right Updated: 02/18/22 1546     Blood Culture No growth at 4 days    Urine Culture - Urine, Urine, Catheter In/Out [[id number]] Collected: 02/14/22 0815    Lab Status: Final result Specimen: Urine, Catheter In/Out Updated: 02/15/22 1025     Urine Culture 25,000 CFU/mL Normal Urogenital Lesli    COVID PRE-OP / PRE-PROCEDURE SCREENING ORDER (NO ISOLATION) - Swab, Nasopharynx [[id number]]  (Normal) Collected: 02/14/22 0651    Lab Status: Final result Specimen: Swab from Nasopharynx Updated: 02/14/22 0751    Narrative:      The following orders were created for panel order COVID PRE-OP / PRE-PROCEDURE SCREENING ORDER (NO ISOLATION) - Swab, Nasopharynx.  Procedure                               Abnormality         Status                     ---------                               -----------         ------                     COVID-19 and FLU A/B PCR...[[id number]]  Normal              Final result                 Please view results for these tests on the individual orders.    COVID-19 and FLU A/B PCR - Swab, Nasopharynx [[id number]]  (Normal) Collected: 02/14/22 0651    Lab Status: Final result Specimen: Swab from  Nasopharynx Updated: 02/14/22 0751     COVID19 Not Detected     Influenza A PCR Not Detected     Influenza B PCR Not Detected    Narrative:      Fact sheet for providers: https://www.fda.gov/media/905080/download    Fact sheet for patients: https://www.fda.gov/media/064780/download    Test performed by PCR.          CT Head Without Contrast    Result Date: 2/18/2022  CT HEAD WO CONTRAST-  Date of Exam: 2/18/2022 7:19 PM  Indication: sdh followup; I62.01-Nontraumatic acute subdural hemorrhage; I62.03-Nontraumatic chronic subdural hemorrhage; W19.XXXA-Unspecified fall, initial encounter; Y92.129-Unspecified place in nursing home as the place of occurrence of the external cause; S42.201A-Unspecified fracture of upper end of right humerus, initial encounter for closed fracture; R13.10-Dysphagia, unspecified.  Comparison Exams: 2/15/2022  Technique: Multiple axial images were obtained from the skull base to the vertex without the administration of IV contrast. The axial data was used to generate reformatted images in the coronal and sagittal planes. Automated exposure control and iterative reconstruction methods were used.  FINDINGS: There is moderate generalized parenchymal volume loss.  Again seen is a mixed density right hemispheric subdural hematoma.  This measures proximal a 7 mm in greatest thickness overlying the right frontal lobe. This appears unchanged.  There is approximate 3 mm of midline shift right to left, similar prior exam.  No definite new hemorrhage identified.  No definite left-sided extra-axial fluid collection.  There is no hydrocephalus.  No acute infarct or hemorrhage identified.   There is no evidence of skull fracture.   Visualized paranasal sinuses and mastoid air cells are clear.  The globes and orbits are within normal limits.      Impression:   1.  Stable appearance of mixed density right hemispheric subdural hematoma.  No definite new or acute hemorrhage identified.  This report was  finalized on 2/18/2022 8:08 PM by Vasile Kelly MD.      FL Video Swallow With Speech Single Contrast    Result Date: 2/17/2022  EXAMINATION: FL VIDEO SWALLOW W SPEECH SINGLE-CONTRAST-  INDICATION: dysphagia; I62.01-Nontraumatic acute subdural hemorrhage; I62.03-Nontraumatic chronic subdural hemorrhage; W19.XXXA-Unspecified fall, initial encounter; Y92.129-Unspecified place in nursing home as the place of occurrence of the external cause; S42.201A-Unspecified fracture of upper end of right humerus, initial encounter for closed fracture; R13.10-Dysphagia, unspecified  TECHNIQUE: 1 minute of fluoroscopic time was used for this exam. 8 associated fluoroscopic loops were saved. The patient was evaluated in the seated lateral position while taking a variety of consistencies of barium by mouth under the direction of speech pathology.  COMPARISON: NONE  FINDINGS: 1 minute of fluoroscopy provided for a modified barium swallow. Please see speech therapy report for full details and recommendations.       Impression: Fluoroscopy provided for a modified barium swallow. Please see speech therapy report for full details and recommendations.    This report was finalized on 2/17/2022 9:17 PM by Dr. Kendrick Jane MD.        Results for orders placed during the hospital encounter of 01/11/22    Adult Transthoracic Echo Complete W/ Cont if Necessary Per Protocol    Interpretation Summary  · Mild aortic valve stenosis is present.  · Estimated right ventricular systolic pressure from tricuspid regurgitation is normal (<35 mmHg).  · LVSF is normal  · MAC      I have reviewed the medications:  Scheduled Meds:atorvastatin, 80 mg, Oral, Nightly  bisoprolol, 10 mg, Oral, Q24H  famotidine, 20 mg, Oral, BID AC  insulin lispro, 0-9 Units, Subcutaneous, 4x Daily With Meals & Nightly  lacosamide, 50 mg, Oral, Q12H  levETIRAcetam, 250 mg, Oral, BID  sodium chloride, 10 mL, Intravenous, Q12H  vitamin B-6, 50 mg, Oral, Daily      Continuous Infusions:    PRN Meds:.•  acetaminophen **OR** acetaminophen  •  dextrose  •  dextrose  •  glucagon (human recombinant)  •  HYDROmorphone  •  labetalol  •  magnesium sulfate **OR** magnesium sulfate **OR** magnesium sulfate  •  melatonin  •  potassium chloride  •  potassium chloride  •  sodium chloride  •  traMADol    Assessment/Plan   Assessment & Plan     Active Hospital Problems    Diagnosis  POA   • **Acute on chronic intracranial subdural hematoma (HCC) [I62.01, I62.03]  Yes   • Closed fracture of proximal end of right humerus [S42.201A]  Yes   • Cervical spine fracture (HCC) [S12.9XXA]  Yes   • Dyslipidemia [E78.5]  Yes   • Cardiomyopathy, nonischemic (HCC) [I42.8]  Yes   • Aortic stenosis [I35.0]  Yes   • Uncontrolled type 2 diabetes mellitus (HCC) [E11.65]  Yes   • Essential hypertension [I10]  Yes      Resolved Hospital Problems   No resolved problems to display.        Brief Hospital Course to date:  Derian Joya is a 82 y.o. male with a history of tobacco abuse, CAD, hypertension, aortic stenosis, diabetes, multiple hospitalizations secondary to falls and subarachnoid hemorrhage and subdural hematoma, who is being admitted again after he was found down by nursing staff at the [facility].  He was unresponsive upon admission and admitted to the ICU.  Imaging work-up with the brain showed acute on chronic subdural hematoma with mild midline shift of 3 to 4 mm.  Neurosurgery evaluated and did not recommend surgical intervention at this time.  Neurology continue to follow.  He also has an acute right humeral fracture and orthopedics has been consulted for this.    Acute encephalopathy  -Etiology suspected to be secondary to seizure.  EEG shows sharp waves but no epileptic activity  -Continue Vimpat and Keppra per neurology recommendations as well as seizure precautions. Decrease Keppra per neurology recommendations, B6 supplement and Vimpat started as well for increased somnolence   -Repeat EEG and head CT given decreased  somnolence.  Dr.Kleinholz Bueno to determine decrease dosing of Vimpat based on studies. CT head negative for new acute process. EEG read pending  -Check chest x-ray and ordered incentive spirometer      Marrow edema of C7, T1, T2  -Neurosurgery consulted, no surgical indications    Right proximal humerus fracture  -Evaluated by Dr. Jorgensen who recommends follow-up outpatient in 3 weeks in the office and to continue sling    Hypertension  -Initially on nicardipine but able to be transitioned off following resumption of home bisoprolol    Diabetes  Hyperglycemia  -On correctional insulin    DVT prophylaxis:  Mechanical DVT prophylaxis orders are present.       AM-PAC 6 Clicks Score (PT): 11 (02/18/22 1530)    Disposition: I expect the patient to be discharged Sunday if neurology is okay with this    CODE STATUS:   Code Status and Medical Interventions:   Ordered at: 02/14/22 0844     Code Status (Patient has no pulse and is not breathing):    CPR (Attempt to Resuscitate)     Medical Interventions (Patient has pulse or is breathing):    Full Support       Anitra Dimas MD  02/19/22

## 2022-02-19 NOTE — PROGRESS NOTES
"Neurology       Patient Care Team:  Diaz Lee MD as PCP - General    Chief complaint subdural hematoma, seizures    Subjective .     History:    Much improved today.  Daughter at bedside and she agrees.  Plan is to DC tomorrow back to the [facility].    Daughter is upset, she is not sure if patient had a cervical fracture or not, she feels like she is getting conflicting information.    Objective     Vital Signs   Blood pressure 139/86, pulse 73, temperature 98.4 °F (36.9 °C), temperature source Oral, resp. rate 18, height 165.1 cm (65\"), weight 59.6 kg (131 lb 8 oz), SpO2 97 %.    Physical Exam:  Elderly man.  Awake and alert.  C-collar in place.  Speech is much more spontaneous today.  He is less staring off.  He is able to follow commands.  He is oriented to person and place.  He did get the year correct.  He is able to follow commands.  Moves all extremities equally but does not move the right arm much due to pain from the humeral fracture.  On room air, even respirations.  Cardiac regular rate and rhythm.  Results Review:  EEG results discussed with Dr. Kim mild slowing with paroxysmal delta, reduced sharps appreciated from prior studies.  Repeat head CT without contrast done yesterday evening is stable, SDH stable.  A.m. labs reviewed    Assessment/Plan     83 y/o man with history of traumatic SDH complicated by seizure who had a fall at skilled nursing home causing a right humeral fracture and new SDH. Concern for additional seizure and he is currently on higher dose keppra and vimpat. No seizure on EEG but sharps were appreciated.      No seizures since admission but was somnolent and so decreased keppra and he is more alert. Added B6 for keppra agitation.  He is also on Vimpat 100 mg twice daily.     2/18  Patient is still not at baseline and has significant psychomotor slowing.  I am not sure if this is new baseline or if he will just need significant time to improve.  I would like to repeat an EEG " and head CT today prior to letting him go back to [facility] over the weekend.  If possible I would like to wait till Sunday to send him back so that we can monitor if I decide to reduce his Vimpat later today.     2/19  Much improved today.  EEG also improved per Dr. Kim, kaylyn sharps appreciated.  Head CT without contrast is reassuring.  We will go ahead and reduce Vimpat and monitor overnight.  I will try and see him in the morning to facilitate his discharge back to the [facility].    Long conversation with the daughter.  Explained that there was suspicion for cervical fracture but I do not think that it is quite clear that a fracture is present.  There is definitely edema.  According to the notes, neurosurgery is planning to repeat his CT scan in a few weeks.  He does have an appointment on March 14 with SUZY Oneal.    -Keppra 250 twice daily  -Vimpat 50 twice daily  -Follow-up neurosurgery clinic on March 14 with repeat CT cervical spine  -Follow-up neurology clinic as an outpatient in 1 to 2 months    I discussed the patients findings and my recommendations with patient, primary team    Amy Mckeon MD  02/19/22  17:01 EST

## 2022-02-19 NOTE — PROGRESS NOTES
"      [facility] Progress Note    Subjective      LOS: 5 days   Patient Care Team:  Diaz Lee MD as PCP - General    CC: Right shoulder pain    Interval History:   No new complaints this morning.  Resting in bed.    Objective      Vital Signs  Temp (24hrs), Av.3 °F (36.8 °C), Min:98 °F (36.7 °C), Max:98.6 °F (37 °C)      /89 (BP Location: Left arm, Patient Position: Lying)   Pulse 86   Temp 98.6 °F (37 °C) (Oral)   Resp 18   Ht 165.1 cm (65\")   Wt 59.6 kg (131 lb 8 oz)   SpO2 96%   BMI 21.88 kg/m²     Examination:   Integument:   Right shoulder: Ecchymosis and swelling    Upper Extremities:   Right Shoulder:    Tenderness:  Positive    Swelling:  Positive  Deformities:  None  Sensation and motor intact in the hand    Labs:  Results from last 7 days   Lab Units 22  0400 02/15/22  0328 22  0643   WBC 10*3/mm3 7.69 7.86 11.01*   HEMOGLOBIN g/dL 11.5* 11.2* 12.2*   HEMATOCRIT % 35.1* 33.0* 37.9   MCV fL 98.0* 95.9 100.0*   PLATELETS 10*3/mm3 194 186 207       Radiology:  Imaging Results (Last 24 Hours)     Procedure Component Value Units Date/Time    CT Head Without Contrast [[id number]] Collected: 22     Updated: 22    Narrative:      CT HEAD WO CONTRAST-     Date of Exam: 2022 7:19 PM     Indication: sdh followup; I62.01-Nontraumatic acute subdural hemorrhage;  I62.03-Nontraumatic chronic subdural hemorrhage; W19.XXXA-Unspecified  fall, initial encounter; Y92.129-Unspecified place in nursing home as  the place of occurrence of the external cause; S42.201A-Unspecified  fracture of upper end of right humerus, initial encounter for closed  fracture; R13.10-Dysphagia, unspecified.     Comparison Exams: 2/15/2022     Technique: Multiple axial images were obtained from the skull base to  the vertex without the administration of IV contrast. The axial data was  used to generate reformatted images in the coronal and sagittal planes.  Automated " exposure control and iterative reconstruction methods were  used.     FINDINGS:  There is moderate generalized parenchymal volume loss.  Again seen is a  mixed density right hemispheric subdural hematoma.  This measures  proximal a 7 mm in greatest thickness overlying the right frontal lobe.   This appears unchanged.  There is approximate 3 mm of midline shift  right to left, similar prior exam.  No definite new hemorrhage  identified.  No definite left-sided extra-axial fluid collection.  There  is no hydrocephalus.  No acute infarct or hemorrhage identified.     There is no evidence of skull fracture.   Visualized paranasal sinuses  and mastoid air cells are clear.      The globes and orbits are within normal limits.       Impression:            1.  Stable appearance of mixed density right hemispheric subdural  hematoma.  No definite new or acute hemorrhage identified.     This report was finalized on 2/18/2022 8:08 PM by Vasile Kelly MD.             PT:  Physical Therapy - Plan of Care Review - Outcome Summary:  Outcome Summary: TOLERATED B LE THER EX AND STS/  STAND PIVOT TRANSFERS WITH MOD ASSIST OF 2. PT LIMITED BY DECREASED LEVEL OF ALERTNESS BUT WILL OPEN EYES TO NAME  AND FOLLOW COMMANDS 25-50%. RECOMMEND SNF AT D/C. (02/18/22 0375)]       Results Review:     I reviewed the patient's new clinical results.    Assessment and Plan     Assessment:   Right proximal humerus fracture    Continue sling  Follow-up with me in 3 weeks in the office  Please feel free to contact me for any questions      Acute on chronic intracranial subdural hematoma (HCC)    Uncontrolled type 2 diabetes mellitus (HCC)    Essential hypertension    Aortic stenosis    Cardiomyopathy, nonischemic (HCC)    Dyslipidemia    Closed fracture of proximal end of right humerus    Cervical spine fracture (HCC)        Future Appointments   Date Time Provider Department Center   3/7/2022  9:00 AM Reid Wood MD [facility] N [facility]   3/7/2022  3:10  PM Jameel, Willie E, MD [facility]   3/14/2022  9:45 AM [facility] CT 1 [facility] TOP[facility] CT [facility]   3/14/2022 12:00 PM Miguel Ángel Scruggs PA-C [facility]           Willie Jorgensen MD  02/19/22  09:54 EST

## 2022-02-20 VITALS
BODY MASS INDEX: 21.91 KG/M2 | DIASTOLIC BLOOD PRESSURE: 77 MMHG | TEMPERATURE: 98 F | OXYGEN SATURATION: 98 % | WEIGHT: 131.5 LBS | SYSTOLIC BLOOD PRESSURE: 130 MMHG | HEIGHT: 65 IN | HEART RATE: 70 BPM | RESPIRATION RATE: 18 BRPM

## 2022-02-20 LAB — GLUCOSE BLDC GLUCOMTR-MCNC: 138 MG/DL (ref 70–130)

## 2022-02-20 PROCEDURE — 99239 HOSP IP/OBS DSCHRG MGMT >30: CPT | Performed by: NURSE PRACTITIONER

## 2022-02-20 PROCEDURE — 82962 GLUCOSE BLOOD TEST: CPT

## 2022-02-20 PROCEDURE — 99231 SBSQ HOSP IP/OBS SF/LOW 25: CPT | Performed by: PSYCHIATRY & NEUROLOGY

## 2022-02-20 RX ORDER — LACOSAMIDE 50 MG/1
50 TABLET ORAL EVERY 12 HOURS SCHEDULED
Qty: 60 TABLET | Refills: 3 | Status: SHIPPED | OUTPATIENT
Start: 2022-02-20 | End: 2022-03-25 | Stop reason: SDUPTHER

## 2022-02-20 RX ORDER — TRAMADOL HYDROCHLORIDE 50 MG/1
50 TABLET ORAL EVERY 6 HOURS PRN
Qty: 6 TABLET | Refills: 0 | Status: SHIPPED | OUTPATIENT
Start: 2022-02-20

## 2022-02-20 RX ORDER — LANOLIN ALCOHOL/MO/W.PET/CERES
50 CREAM (GRAM) TOPICAL DAILY
Qty: 30 TABLET | Refills: 0 | Status: SHIPPED | OUTPATIENT
Start: 2022-02-21 | End: 2022-06-15

## 2022-02-20 RX ADMIN — ACETAMINOPHEN 650 MG: 325 TABLET, FILM COATED ORAL at 08:34

## 2022-02-20 RX ADMIN — FAMOTIDINE 20 MG: 20 TABLET, FILM COATED ORAL at 08:21

## 2022-02-20 RX ADMIN — SODIUM CHLORIDE, PRESERVATIVE FREE 10 ML: 5 INJECTION INTRAVENOUS at 08:21

## 2022-02-20 RX ADMIN — LACOSAMIDE 50 MG: 50 TABLET, FILM COATED ORAL at 08:21

## 2022-02-20 RX ADMIN — PYRIDOXINE HCL TAB 50 MG 50 MG: 50 TAB at 08:21

## 2022-02-20 RX ADMIN — BISOPROLOL FUMARATE 10 MG: 5 TABLET, FILM COATED ORAL at 08:21

## 2022-02-20 RX ADMIN — LEVETIRACETAM 250 MG: 250 TABLET, FILM COATED ORAL at 08:21

## 2022-02-20 NOTE — DISCHARGE SUMMARY
[facility] Medicine Services  DISCHARGE SUMMARY    Patient Name: Derian Joya  : 1940  MRN: [id number]    Date of Admission: 2022  5:29 AM  Date of Discharge:  22  Primary Care Physician: Diaz Lee MD    Consults     Date and Time Order Name Status Description    2022  8:42 AM Inpatient Orthopedic Surgery Consult Completed     2022  8:42 AM Inpatient Neurology Consult General Completed     2022 11:03 AM Inpatient Nephrology Consult Completed           Hospital Course     Presenting Problem:   Acute on chronic intracranial subdural hematoma (HCC) [I62.01, I62.03]    Active Hospital Problems    Diagnosis  POA   • **Acute on chronic intracranial subdural hematoma (HCC) [I62.01, I62.03]  Yes   • Closed fracture of proximal end of right humerus [S42.201A]  Yes   • Cervical spine fracture (HCC) [S12.9XXA]  Yes   • Dyslipidemia [E78.5]  Yes   • Cardiomyopathy, nonischemic (HCC) [I42.8]  Yes   • Aortic stenosis [I35.0]  Yes   • Uncontrolled type 2 diabetes mellitus (HCC) [E11.65]  Yes   • Essential hypertension [I10]  Yes      Resolved Hospital Problems   No resolved problems to display.          Hospital Course:  Derian Joya is a 82 y.o. male with a history of tobacco abuse, CAD, hypertension, aortic stenosis, diabetes, multiple hospitalizations secondary to falls and subarachnoid hemorrhage and subdural hematoma, who is being admitted again after he was found down by nursing staff at the [facility].  He was unresponsive upon admission and admitted to the ICU.  Imaging work-up with the brain showed acute on chronic subdural hematoma with mild midline shift of 3 to 4 mm. Etiology of encephalopathy thought to be seizure activity.  Neurosurgery evaluated and did not recommend surgical intervention at this time. Concern for mild minimal cervical compression fracture, cervical collar in place, will remain until follow up CT in 2-3 weeks.  Neurology continue  to follow, making adjustments to seizure medications.  He also has an acute right humeral fracture and orthopedics evaluated, will keep in sling and follow up outpatient in 3 weeks. Medically stable, will transfer to SNF today.    Discharge Follow Up Recommendations for outpatient labs/diagnostics:   PCP 1 week after DC from rehab   Neurosurgery follow up in 2 weeks with repeat CT neck; cervical collar to remain in place   Neurology 1 month   micaela Flores, 3 weeks for follow up of right humerus fracture    Day of Discharge     HPI:   Sitting up in bed eating breakfast. NAD. No family in room. Right shoulder pain but controlled. No new issues. Denies f/c, n/v/d, soa or cp.     Review of Systems  All other systems negative     Vital Signs:   Temp:  [98 °F (36.7 °C)-98.5 °F (36.9 °C)] 98 °F (36.7 °C)  Heart Rate:  [70-78] 70  Resp:  [16-20] 18  BP: (123-161)/(74-95) 130/77      Physical Exam:  Constitutional: No acute distress, awake, alert  HENT: NCAT, mucous membranes moist; cervical collar in place  Respiratory: Clear to auscultation bilaterally, respiratory effort normal   Cardiovascular: RRR, + murmur  Gastrointestinal: Positive bowel sounds, soft, nontender, nondistended  Musculoskeletal: No bilateral ankle edema, right arm in sling   Psychiatric: Appropriate affect, cooperative  Neurologic: Oriented x 3, RANDOLPH, speech clear  Skin: No rashes, old bruising of right upper arm     Pertinent  and/or Most Recent Results     LAB RESULTS:      Lab 02/16/22  0400 02/15/22  0328 02/14/22  1440 02/14/22  0643   WBC 7.69 7.86  --  11.01*   HEMOGLOBIN 11.5* 11.2*  --  12.2*   HEMATOCRIT 35.1* 33.0*  --  37.9   PLATELETS 194 186  --  207   NEUTROS ABS 5.96 5.81  --  9.57*   IMMATURE GRANS (ABS) 0.02 0.03  --  0.05   LYMPHS ABS 0.91 1.08  --  0.77   MONOS ABS 0.74 0.88  --  0.54   EOS ABS 0.04 0.04  --  0.06   MCV 98.0* 95.9  --  100.0*   PROCALCITONIN  --   --   --  <0.02   LACTATE 1.3  --  2.5* 2.8*         Lab  02/19/22  1034 02/18/22  0952 02/17/22  0914 02/16/22  1757 02/16/22  0400 02/15/22  1613 02/15/22  0328   SODIUM 135* 137 137  --  137  --  136   POTASSIUM 3.8 3.6 3.8 4.8 3.5   < > 3.3*   CHLORIDE 96* 101 102  --  100  --  99   CO2 28.0 28.0 25.0  --  28.0  --  26.0   ANION GAP 11.0 8.0 10.0  --  9.0  --  11.0   BUN 11 10 10  --  7*  --  6*   CREATININE 0.55* 0.52* 0.46*  --  0.42*  --  0.40*   GLUCOSE 230* 213* 111*  --  164*  --  173*   CALCIUM 8.9 8.7 8.8  --  8.3*  --  8.6   MAGNESIUM  --   --  2.0  --  2.1  --  1.4*   PHOSPHORUS  --   --  3.4  --  2.7  --  3.0    < > = values in this interval not displayed.         Lab 02/15/22  0328 02/14/22  0643   TOTAL PROTEIN 6.1 6.2   ALBUMIN 3.50 3.60   GLOBULIN 2.6 2.6   ALT (SGPT) 17 19   AST (SGOT) 18 22   BILIRUBIN 1.1 0.7   ALK PHOS 99 118*         Lab 02/14/22  0643   TROPONIN T <0.010                 Brief Urine Lab Results  (Last result in the past 365 days)      Color   Clarity   Blood   Leuk Est   Nitrite   Protein   CREAT   Urine HCG        02/14/22 0815 Yellow   Clear   Trace   Negative   Negative   Negative               Microbiology Results (last 10 days)     Procedure Component Value - Date/Time    COVID PRE-OP / PRE-PROCEDURE SCREENING ORDER (NO ISOLATION) - Swab, Nasopharynx [[id number]]  (Normal) Collected: 02/18/22 1748    Lab Status: Final result Specimen: Swab from Nasopharynx Updated: 02/18/22 1817    Narrative:      The following orders were created for panel order COVID PRE-OP / PRE-PROCEDURE SCREENING ORDER (NO ISOLATION) - Swab, Nasopharynx.  Procedure                               Abnormality         Status                     ---------                               -----------         ------                     COVID-19, ABBOTT IN-HOUS...[[id number]]  Normal              Final result                 Please view results for these tests on the individual orders.    COVID-19, ABBOTT IN-HOUSE,NASAL Swab (NO TRANSPORT MEDIA) 2 HR TAT - Swab,  Nasopharynx [[id number]]  (Normal) Collected: 02/18/22 1748    Lab Status: Final result Specimen: Swab from Nasopharynx Updated: 02/18/22 1817     COVID19 Presumptive Negative    Narrative:      Fact sheet for providers: https://www.fda.gov/media/196238/download     Fact sheet for patients: https://www.fda.gov/media/655886/download    Test performed by PCR.  If inconsistent with clinical signs and symptoms patient should be tested with different authorized molecular test.    Blood Culture - Blood, Wrist, Right [[id number]]  (Normal) Collected: 02/14/22 1524    Lab Status: Final result Specimen: Blood from Wrist, Right Updated: 02/19/22 1545     Blood Culture No growth at 5 days    Blood Culture - Blood, Hand, Right [[id number]]  (Normal) Collected: 02/14/22 1519    Lab Status: Final result Specimen: Blood from Hand, Right Updated: 02/19/22 1545     Blood Culture No growth at 5 days    Narrative:      Aerobic only    Urine Culture - Urine, Urine, Catheter In/Out [[id number]] Collected: 02/14/22 0815    Lab Status: Final result Specimen: Urine, Catheter In/Out Updated: 02/15/22 1025     Urine Culture 25,000 CFU/mL Normal Urogenital Lesli    COVID PRE-OP / PRE-PROCEDURE SCREENING ORDER (NO ISOLATION) - Swab, Nasopharynx [[id number]]  (Normal) Collected: 02/14/22 0651    Lab Status: Final result Specimen: Swab from Nasopharynx Updated: 02/14/22 0751    Narrative:      The following orders were created for panel order COVID PRE-OP / PRE-PROCEDURE SCREENING ORDER (NO ISOLATION) - Swab, Nasopharynx.  Procedure                               Abnormality         Status                     ---------                               -----------         ------                     COVID-19 and FLU A/B PCR...[[id number]]  Normal              Final result                 Please view results for these tests on the individual orders.    COVID-19 and FLU A/B PCR - Swab, Nasopharynx [[id number]]  (Normal) Collected: 02/14/22 0651    Lab Status:  Final result Specimen: Swab from Nasopharynx Updated: 02/14/22 0751     COVID19 Not Detected     Influenza A PCR Not Detected     Influenza B PCR Not Detected    Narrative:      Fact sheet for providers: https://www.fda.gov/media/814481/download    Fact sheet for patients: https://www.fda.gov/media/190240/download    Test performed by PCR.          EEG    Result Date: 2/20/2022  Reason for referral: 82 y.o.male with altered mental status, consideration of seizures, medication adjustment Technical Summary:  A 19 channel digital EEG was performed using the international 10-20 placement system, including eye leads and EKG leads. Duration: 20 minutes Findings: The awake tracing shows diffuse low to medium amplitude 5-7 Hz theta which is present symmetrically over both hemispheres.  EMG and eye blink artifact are seen anteriorly.  Bursts of medium amplitude 2-4 Hz generalized delta are frequently present.  Light drowsiness is seen with mild slowing of the background but stage II sleep is not seen.  Photic stimulation does not change the tracing.  No epileptiform activity or electrographic seizures are seen.  Hyperventilation is not performed. Video: Off Technical quality: Good EKG: Regular, 80 bpm SUMMARY: Mild generalized slow with paroxysmal generalized delta No epileptiform activity or ongoing seizures are seen     The study shows evidence for diffuse cerebral dysfunction of mild to moderate degree Ongoing seizures are not present Note-dictation was completed within 2 hours of the study appearing on my work list This report is transcribed using the Dragon dictation system.      EEG    Result Date: 2/14/2022  Reason for referral: 82 y.o.male with altered mental status Technical Summary:  A 19 channel digital EEG was performed using the international 10-20 placement system, including eye leads and EKG leads. Duration: 20 minutes Video: Off Findings: The patient is resting in bed, moaning.  The background shows diffuse  medium amplitude 3-5 Hz intermixed delta and theta activity which is present over both hemispheres.  Sharp and slow wave discharges are seen with phase reversal over the left frontal head region.  There are also seen independently, and at times synchronously over the right frontocentral leads.  At times there are brief runs of right hemispheric sharps at approximately 1 Hz (lateralized periodic discharges).  Electrographic seizures are however not seen.  Photic stimulation and hyperventilation are not performed. Technical quality: Good EKG: Regular, 80-90 bpm SUMMARY: Moderate generalized slow Independent left frontal and right frontal central sharp waves Lateralized periodic discharges, right central     The background suggest diffuse cerebral dysfunction which is however nonspecific as to cause The sharp wave discharges can be seen in seizure disorders of the partial type Ongoing seizures are not however seen The lateralized periodic discharges seen above are both associated with seizures, and can also be seen in the setting of acute CNS insult (such as, in this case, subdural hematoma) This report is transcribed using the Dragon dictation system.      XR Shoulder 2+ View Right    Result Date: 2/14/2022  DATE OF EXAM: 2/14/2022 7:16 AM  PROCEDURE: XR SHOULDER 2+ VW RIGHT-  INDICATIONS: FALL; I62.01-Nontraumatic acute subdural hemorrhage; I62.03-Nontraumatic chronic subdural hemorrhage; W19.XXXA-Unspecified fall, initial encounter; Y92.129-Unspecified place in nursing home as the place of occurrence of the external cause; S42.201A-Unspecified fracture of upper end of right humerus, initial encounter for closed fracture  COMPARISON: No Comparisons Available  TECHNIQUE: A minimum of two radiologic views of the right shoulder were obtained.  FINDINGS: Comminuted fracture of the proximal humerus centered at the surgical neck with fracture lines extending into the humeral head and greater tuberosity. There appears be  mild foreshortening of the humeral shaft. No dislocation at the shoulder joint noted. Mild degenerative changes are noted at the glenohumeral joints and mild to moderate degenerative changes at the AC joint. Soft tissues appear grossly unremarkable. Limited imaging of the chest shows no acute abnormality.      Comminuted fracture of the proximal humerus with mild foreshortening.  No evidence of dislocation.  This report was finalized on 2/14/2022 7:48 AM by Dhruv Lund.      CT Head Without Contrast    Result Date: 2/18/2022  CT HEAD WO CONTRAST-  Date of Exam: 2/18/2022 7:19 PM  Indication: sdh followup; I62.01-Nontraumatic acute subdural hemorrhage; I62.03-Nontraumatic chronic subdural hemorrhage; W19.XXXA-Unspecified fall, initial encounter; Y92.129-Unspecified place in nursing home as the place of occurrence of the external cause; S42.201A-Unspecified fracture of upper end of right humerus, initial encounter for closed fracture; R13.10-Dysphagia, unspecified.  Comparison Exams: 2/15/2022  Technique: Multiple axial images were obtained from the skull base to the vertex without the administration of IV contrast. The axial data was used to generate reformatted images in the coronal and sagittal planes. Automated exposure control and iterative reconstruction methods were used.  FINDINGS: There is moderate generalized parenchymal volume loss.  Again seen is a mixed density right hemispheric subdural hematoma.  This measures proximal a 7 mm in greatest thickness overlying the right frontal lobe. This appears unchanged.  There is approximate 3 mm of midline shift right to left, similar prior exam.  No definite new hemorrhage identified.  No definite left-sided extra-axial fluid collection.  There is no hydrocephalus.  No acute infarct or hemorrhage identified.   There is no evidence of skull fracture.   Visualized paranasal sinuses and mastoid air cells are clear.  The globes and orbits are within normal limits.         1.  Stable appearance of mixed density right hemispheric subdural hematoma.  No definite new or acute hemorrhage identified.  This report was finalized on 2/18/2022 8:08 PM by Vasile Kelly MD.      CT Head Without Contrast    Result Date: 2/15/2022  EXAMINATION: CT HEAD WO CONTRAST-  INDICATION: f/u Subdural hematoma; I62.01-Nontraumatic acute subdural hemorrhage; I62.03-Nontraumatic chronic subdural hemorrhage; W19.XXXA-Unspecified fall, initial encounter; Y92.129-Unspecified place in nursing home as the place of occurrence of the external cause; S42.201A-Unspecified fracture of upper end of right humerus, initial encounter for closed fracture  TECHNIQUE: Axial noncontrast CT of the head with multiplanar reconstruction  The radiation dose reduction device was turned on for each scan per the ALARA (As Low as Reasonably Achievable) protocol.  COMPARISON: One day prior  FINDINGS: Continued expected evolutionary change of the patient's previously noted right-sided mixed density subdural hematoma, including acute blood products. There is no evidence of significant new acute hemorrhage or increased mass effect. Advanced age-related changes are again present with diffuse atrophy. The ventricles are unchanged in size and configuration. The calvarium is intact.      Continued expected evolutionary change of the patient's previously noted right-sided mixed density subdural hematoma, including hyperdense acute blood products. There is no evidence of significant new acute hemorrhage or increased mass effect.   This report was finalized on 2/15/2022 8:30 AM by Tal Garza.      CT Head Without Contrast    Result Date: 2/14/2022  EXAM: CT HEAD WO CONTRAST, CT CERVICAL SPINE WO CONTRAST EXAM DATE: 2/14/2022 6:11 AM INDICATION: Trauma.  COMPARISON: 1/22/2022. TECHNIQUE: Images through the head without intravenous contrast. Images through the cervical spine without intravenous contrast. Low-dose CT acquisition  technique included one or more of the following options: Automated exposure control; Adjustment of mA and/or KV according to patient's size; Use of iterative reconstruction. CONTRAST: None. FINDINGS TECHNICAL: Motion degraded examination. HEAD CT: INTRACRANIAL CONTENTS: Mixed density, partially hyperdense, subdural hematoma over the right cerebral hemisphere measuring approximately 12 mm in maximal thickness in the right frontal region. No intracranial mass. No acute large territorial infarct. Moderate hypodensity within the supratentorial white matter. Allowing for differences in technique, approximately unchanged. Midline shift to left measuring 3-4 mm. No herniation or hydrocephalus. SKULL: No acute abnormality. PARANASAL SINUSES: Essentially clear. ORBITS: Within normal limits. MASTOID AIR CELLS: No significant opacification. INTRACRANIAL VESSELS: Dense calcification of the intracranial internal carotid and vertebral arteries. CERVICAL SPINE CT: VERTEBRAE: Mild anterior wedge deformity of the C7, T1, likely T2 vertebral bodies with slight widening of the C6-7 intervertebral disc space anteriorly. BONE MINERALIZATION: Diffuse osteopenia. SPINAL ALIGNMENT: Straightening of the normal cervical lordosis.  Mild retrolisthesis: C5 on C6. SPINAL CANAL: No gross intraspinal collection, mass, or other abnormality. FACET HYPERTROPHY: Multilevel facet hypertrophy. SPINAL CANAL STENOSIS: At most mild stenosis of the spinal canal. NEURAL FORAMINAL NARROWING: Most prominent at C5-6 (mild/moderate). SOFT TISSUES: No significant abnormality. EXTRACRANIAL VESSELS: Calcification of the extracranial internal carotid arteries. OTHER SIGNIFICANT FINDINGS: None.     HEAD CT: Acute subdural hematoma measuring up to 12 mm in maximal thickness over the right frontal region. Mild secondary mass effect including midline shift to left measuring 3-4 mm. Stable, moderate white matter changes, nonspecific but commonly due to chronic  microangiopathic change. CERVICAL SPINE CT: Mild anterior wedge deformities of the C7, T1, and likely T2 vertebral bodies seen in association with mild widening of the C6-7 intervertebral disc space anteriorly. Findings can reflect traumatic injury but are age-indeterminate, noting that there are no associated soft tissue abnormalities to increase suspicion for an acute injury. If no contraindication, and clinically indicated, MRI offered for better evaluation. Mild retrolisthesis of C5 on C6, likely degenerative in nature. RESULTS COMMUNICATION: Direct phone communication of results and all acute medical imaging concerns were discussed with NESTOR BARRAGAN and acknowledged. 2/14/2022 4:47 AM Mountain Time. CRITICAL RESULT OF NEW INTRACRANIAL HEMORRHAGE IDENTIFIED. CRITICAL RESULT OF POSSIBLE ACUTE CERVICAL SPINE FRACTURE IDENTIFIED. Electronically signed by:  French Quinn  2/14/2022 4:49 AM Mountain Time    CT Head Without Contrast    Result Date: 2/10/2022   DATE OF EXAM: 2/10/2022 11:22 AM  PROCEDURE: CT HEAD WO CONTRAST-  INDICATIONS: SDH; S06.8L2C-Xclazrkzm subdural hemorrhage with loss of consciousness of unspecified duration, initial encounter  COMPARISON: January 22, 2022  TECHNIQUE: Routine transaxial cuts were obtained through the head without the administration of contrast. Automated exposure control and iterative reconstruction methods were used.  FINDINGS: There remains prominent extra-axial CSF along the right frontal lobe in the subdural space. There is generalized cerebral atrophy. There are periventricular and deep white matter changes which could reflect more chronic small vessel ischemic change.  The previously noted prepontine blood has resolved additionally areas of subarachnoid hemorrhage also is no longer seen nor is there definitive subdural hemorrhage apparent. The previously noted intraventricular hemorrhage also has resolved.  An acute abnormality of the calvarium is not apparent. The  paranasal sinuses seem relatively clear.      1.  Previously noted subarachnoid, subdural and intraventricular hemorrhage has resolved 2.  Prominent extra-axial CSF along the right frontal lobe which has been suggested and may relate to subdural hygroma or more prominent cerebral atrophy. 3.  Periventricular and deep white matter changes which could reflect more chronic small vessel ischemic change. 4.  Atherosclerotic changes are noted involving the vertebral arteries as well as the distal internal carotid arteries.  This report was finalized on 2/10/2022 11:53 AM by Lloyd Salazar MD.      CT Cervical Spine Without Contrast    Result Date: 2/14/2022  EXAM: CT HEAD WO CONTRAST, CT CERVICAL SPINE WO CONTRAST EXAM DATE: 2/14/2022 6:11 AM INDICATION: Trauma.  COMPARISON: 1/22/2022. TECHNIQUE: Images through the head without intravenous contrast. Images through the cervical spine without intravenous contrast. Low-dose CT acquisition technique included one or more of the following options: Automated exposure control; Adjustment of mA and/or KV according to patient's size; Use of iterative reconstruction. CONTRAST: None. FINDINGS TECHNICAL: Motion degraded examination. HEAD CT: INTRACRANIAL CONTENTS: Mixed density, partially hyperdense, subdural hematoma over the right cerebral hemisphere measuring approximately 12 mm in maximal thickness in the right frontal region. No intracranial mass. No acute large territorial infarct. Moderate hypodensity within the supratentorial white matter. Allowing for differences in technique, approximately unchanged. Midline shift to left measuring 3-4 mm. No herniation or hydrocephalus. SKULL: No acute abnormality. PARANASAL SINUSES: Essentially clear. ORBITS: Within normal limits. MASTOID AIR CELLS: No significant opacification. INTRACRANIAL VESSELS: Dense calcification of the intracranial internal carotid and vertebral arteries. CERVICAL SPINE CT: VERTEBRAE: Mild anterior wedge deformity of  the C7, T1, likely T2 vertebral bodies with slight widening of the C6-7 intervertebral disc space anteriorly. BONE MINERALIZATION: Diffuse osteopenia. SPINAL ALIGNMENT: Straightening of the normal cervical lordosis.  Mild retrolisthesis: C5 on C6. SPINAL CANAL: No gross intraspinal collection, mass, or other abnormality. FACET HYPERTROPHY: Multilevel facet hypertrophy. SPINAL CANAL STENOSIS: At most mild stenosis of the spinal canal. NEURAL FORAMINAL NARROWING: Most prominent at C5-6 (mild/moderate). SOFT TISSUES: No significant abnormality. EXTRACRANIAL VESSELS: Calcification of the extracranial internal carotid arteries. OTHER SIGNIFICANT FINDINGS: None.     HEAD CT: Acute subdural hematoma measuring up to 12 mm in maximal thickness over the right frontal region. Mild secondary mass effect including midline shift to left measuring 3-4 mm. Stable, moderate white matter changes, nonspecific but commonly due to chronic microangiopathic change. CERVICAL SPINE CT: Mild anterior wedge deformities of the C7, T1, and likely T2 vertebral bodies seen in association with mild widening of the C6-7 intervertebral disc space anteriorly. Findings can reflect traumatic injury but are age-indeterminate, noting that there are no associated soft tissue abnormalities to increase suspicion for an acute injury. If no contraindication, and clinically indicated, MRI offered for better evaluation. Mild retrolisthesis of C5 on C6, likely degenerative in nature. RESULTS COMMUNICATION: Direct phone communication of results and all acute medical imaging concerns were discussed with NESTOR BARRAGAN and acknowledged. 2/14/2022 4:47 AM Mountain Time. CRITICAL RESULT OF NEW INTRACRANIAL HEMORRHAGE IDENTIFIED. CRITICAL RESULT OF POSSIBLE ACUTE CERVICAL SPINE FRACTURE IDENTIFIED. Electronically signed by:  French Quinn  2/14/2022 4:49 AM Mountain Time    MRI Cervical Spine Without Contrast    Result Date: 2/15/2022  EXAMINATION: MRI CERVICAL  SPINE WO CONTRAST-  INDICATION: trauma, abnl ct scan; I62.01-Nontraumatic acute subdural hemorrhage; I62.03-Nontraumatic chronic subdural hemorrhage; W19.XXXA-Unspecified fall, initial encounter; Y92.129-Unspecified place in nursing home as the place of occurrence of the external cause; S42.201A-Unspecified fracture of upper end of right humerus, initial encounter for closed fracture  TECHNIQUE: Sagittal T1 and T2 STIR axial gradient echo and sagittal T2 space images of the cervical spine  COMPARISON: Cervical spine CT scan to 14 2022  FINDINGS: Previous exam report indicated mild anterior wedging of C7, T1 and probably T2, mild widening of C6-7 intervertebral disc space nonspecific.  Today's study shows no significant focal subluxation. No cervical compression deformity or marrow edema is seen down to the level of C6. No paraspinous soft tissue edema is appreciated. There is, however, very subtle marrow edema of C7, T1, T2, and the superior endplate of T3, as suspected from previous cervical spine CT scan, consistent with mild vertebral injury. There are low signal T1 changes of the upper margins of C7 T1 T2 and T3, again consistent with mild underlying acute or recent compression deformities here.  There is focal narrowing of the canal at the C5-6 level, due to vertebral osteophyte facet osteophyte and disc protrusion. No significant stenosis is appreciated at the remaining levels. No intrinsic cervical cord lesions are identified. Anatomy of the craniocervical junction appears normal. Included upper thoracic canal and cord appear normal.  Axial images are motion degraded, but show the canal to appear lower limits of normal diameter at C2-3 due to central disc bulge.  At C3-4, there is a central disc protrusion with borderline canal stenosis. Foramina appear normal on the right, moderately narrowed of left allowing for fairly extensive motion artifact.  At C4-5, there is a central disc protrusion with mild canal  stenosis. Foramina are probably normal but again suffer from motion-related blurring.  At C5-6, there is mild-to-moderate canal stenosis, due to broad-based disc protrusion, and posterior element hypertrophy with effacement of all but a small amount of anterior CSF, and moderate to marked bilateral foraminal stenosis.  At C6-7, canal and right neural foramen appear normal. There is moderate left-sided foraminal narrowing.  At C7-T1, canal and foramina appear grossly normal.  The motion degraded axial images do not show obvious cervical cord edema or atrophy.         1. Motion degraded exam, but with marrow edema of C7, T1, T2 and the superior endplate of T3, consistent with acute or recent vertebral injury. No significant associated subluxation or spinal stenosis. Findings are consistent with patient's previous CT scan abnormalities. 2. Mild multilevel canal stenosis, a little greater at C5-C6 than elsewhere, due to a combination of posterior element hypertrophy, disc protrusion and vertebral osteophyte. No high-grade canal stenosis is seen.   This report was finalized on 2/15/2022 4:40 PM by Dr. Kendrick Jane MD.      FL Video Swallow With Speech Single Contrast    Result Date: 2/17/2022  EXAMINATION: FL VIDEO SWALLOW W SPEECH SINGLE-CONTRAST-  INDICATION: dysphagia; I62.01-Nontraumatic acute subdural hemorrhage; I62.03-Nontraumatic chronic subdural hemorrhage; W19.XXXA-Unspecified fall, initial encounter; Y92.129-Unspecified place in nursing home as the place of occurrence of the external cause; S42.201A-Unspecified fracture of upper end of right humerus, initial encounter for closed fracture; R13.10-Dysphagia, unspecified  TECHNIQUE: 1 minute of fluoroscopic time was used for this exam. 8 associated fluoroscopic loops were saved. The patient was evaluated in the seated lateral position while taking a variety of consistencies of barium by mouth under the direction of speech pathology.  COMPARISON: NONE  FINDINGS: 1  minute of fluoroscopy provided for a modified barium swallow. Please see speech therapy report for full details and recommendations.       Fluoroscopy provided for a modified barium swallow. Please see speech therapy report for full details and recommendations.    This report was finalized on 2/17/2022 9:17 PM by Dr. Kendrick Jane MD.      XR Chest 1 View    Result Date: 2/19/2022  DATE OF EXAM: 2/19/2022 11:55 AM  PROCEDURE: XR CHEST 1 VW-  INDICATIONS: Cough.  COMPARISON: 02/14/2022.  TECHNIQUE: Single radiographic view of the chest was obtained.  FINDINGS: The heart size is normal. There is a left-sided transvenous pacemaker in place. There is a small scar versus subsegmental atelectasis in the left lung base. The lungs and pleural spaces are otherwise clear. The pulmonary vascular markings are normal. The patient has a nondisplaced comminuted fracture of the right greater tuberosity and humeral neck. There are chronic age-related changes involving the bony thorax and thoracic aorta.       1. Small scar versus subsegmental atelectasis in the left lung base. 2. The remaining lungs and pleural spaces are clear. 3. Fracture of the right humerus.  This report was finalized on 2/19/2022 12:29 PM by Neil Barrios MD.      XR Chest 1 View    Result Date: 2/14/2022   DATE OF EXAM: 2/14/2022 7:16 AM  PROCEDURE: XR CHEST 1 VW-  INDICATIONS: FALL; I62.01-Nontraumatic acute subdural hemorrhage; I62.03-Nontraumatic chronic subdural hemorrhage; W19.XXXA-Unspecified fall, initial encounter; Y92.129-Unspecified place in nursing home as the place of occurrence of the external cause; S42.201A-Unspecified fracture of upper end of right humerus, initial encounter for closed fracture  COMPARISON: 1/22/2022 and prior  TECHNIQUE: 2 view chest  FINDINGS:  Elevation left hemidiaphragm again noted with vascular crowding and linear opacity compatible with atelectasis at the left lung base. Left lung is otherwise grossly clear. AICD/pacemaker  device from a left subclavian approach again noted. Heart size is stable. Pulmonary vascularity appears within normal limits. Right lung is grossly clear. Healed right-sided inferior rib fracture noted. Multiple degenerative changes noted of the spine. Acute right-sided proximal humeral fracture noted.      IMPRESSION :  1. Acute proximal right humeral fracture, incompletely evaluated. 2. Elevation left hemidiaphragm with vascular crowding left basilar atelectasis appears unchanged.[  This report was finalized on 2/14/2022 7:46 AM by Dhruv Lund.                Results for orders placed during the hospital encounter of 01/11/22    Adult Transthoracic Echo Complete W/ Cont if Necessary Per Protocol    Interpretation Summary  · Mild aortic valve stenosis is present.  · Estimated right ventricular systolic pressure from tricuspid regurgitation is normal (<35 mmHg).  · LVSF is normal  · MAC      Discharge Details        Discharge Medications      New Medications      Instructions Start Date   lacosamide 50 MG tablet tablet  Commonly known as: VIMPAT   50 mg, Oral, Every 12 Hours Scheduled      traMADol 50 MG tablet  Commonly known as: ULTRAM   50 mg, Oral, Every 6 Hours PRN      vitamin B-6 50 MG tablet  Commonly known as: PYRIDOXINE   50 mg, Oral, Daily   Start Date: February 21, 2022        Continue These Medications      Instructions Start Date   atorvastatin 80 MG tablet  Commonly known as: LIPITOR   80 mg, Oral, Nightly      bisoprolol 10 MG tablet  Commonly known as: ZEBeta   10 mg, Oral, Daily      doxazosin 2 MG tablet  Commonly known as: CARDURA   1 mg, Oral, Nightly      famotidine 40 MG tablet  Commonly known as: PEPCID   40 mg, Oral, Daily      insulin detemir 100 UNIT/ML injection  Commonly known as: LEVEMIR   5 Units, Subcutaneous, Every 12 Hours Scheduled      insulin lispro 100 UNIT/ML injection  Commonly known as: humaLOG   0-9 Units, Subcutaneous, 3 Times Daily Before Meals      levETIRAcetam 250  MG tablet  Commonly known as: KEPPRA   250 mg, Oral, 2 Times Daily      metFORMIN 850 MG tablet  Commonly known as: GLUCOPHAGE   TAKE 1 TABLET TWICE DAILY WITH MEALS      polyethylene glycol 17 g packet  Commonly known as: MIRALAX   17 g, Oral, Daily PRN      sennosides-docusate 8.6-50 MG per tablet  Commonly known as: PERICOLACE   1 tablet, Oral, 2 Times Daily, Hold for loose stools      vitamin B-12 500 MCG tablet  Commonly known as: CYANOCOBALAMIN   500 mcg, Oral, Daily         Stop These Medications    sodium chloride 1 g tablet            No Known Allergies      Discharge Disposition:  Rehab Facility or Unit (DC - External)    Diet:  Hospital:  Diet Order   Procedures   • Diet Soft Texture; Chopped; Thin; Cardiac, Consistent Carbohydrate            CODE STATUS:    Code Status and Medical Interventions:   Ordered at: 02/14/22 0844     Code Status (Patient has no pulse and is not breathing):    CPR (Attempt to Resuscitate)     Medical Interventions (Patient has pulse or is breathing):    Full Support       Future Appointments   Date Time Provider Department Center   3/7/2022  9:00 AM Reid Wood MD [facility] N [facility]   3/7/2022  3:10 PM Willie Jorgensen MD [facility]   3/14/2022  9:45 AM TOP[facility] CT 1 BH [facility] CT SO [facility]   3/14/2022 12:00 PM Miguel Ángel Scruggs PA-C [facility]       Additional Instructions for the Follow-ups that You Need to Schedule     Discharge Follow-up with Specified Provider: 3 Weeks   As directed      Follow Up: 3 Weeks    Follow Up Details: Call [phone] for appointment.                     Juliet Galvez, APRN  02/20/22      Time Spent on Discharge:  I spent  45 minutes on this discharge activity which included: face-to-face encounter with the patient, reviewing the data in the system, coordination of the care with the nursing staff as well as consultants, documentation, and entering orders.

## 2022-02-20 NOTE — NURSING NOTE
Jenny at [facility] called back and stated they did not receive the script for Vimpat.  Notified Juliet Galvez, SCARLETT and Juliet verbalized that Dr. Booth will re-submit the Vimpat prior to 1500.

## 2022-02-20 NOTE — PROGRESS NOTES
"Neurology       Patient Care Team:  Diaz Lee MD as PCP - General    Chief complaint SDH, seizure, AMS      Subjective .     History:    Still doing well this AM.  Anxious to go back to the [facility]      Objective     Vital Signs   Blood pressure 159/80, pulse 72, temperature 98.5 °F (36.9 °C), temperature source Oral, resp. rate 20, height 165.1 cm (65\"), weight 59.6 kg (131 lb 8 oz), SpO2 98 %.    Physical Exam:  Early man, c-collar in place.  He attends to me well and follows commands.  His smile is symmetric.  His speech is clear.  He is oriented to place and time.  Right arm is in a sling, he complains of pain with arm movement.  Otherwise no focal weakness.  On room air, even respirations.  Cardiac regular rate and rhythm.  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 Concern for additional seizure and he is currently on higher dose keppra and vimpat. No seizure on EEG but sharps were appreciated.      No seizures since admission but was somnolent and so decreased keppra and he is more alert. Added B6 for keppra agitation.  He is also on Vimpat 100 mg twice daily.     2/18  Patient is still not at baseline and has significant psychomotor slowing.  I am not sure if this is new baseline or if he will just need significant time to improve.  I would like to repeat an EEG and head CT today prior to letting him go back to [facility] over the weekend.  If possible I would like to wait till Sunday to send him back so that we can monitor if I decide to reduce his Vimpat later today.     2/19  Much improved today.  EEG also improved per Dr. Kim, few sharps appreciated.  Head CT without contrast is reassuring.  We will go ahead and reduce Vimpat and monitor overnight.  I will try and see him in the morning to facilitate his discharge back to the " [facility].     Long conversation with the daughter.  Explained that there was suspicion for cervical fracture but I do not think that it is quite clear that a fracture is present.  There is definitely edema.  According to the notes, neurosurgery is planning to repeat his CT scan in a few weeks.  He does have an appointment on March 14 with SUZY Oneal.    2/20  Still doing well today.  Tolerating a lower dose of Vimpat.  Okay to discharge back to [facility] for rehab.  Plan to see in neurology clinic as well as neurosurgery clinic per below.    -Keppra 250 twice daily  -Vimpat 50 twice daily  -Follow-up neurosurgery clinic on March 14 with repeat CT cervical spine  -Follow-up neurology clinic as an outpatient in 1 to 2 months     I discussed the patients findings and my recommendations with patient, primary team        Amy Mckeon MD  02/20/22  09:06 EST

## 2022-02-20 NOTE — PLAN OF CARE
Goal Outcome Evaluation:              Outcome Summary: Patient oriented to self and place today, cooperative with care.  Patient awake most of the morning but slept the afternoon after pain medication.  Reports pain on right shoulder with movement.  Soft collar to neck while in bed.  Steve score 15, falls risk 23, all alarms intact.  Heel boots applied while in bed.  No new complaints or concerns voiced.

## 2022-03-04 ENCOUNTER — TELEPHONE (OUTPATIENT)
Dept: INTERNAL MEDICINE | Facility: CLINIC | Age: 82
End: 2022-03-04

## 2022-03-04 NOTE — TELEPHONE ENCOUNTER
Caller: RIYA RODRIGUEZ    Relationship: Emergency Contact    Best call back number:     [phone]     What is the best time to reach you:     ANY TIME    Who are you requesting to speak with (clinical staff, provider,  specific staff member):     N/A    Do you know the name of the person who called:     N/A    What was the call regarding:     CALLER STATED PATIENT IS NOW A RESIDENT AT THE [facility] FOR PHYSICAL THERAPY/REHABILITATION    Do you require a callback:     NO, PATIENT WANTED DR ADHIKARI TO HAVE UPDATED INFORMATION ON HER /PATIENT    CALLER STATED SHE HAD RECEIVED NOTICE THAT IT WAS TIME FOR PATIENT TO SCHEDULE A MEDICARE WELLNESS VISIT, WHICH PATIENT IS NOT ABLE TO SCHEDULE AT THIS TIME    DR ADHIKARI

## 2022-03-07 ENCOUNTER — OFFICE VISIT (OUTPATIENT)
Dept: NEUROLOGY | Facility: CLINIC | Age: 82
End: 2022-03-07

## 2022-03-07 ENCOUNTER — OFFICE VISIT (OUTPATIENT)
Dept: ORTHOPEDIC SURGERY | Facility: CLINIC | Age: 82
End: 2022-03-07

## 2022-03-07 VITALS — HEIGHT: 65 IN | WEIGHT: 132 LBS | BODY MASS INDEX: 21.99 KG/M2

## 2022-03-07 VITALS — BODY MASS INDEX: 22 KG/M2 | HEIGHT: 65 IN | WEIGHT: 132.06 LBS

## 2022-03-07 DIAGNOSIS — R29.6 FREQUENT FALLS: ICD-10-CM

## 2022-03-07 DIAGNOSIS — I62.01 ACUTE ON CHRONIC INTRACRANIAL SUBDURAL HEMATOMA: Primary | ICD-10-CM

## 2022-03-07 DIAGNOSIS — S42.291D CLOSED 3-PART FRACTURE OF PROXIMAL HUMERUS WITH ROUTINE HEALING, RIGHT: Primary | ICD-10-CM

## 2022-03-07 DIAGNOSIS — R56.9 SEIZURE: ICD-10-CM

## 2022-03-07 DIAGNOSIS — I62.03 ACUTE ON CHRONIC INTRACRANIAL SUBDURAL HEMATOMA: Primary | ICD-10-CM

## 2022-03-07 PROCEDURE — 99214 OFFICE O/P EST MOD 30 MIN: CPT | Performed by: PSYCHIATRY & NEUROLOGY

## 2022-03-07 PROCEDURE — 99212 OFFICE O/P EST SF 10 MIN: CPT | Performed by: ORTHOPAEDIC SURGERY

## 2022-03-07 NOTE — PROGRESS NOTES
[facility] [place]    Subjective     Chief Complaint   Patient presents with   • Right Shoulder - Pain     Fall 02/13/2022        HPI    Derian Joya is a 82 y.o. male who presents with new problem of: right shoulder pain.  Onset: mechanical fall. The issue has been ongoing for 3 week(s). Pain is a 9/10 on the pain scale. Pain is described as stabbing. Associated symptoms include pain. The pain is worse with any movement of the joint; resting, sitting and lying down improve the pain. Previous treatments have included: bracing and physical therapy.  No new complaints today.  He had a proximal humerus fracture on 2/13/2022.    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)   • Dyslipidemia   • Closed fracture of proximal end of right humerus   • Cervical spine fracture (HCC)     Past Medical History:   Diagnosis Date   • Allergic rhinitis    • Aortic stenosis    • Atopic rhinitis 7/6/2016   • Benign non-nodular prostatic hyperplasia with lower urinary tract symptoms 9/15/2017   • CAD (coronary artery disease)    • Cardiomyopathy, ischemic    • Colon polyps     tubular adenoma 2016   • Community acquired pneumonia    • Depression 7/6/2016   • Diabetes (HCC)    • Diverticulosis of intestine 7/6/2016    Description: Left and sigmoid   • Dyslipidemia    • LBBB (left bundle branch block)    • PAD (peripheral artery disease)  (HCC)       Past Surgical History:   Procedure Laterality Date   • CARDIAC CATHETERIZATION  2018    100% cx   • CATARACT EXTRACTION     • COLONOSCOPY W/ BIOPSIES AND POLYPECTOMY  2021    1 benign polyp   • HIP SURGERY Left 2020    Dr Mandy BOUDREAUX   • LEG SURGERY Left 2016    Popliteal Artery stenting by Dr Hdz   • LEG SURGERY Left 2016    skin graft by Dr Nance   • LIPOMA EXCISION      s/p excision on chest   • PACEMAKER IMPLANTATION  2019    and defibrillator      Family History   Problem Relation Age of Onset   • Diabetes Other    • Hypertension Other    • Stroke Other    • Coronary artery disease Other      Social History     Socioeconomic History   • Marital status:    Tobacco Use   • Smoking status: Former Smoker     Types: Cigarettes     Quit date: 1997     Years since quittin.1   • Smokeless tobacco: Never Used   Substance and Sexual Activity   • Drug use: No      Current Outpatient Medications on File Prior to Visit   Medication Sig Dispense Refill   • atorvastatin (LIPITOR) 80 MG tablet Take 1 tablet by mouth Every Night.     • bisoprolol (ZEBeta) 10 MG tablet Take 10 mg by mouth Daily.     • doxazosin (CARDURA) 2 MG tablet Take 0.5 tablets by mouth Every Night.     • famotidine (PEPCID) 40 MG tablet Take 1 tablet by mouth Daily.     • insulin detemir (LEVEMIR) 100 UNIT/ML injection Inject 5 Units under the skin into the appropriate area as directed Every 12 (Twelve) Hours.  12   • insulin lispro (humaLOG) 100 UNIT/ML injection Inject 0-9 Units under the skin into the appropriate area as directed 3 (Three) Times a Day Before Meals.  12   • lacosamide (VIMPAT) 50 MG tablet tablet Take 1 tablet by mouth Every 12 (Twelve) Hours. 60 tablet 3   • levETIRAcetam (KEPPRA) 250 MG tablet Take 1 tablet by mouth 2 (Two) Times a Day.     • metFORMIN (GLUCOPHAGE) 850 MG tablet TAKE 1 TABLET TWICE DAILY WITH MEALS 180 tablet 3   • polyethylene glycol (MIRALAX) 17 g packet Take 17 g by  "mouth Daily As Needed (Use if senna-docusate is ineffective).     • sennosides-docusate (PERICOLACE) 8.6-50 MG per tablet Take 1 tablet by mouth 2 (Two) Times a Day. Hold for loose stools     • traMADol (ULTRAM) 50 MG tablet Take 1 tablet by mouth Every 6 (Six) Hours As Needed for Moderate Pain . 6 tablet 0   • vitamin B-12 (CYANOCOBALAMIN) 500 MCG tablet Take 500 mcg by mouth Daily.     • vitamin B-6 (PYRIDOXINE) 50 MG tablet Take 1 tablet by mouth Daily. 30 tablet 0     No current facility-administered medications on file prior to visit.      No Known Allergies     Review of Systems     Objective      Physical Exam  Ht 165.1 cm (65\")   Wt 59.9 kg (132 lb 0.9 oz)   BMI 21.98 kg/m²     Body mass index is 21.98 kg/m².    General:   Mental Status:  Alert   Appearance: Cooperative, in no acute distress   Build and Nutrition: Well-nourished well-developed male   Orientation: Alert and oriented to person, place and time   Posture: Normal   Gait: In a wheelchair    Integument:   Right shoulder: Resolving ecchymosis    Upper Extremities:   Right Shoulder:    Tenderness:  Positive    Swelling:  Mild    Range of motion:  External rotation:  20°       Forward flexion:  40°  Deformities:  None        Imaging/Studies      Imaging Results (Last 24 Hours)     Procedure Component Value Units Date/Time    XR Shoulder 2+ View Right [[id number]] Resulted: 03/07/22 1536     Updated: 03/07/22 1537    Narrative:      Right Shoulder Radiographs  Indication: right shoulder pain  Views: AP, outlet and axillary views of the right shoulder    Comparison: 2/14/2022    Findings:  Comminuted proximal humerus fracture, with no evidence of dislocation,   with early signs of healing.  No unusual bony features.            Assessment and Plan     Diagnoses and all orders for this visit:    1. Closed 3-part fracture of proximal humerus with routine healing, right (Primary)  -     XR Shoulder 2+ View Right        1. Closed 3-part fracture of proximal " humerus with routine healing, right        I reviewed my findings with the patient and his son.  He has a proximal humerus fracture, which is healing, and he may begin range of motion exercises in a progressive gentle fashion.  I will see him back in 4 weeks with a repeat x-ray, but sooner for any problems.  Low likelihood of surgical intervention given his functional demands.  Paperwork for the [facility] was completed.  Physical therapy was recommended.    Return in about 4 weeks (around 4/4/2022) for Recheck with X-Rays.      Willie Jorgensen MD  03/07/22  15:45 EST

## 2022-03-07 NOTE — PROGRESS NOTES
Subjective:    CC: Derian Joya is seen today in consultation at the request of No ref. provider found for history of traumatic subdural hematoma, seizures.    HPI:  Patient is 82-year-old male in clinic for hospital follow-up.  He is accompanied by his son who helps with the history.  He was admitted to the hospital back in February 2022 because of fall and eventually traumatic subdural hematoma, altered mental status and EEG showing chart sharp wave activity without any obvious electrographic seizures.  Prior to this admission, he was admitted to the hospital 2-3 times because of falls and subdural hematoma.  Currently he is at [facility] and is receiving physical therapy, occupational therapy.  He has been on Keppra 250 mg twice daily as well as Vimpat 50 mg twice daily without any recurrence of seizures.  As per the son, he remained sleepy throughout the day.  Cognitively has become slower as compared to before.  Physical therapy has helped him tremendously in improvement.  He also has right humeral fracture for which he is wearing a sling.  He is also wearing a cervical collar because of questionable cervical spine fracture.  He is scheduled to see neurosurgery in follow-up on March 14.    The following portions of the patient's history were reviewed today and updated as of 03/07/2022  : allergies, social history and problem list.  This document will be scanned to patient's chart.      Current Outpatient Medications:   •  atorvastatin (LIPITOR) 80 MG tablet, Take 1 tablet by mouth Every Night., Disp: , Rfl:   •  bisoprolol (ZEBeta) 10 MG tablet, Take 10 mg by mouth Daily., Disp: , Rfl:   •  doxazosin (CARDURA) 2 MG tablet, Take 0.5 tablets by mouth Every Night., Disp: , Rfl:   •  famotidine (PEPCID) 40 MG tablet, Take 1 tablet by mouth Daily., Disp: , Rfl:   •  insulin detemir (LEVEMIR) 100 UNIT/ML injection, Inject 5 Units under the skin into the appropriate area as directed Every 12 (Twelve)  Hours., Disp: , Rfl: 12  •  insulin lispro (humaLOG) 100 UNIT/ML injection, Inject 0-9 Units under the skin into the appropriate area as directed 3 (Three) Times a Day Before Meals., Disp: , Rfl: 12  •  lacosamide (VIMPAT) 50 MG tablet tablet, Take 1 tablet by mouth Every 12 (Twelve) Hours., Disp: 60 tablet, Rfl: 3  •  levETIRAcetam (KEPPRA) 250 MG tablet, Take 1 tablet by mouth 2 (Two) Times a Day., Disp: , Rfl:   •  metFORMIN (GLUCOPHAGE) 850 MG tablet, TAKE 1 TABLET TWICE DAILY WITH MEALS, Disp: 180 tablet, Rfl: 3  •  polyethylene glycol (MIRALAX) 17 g packet, Take 17 g by mouth Daily As Needed (Use if senna-docusate is ineffective)., Disp: , Rfl:   •  sennosides-docusate (PERICOLACE) 8.6-50 MG per tablet, Take 1 tablet by mouth 2 (Two) Times a Day. Hold for loose stools, Disp: , Rfl:   •  traMADol (ULTRAM) 50 MG tablet, Take 1 tablet by mouth Every 6 (Six) Hours As Needed for Moderate Pain ., Disp: 6 tablet, Rfl: 0  •  vitamin B-12 (CYANOCOBALAMIN) 500 MCG tablet, Take 500 mcg by mouth Daily., Disp: , Rfl:   •  vitamin B-6 (PYRIDOXINE) 50 MG tablet, Take 1 tablet by mouth Daily., Disp: 30 tablet, Rfl: 0   Past Medical History:   Diagnosis Date   • Allergic rhinitis    • Aortic stenosis    • Atopic rhinitis 7/6/2016   • Benign non-nodular prostatic hyperplasia with lower urinary tract symptoms 9/15/2017   • CAD (coronary artery disease)    • Cardiomyopathy, ischemic    • Colon polyps     tubular adenoma 2016   • Community acquired pneumonia    • Depression 7/6/2016   • Diabetes (HCC)    • Diverticulosis of intestine 7/6/2016    Description: Left and sigmoid   • Dyslipidemia    • LBBB (left bundle branch block)    • PAD (peripheral artery disease) (HCC)       Past Surgical History:   Procedure Laterality Date   • CARDIAC CATHETERIZATION  2018    100% cx   • CATARACT EXTRACTION     • COLONOSCOPY W/ BIOPSIES AND POLYPECTOMY  05/2021    1 benign polyp   • HIP SURGERY Left 08/2020    Dr Mandy BOUDREAUX   • LEG  "SURGERY Left 07/2016    Popliteal Artery stenting by Dr Hdz   • LEG SURGERY Left 07/2016    skin graft by Dr Nance   • LIPOMA EXCISION      s/p excision on chest   • PACEMAKER IMPLANTATION  01/2019    and defibrillator      Family History   Problem Relation Age of Onset   • Diabetes Other    • Hypertension Other    • Stroke Other    • Coronary artery disease Other       Review of Systems   Constitutional: Positive for activity change.   HENT: Negative.    Eyes: Negative.    Respiratory: Negative.    Cardiovascular: Negative.    Gastrointestinal: Negative.    Endocrine: Negative.    Genitourinary: Negative.    Musculoskeletal: Positive for gait problem.   Skin: Negative.    Neurological: Positive for seizures, light-headedness and confusion.   Hematological: Negative.        All other systems reviewed and are negative     Objective:    Ht 165.1 cm (65\")   Wt 59.9 kg (132 lb)   BMI 21.97 kg/m²     Neurology Exam:    General apperance: NAD.     Mental status: Alert, awake and oriented to place and person but not to time.    Fund of knowledge:  Normal.     Language and Speech: No aphasia or dysarthria.    Naming , Repitition and Comprehension:  Can name objects, repeat a sentence and follow commands. Speech is clear and fluent with good repetition, comprehension, and naming.    Cranial Nerves:   CN II: Visual fields are full. Intact. Fundi - Normal, No papillederma, Pupils - ROYCE  CN III, IV and VI: Extraocular movements are intact. Normal saccades.   CN V: Facial sensation is intact.   CN VII: Muscles of facial expression reveal no asymmetry. Intact.   CN VIII: Hearing is intact. Whispered voice intact.   CN IX and X: Palate elevates symmetrically. Intact  CN XI: Shoulder shrug is intact.   CN XII: Tongue is midline without evidence of atrophy or fasciculation.     Motor:  Right UE muscle strength 5-/5. Normal tone.     Left UE muscle strength 5-/5. Normal tone.      Right LE muscle strength 5-/5. Normal tone. "     Left LE muscle strength 5-/5. Normal tone.      Sensory: Normal light touch, vibration and pinprick sensation bilaterally.    DTRs: 2+ bilaterally in upper and lower extremities.    Babinski: Negative bilaterally.    Co-ordination: Normal finger-to-nose, heel to shin B/L.    Rhomberg: Negative.    Gait: Normal.    Cardiovascular: Regular rate and rhythm without murmur, gallop or rub.    Ophthalmoscopic exam: Normal fundi, no papilledema.    Assessment and Plan:  1. 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 Seizure (HCC)  3.  Multiple falls  -Patient with history of multiple falls requiring multiple admissions in the hospital.  His last admission was in February where he was noted to have acute on chronic right traumatic subdural hematoma.  No obvious seizure activity but frequent sharps were noted on EEG.  He has been on Keppra and Vimpat since the discharge but Keppra is making him too sleepy.  I am going to stop morning dose of Keppra and continue with Keppra to 50 mg at bedtime and continue with Vimpat 50 mg twice daily.  He has not had any josiah seizure-like activity since the discharge.  I also recommended that he needs to stay in [place] for 3-4 more weeks for aggressive physical therapy, occupational therapy to help with overall strength.  Continue to take strict fall precautions to avoid falling.  Follow-up with PCP and neurosurgery per schedule and I will see him back in clinic in 2 months for follow-up.       Return in about 2 months (around 5/7/2022).     Reid Wood MD

## 2022-03-14 ENCOUNTER — OFFICE VISIT (OUTPATIENT)
Dept: NEUROSURGERY | Facility: CLINIC | Age: 82
End: 2022-03-14

## 2022-03-14 ENCOUNTER — HOSPITAL ENCOUNTER (OUTPATIENT)
Dept: CT IMAGING | Facility: HOSPITAL | Age: 82
Discharge: HOME | End: 2022-03-14
Admitting: NEUROLOGICAL SURGERY

## 2022-03-14 VITALS
OXYGEN SATURATION: 98 % | TEMPERATURE: 96.9 F | HEART RATE: 72 BPM | HEIGHT: 65 IN | DIASTOLIC BLOOD PRESSURE: 78 MMHG | SYSTOLIC BLOOD PRESSURE: 110 MMHG | BODY MASS INDEX: 21.33 KG/M2 | WEIGHT: 128 LBS

## 2022-03-14 DIAGNOSIS — S06.5XAA SDH (SUBDURAL HEMATOMA): ICD-10-CM

## 2022-03-14 DIAGNOSIS — S06.5XAA SDH (SUBDURAL HEMATOMA): Primary | ICD-10-CM

## 2022-03-14 DIAGNOSIS — I60.9 SAH (SUBARACHNOID HEMORRHAGE): ICD-10-CM

## 2022-03-14 PROCEDURE — 99214 OFFICE O/P EST MOD 30 MIN: CPT | Performed by: PHYSICIAN ASSISTANT

## 2022-03-14 PROCEDURE — 70450 CT HEAD/BRAIN W/O DYE: CPT

## 2022-03-14 NOTE — PROGRESS NOTES
Patient: Derian Joya  : 1940    Primary Care Provider: Diaz Lee MD      Chief Complaint: Repeat CT/MRI review    History of Present Illness:       Patient is a very nice 82-year-old gentleman who has had a really rough last quarter.  Patient has had multiple falls resulting in subdural hematomas and seizures.  Patient has been readmitted to the hospital 3 different times.  Patient is seen by Dr. Wood for seizure management.  Since being decreased to 250 mg of Keppra nightly he has had drastic improvement in his cognition.  Patient is able to follow my commands today which is a drastic improvement from any of the times I have seen him.    CT today is stable with 1 compared to the one on 2022.    MRI of the cervical spine shows little bit of bone bruising in the lower cervical/upper thoracic spine.  No obvious compression abnormalities.  Patient in collar today.     We have discontinued the collar and will see him back on as-needed basis    Review of Systems   Constitutional: Positive for fatigue.   Musculoskeletal: Positive for arthralgias.   Psychiatric/Behavioral: Positive for confusion and decreased concentration.       Past Medical History:     Past Medical History:   Diagnosis Date   • Allergic rhinitis    • Aortic stenosis    • Atopic rhinitis 2016   • Benign non-nodular prostatic hyperplasia with lower urinary tract symptoms 9/15/2017   • CAD (coronary artery disease)    • Cardiomyopathy, ischemic    • Colon polyps     tubular adenoma    • Community acquired pneumonia    • Depression 2016   • Diabetes (HCC)    • Diverticulosis of intestine 2016    Description: Left and sigmoid   • Dyslipidemia    • LBBB (left bundle branch block)    • PAD (peripheral artery disease) (HCC)        Family History:     Family History   Problem Relation Age of Onset   • Diabetes Other    • Hypertension Other    • Stroke Other    • Coronary artery disease Other        Social History:     "reports that he quit smoking about 25 years ago. His smoking use included cigarettes. He has never used smokeless tobacco. He reports that he does not use drugs.   SMOKING STATUS: Non-smoker    Surgical History:     Past Surgical History:   Procedure Laterality Date   • CARDIAC CATHETERIZATION  2018    100% cx   • CATARACT EXTRACTION     • COLONOSCOPY W/ BIOPSIES AND POLYPECTOMY  05/2021    1 benign polyp   • HIP SURGERY Left 08/2020    Dr Mandy BOUDREAUX   • LEG SURGERY Left 07/2016    Popliteal Artery stenting by Dr Hdz   • LEG SURGERY Left 07/2016    skin graft by Dr Nance   • LIPOMA EXCISION      s/p excision on chest   • PACEMAKER IMPLANTATION  01/2019    and defibrillator       Allergies:   Patient has no known allergies.    Physical Exam:    Vital Signs:/78 (BP Location: Left arm, Patient Position: Sitting, Cuff Size: Adult)   Pulse 72   Temp 96.9 °F (36.1 °C) (Infrared)   Ht 165.1 cm (65\")   Wt 58.1 kg (128 lb)   SpO2 98%   BMI 21.30 kg/m²    BMI: Body mass index is 21.3 kg/m².     GENERAL:           The patient is in no acute distress, and is able to answer all questions appropriately.    Neck:          Supple without lymphadenopathy    Cardiovascular:       Peripheral pulses 2+ at dorsalis pedis and posterior tibialis    Lungs:         Breathing unlabored    Musculoskeletal:            strength is 5 out of 5 bilaterally.        Shoulder abduction is 5 out of 5.         Dorsiflexion is 5/5 Bilaterally       Plantarflexion is 5/5 bilaterally       Hip Flexion 5/5 bilaterally.         The patient´s gait is normal without antalgia.    Neurologic:          The patient is alert and oriented by 3.          Pupils are equal and reactive to light.         Visual fields are full.         Extraocular movements are intact without nystagmus.         There is no evidence of central motor drift. No facial droop.  No difficulty with rapid alternating movements.         Sensation is equal bilaterally with " no deficit.           Reflexes:  2+ through out    CRANIAL NERVES:         Cranial nerve II: Visual fields are full to confrontation.       Cranial nerves III, IV and VI: PERRLA DC.  Extraocular movements are intact.  Nystagmus is not present.       Cranial nerve V: Facial sensation is intact to light touch.       Cranial nerve VII: Muscles of facial expression revealed no asymmetry.       Cranial nerve VIII: Hearing is intact to finger rub bilaterally.       Cranial nerve IX and X: Palate elevates symmetrically.        Cranial nerve XI: Shoulder shrug is intact.       Cranial nerve XII: Tongue is midline without evidence of Atrophy or fasciculation.    Medical Decision Making    Data Review:   CT of the head reviewed and shows hygroma over the right cerebral frontal convexity showing mild improvement from 1 and 2/18/2022.     MRI of the cervical spine reviewed with the son and patient.  Some STIR signal change noted in C7-T1 and T2    Diagnosis:   Frequent falls  Seizure  Subdural hematoma    Treatment Options:   Patient continues to improve and CT shows some improvement.  Hopefully we will avoid any further falls.  If he does fall he will require further imaging.  If not he will follow up with us on an as-needed basis.    Patient's Body mass index is 21.3 kg/m². indicating that he is within normal range (BMI 18.5-24.9). No BMI management plan needed.    No diagnosis found.

## 2022-03-23 ENCOUNTER — READMISSION MANAGEMENT (OUTPATIENT)
Dept: CALL CENTER | Facility: HOSPITAL | Age: 82
End: 2022-03-23

## 2022-03-23 NOTE — OUTREACH NOTE
Prep Survey    Flowsheet Row Responses   [facility] facility patient discharged from? Non-BH   Is LACE score < 7 ? Non-BH Discharge   Emergency Room discharge w/ pulse ox? No   Eligibility [facility]   Hospital [facility]   Date of Discharge 03/23/22   Discharge diagnosis Unavailable    Does the patient have one of the following disease processes/diagnoses(primary or secondary)? Other   Prep survey completed? Yes          GRACIE ZHAO - Registered Nurse

## 2022-03-24 ENCOUNTER — TRANSITIONAL CARE MANAGEMENT TELEPHONE ENCOUNTER (OUTPATIENT)
Dept: CALL CENTER | Facility: HOSPITAL | Age: 82
End: 2022-03-24

## 2022-03-24 NOTE — OUTREACH NOTE
Call Center TCM Note    Flowsheet Row Responses   [facility] patient discharged from? Non-BH   Does the patient have one of the following disease processes/diagnoses(primary or secondary)? Other   TCM attempt successful? No   Unsuccessful attempts Attempt 1   Call Status Left message  [Spoke with daughter Matthieu and she wishes I speak to Audra about followups and care. No answer when calling Audra. Will try agian. ]          Ricardo Nice RN    3/24/2022, 12:02 EDT

## 2022-03-24 NOTE — OUTREACH NOTE
Call Center TCM Note    Flowsheet Row Responses   [facility] patient discharged from? Non-BH   Does the patient have one of the following disease processes/diagnoses(primary or secondary)? Other   TCM attempt successful? Yes   Call start time 1259   Call end time 1309   General alerts for this patient [phone] call first   Discharge diagnosis rehab at [facility]    Person spoke with today (if not patient) and relationship vikas Zhu    Medication alerts for this patient Patient only has enough refills of diabetes meds and seizure med for 10 days. Message sent to office to obtain refills. If office cannot refill please contact vikas Zhu   Meds reviewed with patient/caregiver? Yes   Is the patient having any side effects they believe may be caused by any medication additions or changes? No   Does the patient have all medications ordered at discharge? Yes   Is the patient taking all medications as directed (includes completed medication regime)? Yes   Does the patient have a primary care provider?  Yes   Does the patient have an appointment with their PCP within 7 days of discharge? Yes   Comments regarding PCP [facility]OS[facility]  telehealth followup scheduled for 3/29/2022 with Dr Lee   Has the patient kept scheduled appointments due by today? N/A   Psychosocial issues? No   Did the patient receive a copy of their discharge instructions? Yes   Nursing interventions Reviewed instructions with patient   What is the patient's perception of their health status since discharge? Same  [He is weak.]   Is the patient/caregiver able to teach back signs and symptoms related to disease process for when to call PCP? Yes   Is the patient/caregiver able to teach back signs and symptoms related to disease process for when to call 911? Yes   Is the patient/caregiver able to teach back the hierarchy of who to call/visit for symptoms/problems? PCP, Specialist, Home health nurse, Urgent Care, ED, 911 Yes   If the patient is a  current smoker, are they able to teach back resources for cessation? Not a smoker   TCM call completed? Yes   Wrap up additional comments Patient is home from [facility] for rehab. He is weak. He has a lot of family support. Followup scheduled with Dr Lee via telehealth on 3/29/2022          Ricardo Nice RN    3/24/2022, 13:09 EDT

## 2022-03-25 DIAGNOSIS — R56.9 SEIZURE: ICD-10-CM

## 2022-03-25 RX ORDER — INSULIN LISPRO 100 [IU]/ML
INJECTION, SOLUTION INTRAVENOUS; SUBCUTANEOUS
Qty: 5 PEN | Refills: 6 | Status: SHIPPED | OUTPATIENT
Start: 2022-03-25

## 2022-03-25 RX ORDER — PEN NEEDLE, DIABETIC 31 GX5/16"
5 NEEDLE, DISPOSABLE MISCELLANEOUS
Qty: 150 EACH | Refills: 6 | Status: SHIPPED | OUTPATIENT
Start: 2022-03-25

## 2022-03-25 RX ORDER — LACOSAMIDE 50 MG/1
50 TABLET ORAL EVERY 12 HOURS SCHEDULED
Qty: 180 TABLET | Refills: 3 | Status: SHIPPED | OUTPATIENT
Start: 2022-03-25 | End: 2022-04-29 | Stop reason: SDUPTHER

## 2022-03-25 RX ORDER — LEVETIRACETAM 250 MG/1
250 TABLET ORAL DAILY
Qty: 90 TABLET | Refills: 1 | Status: SHIPPED | OUTPATIENT
Start: 2022-03-25 | End: 2022-06-15

## 2022-03-25 NOTE — TELEPHONE ENCOUNTER
Caller: TIMA PAL    Relationship: Emergency Contact    Best call back number: [phone]    Requested Prescriptions:   Requested Prescriptions     Pending Prescriptions Disp Refills   • levETIRAcetam (KEPPRA) 250 MG tablet 90 tablet 1     Sig: Take 1 tablet by mouth Daily.   • metFORMIN (GLUCOPHAGE) 850 MG tablet 180 tablet 3     Sig: Take 1 tablet by mouth 2 (Two) Times a Day With Meals.   • lacosamide (VIMPAT) 50 MG tablet tablet 180 tablet 3     Sig: Take 1 tablet by mouth Every 12 (Twelve) Hours.   • insulin lispro (humaLOG) 100 UNIT/ML injection 30 mL 3     Sig: Tid per sliding scale max units per day is 24   • insulin detemir (LEVEMIR) 100 UNIT/ML injection 30 mL 3     Sig: Inject 5 Units under the skin into the appropriate area as directed 2 (Two) Times a Day.   · NEEDLES  · SHARPS CONTAINER (FOR DIRTY NEEDLES)     [facility] [address] [phone] [facility] [phone] [place]     Additional details provided by patient: PATIENTS DAUGHTER STATED SHE SPOKE TO [employer] AND SHE KNOWS WHICH MEDICATIONS PATIENT WILL NEED BEFORE GOING TO [facility]. HOWEVER PATIENT ALSO NEEDS NEEDLES AND A SHARPS CONTAINER. 2 DAYS REMAINING OF MEDICATION AND HE NEEDS IT FILLED STAT. PLEASE ADVISE     Does the patient have less than a 3 day supply:  [x] Yes  [] No    Albaro Amin Rep   03/25/22 14:06 EDT

## 2022-03-29 ENCOUNTER — TELEPHONE (OUTPATIENT)
Dept: INTERNAL MEDICINE | Facility: CLINIC | Age: 82
End: 2022-03-29

## 2022-03-29 NOTE — TELEPHONE ENCOUNTER
pts daughter, linda called and stated the pt needs an appt, but she would like to do the video visit b/c the pt doesn't know what's going on. I told her we don't do that in our office and she wondered if  could call her so she could let him know what her dad is experiencing, so maybe something could be called in for the pt. I left the pt on the schedule just in case, please advise

## 2022-04-04 ENCOUNTER — TELEPHONE (OUTPATIENT)
Dept: ORTHOPEDIC SURGERY | Facility: CLINIC | Age: 82
End: 2022-04-04

## 2022-04-04 NOTE — TELEPHONE ENCOUNTER
Patient will need x-rays, visit cannot be a telephone visit.  Please reschedule to a time patient is able to come.

## 2022-04-04 NOTE — TELEPHONE ENCOUNTER
Called and spoke with Zaheer and he explained that they have a lot going on with his mom right now...    Pt is doing okay and they will call back to R/S at a later time

## 2022-04-04 NOTE — TELEPHONE ENCOUNTER
PATIENT'S SON MELISSA CALLED STATING THAT HE IS OUT OF TOWN AND HE IS NORMALLY THE PERSON THAT BRINGS PATIENT TO APPOINTMENT, WANTING TO KNOW IF VISIT CAN BE A TELEPHONE VISIT

## 2022-04-25 ENCOUNTER — OFFICE VISIT (OUTPATIENT)
Dept: NEUROLOGY | Facility: CLINIC | Age: 82
End: 2022-04-25

## 2022-04-25 VITALS
DIASTOLIC BLOOD PRESSURE: 70 MMHG | BODY MASS INDEX: 24.99 KG/M2 | OXYGEN SATURATION: 96 % | SYSTOLIC BLOOD PRESSURE: 130 MMHG | WEIGHT: 150 LBS | HEART RATE: 70 BPM | HEIGHT: 65 IN

## 2022-04-25 DIAGNOSIS — R29.6 FREQUENT FALLS: ICD-10-CM

## 2022-04-25 DIAGNOSIS — I62.01 ACUTE ON CHRONIC INTRACRANIAL SUBDURAL HEMATOMA: Primary | ICD-10-CM

## 2022-04-25 DIAGNOSIS — R56.9 SEIZURE: ICD-10-CM

## 2022-04-25 DIAGNOSIS — I62.03 ACUTE ON CHRONIC INTRACRANIAL SUBDURAL HEMATOMA: Primary | ICD-10-CM

## 2022-04-25 PROCEDURE — 99214 OFFICE O/P EST MOD 30 MIN: CPT | Performed by: PSYCHIATRY & NEUROLOGY

## 2022-04-25 NOTE — PROGRESS NOTES
Subjective:    CC: Derian Joya is in clinic today for follow up for      HPI:  Initial visit: 3/7/2022: Patient is 82-year-old male in clinic for hospital follow-up.  He is accompanied by his son who helps with the history.  He was admitted to the hospital back in February 2022 because of fall and eventually traumatic subdural hematoma, altered mental status and EEG showing chart sharp wave activity without any obvious electrographic seizures.  Prior to this admission, he was admitted to the hospital 2-3 times because of falls and subdural hematoma.  Currently he is at [facility] and is receiving physical therapy, occupational therapy.  He has been on Keppra 250 mg twice daily as well as Vimpat 50 mg twice daily without any recurrence of seizures.  As per the son, he remained sleepy throughout the day.  Cognitively has become slower as compared to before.  Physical therapy has helped him tremendously in improvement.  He also has right humeral fracture for which he is wearing a sling.  He is also wearing a cervical collar because of questionable cervical spine fracture.  He is scheduled to see neurosurgery in follow-up on March 14.    Follow-up: 4/25/2022: He is in clinic for earlier than scheduled appointment.  He is accompanied by his son.  As per the son, 2 or 3 weeks ago, he had an episode of confusion lasting for 3 to 4 days.  It resolved on its own.  At that time, son was concerned about possibility of UTI but no further testing was done to confirm the diagnosis.  Overall, after still stopping morning dose of Keppra, he is more awake throughout the day and his cognition is improved.  He is however not sleeping well throughout the night.  He sleeps 3 on an average 3 to 5 hours at night.  That does make him sleepy throughout the day.  He is currently on Vimpat 50 mg twice daily and Keppra 250 mg daily.  He recently saw neurosurgery in follow-up and a repeat CT showed improvement in subdural hygroma  "when compared to the previous MRI that was performed in January and February 2022.  No more falls since her last visit.      The following portions of the patient's history were reviewed and updated as of 04/25/2022: allergies, social history and problem list.       Current Outpatient Medications:   •  atorvastatin (LIPITOR) 80 MG tablet, Take 1 tablet by mouth Every Night., Disp: , Rfl:   •  bisoprolol (ZEBeta) 10 MG tablet, Take 10 mg by mouth Daily., Disp: , Rfl:   •  doxazosin (CARDURA) 2 MG tablet, Take 0.5 tablets by mouth Every Night., Disp: , Rfl:   •  famotidine (PEPCID) 40 MG tablet, Take 1 tablet by mouth Daily., Disp: , Rfl:   •  insulin detemir (LEVEMIR) 100 UNIT/ML injection, Inject 5 Units under the skin into the appropriate area as directed 2 (Two) Times a Day., Disp: 5 pen, Rfl: 6  •  Insulin Lispro, 1 Unit Dial, (HumaLOG KwikPen) 100 UNIT/ML solution pen-injector, Per sliding scale 0-9 units tid not to exceed 24 units per day, Disp: 5 pen, Rfl: 6  •  Insulin Pen Needle (Pen Needles 5/16\") 31G X 8 MM misc, 5 each 5 (Five) Times a Day. Dx: E11.9, Disp: 150 each, Rfl: 6  •  lacosamide (VIMPAT) 50 MG tablet tablet, Take 1 tablet by mouth Every 12 (Twelve) Hours., Disp: 180 tablet, Rfl: 3  •  levETIRAcetam (KEPPRA) 250 MG tablet, Take 1 tablet by mouth Daily., Disp: 90 tablet, Rfl: 1  •  metFORMIN (GLUCOPHAGE) 850 MG tablet, Take 1 tablet by mouth 2 (Two) Times a Day With Meals., Disp: 180 tablet, Rfl: 3  •  polyethylene glycol (MIRALAX) 17 g packet, Take 17 g by mouth Daily As Needed (Use if senna-docusate is ineffective)., Disp: , Rfl:   •  sennosides-docusate (PERICOLACE) 8.6-50 MG per tablet, Take 1 tablet by mouth 2 (Two) Times a Day. Hold for loose stools, Disp: , Rfl:   •  traMADol (ULTRAM) 50 MG tablet, Take 1 tablet by mouth Every 6 (Six) Hours As Needed for Moderate Pain ., Disp: 6 tablet, Rfl: 0  •  vitamin B-12 (CYANOCOBALAMIN) 500 MCG tablet, Take 500 mcg by mouth Daily., Disp: , Rfl:   •  " "vitamin B-6 (PYRIDOXINE) 50 MG tablet, Take 1 tablet by mouth Daily., Disp: 30 tablet, Rfl: 0   Past Medical History:   Diagnosis Date   • Allergic rhinitis    • Aortic stenosis    • Atopic rhinitis 7/6/2016   • Benign non-nodular prostatic hyperplasia with lower urinary tract symptoms 9/15/2017   • CAD (coronary artery disease)    • Cardiomyopathy, ischemic    • Colon polyps     tubular adenoma 2016   • Community acquired pneumonia    • Depression 7/6/2016   • Diabetes (HCC)    • Diverticulosis of intestine 7/6/2016    Description: Left and sigmoid   • Dyslipidemia    • LBBB (left bundle branch block)    • PAD (peripheral artery disease) (HCC)       Past Surgical History:   Procedure Laterality Date   • CARDIAC CATHETERIZATION  2018    100% cx   • CATARACT EXTRACTION     • COLONOSCOPY W/ BIOPSIES AND POLYPECTOMY  05/2021    1 benign polyp   • HIP SURGERY Left 08/2020    Dr Mandy BOUDREAUX   • LEG SURGERY Left 07/2016    Popliteal Artery stenting by Dr Hdz   • LEG SURGERY Left 07/2016    skin graft by Dr Nance   • LIPOMA EXCISION      s/p excision on chest   • PACEMAKER IMPLANTATION  01/2019    and defibrillator      Family History   Problem Relation Age of Onset   • Diabetes Other    • Hypertension Other    • Stroke Other    • Coronary artery disease Other         Review of Systems  Objective:    /70   Pulse 70   Ht 165.1 cm (65\")   Wt 68 kg (150 lb)   SpO2 96%   BMI 24.96 kg/m²     Neurology Exam:  General apperance: NAD.     Mental status: Alert, awake and oriented to time place and person.    Recent and Remote memory: Can recall 3/3 objects at 5 minutes. Can recall historical events.     Attention span and Concentration: Serial 7s: Normal.     Fund of knowledge:  Normal.     Language and Speech: No aphasia or dysarthria.    Naming , Repitition and Comprehension:  Can name objects, repeat a sentence and follow commands. Speech is clear and fluent with good repetition, comprehension, and naming.    CN " II to XII: Intact.    Opthalmoscopic Exam: No papilledema.    Motor:  Right UE muscle strength 5/5. Normal tone.     Left UE muscle strength 5/5. Normal tone.      Right LE muscle strength5/5. Normal tone.     Left LE muscle strength 5/5. Normal tone.      Sensory: Normal light touch, vibration and pinprick sensation bilaterally.    DTRs: 2+ bilaterally.    Babinski: Negative bilaterally.    Co-ordination: Normal finger-to-nose, heel to shin B/L.    Rhomberg: Negative.    Gait: Normal.    Cardiovascular: Regular rate and rhythm without murmur, gallop or rub.    Assessment and Plan:  1. 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 Seizure (HCC)  3. Frequent falls  -Patient with history of frequent falls and history of subdural hematoma in the past.  No josiah seizures since last visit in March.  He had an episode of confusion lasting for 3 to 4 days.  However this has resolved completely.  This could very well be related to UTI.  In future, if this happens then I have suggested that he certainly should get UA to rule out possibility of UTI.  Continue with Vimpat 50 mg twice a day, Keppra 250 mg at bedtime for now.  I will plan to see him back in 3 months for follow-up and if at that time, if he remains seizure-free and then my plan is to stop Keppra completely.  Since he is reporting poor quality sleep, I also suggested that he starts melatonin sustained-release 5 mg 30 to 40 minutes prior to bed time and see how he does.    I spent 30 minutes in patient care: Reviewing records prior to the visit, entering orders and documentation and spent more than recinos 50% of this time face-to-face in management, instructions and education regarding above mentioned diagnosis and also on counseling and discussing about taking medication regularly, possible side effects with medication use, importance of good sleep hygiene, good hydration and regular exercise.    Return in about 3 months (around 7/25/2022).

## 2022-04-29 DIAGNOSIS — R56.9 SEIZURE: ICD-10-CM

## 2022-04-29 RX ORDER — LACOSAMIDE 50 MG/1
50 TABLET ORAL EVERY 12 HOURS SCHEDULED
Qty: 60 TABLET | Refills: 11 | Status: SHIPPED | OUTPATIENT
Start: 2022-04-29 | End: 2022-06-03 | Stop reason: SDUPTHER

## 2022-06-03 DIAGNOSIS — R56.9 SEIZURE: ICD-10-CM

## 2022-06-03 RX ORDER — LACOSAMIDE 50 MG/1
50 TABLET ORAL EVERY 12 HOURS SCHEDULED
Qty: 60 TABLET | Refills: 11 | Status: SHIPPED | OUTPATIENT
Start: 2022-06-03

## 2022-06-07 RX ORDER — DONEPEZIL HYDROCHLORIDE 5 MG/1
5 TABLET, FILM COATED ORAL NIGHTLY
Qty: 30 TABLET | Refills: 11 | Status: SHIPPED | OUTPATIENT
Start: 2022-06-07

## 2022-06-15 RX ORDER — BISOPROLOL FUMARATE 10 MG/1
TABLET, FILM COATED ORAL
Qty: 30 TABLET | Refills: 11 | Status: SHIPPED | OUTPATIENT
Start: 2022-06-15

## 2022-06-15 RX ORDER — DOCUSATE SODIUM 100 MG/1
CAPSULE, LIQUID FILLED ORAL
Qty: 60 CAPSULE | Refills: 11 | Status: SHIPPED | OUTPATIENT
Start: 2022-06-15

## 2022-06-15 RX ORDER — CYANOCOBALAMIN (VITAMIN B-12) 500 MCG
TABLET ORAL
Qty: 30 TABLET | Refills: 11 | Status: SHIPPED | OUTPATIENT
Start: 2022-06-15

## 2022-06-15 RX ORDER — LEVETIRACETAM 250 MG/1
TABLET ORAL
Qty: 30 TABLET | Refills: 11 | Status: SHIPPED | OUTPATIENT
Start: 2022-06-15

## 2022-06-15 RX ORDER — FAMOTIDINE 40 MG/1
TABLET, FILM COATED ORAL
Qty: 30 TABLET | Refills: 11 | Status: SHIPPED | OUTPATIENT
Start: 2022-06-15

## 2022-06-15 RX ORDER — LANOLIN ALCOHOL/MO/W.PET/CERES
CREAM (GRAM) TOPICAL
Qty: 30 TABLET | Refills: 11 | Status: SHIPPED | OUTPATIENT
Start: 2022-06-15

## 2022-06-15 RX ORDER — ATORVASTATIN CALCIUM 80 MG/1
TABLET, FILM COATED ORAL
Qty: 30 TABLET | Refills: 11 | Status: SHIPPED | OUTPATIENT
Start: 2022-06-15

## 2022-06-16 ENCOUNTER — TELEPHONE (OUTPATIENT)
Dept: INTERNAL MEDICINE | Facility: CLINIC | Age: 82
End: 2022-06-16

## 2022-06-16 NOTE — TELEPHONE ENCOUNTER
Nessa segundo home health nurse needs verbal orders to complete the OT evaluation next week for the pt ,please advise.     Please call Nessa @ [phone]

## 2022-06-24 ENCOUNTER — TELEPHONE (OUTPATIENT)
Dept: INTERNAL MEDICINE | Facility: CLINIC | Age: 82
End: 2022-06-24

## 2022-06-24 NOTE — TELEPHONE ENCOUNTER
HOME HEALTH IS CALLING FOR CLARIFICATION OF THE DX SO IT CAN BE PROPERLY CODED AND BILLED, SEE QUESTIONS BELOW    IS THE SAH-SDH TRAUMATIC OR NONTRAUMATIC AND ARE THERE ANY RESIDUAL DEFECTS RESULTING FROM THIS (IE. SEIZURES, WEAKNESS, COGNITIVE DISABILITIES)    PATIENT IS TAKING ARICEPT DOES PATIENT HAVE DEMENTIA?    ROHAN SAID TO CALL HER BACK WITH THE INFORMATION AS SOON AS POSSIBLE    [phone]    THIS HAS BEEN SITTING A FEW DAYS AND HAS A MAX OF 5 DAYS TO FINISH, FOLLOW UP ASAP.
